# Patient Record
Sex: FEMALE | Race: WHITE | NOT HISPANIC OR LATINO | Employment: FULL TIME | URBAN - METROPOLITAN AREA
[De-identification: names, ages, dates, MRNs, and addresses within clinical notes are randomized per-mention and may not be internally consistent; named-entity substitution may affect disease eponyms.]

---

## 2017-01-27 RX ORDER — ACYCLOVIR 400 MG/1
TABLET ORAL
Qty: 30 TABLET | Refills: 6 | Status: SHIPPED | OUTPATIENT
Start: 2017-01-27 | End: 2017-06-28 | Stop reason: SDUPTHER

## 2017-04-18 RX ORDER — DESOGESTREL AND ETHINYL ESTRADIOL 0.15-0.03
KIT ORAL
Qty: 28 TABLET | Refills: 0 | Status: SHIPPED | OUTPATIENT
Start: 2017-04-18 | End: 2017-06-14 | Stop reason: SDUPTHER

## 2017-05-23 RX ORDER — LEVOTHYROXINE SODIUM 25 UG/1
25 TABLET ORAL DAILY
Qty: 30 TABLET | Refills: 6 | Status: SHIPPED | OUTPATIENT
Start: 2017-05-23 | End: 2017-12-04 | Stop reason: SDUPTHER

## 2017-06-14 RX ORDER — DESOGESTREL AND ETHINYL ESTRADIOL 0.15-0.03
KIT ORAL
Qty: 28 TABLET | Refills: 0 | Status: SHIPPED | OUTPATIENT
Start: 2017-06-14 | End: 2017-06-28 | Stop reason: SDUPTHER

## 2017-06-18 RX ORDER — TRIAMCINOLONE ACETONIDE 1 MG/G
PASTE DENTAL
Qty: 5 G | Refills: 0 | Status: SHIPPED | OUTPATIENT
Start: 2017-06-18 | End: 2022-01-20 | Stop reason: SDUPTHER

## 2017-06-28 ENCOUNTER — OFFICE VISIT (OUTPATIENT)
Dept: OBSTETRICS AND GYNECOLOGY | Facility: CLINIC | Age: 47
End: 2017-06-28
Payer: COMMERCIAL

## 2017-06-28 VITALS
WEIGHT: 134.5 LBS | BODY MASS INDEX: 22.96 KG/M2 | SYSTOLIC BLOOD PRESSURE: 108 MMHG | HEIGHT: 64 IN | DIASTOLIC BLOOD PRESSURE: 60 MMHG

## 2017-06-28 DIAGNOSIS — Z01.419 ENCOUNTER FOR GYNECOLOGICAL EXAMINATION WITHOUT ABNORMAL FINDING: Primary | ICD-10-CM

## 2017-06-28 DIAGNOSIS — Z30.011 ENCOUNTER FOR INITIAL PRESCRIPTION OF CONTRACEPTIVE PILLS: ICD-10-CM

## 2017-06-28 PROCEDURE — 99396 PREV VISIT EST AGE 40-64: CPT | Mod: S$GLB,,, | Performed by: OBSTETRICS & GYNECOLOGY

## 2017-06-28 PROCEDURE — 99999 PR PBB SHADOW E&M-EST. PATIENT-LVL II: CPT | Mod: PBBFAC,,, | Performed by: OBSTETRICS & GYNECOLOGY

## 2017-06-28 RX ORDER — ACYCLOVIR 400 MG/1
400 TABLET ORAL 2 TIMES DAILY
Qty: 30 TABLET | Refills: 12 | Status: SHIPPED | OUTPATIENT
Start: 2017-06-28 | End: 2018-07-11 | Stop reason: SDUPTHER

## 2017-06-28 RX ORDER — DESOGESTREL AND ETHINYL ESTRADIOL 0.15-0.03
1 KIT ORAL DAILY
Qty: 28 TABLET | Refills: 12 | Status: SHIPPED | OUTPATIENT
Start: 2017-06-28 | End: 2017-06-29 | Stop reason: SDUPTHER

## 2017-06-28 NOTE — PROGRESS NOTES
PT HERE FOR ANNUAL.  WANTS OCP REFILL AND ACYCLOVIR.    ROS:  GENERAL: No fever, chills, fatigability or weight loss.  VULVAR: No pain, no lesions and no itching.  VAGINAL: No relaxation, no itching, no discharge, no abnormal bleeding and no lesions.  ABDOMEN: No abdominal pain. Denies nausea. Denies vomiting. No diarrhea. No constipation  BREAST: Denies pain. No lumps. No discharge.  URINARY: No incontinence, no nocturia, no frequency and no dysuria.  CARDIOVASCULAR: No chest pain. No shortness of breath. No leg cramps.  NEUROLOGICAL: No headaches. No vision changes.  The remainder of the review of systems was negative.    PE:  General Appearance: normal weight And Well developed. Well nourished. In no acute distress.  Vulva: Lesions: No.  Urethral Meatus: Normal size. Normal location. No lesions. No prolapse.  Urethra: No masses. No tenderness. No prolapse. No scarring.  Bladder: No masses. No tenderness.  Vagina: Mucosa NI:yes discharge no, atrophy no, cystocele no or rectocele no.  Cervix: Lesion: no  Stenotic: no Cervical motion tenderness: no  Uterus: Uterus size: 5 weeks. Support good. Uterus size: Normal  Adnexa: Masses: No Tenderness: No CDS Nodularity: No  Abdomen: normal weight No masses. No tenderness.  Breasts: No bilateral masses. No bilateral discharge. No bilateral tenderness. No bilateral fibrocystic changes.  Neck: No thyroid enlargement. No thyroid masses.  Skin: Rashes: No      PROCEDURES:    PLAN:     DIAGNOSIS:  1. Encounter for gynecological examination without abnormal finding    2. Encounter for initial prescription of contraceptive pills        MEDICATIONS & ORDERS:  Orders Placed This Encounter    desogestrel-ethinyl estradiol (APRI) 0.15-0.03 mg per tablet    acyclovir (ZOVIRAX) 400 MG tablet       Patient was counseled today on the new ACS guidelines for cervical cytology screening as well as the current recommendations for breast cancer screening. She was counseled to follow up with  her PCP for other routine health maintenance. Counseling session lasted approximately 10 minutes, and all her questions were answered.         FOLLOW-UP: With me in 12 month

## 2017-06-29 ENCOUNTER — TELEPHONE (OUTPATIENT)
Dept: OBSTETRICS AND GYNECOLOGY | Facility: CLINIC | Age: 47
End: 2017-06-29

## 2017-06-29 RX ORDER — DESOGESTREL AND ETHINYL ESTRADIOL 0.15-0.03
1 KIT ORAL DAILY
Qty: 28 TABLET | Refills: 12 | Status: SHIPPED | OUTPATIENT
Start: 2017-06-29 | End: 2018-06-13 | Stop reason: SDUPTHER

## 2017-06-29 NOTE — TELEPHONE ENCOUNTER
----- Message from Catalina Stovall LPN sent at 6/29/2017 10:57 AM CDT -----  Contact: Self  Can you resend her Rx, the pharm is on file.  Thanks       ----- Message -----  From: Tiana Huizar  Sent: 6/29/2017   9:18 AM  To: Shen WILLIS Jr Staff    Pt is calling in regards of getting her BC (APRI) sent to a different pharmacy if possible. The pt states that she would like it sent to [facility] on [address], [address]. The pt can be reached at [phone]. Thanks KG

## 2017-10-17 RX ORDER — PAROXETINE 10 MG/1
10 TABLET, FILM COATED ORAL NIGHTLY
Qty: 30 TABLET | Refills: 11 | Status: SHIPPED | OUTPATIENT
Start: 2017-10-17 | End: 2018-08-13 | Stop reason: SDUPTHER

## 2017-10-20 ENCOUNTER — OFFICE VISIT (OUTPATIENT)
Dept: URGENT CARE | Facility: CLINIC | Age: 47
End: 2017-10-20
Payer: COMMERCIAL

## 2017-10-20 VITALS
HEART RATE: 80 BPM | SYSTOLIC BLOOD PRESSURE: 113 MMHG | OXYGEN SATURATION: 98 % | BODY MASS INDEX: 22.2 KG/M2 | HEIGHT: 64 IN | RESPIRATION RATE: 16 BRPM | TEMPERATURE: 98 F | DIASTOLIC BLOOD PRESSURE: 77 MMHG | WEIGHT: 130 LBS

## 2017-10-20 DIAGNOSIS — H92.01 EAR PAIN, RIGHT: Primary | ICD-10-CM

## 2017-10-20 PROCEDURE — 99213 OFFICE O/P EST LOW 20 MIN: CPT | Mod: S$GLB,,, | Performed by: FAMILY MEDICINE

## 2017-10-20 NOTE — PATIENT INSTRUCTIONS

## 2017-10-20 NOTE — PROGRESS NOTES
"Subjective:       Patient ID: Becky Ospina is a 47 y.o. female.    Vitals:  height is 5' 4" (1.626 m) and weight is 59 kg (130 lb). Her oral temperature is 98.3 °F (36.8 °C). Her blood pressure is 113/77 and her pulse is 80. Her respiration is 16 and oxygen saturation is 98%.     Chief Complaint: Otalgia    Pt is present today with ear pain, sore throat and congestion that started about 2 weeks ago.       URI    This is a new problem. The current episode started 1 to 4 weeks ago. The problem has been unchanged. Associated symptoms include coughing, ear pain and a sore throat. Pertinent negatives include no abdominal pain, chest pain, congestion, headaches, nausea or wheezing. Treatments tried: Mucinex and Zyrtec  The treatment provided no relief.     Review of Systems   Constitution: Negative for chills, fever and malaise/fatigue.   HENT: Positive for ear pain and sore throat. Negative for congestion and hoarse voice.    Eyes: Positive for discharge and redness.   Cardiovascular: Negative for chest pain, dyspnea on exertion and leg swelling.   Respiratory: Positive for cough and sputum production. Negative for shortness of breath and wheezing.    Musculoskeletal: Negative for myalgias.   Gastrointestinal: Negative for abdominal pain and nausea.   Neurological: Negative for headaches.       Objective:      Physical Exam   Constitutional: She appears well-developed and well-nourished.   HENT:   Head: Normocephalic and atraumatic.   Right Ear: Hearing, tympanic membrane, external ear and ear canal normal.   Left Ear: Hearing, tympanic membrane, external ear and ear canal normal.   Cardiovascular: Normal rate, regular rhythm and normal heart sounds.    Pulmonary/Chest: Effort normal and breath sounds normal.   Nursing note and vitals reviewed.      Assessment:       1. Ear pain, right        Plan:         Ear pain, right    OTC analgesia, RTC prn worsening symptoms    "

## 2017-11-08 ENCOUNTER — OFFICE VISIT (OUTPATIENT)
Dept: INTERNAL MEDICINE | Facility: CLINIC | Age: 47
End: 2017-11-08
Payer: COMMERCIAL

## 2017-11-08 VITALS
TEMPERATURE: 98 F | BODY MASS INDEX: 23.64 KG/M2 | DIASTOLIC BLOOD PRESSURE: 70 MMHG | HEIGHT: 64 IN | HEART RATE: 68 BPM | SYSTOLIC BLOOD PRESSURE: 102 MMHG | WEIGHT: 138.44 LBS

## 2017-11-08 DIAGNOSIS — J01.40 ACUTE NON-RECURRENT PANSINUSITIS: Primary | ICD-10-CM

## 2017-11-08 PROCEDURE — 99214 OFFICE O/P EST MOD 30 MIN: CPT | Mod: S$GLB,,, | Performed by: INTERNAL MEDICINE

## 2017-11-08 PROCEDURE — 99999 PR PBB SHADOW E&M-EST. PATIENT-LVL III: CPT | Mod: PBBFAC,,, | Performed by: INTERNAL MEDICINE

## 2017-11-08 RX ORDER — TRIAMCINOLONE ACETONIDE 55 UG/1
1 SPRAY, METERED NASAL DAILY
Refills: 0 | COMMUNITY
Start: 2017-11-08 | End: 2017-11-18

## 2017-11-08 RX ORDER — METHYLPREDNISOLONE 4 MG/1
TABLET ORAL
Qty: 1 PACKAGE | Refills: 0 | Status: SHIPPED | OUTPATIENT
Start: 2017-11-08 | End: 2017-11-14

## 2017-11-08 NOTE — PATIENT INSTRUCTIONS

## 2017-11-08 NOTE — PROGRESS NOTES
Becky Ospina presents today to urgent care for:  URI      UREVA    This is a new problem. The current episode started 1 to 4 weeks ago. The problem has been waxing and waning. There has been no fever. Associated symptoms include congestion, coughing, ear pain (several weeks ago but this has since resolved), sinus pain and a sore throat. Pertinent negatives include no abdominal pain, chest pain, diarrhea, dysuria, headaches, nausea, neck pain, plugged ear sensation, rhinorrhea, sneezing or wheezing. She has tried antihistamine for the symptoms. The treatment provided mild relief.       Past medical, social, family and surgical history was reviewed and updated today as needed. See encounter for details.     Review of Systems   Constitutional: Negative for chills and fever.   HENT: Positive for congestion, ear pain (several weeks ago but this has since resolved), sinus pain and sore throat. Negative for hearing loss, nosebleeds, rhinorrhea and sneezing.    Eyes: Negative for discharge.   Respiratory: Positive for cough. Negative for wheezing.    Cardiovascular: Negative for chest pain.   Gastrointestinal: Negative for abdominal pain, diarrhea and nausea.   Genitourinary: Negative for dysuria.   Musculoskeletal: Negative for neck pain.   Neurological: Negative for headaches.       Vitals:    11/08/17 0857   BP: 102/70   Pulse: 68   Temp: 98.3 °F (36.8 °C)   Body mass index is 23.76 kg/m².   Physical Exam   Constitutional: She appears well-developed and well-nourished. No distress.   HENT:   Head: Normocephalic and atraumatic.   Right Ear: External ear normal.   Left Ear: External ear normal.   Nose: No mucosal edema or rhinorrhea. Right sinus exhibits no maxillary sinus tenderness and no frontal sinus tenderness. Left sinus exhibits no maxillary sinus tenderness and no frontal sinus tenderness.   Mouth/Throat: Uvula is midline and mucous membranes are normal. Posterior oropharyngeal erythema present. No  posterior oropharyngeal edema. No tonsillar exudate.   Dry nasal membranes     Eyes: Conjunctivae are normal.   Neck: No thyromegaly present.   Cardiovascular: Normal rate, regular rhythm and intact distal pulses.    Pulmonary/Chest: Effort normal and breath sounds normal. She has no wheezes.   Lymphadenopathy:     She has no cervical adenopathy.        Assessment/plan:   1. Acute non-recurrent pansinusitis  Likely either allergic or viral.    Discussed current recommendations for the treatment of sinusitis.   Orders:  - methylPREDNISolone (MEDROL DOSEPACK) 4 mg tablet; use as directed  Dispense: 1 Package; Refill: 0  - triamcinolone (NASACORT) 55 mcg nasal inhaler; 1 spray by Nasal route once daily.; Refill: 0      Return if symptoms worsen or fail to improve. Pt. Will notify me if no improvement after steroid trial and then will proceed with ABX treatment if needed.

## 2017-12-04 RX ORDER — LEVOTHYROXINE SODIUM 25 UG/1
25 TABLET ORAL DAILY
Qty: 30 TABLET | Refills: 1 | Status: SHIPPED | OUTPATIENT
Start: 2017-12-04 | End: 2017-12-05 | Stop reason: SDUPTHER

## 2017-12-05 RX ORDER — LEVOTHYROXINE SODIUM 25 UG/1
25 TABLET ORAL DAILY
Qty: 90 TABLET | Refills: 4 | Status: SHIPPED | OUTPATIENT
Start: 2017-12-05 | End: 2018-05-15 | Stop reason: SDUPTHER

## 2018-01-04 ENCOUNTER — LAB VISIT (OUTPATIENT)
Dept: LAB | Facility: HOSPITAL | Age: 48
End: 2018-01-04
Payer: COMMERCIAL

## 2018-01-04 DIAGNOSIS — Z13.9 ENCOUNTER FOR SCREENING, UNSPECIFIED: ICD-10-CM

## 2018-01-04 DIAGNOSIS — E03.9 ACQUIRED HYPOTHYROIDISM: ICD-10-CM

## 2018-01-04 LAB
ALBUMIN SERPL BCP-MCNC: 3.5 G/DL
ALP SERPL-CCNC: 50 U/L
ALT SERPL W/O P-5'-P-CCNC: 13 U/L
ANION GAP SERPL CALC-SCNC: 7 MMOL/L
AST SERPL-CCNC: 17 U/L
BASOPHILS # BLD AUTO: 0.04 K/UL
BASOPHILS NFR BLD: 0.6 %
BILIRUB SERPL-MCNC: 0.6 MG/DL
BUN SERPL-MCNC: 18 MG/DL
CALCIUM SERPL-MCNC: 8.9 MG/DL
CHLORIDE SERPL-SCNC: 105 MMOL/L
CHOLEST SERPL-MCNC: 190 MG/DL
CHOLEST/HDLC SERPL: 2.4 {RATIO}
CO2 SERPL-SCNC: 25 MMOL/L
CREAT SERPL-MCNC: 0.7 MG/DL
DIFFERENTIAL METHOD: ABNORMAL
EOSINOPHIL # BLD AUTO: 0.2 K/UL
EOSINOPHIL NFR BLD: 2.8 %
ERYTHROCYTE [DISTWIDTH] IN BLOOD BY AUTOMATED COUNT: 11.7 %
EST. GFR  (AFRICAN AMERICAN): >60 ML/MIN/1.73 M^2
EST. GFR  (NON AFRICAN AMERICAN): >60 ML/MIN/1.73 M^2
GLUCOSE SERPL-MCNC: 98 MG/DL
HCT VFR BLD AUTO: 39.3 %
HDLC SERPL-MCNC: 78 MG/DL
HDLC SERPL: 41.1 %
HGB BLD-MCNC: 13 G/DL
LDLC SERPL CALC-MCNC: 90.8 MG/DL
LYMPHOCYTES # BLD AUTO: 1.6 K/UL
LYMPHOCYTES NFR BLD: 23.4 %
MCH RBC QN AUTO: 31.7 PG
MCHC RBC AUTO-ENTMCNC: 33.1 G/DL
MCV RBC AUTO: 96 FL
MONOCYTES # BLD AUTO: 0.7 K/UL
MONOCYTES NFR BLD: 10.2 %
NEUTROPHILS # BLD AUTO: 4.3 K/UL
NEUTROPHILS NFR BLD: 62.9 %
NONHDLC SERPL-MCNC: 112 MG/DL
PLATELET # BLD AUTO: 290 K/UL
PMV BLD AUTO: 9.5 FL
POTASSIUM SERPL-SCNC: 4.7 MMOL/L
PROT SERPL-MCNC: 7 G/DL
RBC # BLD AUTO: 4.1 M/UL
SODIUM SERPL-SCNC: 137 MMOL/L
T4 FREE SERPL-MCNC: 0.88 NG/DL
TRIGL SERPL-MCNC: 106 MG/DL
TSH SERPL DL<=0.005 MIU/L-ACNC: 7.44 UIU/ML
WBC # BLD AUTO: 6.85 K/UL

## 2018-01-04 PROCEDURE — 80061 LIPID PANEL: CPT

## 2018-01-04 PROCEDURE — 84443 ASSAY THYROID STIM HORMONE: CPT

## 2018-01-04 PROCEDURE — 80053 COMPREHEN METABOLIC PANEL: CPT

## 2018-01-04 PROCEDURE — 84439 ASSAY OF FREE THYROXINE: CPT

## 2018-01-04 PROCEDURE — 85025 COMPLETE CBC W/AUTO DIFF WBC: CPT

## 2018-01-04 PROCEDURE — 36415 COLL VENOUS BLD VENIPUNCTURE: CPT

## 2018-01-11 ENCOUNTER — OFFICE VISIT (OUTPATIENT)
Dept: INTERNAL MEDICINE | Facility: CLINIC | Age: 48
End: 2018-01-11
Payer: COMMERCIAL

## 2018-01-11 VITALS
SYSTOLIC BLOOD PRESSURE: 120 MMHG | WEIGHT: 139.75 LBS | BODY MASS INDEX: 23.86 KG/M2 | HEIGHT: 64 IN | DIASTOLIC BLOOD PRESSURE: 64 MMHG | HEART RATE: 76 BPM | OXYGEN SATURATION: 95 %

## 2018-01-11 DIAGNOSIS — R76.8 POSITIVE ANA (ANTINUCLEAR ANTIBODY): ICD-10-CM

## 2018-01-11 DIAGNOSIS — Z12.31 ENCOUNTER FOR SCREENING MAMMOGRAM FOR BREAST CANCER: ICD-10-CM

## 2018-01-11 DIAGNOSIS — Z00.00 PHYSICAL EXAM: Primary | ICD-10-CM

## 2018-01-11 DIAGNOSIS — E03.8 OTHER SPECIFIED HYPOTHYROIDISM: ICD-10-CM

## 2018-01-11 PROCEDURE — 99999 PR PBB SHADOW E&M-EST. PATIENT-LVL IV: CPT | Mod: PBBFAC,,, | Performed by: INTERNAL MEDICINE

## 2018-01-11 PROCEDURE — 99396 PREV VISIT EST AGE 40-64: CPT | Mod: S$GLB,,, | Performed by: INTERNAL MEDICINE

## 2018-01-11 NOTE — PROGRESS NOTES
Subjective:      Patient ID: Becky Ospina is a 47 y.o. female.    Chief Complaint: Annual Exam    HPI:  HPI   Patient is here for her annual exam: Patient feels as if she has had a cold for multiple.    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   HENT: Negative for ear pain, nosebleeds, sore throat, trouble swallowing and voice change.    Eyes: Negative for photophobia and visual disturbance.   Respiratory: Negative for cough, shortness of breath and wheezing.    Cardiovascular: Negative for chest pain, palpitations and leg swelling.   Gastrointestinal: Negative for abdominal distention, abdominal pain and blood in stool.   Genitourinary: Negative for difficulty urinating, dysuria, flank pain and hematuria.   Musculoskeletal: Negative for back pain, gait problem and joint swelling.   Skin: Negative for color change and rash.   Neurological: Negative for seizures and headaches.   Hematological:  "Negative for adenopathy. Does not bruise/bleed easily.   Psychiatric/Behavioral: Negative for dysphoric mood and suicidal ideas.     Objective:     Vitals:    01/11/18 1527   BP: 120/64   Pulse: 76   SpO2: 95%   Weight: 63.4 kg (139 lb 12.4 oz)   Height: 5' 4" (1.626 m)   PainSc: 0-No pain     Body mass index is 23.99 kg/m².  Physical Exam   Constitutional: She is oriented to person, place, and time. She appears well-developed and well-nourished. No distress.   HENT:   Right Ear: Tympanic membrane and ear canal normal.   Left Ear: Tympanic membrane and ear canal normal.   Nose: No sinus tenderness or nasal deformity.   Mouth/Throat: No oropharyngeal exudate or posterior oropharyngeal erythema.   Eyes: Conjunctivae, EOM and lids are normal. Pupils are equal, round, and reactive to light.   Neck: Carotid bruit is not present. No thyromegaly present.   Cardiovascular: Normal rate, regular rhythm and intact distal pulses.    Pulmonary/Chest: Effort normal and breath sounds normal. No respiratory distress.   Abdominal: Soft. Bowel sounds are normal. There is no tenderness.   Musculoskeletal: She exhibits no edema or tenderness.   Lymphadenopathy:        Head (right side): No submandibular adenopathy present.        Head (left side): No submandibular adenopathy present.     She has no cervical adenopathy.     She has no axillary adenopathy.        Right: No inguinal adenopathy present.        Left: No inguinal adenopathy present.   Neurological: She is alert and oriented to person, place, and time. She has normal strength.   Skin: Skin is intact. No lesion noted.   Psychiatric: She has a normal mood and affect. Her speech is normal and behavior is normal.     Assessment:     1. Physical exam    2. Encounter for screening mammogram for breast cancer    3. Other specified hypothyroidism    4. Positive LIBRA (antinuclear antibody)      Plan:   Becky was seen today for annual exam.    Diagnoses and all orders for this " visit:    Physical exam: updated and reviewed    Encounter for screening mammogram for breast cancer  -     Mammo Digital Screening Bilat with Tomosynthesis CAD; Future    Other specified hypothyroidism: patient has positive thyroid antibodies and her tsh has increased although she is assymptomatic.    Positive LIBRA (antinuclear antibody): : in the past no complaints of new symptoms      Return in about 1 year (around 1/11/2019) for Annual exam.     Medication List          Accurate as of 1/11/18  4:26 PM. If you have any questions, ask your nurse or doctor.               CONTINUE taking these medications    acyclovir 400 MG tablet  Commonly known as:  ZOVIRAX  Take 1 tablet (400 mg total) by mouth 2 (two) times daily.     desogestrel-ethinyl estradiol 0.15-0.03 mg per tablet  Commonly known as:  APRI  Take 1 tablet by mouth once daily.     levothyroxine 25 MCG tablet  Commonly known as:  SYNTHROID  Take 1 tablet (25 mcg total) by mouth once daily.     naproxen sodium 550 MG tablet  Commonly known as:  ANAPROX  Take 1 tablet (550 mg total) by mouth 2 (two) times daily with meals.     paroxetine 10 MG tablet  Commonly known as:  PAXIL  TAKE 1 TABLET (10 MG TOTAL) BY MOUTH EVERY EVENING.     triamcinolone acetonide 0.1% 0.1 % paste  Commonly known as:  KENALOG  APPLY TO MOUTH SORES 2 TIMES A DAY

## 2018-01-11 NOTE — PATIENT INSTRUCTIONS
Due: Tdap: tetanus/diptheria/pertussis  Pharmacy  3D Mammogram  Optometry: [facility]

## 2018-01-24 ENCOUNTER — HOSPITAL ENCOUNTER (OUTPATIENT)
Dept: RADIOLOGY | Facility: HOSPITAL | Age: 48
Discharge: HOME | End: 2018-01-24
Attending: INTERNAL MEDICINE
Payer: COMMERCIAL

## 2018-01-24 VITALS — HEIGHT: 64 IN | WEIGHT: 139 LBS | BODY MASS INDEX: 23.73 KG/M2

## 2018-01-24 DIAGNOSIS — Z12.31 ENCOUNTER FOR SCREENING MAMMOGRAM FOR BREAST CANCER: ICD-10-CM

## 2018-01-24 PROCEDURE — 77067 SCR MAMMO BI INCL CAD: CPT | Mod: TC

## 2018-01-24 PROCEDURE — 77067 SCR MAMMO BI INCL CAD: CPT | Mod: 26,,, | Performed by: INTERNAL MEDICINE

## 2018-01-24 PROCEDURE — 77063 BREAST TOMOSYNTHESIS BI: CPT | Mod: 26,,, | Performed by: INTERNAL MEDICINE

## 2018-05-15 RX ORDER — LEVOTHYROXINE SODIUM 25 UG/1
TABLET ORAL
Qty: 135 TABLET | Refills: 4 | Status: SHIPPED | OUTPATIENT
Start: 2018-05-15 | End: 2018-07-11 | Stop reason: SDUPTHER

## 2018-05-15 NOTE — TELEPHONE ENCOUNTER
"----- Message from Jill Kaminski sent at 5/15/2018 10:00 AM CDT -----  Contact: Self Call  [phone]     Is this a refill or new RX:  New     RX name and strength:  Levothyroxine (SYNTHROID) 25 MCG tablet    Directions: Taking 1 1/2    Is this a 30 day or 90 day RX:  90    Pharmacy name and phone # (DON'T enter "on file" or "in chart"):   [facility]   [phone]    Comments:  Please send a new Rx with new directions of taking 1 1/2 pills a day.      "

## 2018-06-13 RX ORDER — DESOGESTREL AND ETHINYL ESTRADIOL 0.15-0.03
1 KIT ORAL DAILY
Qty: 28 TABLET | Refills: 1 | Status: SHIPPED | OUTPATIENT
Start: 2018-06-13 | End: 2018-07-17 | Stop reason: SDUPTHER

## 2018-07-10 ENCOUNTER — NURSE TRIAGE (OUTPATIENT)
Dept: ADMINISTRATIVE | Facility: CLINIC | Age: 48
End: 2018-07-10

## 2018-07-11 ENCOUNTER — OFFICE VISIT (OUTPATIENT)
Dept: INTERNAL MEDICINE | Facility: CLINIC | Age: 48
End: 2018-07-11
Payer: COMMERCIAL

## 2018-07-11 VITALS
OXYGEN SATURATION: 99 % | BODY MASS INDEX: 24.77 KG/M2 | TEMPERATURE: 99 F | DIASTOLIC BLOOD PRESSURE: 60 MMHG | WEIGHT: 145.06 LBS | HEART RATE: 81 BPM | HEIGHT: 64 IN | SYSTOLIC BLOOD PRESSURE: 120 MMHG

## 2018-07-11 DIAGNOSIS — M54.2 NECK PAIN: ICD-10-CM

## 2018-07-11 DIAGNOSIS — G44.85 PRIMARY STABBING HEADACHE: Primary | ICD-10-CM

## 2018-07-11 DIAGNOSIS — E03.8 OTHER SPECIFIED HYPOTHYROIDISM: ICD-10-CM

## 2018-07-11 PROCEDURE — 99213 OFFICE O/P EST LOW 20 MIN: CPT | Mod: S$GLB,,, | Performed by: NURSE PRACTITIONER

## 2018-07-11 PROCEDURE — 3008F BODY MASS INDEX DOCD: CPT | Mod: CPTII,S$GLB,, | Performed by: NURSE PRACTITIONER

## 2018-07-11 PROCEDURE — 99999 PR PBB SHADOW E&M-EST. PATIENT-LVL IV: CPT | Mod: PBBFAC,,, | Performed by: NURSE PRACTITIONER

## 2018-07-11 RX ORDER — LEVOTHYROXINE SODIUM 25 UG/1
TABLET ORAL
Qty: 135 TABLET | Refills: 4 | Status: SHIPPED | OUTPATIENT
Start: 2018-07-11 | End: 2019-07-15 | Stop reason: SDUPTHER

## 2018-07-11 RX ORDER — ACYCLOVIR 400 MG/1
400 TABLET ORAL 2 TIMES DAILY
Qty: 30 TABLET | Refills: 12 | Status: SHIPPED | OUTPATIENT
Start: 2018-07-11 | End: 2018-07-17 | Stop reason: SDUPTHER

## 2018-07-11 RX ORDER — NAPROXEN SODIUM 550 MG/1
550 TABLET ORAL 2 TIMES DAILY WITH MEALS
Qty: 60 TABLET | Refills: 1 | Status: SHIPPED | OUTPATIENT
Start: 2018-07-11 | End: 2022-04-19

## 2018-07-11 NOTE — PROGRESS NOTES
INTERNAL MEDICINE URGENT CARE NOTE    CHIEF COMPLAINT     Chief Complaint   Patient presents with    Headache     only on left side       HPI     Becky Ospina is a 47 y.o. female with hypothyroidism who presents for an urgent visit today.    Here with c/o left sided headaches to the left occipital region started approx one year ago but now occurring daily x1 week. Described as burning sensation to the occipital region moving up to parietal region    Aggravated by hanging head down (ie- over the bath tub) and occasionally with valsalva and turning head to the left.   Relived with moving head to the right or opposite direction or shaking head   No associated s/s- no visual auras, no dizziness, no nausea.   Feels them coming on.     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 Authorizing Provider   acyclovir (ZOVIRAX) 400 MG tablet Take 1 tablet (400 mg total) by mouth 2 (two) times daily. 6/28/17  Yes Diaz Gotti Jr., MD   APRI 0.15-0.03 mg per tablet Take 1 tablet by mouth once daily. 6/13/18  Yes Diaz Gotti Jr., MD   levothyroxine (SYNTHROID) 25 MCG tablet Take 1 1/2 tablets per day 5/15/18  Yes Kacie Silva MD   naproxen sodium (ANAPROX) 550 MG tablet Take 1 tablet (550 mg total) by mouth 2 (two) times daily with meals. 11/22/14  Yes Toan Huddleston MD   paroxetine (PAXIL) 10 MG tablet TAKE 1 TABLET (10 MG TOTAL) BY MOUTH EVERY EVENING. 10/17/17  Yes Kacie Silva MD   triamcinolone acetonide 0.1% (KENALOG) 0.1 % paste APPLY TO MOUTH SORES 2 TIMES A DAY 6/18/17  Yes Kacie Silva MD       Review of Systems:  Review of Systems  "  Constitutional: Negative for chills, fatigue and fever.   Respiratory: Negative for cough, shortness of breath and wheezing.    Cardiovascular: Negative for chest pain and palpitations.   Gastrointestinal: Negative for abdominal pain, blood in stool, constipation, diarrhea and nausea.   Musculoskeletal: Negative for arthralgias, neck pain and neck stiffness.   Skin: Negative for rash.   Neurological: Positive for headaches. Negative for dizziness, seizures, weakness and light-headedness.       Health Maintainence:   Immunizations:  Health Maintenance       Date Due Completion Date    TETANUS VACCINE 07/13/1988 ---    Influenza Vaccine 08/01/2018 10/2/2017    Mammogram 01/24/2019 1/24/2018    Pap Smear with HPV Cotest 04/22/2019 4/22/2016    Lipid Panel 01/04/2023 1/4/2018           PHYSICAL EXAM     /60 (BP Location: Left arm, Patient Position: Sitting, BP Method: Large (Manual))   Pulse 81   Temp 98.6 °F (37 °C)   Ht 5' 4" (1.626 m)   Wt 65.8 kg (145 lb 1 oz)   LMP 07/11/2018 (Exact Date)   SpO2 99%   BMI 24.90 kg/m²     Physical Exam   Constitutional: She is oriented to person, place, and time. She appears well-developed and well-nourished.   HENT:   Head: Normocephalic.   Right Ear: External ear normal.   Left Ear: External ear normal.   Nose: Nose normal.   Mouth/Throat: Oropharynx is clear and moist. No oropharyngeal exudate.   Eyes: Pupils are equal, round, and reactive to light.   Neck: Neck supple. No JVD present. No tracheal deviation present. No thyromegaly present.   Cardiovascular: Normal rate, regular rhythm, normal heart sounds and intact distal pulses.  Exam reveals no gallop and no friction rub.    No murmur heard.  Pulmonary/Chest: Effort normal and breath sounds normal. No respiratory distress. She has no wheezes. She has no rales.   Abdominal: Soft. Bowel sounds are normal. She exhibits no distension. There is no tenderness.   Musculoskeletal: Normal range of motion. She exhibits no " edema or tenderness.   Lymphadenopathy:        Head (right side): No submental, no submandibular, no tonsillar, no preauricular, no posterior auricular and no occipital adenopathy present.        Head (left side): No submental, no submandibular, no tonsillar, no preauricular, no posterior auricular and no occipital adenopathy present.     She has no cervical adenopathy.        Right cervical: No superficial cervical adenopathy present.       Left cervical: No superficial cervical adenopathy present.        Right: No supraclavicular adenopathy present.        Left: No supraclavicular adenopathy present.   Neurological: She is alert and oriented to person, place, and time. She displays normal reflexes. No cranial nerve deficit. Coordination normal.   Skin: Skin is warm and dry. Capillary refill takes less than 2 seconds. No rash noted.   Psychiatric: She has a normal mood and affect. Her behavior is normal.   Vitals reviewed.      LABS     No results found for: LABA1C, HGBA1C  CMP  Sodium   Date Value Ref Range Status   01/04/2018 137 136 - 145 mmol/L Final     Potassium   Date Value Ref Range Status   01/04/2018 4.7 3.5 - 5.1 mmol/L Final     Chloride   Date Value Ref Range Status   01/04/2018 105 95 - 110 mmol/L Final     CO2   Date Value Ref Range Status   01/04/2018 25 23 - 29 mmol/L Final     Glucose   Date Value Ref Range Status   01/04/2018 98 70 - 110 mg/dL Final     BUN, Bld   Date Value Ref Range Status   01/04/2018 18 6 - 20 mg/dL Final     Creatinine   Date Value Ref Range Status   01/04/2018 0.7 0.5 - 1.4 mg/dL Final     Calcium   Date Value Ref Range Status   01/04/2018 8.9 8.7 - 10.5 mg/dL Final     Total Protein   Date Value Ref Range Status   01/04/2018 7.0 6.0 - 8.4 g/dL Final     Albumin   Date Value Ref Range Status   01/04/2018 3.5 3.5 - 5.2 g/dL Final     Total Bilirubin   Date Value Ref Range Status   01/04/2018 0.6 0.1 - 1.0 mg/dL Final     Comment:     For infants and newborns, interpretation  of results should be based  on gestational age, weight and in agreement with clinical  observations.  Premature Infant recommended reference ranges:  Up to 24 hours.............<8.0 mg/dL  Up to 48 hours............<12.0 mg/dL  3-5 days..................<15.0 mg/dL  6-29 days.................<15.0 mg/dL       Alkaline Phosphatase   Date Value Ref Range Status   01/04/2018 50 (L) 55 - 135 U/L Final     AST   Date Value Ref Range Status   01/04/2018 17 10 - 40 U/L Final     ALT   Date Value Ref Range Status   01/04/2018 13 10 - 44 U/L Final     Anion Gap   Date Value Ref Range Status   01/04/2018 7 (L) 8 - 16 mmol/L Final     eGFR if    Date Value Ref Range Status   01/04/2018 >60 >60 mL/min/1.73 m^2 Final     eGFR if non    Date Value Ref Range Status   01/04/2018 >60 >60 mL/min/1.73 m^2 Final     Comment:     Calculation used to obtain the estimated glomerular filtration  rate (eGFR) is the CKD-EPI equation.        Lab Results   Component Value Date    WBC 6.85 01/04/2018    HGB 13.0 01/04/2018    HCT 39.3 01/04/2018    MCV 96 01/04/2018     01/04/2018     Lab Results   Component Value Date    CHOL 190 01/04/2018    CHOL 215 (H) 01/05/2016    CHOL 182 09/23/2014     Lab Results   Component Value Date    HDL 78 (H) 01/04/2018    HDL 79 (H) 01/05/2016    HDL 72 09/23/2014     Lab Results   Component Value Date    LDLCALC 90.8 01/04/2018    LDLCALC 121.4 01/05/2016    LDLCALC 83.6 09/23/2014     Lab Results   Component Value Date    TRIG 106 01/04/2018    TRIG 73 01/05/2016    TRIG 132 09/23/2014     Lab Results   Component Value Date    CHOLHDL 41.1 01/04/2018    CHOLHDL 36.7 01/05/2016    CHOLHDL 39.6 09/23/2014     Lab Results   Component Value Date    TSH 7.441 (H) 01/04/2018       ASSESSMENT/PLAN     Becky Ospina is a 47 y.o. 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 ?occipital neuralgia vs cervical radiculopathy.   -     CT Head Without Contrast; Future; Expected date: 07/11/2018    Neck pain- ?cervical radiculopathy vs occipital neuralgia. Will send for imaging. May need neuro referral if imaging is negative.   -     CT Head Without Contrast; Future; Expected date: 07/11/2018  -     CT Cervical Spine Without Contrast; Future; Expected date: 07/11/2018    Other specified hypothyroidism- stable. Cont current meds. Will refill as pharmacy did not fill appropriately when Dr. Silva sent in rx. May need PA?   -     levothyroxine (SYNTHROID) 25 MCG tablet; Take 1 1/2 tablets per day  Dispense: 135 tablet; Refill: 4    Other orders  -     naproxen sodium (ANAPROX) 550 MG tablet; Take 1 tablet (550 mg total) by mouth 2 (two) times daily with meals.  Dispense: 60 tablet; Refill: 1  -     acyclovir (ZOVIRAX) 400 MG tablet; Take 1 tablet (400 mg total) by mouth 2 (two) times daily.  Dispense: 30 tablet; Refill: 12      Follow up as needed and with PCP     Patient education provided from [facility]. Patient was counseled on when and how to seek emergent care.       Ailyn COHEN, APRN, FNP-c   Department of Internal Medicine - [facility]  4:28 PM

## 2018-07-13 ENCOUNTER — TELEPHONE (OUTPATIENT)
Dept: INTERNAL MEDICINE | Facility: CLINIC | Age: 48
End: 2018-07-13

## 2018-07-13 ENCOUNTER — PATIENT MESSAGE (OUTPATIENT)
Dept: INTERNAL MEDICINE | Facility: CLINIC | Age: 48
End: 2018-07-13

## 2018-07-13 ENCOUNTER — HOSPITAL ENCOUNTER (OUTPATIENT)
Dept: RADIOLOGY | Facility: HOSPITAL | Age: 48
Discharge: HOME | End: 2018-07-13
Attending: NURSE PRACTITIONER
Payer: COMMERCIAL

## 2018-07-13 DIAGNOSIS — G44.85 PRIMARY STABBING HEADACHE: ICD-10-CM

## 2018-07-13 DIAGNOSIS — M54.2 NECK PAIN: ICD-10-CM

## 2018-07-13 DIAGNOSIS — G44.85 PRIMARY STABBING HEADACHE: Primary | ICD-10-CM

## 2018-07-13 PROCEDURE — 70450 CT HEAD/BRAIN W/O DYE: CPT | Mod: 26,,, | Performed by: RADIOLOGY

## 2018-07-13 PROCEDURE — 72125 CT NECK SPINE W/O DYE: CPT | Mod: TC

## 2018-07-13 PROCEDURE — 70450 CT HEAD/BRAIN W/O DYE: CPT | Mod: TC

## 2018-07-13 PROCEDURE — 72125 CT NECK SPINE W/O DYE: CPT | Mod: 26,,, | Performed by: RADIOLOGY

## 2018-07-13 NOTE — TELEPHONE ENCOUNTER
Spoke pt. CT of head - normal. CT of the c-spine with mild foraminal narrowing could explain radicular pain. Discussed treatment options, will refer to PT. Pt is agreeable and verbalizes understanding

## 2018-07-17 ENCOUNTER — OFFICE VISIT (OUTPATIENT)
Dept: OBSTETRICS AND GYNECOLOGY | Facility: CLINIC | Age: 48
End: 2018-07-17
Payer: COMMERCIAL

## 2018-07-17 VITALS
WEIGHT: 143.31 LBS | HEIGHT: 64 IN | SYSTOLIC BLOOD PRESSURE: 110 MMHG | BODY MASS INDEX: 24.46 KG/M2 | DIASTOLIC BLOOD PRESSURE: 70 MMHG

## 2018-07-17 DIAGNOSIS — Z30.011 ENCOUNTER FOR INITIAL PRESCRIPTION OF CONTRACEPTIVE PILLS: ICD-10-CM

## 2018-07-17 DIAGNOSIS — Z01.419 ENCOUNTER FOR GYNECOLOGICAL EXAMINATION WITHOUT ABNORMAL FINDING: Primary | ICD-10-CM

## 2018-07-17 PROCEDURE — 99396 PREV VISIT EST AGE 40-64: CPT | Mod: S$GLB,,, | Performed by: OBSTETRICS & GYNECOLOGY

## 2018-07-17 PROCEDURE — 99999 PR PBB SHADOW E&M-EST. PATIENT-LVL II: CPT | Mod: PBBFAC,,, | Performed by: OBSTETRICS & GYNECOLOGY

## 2018-07-17 RX ORDER — ACYCLOVIR 400 MG/1
400 TABLET ORAL 2 TIMES DAILY
Qty: 60 TABLET | Refills: 12 | Status: SHIPPED | OUTPATIENT
Start: 2018-07-17 | End: 2021-12-29 | Stop reason: SDUPTHER

## 2018-07-17 RX ORDER — DESOGESTREL AND ETHINYL ESTRADIOL 0.15-0.03
1 KIT ORAL DAILY
Qty: 28 TABLET | Refills: 12 | Status: SHIPPED | OUTPATIENT
Start: 2018-07-17 | End: 2019-08-07 | Stop reason: SDUPTHER

## 2018-07-17 NOTE — PROGRESS NOTES
PT HERE FOR ANNUAL.  WANTS REFILL OCP.    ROS:  GENERAL: No fever, chills, fatigability or weight loss.  VULVAR: No pain, no lesions and no itching.  VAGINAL: No relaxation, no itching, no discharge, no abnormal bleeding and no lesions.  ABDOMEN: No abdominal pain. Denies nausea. Denies vomiting. No diarrhea. No constipation  BREAST: Denies pain. No lumps. No discharge.  URINARY: No incontinence, no nocturia, no frequency and no dysuria.  CARDIOVASCULAR: No chest pain. No shortness of breath. No leg cramps.  NEUROLOGICAL: No headaches. No vision changes.  The remainder of the review of systems was negative.    PE:  General Appearance: normal weight And Well developed. Well nourished. In no acute distress.  Vulva: Lesions: No.  Urethral Meatus: Normal size. Normal location. No lesions. No prolapse.  Urethra: No masses. No tenderness. No prolapse. No scarring.  Bladder: No masses. No tenderness.  Vagina: Mucosa NI:yes discharge no, atrophy no, cystocele no or rectocele no.  Cervix: Lesion: no  Stenotic: no Cervical motion tenderness: no  Uterus: Uterus size: 6 weeks. Support good. Uterus size: Normal  Adnexa: Masses: No Tenderness: No CDS Nodularity: No  Abdomen: normal weight No masses. No tenderness.  Breasts: No bilateral masses. No bilateral discharge. No bilateral tenderness. No bilateral fibrocystic changes.  Neck: No thyroid enlargement. No thyroid masses.  Skin: Rashes: No      PROCEDURES:    PLAN:     DIAGNOSIS:  1. Encounter for gynecological examination without abnormal finding    2. Encounter for initial prescription of contraceptive pills        MEDICATIONS & ORDERS:  Orders Placed This Encounter    desogestrel-ethinyl estradiol (APRI) 0.15-0.03 mg per tablet    acyclovir (ZOVIRAX) 400 MG tablet       Patient was counseled today on the new ACS guidelines for cervical cytology screening as well as the current recommendations for breast cancer screening. She was counseled to follow up with her PCP for  other routine health maintenance. Counseling session lasted approximately 10 minutes, and all her questions were answered.         FOLLOW-UP: With me in 12 month

## 2018-07-24 ENCOUNTER — CLINICAL SUPPORT (OUTPATIENT)
Dept: REHABILITATION | Facility: OTHER | Age: 48
End: 2018-07-24
Attending: NURSE PRACTITIONER
Payer: COMMERCIAL

## 2018-07-24 DIAGNOSIS — R29.3 POSTURE IMBALANCE: ICD-10-CM

## 2018-07-24 DIAGNOSIS — M54.2 NECK PAIN: ICD-10-CM

## 2018-07-24 PROCEDURE — 97140 MANUAL THERAPY 1/> REGIONS: CPT | Mod: PN | Performed by: PHYSICAL THERAPIST

## 2018-07-24 PROCEDURE — 97161 PT EVAL LOW COMPLEX 20 MIN: CPT | Mod: PN | Performed by: PHYSICAL THERAPIST

## 2018-07-24 PROCEDURE — 97110 THERAPEUTIC EXERCISES: CPT | Mod: PN | Performed by: PHYSICAL THERAPIST

## 2018-07-24 NOTE — PLAN OF CARE
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  Nutrition:  Normal  History of Present Illness: Cervical spondylosis at C6-C7 resulting in mild bilateral neuroforaminal narrowing without significant spinal canal stenosis at this or any other cervical level level.  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 She will get a stabbing/ burning headaches on left side from base of skull to top of head. Pain will be severe for a few minutes and resolve completely with stretching it different directions. No ear ringing, temporal or orbital pain. History of neck tightness and more so on R side of neck today. No pain into UE's, numbness, or tingling. Parvo virus 5 y.o. And notes widespread  "joint pain since then. She will noticed increase in joint pain when eating inflammatory foods and carbs. Hx of TMD and wear a mouth guard at night.     Pain:  Location: head and neck   Description: hot  Activities Which Increase Pain: extension, watching TV, inflammatory foods/ carbs  Activities Which Decrease Pain: changing positions  Pain Scale: 0/10 at best 3/10 now  8/10 at worst    Objective     Posture: forward head, anterior tilt of scapulae,   Palpation: TTP OC superior, RCP, Upper trapezius, L masseter   Sensation: BUE light touch sensation grossly intact  DTRs: NT  Cervical Spine Active ROM, measured in degrees with inclinometer, * Indicates pain with movement    Flexion: 40  Extension: 80*  Left Side Bend: 20  Right Side Bend: 20  Left Rotation: 42  Right Rotation: 35    PROM C-spine: decreased flexion, B side bending, R rotation  Joint mobility: Hypermobility 2/6 central PA's C6-T2, Downglides R mid cervical    MMT:    Left  Right    Shoulder:       Flexion:    5/5   5/5    Abduction:    5/5   5/5  External Rotation:   5/5   5/5    Internal Rotation:   5/5   5/5    Elbow:  Flexion:    5/5   5/5     Extension:   5/5   5/5    Wrist:  Extension:   5/5   5/5  Flexion    5/5  5/5    Finger adduction  5/5   5/5  Opposition   4/5  5/5  Thumb extension  4+/5  5/5    Flexibility: pecs, cervical paraspinals, UT  Gait: Without AD  Analysis: Assistance independent  Bed Mobility:Independent  Transfers: Independent  Special Tests:   Spurling's:negative  Sharp Ami's: negative  Cervical distraction: positive for relief  ULTT: negative    Treatment: PT Evaluation Completed? Yes  Discussed Plan of Care with patient: Yes    Patient received 15 minutes of therapeutic exercise & instruction including:  Supine chin tucks/ DNF x 10  Upper cervical nods seated x 10  Corner stretch 5 x 10"  Scapula retractions x 20  Self mob with towel R rotation x 10    Patient received 10 minutes of manual therapy including:  suboccipital " release  Manual cervical distraction  downglides R mid cervical, central PA's lower cervical and upper thoracic  MET R rotation and flexion    Written Home Exercises Provided: issued HEP with above exercises  Patient demo good understanding of the education provided. Patient demo good return demo of skill of exercises.      Assessment       Initial Assessment: Becky is a 48 year old female referred to physical therapy for diagnosis of headaches and possible occipital neuralgia. Pt with intermittent chronic neck and TMJ pain. Patient has the following impairments upon initial evaluation: decreased cervical spine AROM, poor postural awareness, L C5-6 myotome weakness, decreased soft tissue mobility, and joint stiffness. Patient to benefit from skilled physical therapy to address above deficits and maximize functional independence. Patient appears motivated to improve condition and is a good candidate for physical therapy. Patient has verbalized understanding of plan of care and set goals. Patient has no identified cultural, spiritual, or educational needs that would impair learning.     History  Co-morbidities and personal factors that may impact the plan of care Examination  Body Structures and Functions, activity limitations and participation restrictions that may impact the plan of care Clinical Presentation   Decision Making/ Complexity Score   Co-morbidities:       none.            Personal Factors:    Body Regions:neck    Body Systems:     Musculoskeletal:  weakness, impaired functional mobility, pain, decreased ROM, impaired joint extensibility and impaired muscle length    Neuromuscular:    Activity limitations: looking up, turning head R      Participation Restrictions: TV watching         stable and uncomplicated.     Low     CMS Impairment/Limitation/Restriction for FOTO Neck Survey  Status Limitation G-Code CMS Severity Modifier  Intake 70% 30% Current Status CJ - At least 20 percent but less than 40  percent  Predicted 74% 26% Goal Status+ CJ - At least 20 percent but less than 40 percent    Rehab Potiential: good    Short Term GOALS: 4 weeks  1. Patient to be independent with home exercise program for improved self management of condition  2. Patient demonstrates independence with Postural Awareness  3. Patient to report decreased frequency L sided headaches by 30% or greater  4. Patient demonstrates independence with body mechanics    Long Term GOALS: 8 weeks  1. Patient demonstrates increased cervical spine AROM to WNL to improve tolerance to functional activities pain free.   3. Patient to have decreased subjective report of disability as noted by a score of 26% or less on the FOTO questionnaire       Plan     Certification Period: 7/24/2018 to 9/24/2018  Recommended Treatment Plan: 1 times per week for 8 weeks: Cervical/Lumbar Traction, Manual Therapy, Moist Heat/ Ice, Neuromuscular Re-ed, Patient Education, Therapeutic Activites, Therapeutic Exercise and modalities and functional dry needling PRN  Other Recommendations: Patient may be seen by PTA as part of treatment team        Therapist: Phoebe Ashley PT

## 2018-08-10 ENCOUNTER — CLINICAL SUPPORT (OUTPATIENT)
Dept: REHABILITATION | Facility: OTHER | Age: 48
End: 2018-08-10
Payer: COMMERCIAL

## 2018-08-10 DIAGNOSIS — M54.2 NECK PAIN: ICD-10-CM

## 2018-08-10 DIAGNOSIS — R29.3 POSTURE IMBALANCE: ICD-10-CM

## 2018-08-10 PROCEDURE — 97110 THERAPEUTIC EXERCISES: CPT | Mod: PN | Performed by: PHYSICAL THERAPIST

## 2018-08-10 PROCEDURE — 97140 MANUAL THERAPY 1/> REGIONS: CPT | Mod: PN | Performed by: PHYSICAL THERAPIST

## 2018-08-10 NOTE — PROGRESS NOTES
"                                                    Physical Therapy Daily Note     Name: Becky Cade [facility]  Clinic Number: [id number]  Diagnosis:   Encounter Diagnoses   Name Primary?    Neck pain     Posture imbalance      Physician: Ailyn Rivero,*  Precautions: standard  Visit #: 2 of 20  PTA Visit #: 0  Time In: 0900  Time Out: 0935    Subjective     Pt reports: Feeling very tense after the evaluation. She bought a massager and has been doing the exercises with some relief. She was called into work today and requests shortened treatment session  Pain Scale: Becky rates pain on a scale of 0-10 to be 3 currently.    Objective     Becky received individual therapeutic exercises to develop strength, endurance, ROM, flexibility, posture and core stabilization for 25 minutes including:    Supine chin tucks/ DNF x 10  Upper cervical nods seated x 10  Corner stretch 5 x 10"  Scapula retractions x 20  Self mob with towel R rotation x 10    Becky received the following manual therapy techniques: Manual traction, Myofacial release and Soft tissue Mobilization were applied to the: neck for 10 minutes including:  suboccipital release  Manual cervical distraction  downglides R mid cervical, central PA's lower cervical and upper thoracic  MET R rotation and flexion     The patient received the following supervised modalities after being cleared for contradictions: none    Written Home Exercises Provided: reviewed   Pt demo good understanding of the education provided. Becky demonstrated good return demonstration of activities.     Education provided re:  Becky verbalized good understanding of education provided.   No spiritual or educational barriers to learning provided    Assessment     Patient tolerated treatment session well. Increased TTP R suboccipital region with reported referral of headache.   This is a 48 y.o. female referred to outpatient physical therapy and presents with a medical " diagnosis of neck pain and headaches and demonstrates limitations as described in the problem list. Pt prognosis is Good. Pt will continue to benefit from skilled outpatient physical therapy to address the deficits listed in the problem list, provide pt/family education and to maximize pt's level of independence in the home and community environment.     Goals as follows:  Short Term GOALS: 4 weeks  1. Patient to be independent with home exercise program for improved self management of condition  2. Patient demonstrates independence with Postural Awareness  3. Patient to report decreased frequency L sided headaches by 30% or greater  4. Patient demonstrates independence with body mechanics     Long Term GOALS: 8 weeks  1. Patient demonstrates increased cervical spine AROM to WNL to improve tolerance to functional activities pain free.   3. Patient to have decreased subjective report of disability as noted by a score of 26% or less on the FOTO questionnaire      Plan     Continue with established Plan of Care towards PT goals.    Therapist: Phoebe Ashley, PT  8/10/2018

## 2018-08-13 RX ORDER — PAROXETINE 10 MG/1
10 TABLET, FILM COATED ORAL NIGHTLY
Qty: 30 TABLET | Refills: 3 | Status: SHIPPED | OUTPATIENT
Start: 2018-08-13 | End: 2018-11-08 | Stop reason: SDUPTHER

## 2018-08-17 ENCOUNTER — CLINICAL SUPPORT (OUTPATIENT)
Dept: REHABILITATION | Facility: OTHER | Age: 48
End: 2018-08-17
Payer: COMMERCIAL

## 2018-08-17 DIAGNOSIS — M54.2 NECK PAIN: ICD-10-CM

## 2018-08-17 DIAGNOSIS — R29.3 POSTURE IMBALANCE: ICD-10-CM

## 2018-08-17 PROCEDURE — 97140 MANUAL THERAPY 1/> REGIONS: CPT | Mod: PN | Performed by: PHYSICAL THERAPIST

## 2018-08-17 PROCEDURE — 97110 THERAPEUTIC EXERCISES: CPT | Mod: PN | Performed by: PHYSICAL THERAPIST

## 2018-08-17 NOTE — PROGRESS NOTES
"                                                    Physical Therapy Daily Note     Name: Becky Cade [facility] Number: [id number]  Diagnosis:   Encounter Diagnoses   Name Primary?    Neck pain     Posture imbalance      Physician: Ailyn Rivero,*  Precautions: standard  Visit #: 2 of 20  PTA Visit #: 0  Time In: 0900  Time Out: 0945    Subjective     Pt reports: Pt reports using a massager at home with good relief. No headaches currently, and pain on R side of neck  Pain Scale: Becky rates pain on a scale of 0-10 to be 3 currently.    Objective     Becky received individual therapeutic exercises to develop strength, endurance, ROM, flexibility, posture and core stabilization for 35 minutes including:    UBE 4 min    Supine chin tucks/ DNF x 10  Upper cervical nods seated x 10  Corner stretch 5 x 10"  Rows ytb 2 x 15  Scapula retractions x 20  Self mob with towel R rotation x 10    Supine on 1/2 roll:  -shoulder flexion x 10  -horiz abd 10" x 10  -protraction 2 x 10    Becky received the following manual therapy techniques: Manual traction, Myofacial release and Soft tissue Mobilization were applied to the: neck for 10 minutes including:  suboccipital release  Manual cervical distraction  downglides R mid cervical, central PA's lower cervical and upper thoracic  MET R rotation and flexion     The patient received the following supervised modalities after being cleared for contradictions: none    Written Home Exercises Provided: reviewed   Pt demo good understanding of the education provided. Becky demonstrated good return demonstration of activities.     Education provided re:  Becky verbalized good understanding of education provided.   No spiritual or educational barriers to learning provided    Assessment     Patient tolerated treatment session well. Good progression of periscapular of periscapular strengthening without exacerbation of pain or headaches.   This is a 48 y.o. " female referred to outpatient physical therapy and presents with a medical diagnosis of neck pain and headaches and demonstrates limitations as described in the problem list. Pt prognosis is Good. Pt will continue to benefit from skilled outpatient physical therapy to address the deficits listed in the problem list, provide pt/family education and to maximize pt's level of independence in the home and community environment.     Goals as follows:  Short Term GOALS: 4 weeks  1. Patient to be independent with home exercise program for improved self management of condition  2. Patient demonstrates independence with Postural Awareness  3. Patient to report decreased frequency L sided headaches by 30% or greater  4. Patient demonstrates independence with body mechanics     Long Term GOALS: 8 weeks  1. Patient demonstrates increased cervical spine AROM to WNL to improve tolerance to functional activities pain free.   3. Patient to have decreased subjective report of disability as noted by a score of 26% or less on the FOTO questionnaire      Plan     Continue with established Plan of Care towards PT goals.    Therapist: Phoebe Ashley, PT  8/17/2018

## 2018-08-22 ENCOUNTER — CLINICAL SUPPORT (OUTPATIENT)
Dept: REHABILITATION | Facility: OTHER | Age: 48
End: 2018-08-22
Payer: COMMERCIAL

## 2018-08-22 DIAGNOSIS — M54.2 NECK PAIN: ICD-10-CM

## 2018-08-22 DIAGNOSIS — R29.3 POSTURE IMBALANCE: ICD-10-CM

## 2018-08-22 PROCEDURE — 97110 THERAPEUTIC EXERCISES: CPT | Mod: PN | Performed by: PHYSICAL THERAPIST

## 2018-08-22 NOTE — PROGRESS NOTES
"                                                    Physical Therapy Daily Note     Name: Becky Cade [facility] Number: [id number]  Diagnosis:   Encounter Diagnoses   Name Primary?    Neck pain     Posture imbalance      Physician: Ailyn Rivero,*  Precautions: standard  Visit #: 2 of 20  PTA Visit #: 0  Time In: 0855  Time Out: 0935    Subjective     Pt reports: Pt reports no episodes of left sided headaches since last visit, only a little soreness. She reports intermittent R sided neck pain and tightness.   Pain Scale: Becky rates pain on a scale of 0-10 to be 0 currently.    Objective     Cervical Spine Active ROM, measured in degrees with inclinometer, * Indicates pain with movement     Flexion: 60  Extension: 80  Left Side Bend: 30  Right Side Bend: 25  Left Rotation: 55  Right Rotation: 45    BUE's grossly 5/5 with exception of middle rhomboids R: 4/5, L: 4+/5, Lower traps: R: 3+/5, L: 4/5    Becky received individual therapeutic exercises to develop strength, endurance, ROM, flexibility, posture and core stabilization for 35 minutes including:    UBE 4 min    Supine chin tucks/ DNF x 10  Upper cervical nods seated x 10  Corner stretch 5 x 10"  Rows ytb 2 x 15- np  Scapula retractions x 20  Self mob with towel R rotation x 10- np    Supine on 1/2 roll:  -shoulder flexion x 10  -horiz abd 10" x 10  -protraction 2 x 10    Becky received the following manual therapy techniques: Manual traction, Myofacial release and Soft tissue Mobilization were applied to the: neck for 5 minutes including:  suboccipital release  Manual cervical distraction  downglides R mid cervical, central PA's lower cervical and upper thoracic- np  MET R rotation and flexion - np    The patient received the following supervised modalities after being cleared for contradictions: none    Written Home Exercises Provided: reviewed   Pt demo good understanding of the education provided. Becky demonstrated good " return demonstration of activities.     Education provided re:  Becky verbalized good understanding of education provided.   No spiritual or educational barriers to learning provided    Assessment     Patient tolerated treatment session well with one month reassessment performed today. Patient demonstrating improved cervical spine AROM as noted by recent objective measures. Patient has met 4/4 short term goals.Patient to benefit from continued skilled physical therapy for normalized cervical spine ROM, improved periscapular strength, and pain.     This is a 48 y.o. female referred to outpatient physical therapy and presents with a medical diagnosis of neck pain and headaches and demonstrates limitations as described in the problem list. Pt prognosis is Good. Pt will continue to benefit from skilled outpatient physical therapy to address the deficits listed in the problem list, provide pt/family education and to maximize pt's level of independence in the home and community environment.     Goals as follows:  Short Term GOALS: 4 weeks  1. Patient to be independent with home exercise program for improved self management of condition- met  2. Patient demonstrates independence with Postural Awareness- met  3. Patient to report decreased frequency L sided headaches by 30% or greater- met  4. Patient demonstrates independence with body mechanics- met     Long Term GOALS: 8 weeks  1. Patient demonstrates increased cervical spine AROM to WNL to improve tolerance to functional activities pain free. - in progress  3. Patient to have decreased subjective report of disability as noted by a score of 26% or less on the FOTO questionnaire      Plan     Continue with established Plan of Care towards PT goals.    Therapist: Phoebe Ashley, PT  8/22/2018

## 2018-08-31 ENCOUNTER — CLINICAL SUPPORT (OUTPATIENT)
Dept: REHABILITATION | Facility: OTHER | Age: 48
End: 2018-08-31
Payer: COMMERCIAL

## 2018-08-31 DIAGNOSIS — R29.3 POSTURE IMBALANCE: ICD-10-CM

## 2018-08-31 DIAGNOSIS — M54.2 NECK PAIN: ICD-10-CM

## 2018-08-31 PROCEDURE — 97140 MANUAL THERAPY 1/> REGIONS: CPT | Mod: PN | Performed by: PHYSICAL THERAPIST

## 2018-08-31 PROCEDURE — 97110 THERAPEUTIC EXERCISES: CPT | Mod: PN | Performed by: PHYSICAL THERAPIST

## 2018-08-31 NOTE — PROGRESS NOTES
"Name: Becky Cade [facility] Number: [id number]   Age: 48 y.o.   Diagnosis:   Encounter Diagnoses   Name Primary?    Neck pain     Posture imbalance       Physician: Ailyn Rivero,*   Original Orders : PT eval and treat  Initial visit: 7/24/2018  Date of Last visit: 8/31/2018  Date of Discharge Note:  8/31/2018  Total Visits Received: 4  Missed Visits: 0    Subjective: feeling a lot looser and able to move her neck more. She continues to have intermittent headaches on left side, worse when washing her hair. Headache stops when she stops the activity. Her headaches and pain are manageable with the exercises. She also has a massager that she uses. Pt requests discharge from therapy and will come back if she has any problems.     Objective:  Treatment :    Included:Therapeutic exercise, Joint mobilizations, Soft tissue mobilizations, Moist heat and educated in HEP       8/22/2018   Cervical Spine Active ROM, measured in degrees with inclinometer, * Indicates pain with movement     Flexion: 60  Extension: 80  Left Side Bend: 30  Right Side Bend: 25  Left Rotation: 55  Right Rotation: 45    BUE's grossly 5/5 with exception of middle rhomboids R: 4/5, L: 4+/5, Lower traps: R: 3+/5, L: 4/5    Treatment today:    Time In: 0905  Time Out: 0945     UBE 4 min    Supine chin tucks/ DNF x 10  Upper cervical nods seated x 10  Corner stretch 5 x 10"  Rows ytb 2 x 15- np  Scapula retractions x 20  Self mob with towel R rotation x 10- np    Supine on 1/2 roll:  -shoulder flexion x 10  -horiz abd 10" x 10  -protraction 2 x 10    Becky received the following manual therapy techniques: Manual traction, Myofacial release and Soft tissue Mobilization were applied to the: neck for 5 minutes including:  suboccipital release  Manual cervical distraction  downglides R mid cervical, central PA's lower cervical and upper thoracic- np  MET R rotation and flexion - np      Assessment:  Pt achieved personal goals and " appropriate to discharge to HEP. Pt to contact PCP if any problems arise for referral for continued therapy or to neurology for headaches.     Goals as follows:  Short Term GOALS: 4 weeks  1. Patient to be independent with home exercise program for improved self management of condition- met  2. Patient demonstrates independence with Postural Awareness- met  3. Patient to report decreased frequency L sided headaches by 30% or greater- met  4. Patient demonstrates independence with body mechanics- met     Long Term GOALS: 8 weeks  1. Patient demonstrates increased cervical spine AROM to WNL to improve tolerance to functional activities pain free. -partially met  3. Patient to have decreased subjective report of disability as noted by a score of 26% or less on the FOTO questionnaire - met       CMS Impairment/Limitation/Restriction for FOTO Neck Survey  Status Limitation G-Code CMS Severity Modifier  Intake 70% 30%  Predicted 74% 26% Goal Status+ CJ - At least 20 percent but less than 40 percent  8/31/2018 78% 22% Current Status CJ - At least 20 percent but less than 40 percent    Discharge reason : Physician discontinued services    Discharge plan :Continue HEP and Pt to follow-up with MD as planned    Plan:  This patient is discharged from Physical Therapy Services.

## 2018-10-02 ENCOUNTER — TELEPHONE (OUTPATIENT)
Dept: INTERNAL MEDICINE | Facility: CLINIC | Age: 48
End: 2018-10-02

## 2018-10-02 DIAGNOSIS — Z12.11 COLON CANCER SCREENING: Primary | ICD-10-CM

## 2018-11-08 RX ORDER — PAROXETINE 10 MG/1
10 TABLET, FILM COATED ORAL NIGHTLY
Qty: 30 TABLET | Refills: 6 | Status: SHIPPED | OUTPATIENT
Start: 2018-11-08 | End: 2019-04-01 | Stop reason: SDUPTHER

## 2019-02-04 ENCOUNTER — TELEPHONE (OUTPATIENT)
Dept: INTERNAL MEDICINE | Facility: CLINIC | Age: 49
End: 2019-02-04

## 2019-02-04 DIAGNOSIS — Z12.31 ENCOUNTER FOR SCREENING MAMMOGRAM FOR BREAST CANCER: Primary | ICD-10-CM

## 2019-02-04 NOTE — TELEPHONE ENCOUNTER
----- Message from Danyel Jones sent at 2/4/2019  9:20 AM CST -----  Contact: self/[phone]  Type: Orders Request    What orders/ testing are being requested? Mammogram     Is there a future appointment scheduled for the patient with PCP? NO    When?NA    Would you prefer a response via [employer]? NO    Comments: Please advise.        Thank You

## 2019-02-08 ENCOUNTER — HOSPITAL ENCOUNTER (OUTPATIENT)
Dept: RADIOLOGY | Facility: HOSPITAL | Age: 49
Discharge: HOME | End: 2019-02-08
Attending: INTERNAL MEDICINE
Payer: COMMERCIAL

## 2019-02-08 VITALS — BODY MASS INDEX: 27.66 KG/M2 | WEIGHT: 162 LBS | HEIGHT: 64 IN

## 2019-02-08 DIAGNOSIS — Z12.31 ENCOUNTER FOR SCREENING MAMMOGRAM FOR BREAST CANCER: ICD-10-CM

## 2019-02-08 PROCEDURE — 77067 SCR MAMMO BI INCL CAD: CPT | Mod: TC

## 2019-02-08 PROCEDURE — 77063 BREAST TOMOSYNTHESIS BI: CPT | Mod: 26,,, | Performed by: RADIOLOGY

## 2019-02-08 PROCEDURE — 77067 MAMMO DIGITAL SCREENING BILAT WITH TOMOSYNTHESIS_CAD: ICD-10-PCS | Mod: 26,,, | Performed by: RADIOLOGY

## 2019-02-08 PROCEDURE — 77067 SCR MAMMO BI INCL CAD: CPT | Mod: 26,,, | Performed by: RADIOLOGY

## 2019-02-08 PROCEDURE — 77063 MAMMO DIGITAL SCREENING BILAT WITH TOMOSYNTHESIS_CAD: ICD-10-PCS | Mod: 26,,, | Performed by: RADIOLOGY

## 2019-03-14 ENCOUNTER — TELEPHONE (OUTPATIENT)
Dept: DERMATOLOGY | Facility: CLINIC | Age: 49
End: 2019-03-14

## 2019-03-14 NOTE — TELEPHONE ENCOUNTER
----- Message from Beth Heller sent at 3/14/2019  9:09 AM CDT -----  Contact: PT  Needs Advice    Reason for call: Patient is requesting an appointment I cannot schedule do not see any available appointments        Communication Preference: [phone]    Additional Information:

## 2019-04-01 RX ORDER — PAROXETINE 10 MG/1
10 TABLET, FILM COATED ORAL NIGHTLY
Qty: 30 TABLET | Refills: 12 | Status: SHIPPED | OUTPATIENT
Start: 2019-04-01 | End: 2020-05-12 | Stop reason: SDUPTHER

## 2019-04-09 ENCOUNTER — OFFICE VISIT (OUTPATIENT)
Dept: DERMATOLOGY | Facility: CLINIC | Age: 49
End: 2019-04-09
Payer: COMMERCIAL

## 2019-04-09 DIAGNOSIS — D22.9 MULTIPLE BENIGN NEVI: ICD-10-CM

## 2019-04-09 DIAGNOSIS — L30.9 ECZEMA, UNSPECIFIED TYPE: Primary | ICD-10-CM

## 2019-04-09 DIAGNOSIS — D18.01 ANGIOMA OF SKIN: ICD-10-CM

## 2019-04-09 DIAGNOSIS — L81.4 SOLAR LENTIGO: ICD-10-CM

## 2019-04-09 DIAGNOSIS — Z12.83 SCREENING EXAM FOR SKIN CANCER: ICD-10-CM

## 2019-04-09 PROCEDURE — 99214 OFFICE O/P EST MOD 30 MIN: CPT | Mod: S$GLB,,, | Performed by: PATHOLOGY

## 2019-04-09 PROCEDURE — 99999 PR PBB SHADOW E&M-EST. PATIENT-LVL II: CPT | Mod: PBBFAC,,, | Performed by: PATHOLOGY

## 2019-04-09 PROCEDURE — 99214 PR OFFICE/OUTPT VISIT, EST, LEVL IV, 30-39 MIN: ICD-10-PCS | Mod: S$GLB,,, | Performed by: PATHOLOGY

## 2019-04-09 PROCEDURE — 99999 PR PBB SHADOW E&M-EST. PATIENT-LVL II: ICD-10-PCS | Mod: PBBFAC,,, | Performed by: PATHOLOGY

## 2019-04-09 RX ORDER — TRIAMCINOLONE ACETONIDE 1 MG/G
CREAM TOPICAL
Qty: 454 G | Refills: 3 | Status: SHIPPED | OUTPATIENT
Start: 2019-04-09

## 2019-04-09 NOTE — PROGRESS NOTES
Subjective:       Patient ID:  Becky Ospina is a 48 y.o. female who presents for   Chief Complaint   Patient presents with    Spot     Pt here today for spot on left forearm, itching, 1+ year, Tx. none Pt request TBSE     Spot  - Initial  Affected locations: left arm  Duration: 1 year  Signs / symptoms: itching  Severity: mild to moderate  Timing: recurrent  Aggravated by: nothing  Relieving factors/Treatments tried: nothing  Improvement on treatment: no relief      Pt with h/o prednisone responsive hypersensitivity dermatitis in 2016.  Has continued mild itching to neck, arms, legs, back.  Uses Curel or CeraVe creams.  Uses a backscratcher.  No personal h/o MM or NMSC.  Here for skin cancer screening today.    Review of Systems   Constitutional: Negative for fever, chills, weight loss, weight gain, fatigue, night sweats and malaise.   Skin: Positive for itching, rash, dry skin and activity-related sunscreen use. Negative for daily sunscreen use.        Objective:    Physical Exam   Constitutional: She appears well-developed and well-nourished. No distress.   Neurological: She is alert and oriented to person, place, and time. She is not disoriented.   Psychiatric: She has a normal mood and affect.   Skin:   Areas Examined (abnormalities noted in diagram):   Scalp / Hair Palpated and Inspected  Head / Face Inspection Performed  Neck Inspection Performed  Chest / Axilla Inspection Performed  Abdomen Inspection Performed  Genitals / Buttocks / Groin Inspection Performed  Back Inspection Performed  RUE Inspected  LUE Inspection Performed  RLE Inspected  LLE Inspection Performed  Nails and Digits Inspection Performed                   Diagram Legend     Erythematous scaling macule/papule c/w actinic keratosis       Vascular papule c/w angioma      Pigmented verrucoid papule/plaque c/w seborrheic keratosis      Yellow umbilicated papule c/w sebaceous hyperplasia      Irregularly shaped tan macule c/w  lentigo     1-2 mm smooth white papules consistent with Milia      Movable subcutaneous cyst with punctum c/w epidermal inclusion cyst      Subcutaneous movable cyst c/w pilar cyst      Firm pink to brown papule c/w dermatofibroma      Pedunculated fleshy papule(s) c/w skin tag(s)      Evenly pigmented macule c/w junctional nevus     Mildly variegated pigmented, slightly irregular-bordered macule c/w mildly atypical nevus      Flesh colored to evenly pigmented papule c/w intradermal nevus       Pink pearly papule/plaque c/w basal cell carcinoma      Erythematous hyperkeratotic cursted plaque c/w SCC      Surgical scar with no sign of skin cancer recurrence      Open and closed comedones      Inflammatory papules and pustules      Verrucoid papule consistent consistent with wart     Erythematous eczematous patches and plaques     Dystrophic onycholytic nail with subungual debris c/w onychomycosis     Umbilicated papule    Erythematous-base heme-crusted tan verrucoid plaque consistent with inflamed seborrheic keratosis     Erythematous Silvery Scaling Plaque c/w Psoriasis     See annotation      Assessment / Plan:        Eczema, unspecified type - mild  -     triamcinolone acetonide 0.1% (KENALOG) 0.1 % cream; AAA bid  Dispense: 454 g; Refill: 3    Good skin care regimen discussed including limiting to one bath or shower/day, using lukewarm water with mild soap and moisturizing cream to skin 1 - 2x/day. Brochure was provided and reviewed with patient.    Suggest CeraVe anti-itch cream with pramoxine.    Solar lentigo - This is a benign hyperpigmented sun induced lesion. Daily sun protection will reduce the number of new lesions. Treatment of these benign lesions are considered cosmetic.      Angioma of skin - This is a benign vascular lesion. Reassurance given. No treatment required.       Multiple benign nevi - Patient with several benign appearing nevi. Instructed patient to observe lesion(s) for changes and follow  up in clinic if changes are noted.     Screening exam from skin cancer - no lesions concerning for malignancy           Follow up in about 1 year (around 4/9/2020), or if symptoms worsen or fail to improve.

## 2019-06-25 ENCOUNTER — TELEPHONE (OUTPATIENT)
Dept: INTERNAL MEDICINE | Facility: CLINIC | Age: 49
End: 2019-06-25

## 2019-06-25 RX ORDER — OFLOXACIN 3 MG/ML
SOLUTION/ DROPS OPHTHALMIC
Qty: 1 BOTTLE | Refills: 0 | Status: SHIPPED | OUTPATIENT
Start: 2019-06-25 | End: 2020-01-06

## 2019-06-25 NOTE — TELEPHONE ENCOUNTER
----- Message from Teto Osuna sent at 6/25/2019  8:45 AM CDT -----  Contact: Patient [phone]  Patient would like to get medical advice.  Symptoms (please be specific): Right eye Itchy and  puffy   How long has patient had these symptoms:  1 day  Pharmacy name and phone # [id number] - [place], [place] - [address]  [phone] (Phone) [phone] (Fax)    Comments:  Her was diagnosed with pink eye and now she thinks she's got it

## 2019-07-15 ENCOUNTER — TELEPHONE (OUTPATIENT)
Dept: INTERNAL MEDICINE | Facility: CLINIC | Age: 49
End: 2019-07-15

## 2019-07-15 DIAGNOSIS — E03.8 OTHER SPECIFIED HYPOTHYROIDISM: ICD-10-CM

## 2019-07-16 RX ORDER — LEVOTHYROXINE SODIUM 25 UG/1
TABLET ORAL
Qty: 135 TABLET | Refills: 0 | Status: SHIPPED | OUTPATIENT
Start: 2019-07-16 | End: 2020-01-06 | Stop reason: DRUGHIGH

## 2019-07-17 ENCOUNTER — PATIENT OUTREACH (OUTPATIENT)
Dept: ADMINISTRATIVE | Facility: OTHER | Age: 49
End: 2019-07-17

## 2019-07-17 DIAGNOSIS — E03.8 OTHER SPECIFIED HYPOTHYROIDISM: ICD-10-CM

## 2019-07-17 RX ORDER — LEVOTHYROXINE SODIUM 25 UG/1
TABLET ORAL
Qty: 135 TABLET | Refills: 4 | Status: SHIPPED | OUTPATIENT
Start: 2019-07-17 | End: 2019-07-19 | Stop reason: SDUPTHER

## 2019-07-19 ENCOUNTER — OFFICE VISIT (OUTPATIENT)
Dept: OBSTETRICS AND GYNECOLOGY | Facility: CLINIC | Age: 49
End: 2019-07-19
Payer: COMMERCIAL

## 2019-07-19 VITALS
WEIGHT: 142 LBS | SYSTOLIC BLOOD PRESSURE: 108 MMHG | BODY MASS INDEX: 24.24 KG/M2 | DIASTOLIC BLOOD PRESSURE: 62 MMHG | HEIGHT: 64 IN

## 2019-07-19 DIAGNOSIS — N39.46 MIXED STRESS AND URGE URINARY INCONTINENCE: ICD-10-CM

## 2019-07-19 DIAGNOSIS — Z01.419 ENCOUNTER FOR GYNECOLOGICAL EXAMINATION WITHOUT ABNORMAL FINDING: Primary | ICD-10-CM

## 2019-07-19 LAB
BILIRUB UR QL STRIP: NEGATIVE
CLARITY UR REFRACT.AUTO: ABNORMAL
COLOR UR AUTO: YELLOW
GLUCOSE UR QL STRIP: NEGATIVE
HGB UR QL STRIP: ABNORMAL
KETONES UR QL STRIP: NEGATIVE
LEUKOCYTE ESTERASE UR QL STRIP: NEGATIVE
MICROSCOPIC COMMENT: NORMAL
NITRITE UR QL STRIP: NEGATIVE
PH UR STRIP: 6 [PH] (ref 5–8)
PROT UR QL STRIP: NEGATIVE
RBC #/AREA URNS AUTO: 0 /HPF (ref 0–4)
SP GR UR STRIP: 1.02 (ref 1–1.03)
SQUAMOUS #/AREA URNS AUTO: 3 /HPF
URN SPEC COLLECT METH UR: ABNORMAL
WBC #/AREA URNS AUTO: 0 /HPF (ref 0–5)

## 2019-07-19 PROCEDURE — 87086 URINE CULTURE/COLONY COUNT: CPT

## 2019-07-19 PROCEDURE — 99396 PR PREVENTIVE VISIT,EST,40-64: ICD-10-PCS | Mod: S$GLB,,, | Performed by: OBSTETRICS & GYNECOLOGY

## 2019-07-19 PROCEDURE — 87624 HPV HI-RISK TYP POOLED RSLT: CPT

## 2019-07-19 PROCEDURE — 81001 URINALYSIS AUTO W/SCOPE: CPT

## 2019-07-19 PROCEDURE — 88141 LIQUID-BASED PAP SMEAR, SCREENING: ICD-10-PCS | Mod: ,,, | Performed by: PATHOLOGY

## 2019-07-19 PROCEDURE — 99396 PREV VISIT EST AGE 40-64: CPT | Mod: S$GLB,,, | Performed by: OBSTETRICS & GYNECOLOGY

## 2019-07-19 PROCEDURE — 99999 PR PBB SHADOW E&M-EST. PATIENT-LVL II: CPT | Mod: PBBFAC,,, | Performed by: OBSTETRICS & GYNECOLOGY

## 2019-07-19 PROCEDURE — 99999 PR PBB SHADOW E&M-EST. PATIENT-LVL II: ICD-10-PCS | Mod: PBBFAC,,, | Performed by: OBSTETRICS & GYNECOLOGY

## 2019-07-19 PROCEDURE — 88175 CYTOPATH C/V AUTO FLUID REDO: CPT | Performed by: PATHOLOGY

## 2019-07-19 PROCEDURE — 88141 CYTOPATH C/V INTERPRET: CPT | Mod: ,,, | Performed by: PATHOLOGY

## 2019-07-19 NOTE — PROGRESS NOTES
PAP = ASCUS  HPV High Risk type 16, PCR Negative Negative    HPV High Risk type 18, PCR Negative Negative    HPV other High Risk types, PCR Negative Negative      PAP / HPV NEXT YEAR.      PT HERE FOR ANNUAL.  MORE JUMANA AND URGENCY.  DOES NOT WANT TO SEE URO/GYN NOW.    ROS:  GENERAL: No fever, chills, fatigability or weight loss.  VULVAR: No pain, no lesions and no itching.  VAGINAL: No relaxation, no itching, no discharge, no abnormal bleeding and no lesions.  ABDOMEN: No abdominal pain. Denies nausea. Denies vomiting. No diarrhea. No constipation  BREAST: Denies pain. No lumps. No discharge.  URINARY: No incontinence, no nocturia, no frequency and no dysuria.  CARDIOVASCULAR: No chest pain. No shortness of breath. No leg cramps.  NEUROLOGICAL: No headaches. No vision changes.  The remainder of the review of systems was negative.    PE:  General Appearance: normal weight And Well developed. Well nourished. In no acute distress.  Vulva: Lesions: No.  Urethral Meatus: Normal size. Normal location. No lesions. No prolapse.  Urethra: No masses. No tenderness. No prolapse. No scarring.  Bladder: No masses. No tenderness.  Vagina: Mucosa NI:yes discharge no, atrophy no, cystocele no or rectocele no.  Cervix: Lesion: no  Stenotic: no Cervical motion tenderness: no  Uterus: Uterus size: 5 weeks. Support good. Uterus size: Normal  Adnexa: Masses: No Tenderness: No CDS Nodularity: No  Abdomen: normal weight No masses. No tenderness.  Breasts: No bilateral masses. No bilateral discharge. No bilateral tenderness. No bilateral fibrocystic changes.  Neck: No thyroid enlargement. No thyroid masses.  Skin: Rashes: No      PROCEDURES:    PLAN:     DIAGNOSIS:  1. Encounter for gynecological examination without abnormal finding    2. Mixed stress and urge urinary incontinence        MEDICATIONS & ORDERS:  Orders Placed This Encounter    Urine culture    Urinalysis    Liquid-based pap smear, screening       Patient was counseled  today on the new ACS guidelines for cervical cytology screening as well as the current recommendations for breast cancer screening. She was counseled to follow up with her PCP for other routine health maintenance. Counseling session lasted approximately 10 minutes, and all her questions were answered.         FOLLOW-UP: With me in 12 month

## 2019-07-20 ENCOUNTER — PATIENT MESSAGE (OUTPATIENT)
Dept: OBSTETRICS AND GYNECOLOGY | Facility: CLINIC | Age: 49
End: 2019-07-20

## 2019-07-21 LAB
BACTERIA UR CULT: NORMAL
BACTERIA UR CULT: NORMAL

## 2019-08-06 LAB
HPV HR 12 DNA CVX QL NAA+PROBE: NEGATIVE
HPV16 AG SPEC QL: NEGATIVE
HPV18 DNA SPEC QL NAA+PROBE: NEGATIVE

## 2019-08-07 RX ORDER — DESOGESTREL AND ETHINYL ESTRADIOL 0.15-0.03
KIT ORAL
Qty: 28 TABLET | Refills: 12 | Status: SHIPPED | OUTPATIENT
Start: 2019-08-07 | End: 2020-07-07

## 2019-12-12 ENCOUNTER — PATIENT MESSAGE (OUTPATIENT)
Dept: INTERNAL MEDICINE | Facility: CLINIC | Age: 49
End: 2019-12-12

## 2019-12-12 ENCOUNTER — TELEPHONE (OUTPATIENT)
Dept: INTERNAL MEDICINE | Facility: CLINIC | Age: 49
End: 2019-12-12

## 2019-12-12 DIAGNOSIS — Z00.00 WELLNESS EXAMINATION: Primary | ICD-10-CM

## 2019-12-12 NOTE — TELEPHONE ENCOUNTER
----- Message from Sima Gaston sent at 12/12/2019  9:08 AM CST -----  Doctor appointment and lab have been scheduled.  Please link lab orders to the lab appointment.  Date of doctor appointment:  1/6  Date of lab appointment:  1/2  Physical or EP: Phyiscal  Comments:

## 2019-12-28 ENCOUNTER — TELEPHONE (OUTPATIENT)
Dept: NEPHROLOGY | Facility: CLINIC | Age: 49
End: 2019-12-28

## 2019-12-28 ENCOUNTER — OFFICE VISIT (OUTPATIENT)
Dept: INTERNAL MEDICINE | Facility: CLINIC | Age: 49
End: 2019-12-28
Payer: COMMERCIAL

## 2019-12-28 VITALS
HEIGHT: 64 IN | WEIGHT: 147.06 LBS | HEART RATE: 87 BPM | BODY MASS INDEX: 25.11 KG/M2 | SYSTOLIC BLOOD PRESSURE: 110 MMHG | DIASTOLIC BLOOD PRESSURE: 64 MMHG | OXYGEN SATURATION: 96 %

## 2019-12-28 DIAGNOSIS — E03.9 ACQUIRED HYPOTHYROIDISM: ICD-10-CM

## 2019-12-28 DIAGNOSIS — B96.89 ACUTE BACTERIAL BRONCHITIS: Primary | ICD-10-CM

## 2019-12-28 DIAGNOSIS — J20.8 ACUTE BACTERIAL BRONCHITIS: Primary | ICD-10-CM

## 2019-12-28 PROCEDURE — 96372 THER/PROPH/DIAG INJ SC/IM: CPT | Mod: S$GLB,,, | Performed by: FAMILY MEDICINE

## 2019-12-28 PROCEDURE — 3008F PR BODY MASS INDEX (BMI) DOCUMENTED: ICD-10-PCS | Mod: CPTII,S$GLB,, | Performed by: FAMILY MEDICINE

## 2019-12-28 PROCEDURE — 3008F BODY MASS INDEX DOCD: CPT | Mod: CPTII,S$GLB,, | Performed by: FAMILY MEDICINE

## 2019-12-28 PROCEDURE — 99213 OFFICE O/P EST LOW 20 MIN: CPT | Mod: 25,S$GLB,, | Performed by: FAMILY MEDICINE

## 2019-12-28 PROCEDURE — 99213 PR OFFICE/OUTPT VISIT, EST, LEVL III, 20-29 MIN: ICD-10-PCS | Mod: 25,S$GLB,, | Performed by: FAMILY MEDICINE

## 2019-12-28 PROCEDURE — 96372 PR INJECTION,THERAP/PROPH/DIAG2ST, IM OR SUBCUT: ICD-10-PCS | Mod: S$GLB,,, | Performed by: FAMILY MEDICINE

## 2019-12-28 PROCEDURE — 99999 PR PBB SHADOW E&M-EST. PATIENT-LVL III: ICD-10-PCS | Mod: PBBFAC,,, | Performed by: FAMILY MEDICINE

## 2019-12-28 PROCEDURE — 99999 PR PBB SHADOW E&M-EST. PATIENT-LVL III: CPT | Mod: PBBFAC,,, | Performed by: FAMILY MEDICINE

## 2019-12-28 RX ORDER — FLUCONAZOLE 150 MG/1
150 TABLET ORAL DAILY
Qty: 1 TABLET | Refills: 0 | Status: SHIPPED | OUTPATIENT
Start: 2019-12-28 | End: 2019-12-29

## 2019-12-28 RX ORDER — TRIAMCINOLONE ACETONIDE 40 MG/ML
40 INJECTION, SUSPENSION INTRA-ARTICULAR; INTRAMUSCULAR
Status: COMPLETED | OUTPATIENT
Start: 2019-12-28 | End: 2019-12-28

## 2019-12-28 RX ORDER — PROMETHAZINE HYDROCHLORIDE AND CODEINE PHOSPHATE 6.25; 1 MG/5ML; MG/5ML
SOLUTION ORAL
Qty: 118 ML | Refills: 0 | Status: SHIPPED | OUTPATIENT
Start: 2019-12-28 | End: 2020-01-06

## 2019-12-28 RX ORDER — AZITHROMYCIN 250 MG/1
TABLET, FILM COATED ORAL
Qty: 6 TABLET | Refills: 0 | Status: SHIPPED | OUTPATIENT
Start: 2019-12-28 | End: 2020-01-06

## 2019-12-28 RX ORDER — METHYLPREDNISOLONE 4 MG/1
TABLET ORAL
Qty: 1 PACKAGE | Refills: 0 | Status: SHIPPED | OUTPATIENT
Start: 2019-12-28 | End: 2020-01-06

## 2019-12-28 RX ADMIN — TRIAMCINOLONE ACETONIDE 40 MG: 40 INJECTION, SUSPENSION INTRA-ARTICULAR; INTRAMUSCULAR at 02:12

## 2019-12-28 NOTE — PROGRESS NOTES
Subjective:   Patient ID: Becky Ospina is a 49 y.o. female.    Chief Complaint: URI      Cough   This is a new problem. The current episode started 1 to 4 weeks ago. The problem has been gradually worsening. The cough is productive of sputum, productive of purulent sputum and productive of brown sputum. Associated symptoms include chest pain, chills, ear congestion, ear pain, a fever, headaches, nasal congestion, postnasal drip, shortness of breath and wheezing. Pertinent negatives include no heartburn, hemoptysis, myalgias, rhinorrhea or sore throat. The symptoms are aggravated by lying down, dust and exercise.       Patient queried and denies any further complaints.    LOCATION  DURATION  SEVERITY  QUALITY  TIMING  CAUSE  ASSOCIATED SYMPTOMS  MODIFIERS.    ALLERGIES AND MEDICATIONS: updated and reviewed.  Review of patient's allergies indicates:  No Known Allergies    Current Outpatient Medications:     acyclovir (ZOVIRAX) 400 MG tablet, Take 1 tablet (400 mg total) by mouth 2 (two) times daily., Disp: 60 tablet, Rfl: 12    APRI 0.15-0.03 mg per tablet, Take 1 tablet by mouth once daily., Disp: 28 tablet, Rfl: 12    levothyroxine (SYNTHROID) 25 MCG tablet, TAKE 1 1/2 TABLETS PER DAY, Disp: 135 tablet, Rfl: 0    naproxen sodium (ANAPROX) 550 MG tablet, Take 1 tablet (550 mg total) by mouth 2 (two) times daily with meals., Disp: 60 tablet, Rfl: 1    ofloxacin (OCUFLOX) 0.3 % ophthalmic solution, Place 1 drop to the eye four times a day for 5 days, Disp: 1 Bottle, Rfl: 0    paroxetine (PAXIL) 10 MG tablet, TAKE 1 TABLET (10 MG TOTAL) BY MOUTH EVERY EVENING., Disp: 30 tablet, Rfl: 12    triamcinolone acetonide 0.1% (KENALOG) 0.1 % cream, AAA bid, Disp: 454 g, Rfl: 3    triamcinolone acetonide 0.1% (KENALOG) 0.1 % paste, APPLY TO MOUTH SORES 2 TIMES A DAY, Disp: 5 g, Rfl: 0    azithromycin (Z-MIRANDA) 250 MG tablet, Take 2 tablets by mouth on day 1; Take 1 tablet by mouth on days 2-5, Disp: 6 tablet,  "Rfl: 0    fluconazole (DIFLUCAN) 150 MG Tab, Take 1 tablet (150 mg total) by mouth once daily. Prn yeast vaginitis for 1 day, Disp: 1 tablet, Rfl: 0    methylPREDNISolone (MEDROL DOSEPACK) 4 mg tablet, use as directed; start 12/29/2019, Disp: 1 Package, Rfl: 0    promethazine-codeine 6.25-10 mg/5 ml (PHENERGAN WITH CODEINE) 6.25-10 mg/5 mL syrup, 5 ml po qhs prn severe cough, Disp: 118 mL, Rfl: 0  No current facility-administered medications for this visit.     Review of Systems   Constitutional: Positive for chills and fever. Negative for fatigue.   HENT: Positive for ear pain and postnasal drip. Negative for congestion, rhinorrhea, sinus pressure and sore throat.    Respiratory: Positive for cough, shortness of breath and wheezing. Negative for hemoptysis.    Cardiovascular: Positive for chest pain. Negative for palpitations and leg swelling.   Gastrointestinal: Negative for abdominal pain, diarrhea, heartburn, nausea and vomiting.   Genitourinary: Negative for dysuria.   Musculoskeletal: Negative for myalgias.   Neurological: Positive for headaches. Negative for dizziness and light-headedness.       Objective:     Vitals:    12/28/19 1342   BP: 110/64   Pulse: 87   SpO2: 96%   Weight: 66.7 kg (147 lb 0.8 oz)   Height: 5' 4" (1.626 m)   PainSc: 0-No pain     Body mass index is 25.24 kg/m².    Physical Exam   Constitutional: She is oriented to person, place, and time. She appears well-developed and well-nourished. She is cooperative. She does not have a sickly appearance. No distress.   HENT:   Head: Normocephalic and atraumatic.   Right Ear: Hearing, tympanic membrane, external ear and ear canal normal. No tenderness.   Left Ear: Hearing, tympanic membrane, external ear and ear canal normal. No tenderness.   Nose: Nose normal.   Mouth/Throat: Oropharynx is clear and moist. Normal dentition. No oropharyngeal exudate, posterior oropharyngeal edema or posterior oropharyngeal erythema.   Eyes: Conjunctivae and lids " are normal. Right eye exhibits no discharge. Left eye exhibits no discharge. Right conjunctiva is not injected. Left conjunctiva is not injected. No scleral icterus. Right eye exhibits normal extraocular motion. Left eye exhibits normal extraocular motion.   Neck: Normal range of motion. Neck supple. No JVD present. Carotid bruit is not present. No tracheal deviation and no edema present. No thyromegaly present.   Cardiovascular: Normal rate, regular rhythm, normal heart sounds and normal pulses. Exam reveals no friction rub.   No murmur heard.  Pulmonary/Chest: Effort normal. No accessory muscle usage. No respiratory distress. She has decreased breath sounds. She has no wheezes. She has no rhonchi. She has no rales.   Musculoskeletal: She exhibits no edema.   Lymphadenopathy:        Head (right side): No submandibular adenopathy present.        Head (left side): No submandibular adenopathy present.     She has no cervical adenopathy.   Neurological: She is alert and oriented to person, place, and time.   Skin: Skin is warm and dry. She is not diaphoretic.   Psychiatric: Her speech is normal and behavior is normal. Thought content normal. Her mood appears not anxious. Her affect is not angry, not labile and not inappropriate. She does not exhibit a depressed mood.   Nursing note and vitals reviewed.      Assessment and Plan:   Becky was seen today for uri.    Diagnoses and all orders for this visit:    Acute bacterial bronchitis    Acquired hypothyroidism    Other orders  -     triamcinolone acetonide injection 40 mg  -     azithromycin (Z-MIRANDA) 250 MG tablet; Take 2 tablets by mouth on day 1; Take 1 tablet by mouth on days 2-5  -     methylPREDNISolone (MEDROL DOSEPACK) 4 mg tablet; use as directed; start 12/29/2019  -     promethazine-codeine 6.25-10 mg/5 ml (PHENERGAN WITH CODEINE) 6.25-10 mg/5 mL syrup; 5 ml po qhs prn severe cough  -     fluconazole (DIFLUCAN) 150 MG Tab; Take 1 tablet (150 mg total) by  mouth once daily. Prn yeast vaginitis for 1 day    Hydrate, rest, OTC Mucinex Expectorant as directed, Nasal saline as needed.  OTC Zyrtec as directed.      Follow up in about 2 weeks (around 1/11/2020).    THIS NOTE WILL BE SHARED WITH THE PATIENT.

## 2020-01-02 ENCOUNTER — LAB VISIT (OUTPATIENT)
Dept: LAB | Facility: HOSPITAL | Age: 50
End: 2020-01-02
Payer: COMMERCIAL

## 2020-01-02 DIAGNOSIS — Z00.00 WELLNESS EXAMINATION: ICD-10-CM

## 2020-01-02 LAB
25(OH)D3+25(OH)D2 SERPL-MCNC: 15 NG/ML (ref 30–96)
ALBUMIN SERPL BCP-MCNC: 3.2 G/DL (ref 3.5–5.2)
ALP SERPL-CCNC: 54 U/L (ref 55–135)
ALT SERPL W/O P-5'-P-CCNC: 10 U/L (ref 10–44)
ANION GAP SERPL CALC-SCNC: 7 MMOL/L (ref 8–16)
AST SERPL-CCNC: 12 U/L (ref 10–40)
BASOPHILS # BLD AUTO: 0.08 K/UL (ref 0–0.2)
BASOPHILS NFR BLD: 0.8 % (ref 0–1.9)
BILIRUB SERPL-MCNC: 0.3 MG/DL (ref 0.1–1)
BUN SERPL-MCNC: 14 MG/DL (ref 6–20)
CALCIUM SERPL-MCNC: 9.1 MG/DL (ref 8.7–10.5)
CHLORIDE SERPL-SCNC: 105 MMOL/L (ref 95–110)
CHOLEST SERPL-MCNC: 190 MG/DL (ref 120–199)
CHOLEST/HDLC SERPL: 2.3 {RATIO} (ref 2–5)
CO2 SERPL-SCNC: 28 MMOL/L (ref 23–29)
CREAT SERPL-MCNC: 0.7 MG/DL (ref 0.5–1.4)
DIFFERENTIAL METHOD: ABNORMAL
EOSINOPHIL # BLD AUTO: 0.2 K/UL (ref 0–0.5)
EOSINOPHIL NFR BLD: 1.4 % (ref 0–8)
ERYTHROCYTE [DISTWIDTH] IN BLOOD BY AUTOMATED COUNT: 11.4 % (ref 11.5–14.5)
EST. GFR  (AFRICAN AMERICAN): >60 ML/MIN/1.73 M^2
EST. GFR  (NON AFRICAN AMERICAN): >60 ML/MIN/1.73 M^2
GLUCOSE SERPL-MCNC: 90 MG/DL (ref 70–110)
HCT VFR BLD AUTO: 39.9 % (ref 37–48.5)
HDLC SERPL-MCNC: 81 MG/DL (ref 40–75)
HDLC SERPL: 42.6 % (ref 20–50)
HGB BLD-MCNC: 12.5 G/DL (ref 12–16)
LDLC SERPL CALC-MCNC: 82.8 MG/DL (ref 63–159)
LYMPHOCYTES # BLD AUTO: 3 K/UL (ref 1–4.8)
LYMPHOCYTES NFR BLD: 29.2 % (ref 18–48)
MCH RBC QN AUTO: 31.6 PG (ref 27–31)
MCHC RBC AUTO-ENTMCNC: 31.3 G/DL (ref 32–36)
MCV RBC AUTO: 101 FL (ref 82–98)
MONOCYTES # BLD AUTO: 0.9 K/UL (ref 0.3–1)
MONOCYTES NFR BLD: 8.3 % (ref 4–15)
NEUTROPHILS # BLD AUTO: 6.2 K/UL (ref 1.8–7.7)
NEUTROPHILS NFR BLD: 59.8 % (ref 38–73)
NONHDLC SERPL-MCNC: 109 MG/DL
NRBC BLD-RTO: 0 /100 WBC
PLATELET # BLD AUTO: 367 K/UL (ref 150–350)
PMV BLD AUTO: 9.8 FL (ref 9.2–12.9)
POTASSIUM SERPL-SCNC: 3.7 MMOL/L (ref 3.5–5.1)
PROT SERPL-MCNC: 7 G/DL (ref 6–8.4)
RBC # BLD AUTO: 3.96 M/UL (ref 4–5.4)
SODIUM SERPL-SCNC: 140 MMOL/L (ref 136–145)
T4 FREE SERPL-MCNC: 0.8 NG/DL (ref 0.71–1.51)
TRIGL SERPL-MCNC: 131 MG/DL (ref 30–150)
TSH SERPL DL<=0.005 MIU/L-ACNC: 7.9 UIU/ML (ref 0.4–4)
WBC # BLD AUTO: 10.35 K/UL (ref 3.9–12.7)

## 2020-01-02 PROCEDURE — 36415 COLL VENOUS BLD VENIPUNCTURE: CPT

## 2020-01-02 PROCEDURE — 84443 ASSAY THYROID STIM HORMONE: CPT

## 2020-01-02 PROCEDURE — 80061 LIPID PANEL: CPT

## 2020-01-02 PROCEDURE — 85025 COMPLETE CBC W/AUTO DIFF WBC: CPT

## 2020-01-02 PROCEDURE — 80053 COMPREHEN METABOLIC PANEL: CPT

## 2020-01-02 PROCEDURE — 82306 VITAMIN D 25 HYDROXY: CPT

## 2020-01-02 PROCEDURE — 84439 ASSAY OF FREE THYROXINE: CPT

## 2020-01-06 ENCOUNTER — IMMUNIZATION (OUTPATIENT)
Dept: PHARMACY | Facility: CLINIC | Age: 50
End: 2020-01-06
Payer: COMMERCIAL

## 2020-01-06 ENCOUNTER — OFFICE VISIT (OUTPATIENT)
Dept: INTERNAL MEDICINE | Facility: CLINIC | Age: 50
End: 2020-01-06
Payer: COMMERCIAL

## 2020-01-06 VITALS
OXYGEN SATURATION: 99 % | DIASTOLIC BLOOD PRESSURE: 70 MMHG | HEIGHT: 64 IN | WEIGHT: 145.5 LBS | SYSTOLIC BLOOD PRESSURE: 100 MMHG | BODY MASS INDEX: 24.84 KG/M2 | HEART RATE: 77 BPM

## 2020-01-06 DIAGNOSIS — Z12.11 COLON CANCER SCREENING: ICD-10-CM

## 2020-01-06 DIAGNOSIS — R71.8 ELEVATED MCV: ICD-10-CM

## 2020-01-06 DIAGNOSIS — Z12.31 ENCOUNTER FOR SCREENING MAMMOGRAM FOR BREAST CANCER: ICD-10-CM

## 2020-01-06 DIAGNOSIS — Z00.00 WELLNESS EXAMINATION: ICD-10-CM

## 2020-01-06 DIAGNOSIS — E03.9 HYPOTHYROIDISM, UNSPECIFIED TYPE: ICD-10-CM

## 2020-01-06 DIAGNOSIS — E55.9 MILD VITAMIN D DEFICIENCY: ICD-10-CM

## 2020-01-06 DIAGNOSIS — Z86.19 HISTORY OF PARVOVIRUS B19 INFECTION: ICD-10-CM

## 2020-01-06 DIAGNOSIS — Z00.00 PHYSICAL EXAM: Primary | ICD-10-CM

## 2020-01-06 PROCEDURE — 99999 PR PBB SHADOW E&M-EST. PATIENT-LVL III: ICD-10-PCS | Mod: PBBFAC,,, | Performed by: INTERNAL MEDICINE

## 2020-01-06 PROCEDURE — 99396 PREV VISIT EST AGE 40-64: CPT | Mod: S$GLB,,, | Performed by: INTERNAL MEDICINE

## 2020-01-06 PROCEDURE — 99396 PR PREVENTIVE VISIT,EST,40-64: ICD-10-PCS | Mod: S$GLB,,, | Performed by: INTERNAL MEDICINE

## 2020-01-06 PROCEDURE — 99999 PR PBB SHADOW E&M-EST. PATIENT-LVL III: CPT | Mod: PBBFAC,,, | Performed by: INTERNAL MEDICINE

## 2020-01-06 RX ORDER — LEVOTHYROXINE SODIUM 50 UG/1
50 TABLET ORAL DAILY
Qty: 30 TABLET | Refills: 3 | Status: SHIPPED | OUTPATIENT
Start: 2020-01-06 | End: 2020-02-19 | Stop reason: DRUGHIGH

## 2020-01-06 NOTE — PROGRESS NOTES
"Subjective:      Patient ID: Becky Ospina is a 49 y.o. female.    Chief Complaint: Annual Exam    HPI:  HPI   Patient is here for her annual exam:  Last week patient had bronchitis last week that resolved.   Labs done prior to the appt: thyroid remains elevated.    Annual exam: 1/6/2020  Colonoscopy: Patient will do colonoscopy as screening  Mammogram:ordered  Gyn : Dr. 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   HENT: Negative for trouble swallowing.    Respiratory: Negative for cough, shortness of breath and wheezing.    Cardiovascular: Negative for chest pain and palpitations.   Gastrointestinal: Negative for abdominal distention, abdominal pain, blood in stool and vomiting.   Musculoskeletal: Negative for back pain.     Objective:     Vitals:    01/06/20 0808   BP: 100/70   Pulse: 77   SpO2: 99%   Weight: 66 kg (145 lb 8.1 oz)   Height: 5' 4" (1.626 m)   PainSc: 0-No pain     Body mass index is 24.98 " kg/m².  Physical Exam   Constitutional: She is oriented to person, place, and time. She appears well-developed and well-nourished. No distress.   Neck: Carotid bruit is not present. No thyromegaly present.   Cardiovascular: Normal rate, regular rhythm and normal heart sounds. PMI is not displaced.   Pulmonary/Chest: Effort normal and breath sounds normal. No respiratory distress.   Abdominal: Soft. Bowel sounds are normal. She exhibits no distension. There is no tenderness.   Musculoskeletal: She exhibits no edema.   Neurological: She is alert and oriented to person, place, and time.     Assessment:     1. Physical exam    2. Elevated MCV    3. Hypothyroidism, unspecified type    4. Wellness examination    5. History of parvovirus B19 infection    6. Colon cancer screening    7. Encounter for screening mammogram for breast cancer    8. Mild vitamin D deficiency      Plan:   Becky was seen today for annual exam.    Diagnoses and all orders for this visit:    Physical exam  Comments:  No new problems    Elevated MCV  Comments:  B12 will be checked with repeat tsh  Orders:  -     Vitamin B12; Future    Hypothyroidism, unspecified type  Comments:  Increase thyroid to 50 mcg recheck in 6 weeks  Orders:  -     TSH; Future    Wellness examination  Comments:  Discussed labs  Orders:  -     CBC auto differential; Future  -     Comprehensive metabolic panel; Future  -     Lipid panel; Future  -     Hemoglobin A1c; Future  -     TSH; Future  -     Vitamin D; Future    History of parvovirus B19 infection    Colon cancer screening  -     Case request GI: COLONOSCOPY    Encounter for screening mammogram for breast cancer  -     Mammo Digital Screening Bilat w/ Darrick; Future    Mild vitamin D deficiency  Comments:  Begin 2000 units a day    Other orders  -     levothyroxine (SYNTHROID) 50 MCG tablet; Take 1 tablet (50 mcg total) by mouth once daily.        Problem List Items Addressed This Visit     History of parvovirus B19  infection      Other Visit Diagnoses     Physical exam    -  Primary    No new problems    Elevated MCV        B12 will be checked with repeat tsh    Relevant Orders    Vitamin B12    Hypothyroidism, unspecified type        Increase thyroid to 50 mcg recheck in 6 weeks    Relevant Orders    TSH    Wellness examination        Discussed labs    Relevant Orders    CBC auto differential    Comprehensive metabolic panel    Lipid panel    Hemoglobin A1c    TSH    Vitamin D    Colon cancer screening        Relevant Orders    Case request GI: COLONOSCOPY (Completed)    Encounter for screening mammogram for breast cancer        Relevant Orders    Mammo Digital Screening Bilat w/ Darrick    Mild vitamin D deficiency        Begin 2000 units a day        Orders Placed This Encounter   Procedures    Mammo Digital Screening Bilat w/ Darrick     Standing Status:   Future     Standing Expiration Date:   3/7/2021     Order Specific Question:   May the Radiologist modify the order per protocol to meet the clinical needs of the patient?     Answer:   Yes    TSH     Standing Status:   Future     Standing Expiration Date:   3/6/2020    Vitamin B12     Standing Status:   Future     Standing Expiration Date:   3/6/2021    CBC auto differential     Standing Status:   Future     Standing Expiration Date:   3/6/2021    Comprehensive metabolic panel     Standing Status:   Future     Standing Expiration Date:   3/6/2021    Lipid panel     Standing Status:   Future     Standing Expiration Date:   3/6/2021    Hemoglobin A1c     Standing Status:   Future     Standing Expiration Date:   3/6/2021    TSH     Standing Status:   Future     Standing Expiration Date:   3/6/2021    Vitamin D     Standing Status:   Future     Standing Expiration Date:   3/6/2021    Case request GI: COLONOSCOPY     Order Specific Question:   Pre-op Diagnosis     Answer:   Screening [324352]     Order Specific Question:   CPT Code:     Answer:   PA COLORECTAL CANCER  SCREEN RESULTS DOCUMENT/REVIEW [3017F]     Order Specific Question:   Case Referring Provider     Answer:   FE SILVA [[id number]]     Order Specific Question:   CPT Code:     Answer:   IN COLON CA SCRN NOT HI RSK IND []     Order Specific Question:   Medical Necessity:     Answer:   Medically Non-Urgent [100]     Follow up in about 1 year (around 1/6/2021) for Annual exam: cbc, cmp, lipid, tsh vit D.     Medication List           Accurate as of January 6, 2020  8:41 AM. If you have any questions, ask your nurse or doctor.               CHANGE how you take these medications    acyclovir 400 MG tablet  Commonly known as:  ZOVIRAX  Take 1 tablet (400 mg total) by mouth 2 (two) times daily.  What changed:  additional instructions     levothyroxine 50 MCG tablet  Commonly known as:  SYNTHROID  Take 1 tablet (50 mcg total) by mouth once daily.  What changed:    · medication strength  · See the new instructions.  Changed by:  Fe Silva MD     naproxen sodium 550 MG tablet  Commonly known as:  ANAPROX  Take 1 tablet (550 mg total) by mouth 2 (two) times daily with meals.  What changed:  additional instructions        CONTINUE taking these medications    Apri 0.15-0.03 mg per tablet  Generic drug:  desogestrel-ethinyl estradiol  Take 1 tablet by mouth once daily.     paroxetine 10 MG tablet  Commonly known as:  PAXIL  TAKE 1 TABLET (10 MG TOTAL) BY MOUTH EVERY EVENING.     * triamcinolone acetonide 0.1% 0.1 % paste  Commonly known as:  KENALOG  APPLY TO MOUTH SORES 2 TIMES A DAY     * triamcinolone acetonide 0.1% 0.1 % cream  Commonly known as:  KENALOG  AAA bid         * This list has 2 medication(s) that are the same as other medications prescribed for you. Read the directions carefully, and ask your doctor or other care provider to review them with you.               Where to Get Your Medications      These medications were sent to [facility]/pharmacy #[id number] - [place], [place] - [address]  [address]  [place] [address]    Hours:  24-hours Phone:  [phone]   · levothyroxine 50 MCG tablet

## 2020-01-12 ENCOUNTER — PATIENT MESSAGE (OUTPATIENT)
Dept: INTERNAL MEDICINE | Facility: CLINIC | Age: 50
End: 2020-01-12

## 2020-01-26 ENCOUNTER — PATIENT MESSAGE (OUTPATIENT)
Dept: INTERNAL MEDICINE | Facility: CLINIC | Age: 50
End: 2020-01-26

## 2020-02-09 ENCOUNTER — HOSPITAL ENCOUNTER (OUTPATIENT)
Dept: RADIOLOGY | Facility: HOSPITAL | Age: 50
Discharge: HOME | End: 2020-02-09
Attending: INTERNAL MEDICINE
Payer: COMMERCIAL

## 2020-02-09 DIAGNOSIS — Z12.31 ENCOUNTER FOR SCREENING MAMMOGRAM FOR BREAST CANCER: ICD-10-CM

## 2020-02-09 PROCEDURE — 77067 SCR MAMMO BI INCL CAD: CPT | Mod: TC

## 2020-02-09 PROCEDURE — 77067 MAMMO DIGITAL SCREENING BILAT WITH TOMOSYNTHESIS_CAD: ICD-10-PCS | Mod: 26,,, | Performed by: RADIOLOGY

## 2020-02-09 PROCEDURE — 77063 BREAST TOMOSYNTHESIS BI: CPT | Mod: 26,,, | Performed by: RADIOLOGY

## 2020-02-09 PROCEDURE — 77063 MAMMO DIGITAL SCREENING BILAT WITH TOMOSYNTHESIS_CAD: ICD-10-PCS | Mod: 26,,, | Performed by: RADIOLOGY

## 2020-02-09 PROCEDURE — 77067 SCR MAMMO BI INCL CAD: CPT | Mod: 26,,, | Performed by: RADIOLOGY

## 2020-02-17 ENCOUNTER — PATIENT MESSAGE (OUTPATIENT)
Dept: INTERNAL MEDICINE | Facility: CLINIC | Age: 50
End: 2020-02-17

## 2020-02-17 ENCOUNTER — LAB VISIT (OUTPATIENT)
Dept: LAB | Facility: HOSPITAL | Age: 50
End: 2020-02-17
Attending: INTERNAL MEDICINE
Payer: COMMERCIAL

## 2020-02-17 ENCOUNTER — TELEPHONE (OUTPATIENT)
Dept: INTERNAL MEDICINE | Facility: CLINIC | Age: 50
End: 2020-02-17

## 2020-02-17 DIAGNOSIS — E03.9 HYPOTHYROIDISM, UNSPECIFIED TYPE: Primary | ICD-10-CM

## 2020-02-17 DIAGNOSIS — E03.9 HYPOTHYROIDISM, UNSPECIFIED TYPE: ICD-10-CM

## 2020-02-17 DIAGNOSIS — R71.8 ELEVATED MCV: ICD-10-CM

## 2020-02-17 LAB
T4 FREE SERPL-MCNC: 0.9 NG/DL (ref 0.71–1.51)
TSH SERPL DL<=0.005 MIU/L-ACNC: 8.88 UIU/ML (ref 0.4–4)
VIT B12 SERPL-MCNC: 282 PG/ML (ref 210–950)

## 2020-02-17 PROCEDURE — 36415 COLL VENOUS BLD VENIPUNCTURE: CPT

## 2020-02-17 PROCEDURE — 84443 ASSAY THYROID STIM HORMONE: CPT

## 2020-02-17 PROCEDURE — 82607 VITAMIN B-12: CPT

## 2020-02-17 PROCEDURE — 84439 ASSAY OF FREE THYROXINE: CPT

## 2020-02-17 NOTE — TELEPHONE ENCOUNTER
Please ask her if I may increase the levothyroxine to 75 mcg    Please also tell her that her B12 is low and ask how much she is taking.

## 2020-02-19 ENCOUNTER — TELEPHONE (OUTPATIENT)
Dept: INTERNAL MEDICINE | Facility: CLINIC | Age: 50
End: 2020-02-19

## 2020-02-19 RX ORDER — LEVOTHYROXINE SODIUM 75 UG/1
75 TABLET ORAL
Qty: 30 TABLET | Refills: 2 | Status: SHIPPED | OUTPATIENT
Start: 2020-02-19 | End: 2020-05-14

## 2020-02-19 NOTE — TELEPHONE ENCOUNTER
Please tell her that I have increased the levothyroxine to 75 mcg and will recheck a tsh in 8 weeks. I am raising her dose conservatively but please note the free T4 went up from 0.8 to 0.9 which is what the body sees. GML  TSH in 8 weeks         Documentation

## 2020-02-19 NOTE — TELEPHONE ENCOUNTER
Please tell her that I have increased the levothyroxine to 75 mcg and will recheck a tsh in 8 weeks. I am raising her dose conservatively but please note the free T4 went up from 0.8 to 0.9 which is what the body sees. GML  TSH in 8 weeks

## 2020-02-19 NOTE — TELEPHONE ENCOUNTER
----- Message from Sima Gaston sent at 2/19/2020 12:46 PM CST -----  Contact: Self [phone]  Patient is returning a phone call.  Who left a message for the patient: ALEXEI GRIJALVA  Does patient know what this is regarding:  Unknown  Comments:

## 2020-02-19 NOTE — TELEPHONE ENCOUNTER
Pt stated [facility] may [facility] [address]. Pt stated she isn't taking any B12 only vitamin D.    Pt she sent a portal message regarding why her TSH level has increased in 6 weeks. Pt stated you can respond back to her through the portal.

## 2020-03-25 ENCOUNTER — PATIENT MESSAGE (OUTPATIENT)
Dept: INTERNAL MEDICINE | Facility: CLINIC | Age: 50
End: 2020-03-25

## 2020-03-29 RX ORDER — LEVOTHYROXINE SODIUM 50 UG/1
TABLET ORAL
Qty: 90 TABLET | Refills: 1 | OUTPATIENT
Start: 2020-03-29

## 2020-05-12 ENCOUNTER — PATIENT MESSAGE (OUTPATIENT)
Dept: INTERNAL MEDICINE | Facility: CLINIC | Age: 50
End: 2020-05-12

## 2020-05-12 RX ORDER — PAROXETINE 10 MG/1
10 TABLET, FILM COATED ORAL NIGHTLY
Qty: 30 TABLET | Refills: 12 | Status: CANCELLED | OUTPATIENT
Start: 2020-05-12

## 2020-05-12 RX ORDER — PAROXETINE 10 MG/1
10 TABLET, FILM COATED ORAL NIGHTLY
Qty: 30 TABLET | Refills: 12 | Status: SHIPPED | OUTPATIENT
Start: 2020-05-12 | End: 2021-05-02

## 2020-05-12 NOTE — TELEPHONE ENCOUNTER
----- Message from Gladis Goode sent at 5/12/2020  8:50 AM CDT -----  Contact: [phone]  Type: Rx    Name of medication(s): paroxetine (PAXIL) 10 MG tablet    Is this a refill? New rx? refill    Who prescribed medication?  Dr. JEANINE Silva    Pharmacy Name, Phone, & Location:  [facility]/pharmacy #[id number] - [facility] [address]    Comments:  Please advise, thank you

## 2020-05-14 DIAGNOSIS — E03.9 HYPOTHYROIDISM, UNSPECIFIED TYPE: ICD-10-CM

## 2020-05-14 RX ORDER — LEVOTHYROXINE SODIUM 75 UG/1
TABLET ORAL
Qty: 90 TABLET | Refills: 3 | Status: SHIPPED | OUTPATIENT
Start: 2020-05-14 | End: 2021-07-01

## 2020-07-14 ENCOUNTER — PATIENT OUTREACH (OUTPATIENT)
Dept: ADMINISTRATIVE | Facility: OTHER | Age: 50
End: 2020-07-14

## 2020-07-16 ENCOUNTER — OFFICE VISIT (OUTPATIENT)
Dept: DERMATOLOGY | Facility: CLINIC | Age: 50
End: 2020-07-16
Payer: COMMERCIAL

## 2020-07-16 DIAGNOSIS — L82.1 SK (SEBORRHEIC KERATOSIS): ICD-10-CM

## 2020-07-16 DIAGNOSIS — Z12.83 SCREENING EXAM FOR SKIN CANCER: ICD-10-CM

## 2020-07-16 DIAGNOSIS — D48.5 NEOPLASM OF UNCERTAIN BEHAVIOR OF SKIN: Primary | ICD-10-CM

## 2020-07-16 DIAGNOSIS — D22.9 MULTIPLE BENIGN NEVI: ICD-10-CM

## 2020-07-16 DIAGNOSIS — L81.4 SOLAR LENTIGO: ICD-10-CM

## 2020-07-16 PROCEDURE — 99999 PR PBB SHADOW E&M-EST. PATIENT-LVL III: ICD-10-PCS | Mod: PBBFAC,,, | Performed by: DERMATOLOGY

## 2020-07-16 PROCEDURE — 99213 OFFICE O/P EST LOW 20 MIN: CPT | Mod: 25,S$GLB,, | Performed by: DERMATOLOGY

## 2020-07-16 PROCEDURE — 11102 PR TANGENTIAL BIOPSY, SKIN, SINGLE LESION: ICD-10-PCS | Mod: S$GLB,,, | Performed by: DERMATOLOGY

## 2020-07-16 PROCEDURE — 11102 TANGNTL BX SKIN SINGLE LES: CPT | Mod: S$GLB,,, | Performed by: DERMATOLOGY

## 2020-07-16 PROCEDURE — 88305 TISSUE EXAM BY PATHOLOGIST: CPT | Performed by: PATHOLOGY

## 2020-07-16 PROCEDURE — 99999 PR PBB SHADOW E&M-EST. PATIENT-LVL III: CPT | Mod: PBBFAC,,, | Performed by: DERMATOLOGY

## 2020-07-16 PROCEDURE — 88305 TISSUE EXAM BY PATHOLOGIST: CPT | Mod: 26,,, | Performed by: PATHOLOGY

## 2020-07-16 PROCEDURE — 99213 PR OFFICE/OUTPT VISIT, EST, LEVL III, 20-29 MIN: ICD-10-PCS | Mod: 25,S$GLB,, | Performed by: DERMATOLOGY

## 2020-07-16 PROCEDURE — 88305 TISSUE EXAM BY PATHOLOGIST: ICD-10-PCS | Mod: 26,,, | Performed by: PATHOLOGY

## 2020-07-16 NOTE — PATIENT INSTRUCTIONS

## 2020-07-16 NOTE — PROGRESS NOTES
Subjective:       Patient ID:  Becky Ospina is a 50 y.o. female who presents for   Chief Complaint   Patient presents with    Skin Check     ubse      Rash - Initial  Affected locations: right hand  Duration: 4 months (has since resolved)  Signs / symptoms: pain and itching  Relieving factors/Treatments tried: nothing (resolved spontaneously)        Review of Systems   Skin: Positive for dry skin and activity-related sunscreen use. Negative for itching, rash and daily sunscreen use.   Hematologic/Lymphatic: Does not bruise/bleed easily.        Objective:    Physical Exam   Constitutional: She appears well-developed and well-nourished. No distress.   Neurological: She is alert and oriented to person, place, and time. She is not disoriented.   Psychiatric: She has a normal mood and affect.   Skin:   Areas Examined (abnormalities noted in diagram):   Head / Face Inspection Performed  Neck Inspection Performed  Chest / Axilla Inspection Performed  Back Inspection Performed  RUE Inspected  LUE Inspection Performed                   Diagram Legend     Erythematous scaling macule/papule c/w actinic keratosis       Vascular papule c/w angioma      Pigmented verrucoid papule/plaque c/w seborrheic keratosis      Yellow umbilicated papule c/w sebaceous hyperplasia      Irregularly shaped tan macule c/w lentigo     1-2 mm smooth white papules consistent with Milia      Movable subcutaneous cyst with punctum c/w epidermal inclusion cyst      Subcutaneous movable cyst c/w pilar cyst      Firm pink to brown papule c/w dermatofibroma      Pedunculated fleshy papule(s) c/w skin tag(s)      Evenly pigmented macule c/w junctional nevus     Mildly variegated pigmented, slightly irregular-bordered macule c/w mildly atypical nevus      Flesh colored to evenly pigmented papule c/w intradermal nevus       Pink pearly papule/plaque c/w basal cell carcinoma      Erythematous hyperkeratotic cursted plaque c/w SCC      Surgical  scar with no sign of skin cancer recurrence      Open and closed comedones      Inflammatory papules and pustules      Verrucoid papule consistent consistent with wart     Erythematous eczematous patches and plaques     Dystrophic onycholytic nail with subungual debris c/w onychomycosis     Umbilicated papule    Erythematous-base heme-crusted tan verrucoid plaque consistent with inflamed seborrheic keratosis     Erythematous Silvery Scaling Plaque c/w Psoriasis     See annotation              Assessment / Plan:      Pathology Orders:     Normal Orders This Visit    Specimen to Pathology, Dermatology     Questions:    Procedure Type: Dermatology and skin neoplasms    Number of Specimens: 1    ------------------------: -------------------------    Spec 1 Procedure: Biopsy    Spec 1 Clinical Impression: r/o molluscum vs irritated nevus vs other    Spec 1 Source: right post neck        Neoplasm of uncertain behavior of skin  -     Specimen to Pathology, Dermatology  Shave biopsy procedure note:    Shave biopsy performed after verbal consent including risk of infection, scar, recurrence, need for additional treatment of site. Area prepped with alcohol, anesthetized with approximately 1.0cc of 1% lidocaine with epinephrine. Lesional tissue shaved with razor blade. Hemostasis achieved with application of aluminum chloride followed by hyfrecation. No complications. Dressing applied. Wound care explained.        SK (seborrheic keratosis)  These are benign inherited growths without a malignant potential. Reassurance given to patient. No treatment is necessary.       Solar lentigo  This is a benign hyperpigmented sun induced lesion. Daily sun protection will reduce the number of new lesions. Treatment of these benign lesions are considered cosmetic.    Multiple benign nevi   - stable and chronic      Screening exam for skin cancer    Upper body skin examination performed today including at least 6 points as noted in physical  examination. Suspicious lesions noted.               Follow up if symptoms worsen or fail to improve.

## 2020-07-17 DIAGNOSIS — Z12.11 SPECIAL SCREENING FOR MALIGNANT NEOPLASMS, COLON: Primary | ICD-10-CM

## 2020-07-17 DIAGNOSIS — Z01.818 PRE-OP TESTING: ICD-10-CM

## 2020-07-17 RX ORDER — POLYETHYLENE GLYCOL 3350, SODIUM SULFATE ANHYDROUS, SODIUM BICARBONATE, SODIUM CHLORIDE, POTASSIUM CHLORIDE 236; 22.74; 6.74; 5.86; 2.97 G/4L; G/4L; G/4L; G/4L; G/4L
4 POWDER, FOR SOLUTION ORAL ONCE
Qty: 4000 ML | Refills: 0 | Status: SHIPPED | OUTPATIENT
Start: 2020-07-17 | End: 2020-07-17

## 2020-07-21 LAB
FINAL PATHOLOGIC DIAGNOSIS: NORMAL
GROSS: NORMAL
MICROSCOPIC EXAM: NORMAL

## 2020-07-22 ENCOUNTER — OFFICE VISIT (OUTPATIENT)
Dept: OPTOMETRY | Facility: CLINIC | Age: 50
End: 2020-07-22

## 2020-07-22 ENCOUNTER — OFFICE VISIT (OUTPATIENT)
Dept: OPTOMETRY | Facility: CLINIC | Age: 50
End: 2020-07-22
Payer: COMMERCIAL

## 2020-07-22 DIAGNOSIS — H04.123 DRY EYE SYNDROME, BILATERAL: Primary | ICD-10-CM

## 2020-07-22 DIAGNOSIS — H52.03 HYPEROPIA, BILATERAL: ICD-10-CM

## 2020-07-22 DIAGNOSIS — Z46.0 FITTING AND ADJUSTMENT OF SPECTACLES AND CONTACT LENSES: Primary | ICD-10-CM

## 2020-07-22 DIAGNOSIS — H52.4 PRESBYOPIA: ICD-10-CM

## 2020-07-22 PROCEDURE — 92310 PR CONTACT LENS FITTING (NO CHANGE): ICD-10-PCS | Mod: CSM,,, | Performed by: OPTOMETRIST

## 2020-07-22 PROCEDURE — 92310 CONTACT LENS FITTING OU: CPT | Mod: CSM,,, | Performed by: OPTOMETRIST

## 2020-07-22 PROCEDURE — 99999 PR PBB SHADOW E&M-EST. PATIENT-LVL II: ICD-10-PCS | Mod: PBBFAC,,, | Performed by: OPTOMETRIST

## 2020-07-22 PROCEDURE — 92015 PR REFRACTION: ICD-10-PCS | Mod: S$GLB,,, | Performed by: OPTOMETRIST

## 2020-07-22 PROCEDURE — 92004 COMPRE OPH EXAM NEW PT 1/>: CPT | Mod: S$GLB,,, | Performed by: OPTOMETRIST

## 2020-07-22 PROCEDURE — 99999 PR PBB SHADOW E&M-EST. PATIENT-LVL II: CPT | Mod: PBBFAC,,, | Performed by: OPTOMETRIST

## 2020-07-22 PROCEDURE — 92015 DETERMINE REFRACTIVE STATE: CPT | Mod: S$GLB,,, | Performed by: OPTOMETRIST

## 2020-07-22 PROCEDURE — 92004 PR EYE EXAM, NEW PATIENT,COMPREHESV: ICD-10-PCS | Mod: S$GLB,,, | Performed by: OPTOMETRIST

## 2020-07-22 NOTE — PROGRESS NOTES
HPI     Pt here for annual and cl fit  KIMBERLY was 2 yrs ago   Wears PAL and MF daily cls  Feels like there is a decrease in near and far va   Pt feels like OD fbs often   Pt does not think she sees well out of her cls at all      Last edited by Niya Marin on 7/22/2020  9:37 AM. (History)            Assessment /Plan     For exam results, see Encounter Report.    Dry eye syndrome, bilateral   Use systane or refresh QID x 1-2 weeks then BID/PRN    Presbyopia  Hyperopia, bilateral   Rx specs   Cl fit today: dispensed trials of dailies total one MF +2.25 MED OU   Call/ return if problems with vision       Good internal ocular health, monitor yearly    RTC 1 year

## 2020-07-24 ENCOUNTER — OFFICE VISIT (OUTPATIENT)
Dept: OBSTETRICS AND GYNECOLOGY | Facility: CLINIC | Age: 50
End: 2020-07-24
Payer: COMMERCIAL

## 2020-07-24 VITALS
WEIGHT: 146.38 LBS | BODY MASS INDEX: 24.99 KG/M2 | HEIGHT: 64 IN | DIASTOLIC BLOOD PRESSURE: 76 MMHG | SYSTOLIC BLOOD PRESSURE: 112 MMHG

## 2020-07-24 DIAGNOSIS — Z01.419 ENCOUNTER FOR GYNECOLOGICAL EXAMINATION WITHOUT ABNORMAL FINDING: Primary | ICD-10-CM

## 2020-07-24 PROCEDURE — 3008F PR BODY MASS INDEX (BMI) DOCUMENTED: ICD-10-PCS | Mod: CPTII,S$GLB,, | Performed by: OBSTETRICS & GYNECOLOGY

## 2020-07-24 PROCEDURE — 99396 PREV VISIT EST AGE 40-64: CPT | Mod: S$GLB,,, | Performed by: OBSTETRICS & GYNECOLOGY

## 2020-07-24 PROCEDURE — 99999 PR PBB SHADOW E&M-EST. PATIENT-LVL III: ICD-10-PCS | Mod: PBBFAC,,, | Performed by: OBSTETRICS & GYNECOLOGY

## 2020-07-24 PROCEDURE — 99396 PR PREVENTIVE VISIT,EST,40-64: ICD-10-PCS | Mod: S$GLB,,, | Performed by: OBSTETRICS & GYNECOLOGY

## 2020-07-24 PROCEDURE — 3008F BODY MASS INDEX DOCD: CPT | Mod: CPTII,S$GLB,, | Performed by: OBSTETRICS & GYNECOLOGY

## 2020-07-24 PROCEDURE — 99999 PR PBB SHADOW E&M-EST. PATIENT-LVL III: CPT | Mod: PBBFAC,,, | Performed by: OBSTETRICS & GYNECOLOGY

## 2020-07-24 PROCEDURE — 88141 PR  CYTOPATH CERV/VAG INTERPRET: ICD-10-PCS | Mod: ,,, | Performed by: PATHOLOGY

## 2020-07-24 PROCEDURE — 88141 CYTOPATH C/V INTERPRET: CPT | Mod: ,,, | Performed by: PATHOLOGY

## 2020-07-24 PROCEDURE — 88175 CYTOPATH C/V AUTO FLUID REDO: CPT | Performed by: PATHOLOGY

## 2020-08-10 LAB
FINAL PATHOLOGIC DIAGNOSIS: ABNORMAL
Lab: ABNORMAL

## 2020-08-11 PROBLEM — R87.610 ASCUS OF CERVIX WITH NEGATIVE HIGH RISK HPV: Status: ACTIVE | Noted: 2020-01-01

## 2020-09-15 ENCOUNTER — LAB VISIT (OUTPATIENT)
Dept: URGENT CARE | Facility: CLINIC | Age: 50
End: 2020-09-15
Payer: COMMERCIAL

## 2020-09-15 DIAGNOSIS — Z01.818 PRE-OP TESTING: ICD-10-CM

## 2020-09-15 PROCEDURE — U0003 INFECTIOUS AGENT DETECTION BY NUCLEIC ACID (DNA OR RNA); SEVERE ACUTE RESPIRATORY SYNDROME CORONAVIRUS 2 (SARS-COV-2) (CORONAVIRUS DISEASE [COVID-19]), AMPLIFIED PROBE TECHNIQUE, MAKING USE OF HIGH THROUGHPUT TECHNOLOGIES AS DESCRIBED BY CMS-2020-01-R: HCPCS

## 2020-09-16 LAB — SARS-COV-2 RNA RESP QL NAA+PROBE: NOT DETECTED

## 2020-09-17 ENCOUNTER — NURSE TRIAGE (OUTPATIENT)
Dept: ADMINISTRATIVE | Facility: CLINIC | Age: 50
End: 2020-09-17

## 2020-09-17 ENCOUNTER — ANESTHESIA EVENT (OUTPATIENT)
Dept: ENDOSCOPY | Facility: HOSPITAL | Age: 50
End: 2020-09-17
Payer: COMMERCIAL

## 2020-09-17 NOTE — TELEPHONE ENCOUNTER
Pt doing colonoscpy prep, pt took laxatives at noon, pt already liquid diarrhea coming out. Pt started to drink prep, vomited once, able to tolerate 1 toi gras cup but worried she wont be able to tolerate drinking all the prep. Reviewed instructions with pt (see below). Recommended crystal light (without red or purple dye). VU. Will call back with further questions.     It is not uncommon to experience some abdominal cramping, nausea and/or vomiting when taking the prep. If you have nausea and/or vomiting while taking the prep, stop drinking for 20 to 30 minutes then resume.     Reason for Disposition   Question about upcoming scheduled test, no triage required and triager able to answer question    Protocols used: INFORMATION ONLY CALL - NO TRIAGE-A-

## 2020-09-18 ENCOUNTER — HOSPITAL ENCOUNTER (OUTPATIENT)
Facility: HOSPITAL | Age: 50
Discharge: HOME | End: 2020-09-18
Attending: INTERNAL MEDICINE | Admitting: INTERNAL MEDICINE
Payer: COMMERCIAL

## 2020-09-18 ENCOUNTER — NURSE TRIAGE (OUTPATIENT)
Dept: ADMINISTRATIVE | Facility: CLINIC | Age: 50
End: 2020-09-18

## 2020-09-18 ENCOUNTER — ANESTHESIA (OUTPATIENT)
Dept: ENDOSCOPY | Facility: HOSPITAL | Age: 50
End: 2020-09-18
Payer: COMMERCIAL

## 2020-09-18 VITALS
HEIGHT: 64 IN | TEMPERATURE: 98 F | SYSTOLIC BLOOD PRESSURE: 126 MMHG | WEIGHT: 140 LBS | BODY MASS INDEX: 23.9 KG/M2 | RESPIRATION RATE: 16 BRPM | OXYGEN SATURATION: 100 % | DIASTOLIC BLOOD PRESSURE: 87 MMHG | HEART RATE: 70 BPM

## 2020-09-18 DIAGNOSIS — Z12.11 ENCOUNTER FOR SCREENING COLONOSCOPY: Primary | ICD-10-CM

## 2020-09-18 LAB
HPV HR 12 DNA SPEC QL NAA+PROBE: NEGATIVE
HPV16 AG SPEC QL: NEGATIVE
HPV18 DNA SPEC QL NAA+PROBE: NEGATIVE

## 2020-09-18 PROCEDURE — G0121 COLON CA SCRN NOT HI RSK IND: ICD-10-PCS | Mod: ,,, | Performed by: INTERNAL MEDICINE

## 2020-09-18 PROCEDURE — D9220A PRA ANESTHESIA: Mod: CRNA,,, | Performed by: REGISTERED NURSE

## 2020-09-18 PROCEDURE — D9220A PRA ANESTHESIA: Mod: ANES,,, | Performed by: ANESTHESIOLOGY

## 2020-09-18 PROCEDURE — 63600175 PHARM REV CODE 636 W HCPCS: Performed by: REGISTERED NURSE

## 2020-09-18 PROCEDURE — 25000003 PHARM REV CODE 250: Performed by: REGISTERED NURSE

## 2020-09-18 PROCEDURE — D9220A PRA ANESTHESIA: ICD-10-PCS | Mod: CRNA,,, | Performed by: REGISTERED NURSE

## 2020-09-18 PROCEDURE — 87624 HPV HI-RISK TYP POOLED RSLT: CPT

## 2020-09-18 PROCEDURE — G0121 COLON CA SCRN NOT HI RSK IND: HCPCS | Performed by: INTERNAL MEDICINE

## 2020-09-18 PROCEDURE — 37000009 HC ANESTHESIA EA ADD 15 MINS: Performed by: INTERNAL MEDICINE

## 2020-09-18 PROCEDURE — 37000008 HC ANESTHESIA 1ST 15 MINUTES: Performed by: INTERNAL MEDICINE

## 2020-09-18 PROCEDURE — D9220A PRA ANESTHESIA: ICD-10-PCS | Mod: ANES,,, | Performed by: ANESTHESIOLOGY

## 2020-09-18 PROCEDURE — G0121 COLON CA SCRN NOT HI RSK IND: HCPCS | Mod: ,,, | Performed by: INTERNAL MEDICINE

## 2020-09-18 RX ORDER — SODIUM CHLORIDE 0.9 % (FLUSH) 0.9 %
10 SYRINGE (ML) INJECTION
Status: DISCONTINUED | OUTPATIENT
Start: 2020-09-18 | End: 2020-09-18 | Stop reason: HOSPADM

## 2020-09-18 RX ORDER — PROPOFOL 10 MG/ML
INJECTION, EMULSION INTRAVENOUS
Status: DISCONTINUED | OUTPATIENT
Start: 2020-09-18 | End: 2020-09-18

## 2020-09-18 RX ORDER — SODIUM CHLORIDE 9 MG/ML
INJECTION, SOLUTION INTRAVENOUS CONTINUOUS PRN
Status: DISCONTINUED | OUTPATIENT
Start: 2020-09-18 | End: 2020-09-18

## 2020-09-18 RX ORDER — LIDOCAINE HCL/PF 100 MG/5ML
SYRINGE (ML) INTRAVENOUS
Status: DISCONTINUED | OUTPATIENT
Start: 2020-09-18 | End: 2020-09-18

## 2020-09-18 RX ORDER — PROPOFOL 10 MG/ML
INJECTION, EMULSION INTRAVENOUS CONTINUOUS PRN
Status: DISCONTINUED | OUTPATIENT
Start: 2020-09-18 | End: 2020-09-18

## 2020-09-18 RX ORDER — SODIUM CHLORIDE 9 MG/ML
INJECTION, SOLUTION INTRAVENOUS CONTINUOUS
Status: DISCONTINUED | OUTPATIENT
Start: 2020-09-18 | End: 2020-09-18 | Stop reason: HOSPADM

## 2020-09-18 RX ADMIN — PROPOFOL 150 MCG/KG/MIN: 10 INJECTION, EMULSION INTRAVENOUS at 08:09

## 2020-09-18 RX ADMIN — SODIUM CHLORIDE: 9 INJECTION, SOLUTION INTRAVENOUS at 08:09

## 2020-09-18 RX ADMIN — Medication 60 MG: at 08:09

## 2020-09-18 RX ADMIN — PROPOFOL 100 MG: 10 INJECTION, EMULSION INTRAVENOUS at 08:09

## 2020-09-18 NOTE — H&P
Short Stay Endoscopy History and Physical    PCP - Kacie Silva MD    Procedure - Colonoscopy  ASA - per anesthesia  Mallampati - per anesthesia  History of Anesthesia problems - no  Family history Anesthesia problems - no   Plan of anesthesia - General    HPI:  This is a 50 y.o. female here for evaluation of : asymptomatic screening exam  First colonoscopy. No fam hx of CRC. No blood thinners.  On 2nd floor for fam hx of malignant hyperthermia.      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    Surgical History:  has a past surgical history that includes Tonsillectomy (1993) and Cold knife conization of cervix (2009).    Family History: family history includes Arthritis in her mother; Malignant hyperthermia in her brother; Parkinsonism in her father; Rheum arthritis in her mother.. Otherwise no colon cancer, inflammatory bowel disease, or GI malignancies.    Social History:  reports that she has never smoked. She has never used smokeless tobacco. She reports current alcohol use of about 2.0 standard drinks of alcohol per week. She reports that she does not use drugs.    Review of patient's allergies indicates:  No Known Allergies    Medications:   Medications Prior to Admission   Medication Sig Dispense Refill Last Dose    APRI 0.15-0.03 mg per tablet Take 1 tablet by mouth once daily. 28 tablet 12 9/17/2020 at Unknown time    levothyroxine (SYNTHROID) 75 MCG tablet TAKE 1 TABLET BY MOUTH BEFORE BREAKFAST. 90 tablet 3 9/17/2020 at Unknown time    naproxen sodium (ANAPROX) 550 MG tablet Take 1 tablet (550 mg  total) by mouth 2 (two) times daily with meals. (Patient taking differently: Take 550 mg by mouth 2 (two) times daily with meals. Jaw pain) 60 tablet 1 Past Week at Unknown time    paroxetine (PAXIL) 10 MG tablet Take 1 tablet (10 mg total) by mouth every evening. 30 tablet 12 9/17/2020 at Unknown time    triamcinolone acetonide 0.1% (KENALOG) 0.1 % cream AAA bid 454 g 3 Past Month at Unknown time    triamcinolone acetonide 0.1% (KENALOG) 0.1 % paste APPLY TO MOUTH SORES 2 TIMES A DAY 5 g 0 Past Month at Unknown time    acyclovir (ZOVIRAX) 400 MG tablet Take 1 tablet (400 mg total) by mouth 2 (two) times daily. (Patient taking differently: Take 400 mg by mouth 2 (two) times daily. Fever blisters) 60 tablet 12 More than a month at Unknown time    levothyroxine (SYNTHROID) 50 MCG tablet TAKE 1 TABLET BY MOUTH EVERY DAY 90 tablet 1 Unknown at Unknown time         Physical Exam:    Vital Signs: There were no vitals filed for this visit.    General Appearance: Well appearing in no acute distress  Eyes:    No scleral icterus  ENT: Neck supple, Lips, mucosa, and tongue normal; teeth and gums normal  Lungs: CTA bilaterally  Heart:  S1, S2 normal, no murmurs heard  Abdomen: Soft, non tender, non distended with positive bowel sounds. No hepatosplenomegaly, ascites, or mass.  Extremities: 2+ pulses, no clubbing, cyanosis or edema  Skin: No rash      Labs:  Lab Results   Component Value Date    WBC 10.35 01/02/2020    HGB 12.5 01/02/2020    HCT 39.9 01/02/2020     (H) 01/02/2020    CHOL 190 01/02/2020    TRIG 131 01/02/2020    HDL 81 (H) 01/02/2020    ALT 10 01/02/2020    AST 12 01/02/2020     01/02/2020    K 3.7 01/02/2020     01/02/2020    CREATININE 0.7 01/02/2020    BUN 14 01/02/2020    CO2 28 01/02/2020    TSH 8.883 (H) 02/17/2020       Plan:  Colonoscopy for average risk screening    I have explained the risks and benefits of endoscopy procedures to the patient including but not limited to  bleeding, perforation, infection, and death.  The patient was asked if they understand and allowed to ask any further questions to their satisfaction.    Anuel Velásquez MD

## 2020-09-18 NOTE — [employer] PATIENT INSTRUCTIONS
Discharge Summary/Instructions after an Endoscopic Procedure  Patient Name: Becky Ospina  Patient MRN: [id number]  Patient YOB: 1970 Friday, September 18, 2020  Thaddeus Blackburn MD  RESTRICTIONS:  During your procedure today, you received medications for sedation.  These   medications may affect your judgment, balance and coordination.  Therefore,   for 24 hours, you have the following restrictions:   - DO NOT drive a car, operate machinery, make legal/financial decisions,   sign important papers or drink alcohol.    ACTIVITY:  Today: no heavy lifting, straining or running due to procedural   sedation/anesthesia.  The following day: return to full activity including work.  DIET:  Eat and drink normally unless instructed otherwise.     TREATMENT FOR COMMON SIDE EFFECTS:  - Mild abdominal pain, nausea, belching, bloating or excessive gas:  rest,   eat lightly and use a heating pad.  - Sore Throat: treat with throat lozenges and/or gargle with warm salt   water.  - Because air was used during the procedure, expelling large amounts of air   from your rectum or belching is normal.  - If a bowel prep was taken, you may not have a bowel movement for 1-3 days.    This is normal.  SYMPTOMS TO WATCH FOR AND REPORT TO YOUR PHYSICIAN:  1. Abdominal pain or bloating, other than gas cramps.  2. Chest pain.  3. Back pain.  4. Signs of infection such as: chills or fever occurring within 24 hours   after the procedure.  5. Rectal bleeding, which would show as bright red, maroon, or black stools.   (A tablespoon of blood from the rectum is not serious, especially if   hemorrhoids are present.)  6. Vomiting.  7. Weakness or dizziness.  GO DIRECTLY TO THE NEAREST EMERGENCY ROOM IF YOU HAVE ANY OF THE FOLLOWING:      Difficulty breathing              Chills and/or fever over 101 F   Persistent vomiting and/or vomiting blood   Severe abdominal pain   Severe chest pain   Black, tarry stools   Bleeding- more than one  tablespoon   Any other symptom or condition that you feel may need urgent attention  Your doctor recommends these additional instructions:  If any biopsies were taken, your doctors clinic will contact you in 1 to 2   weeks with any results.  - Discharge patient to home.   - Resume previous diet.   - Continue present medications.   - Repeat colonoscopy in 10 years for screening purposes.   - The findings and recommendations were discussed with the patient and   patient's family.   For questions, problems or results please call your physician - Thaddeus Blackburn MD at Work:  [phone].  [facility], EMERGENCY ROOM PHONE NUMBER: [phone]  IF A COMPLICATION OR EMERGENCY SITUATION ARISES AND YOU ARE UNABLE TO REACH   YOUR PHYSICIAN - GO DIRECTLY TO THE EMERGENCY ROOM.  Thaddeus Blackburn MD  9/18/2020 8:32:52 AM  This report has been verified and signed electronically.  [employer]

## 2020-09-18 NOTE — TELEPHONE ENCOUNTER
Reason for Disposition   Question about upcoming scheduled test, no triage required and triager able to answer question    Protocols used: INFORMATION ONLY CALL - NO TRIAGE-A-AH    Patient has colonoscopy and has to report for procedure at 7am this morning. On glass 2 of 2nd half of the prep she cannot stop vomiting. She is running clear yellow water when she has to evacuate. Per her instructions for procedure, it says she can have clear liquids up to 4am the morning of colonoscopy. Patient wants to drink 7 up to quell nausea and she was advised to do so as instructions indicate. Advised patient to stop the prep and let them know before procedure she could not get down part 2 o f the prep. She verbalized understanding of care advice.

## 2020-09-18 NOTE — ANESTHESIA PREPROCEDURE EVALUATION
09/18/2020  Becky Ospina is a 50 y.o., female.  Patient reports brother with a confirmed Malignant Hyperthermia event as a child.  She reports having a tonisllectomy without issue when she was a child (unsure if they did a tiva for her at that time).  Discussed the risks and steps taken to prepare the room for her anesthetic she voiced understanding.        Patient Active Problem List   Diagnosis    Positive LIBRA (antinuclear antibody)    Acquired hypothyroidism    History of parvovirus B19 infection    ASCUS of cervix     Past Surgical History:   Procedure Laterality Date    COLD KNIFE CONIZATION OF CERVIX  2009    [facility]    TONSILLECTOMY  1993       Anesthesia Evaluation    I have reviewed the Patient Summary Reports.    I have reviewed the Nursing Notes. I have reviewed the NPO Status.      Review of Systems      Physical Exam  General:  Well nourished    Airway/Jaw/Neck:  Airway Findings: Mouth Opening: Normal General Airway Assessment: Adult  Mallampati: II  Improves to II with phonation.  Jaw/Neck Findings:  Limited Ability to Prognath  Neck ROM: Normal ROM     Eyes/Ears/Nose:  Eyes/Ears/Nose Findings:    Dental:  Dental Findings: In tact   Chest/Lungs:  Chest/Lungs Findings: Clear to auscultation, Normal Respiratory Rate     Heart/Vascular:  Heart Findings: Rate: Normal  Rhythm: Regular Rhythm  Sounds: Normal     Abdomen:  Abdomen Findings:  Normal     Musculoskeletal:  Musculoskeletal Findings:    Skin:  Skin Findings:     Mental Status:  Mental Status Findings:  Cooperative, Alert and Oriented         Anesthesia Plan  Type of Anesthesia, risks & benefits discussed:  Anesthesia Type:  general, MAC  Patient's Preference:   Intra-op Monitoring Plan:   Intra-op Monitoring Plan Comments:   Post Op Pain Control Plan:   Post Op Pain Control Plan Comments:   Induction:   IV  Beta Blocker:   Patient is not currently on a Beta-Blocker (No further documentation required).       Informed Consent: Patient understands risks and agrees with Anesthesia plan.  Questions answered. Anesthesia consent signed with patient.  ASA Score: 2     Day of Surgery Review of History & Physical:    H&P update referred to the surgeon.         Ready For Surgery From Anesthesia Perspective.

## 2020-09-18 NOTE — TRANSFER OF CARE
"Anesthesia Transfer of Care Note    Patient: Becky Ospina    Procedure(s) Performed: Procedure(s) (LRB):  COLONOSCOPY (N/A)    Patient location: PACU    Anesthesia Type: general    Transport from OR: Transported from OR on room air with adequate spontaneous ventilation    Post pain: adequate analgesia    Post assessment: no apparent anesthetic complications and tolerated procedure well    Post vital signs: stable    Level of consciousness: awake, alert and oriented    Nausea/Vomiting: no nausea/vomiting    Complications: none    Transfer of care protocol was followed      Last vitals:   Visit Vitals  BP (!) 125/91 (BP Location: Left arm, Patient Position: Lying)   Pulse 89   Temp 36.6 °C (97.9 °F) (Temporal)   Resp 17   Ht 5' 4" (1.626 m)   Wt 63.5 kg (140 lb)   SpO2 99%   Breastfeeding No   BMI 24.03 kg/m²     "

## 2020-09-18 NOTE — ANESTHESIA POSTPROCEDURE EVALUATION
Anesthesia Post Evaluation    Patient: Becky Ospina    Procedure(s) Performed: Procedure(s) (LRB):  COLONOSCOPY (N/A)    Final Anesthesia Type: general    Patient location during evaluation: PACU  Patient participation: Yes- Able to Participate  Level of consciousness: awake and alert and oriented  Pain management: adequate  Airway patency: patent    PONV status at discharge: No PONV  Anesthetic complications: no      Cardiovascular status: blood pressure returned to baseline and hemodynamically stable  Respiratory status: unassisted  Hydration status: euvolemic  Follow-up not needed.          Vitals Value Taken Time   /87 09/18/20 0916   Temp 36.6 °C (97.9 °F) 09/18/20 0841   Pulse 71 09/18/20 0925   Resp 24 09/18/20 0925   SpO2 99 % 09/18/20 0925   Vitals shown include unvalidated device data.      No case tracking events are documented in the log.      Pain/Lorenza Score: Lorenza Score: 10 (9/18/2020  9:00 AM)

## 2020-09-18 NOTE — DISCHARGE INSTRUCTIONS
Colonoscopy     A camera attached to a flexible tube with a viewing lens is used to take video pictures.     Colonoscopy is a test to view the inside of your lower digestive tract (colon and rectum). Sometimes it can show the last part of the small intestine (ileum). During the test, small pieces of tissue may be removed for testing. This is called a biopsy. Small growths, such as polyps, may also be removed.   Why is colonoscopy done?  The test is done to help look for colon cancer. And it can help find the source of abdominal pain, bleeding, and changes in bowel habits. It may be needed once a year, depending on factors such as your:  · Age  · Health history  · Family health history  · Symptoms  · Results from any prior colonoscopy  Risks and possible complications  These include:  · Bleeding               · A puncture or tear in the colon   · Risks of anesthesia  · A cancer lesion not being seen  Getting ready   To prepare for the test:  · Talk with your healthcare provider about the risks of the test (see below). Also ask your healthcare provider about alternatives to the test.  · Tell your healthcare provider about any medicines you take. Also tell him or her about any health conditions you may have.  · Make sure your rectum and colon are empty for the test. Follow the diet and bowel prep instructions exactly. If you dont, the test may need to be rescheduled.  · Plan for a friend or family member to drive you home after the test.     Colonoscopy provides an inside view of the entire colon.     You may discuss the results with your doctor right away or at a future visit.  During the test   The test is usually done in the hospital on an outpatient basis. This means you go home the same day. The procedure takes about 30 minutes. During that time:  · You are given relaxing (sedating) medicine through an IV line. You may be drowsy, or fully asleep.  · The healthcare provider will first give you a physical exam to  check for anal and rectal problems.  · Then the anus is lubricated and the scope inserted.  · If you are awake, you may have a feeling similar to needing to have a bowel movement. You may also feel pressure as air is pumped into the colon. Its OK to pass gas during the procedure.  · Biopsy, polyp removal, or other treatments may be done during the test.  After the test   You may have gas right after the test. It can help to try to pass it to help prevent later bloating. Your healthcare provider may discuss the results with you right away. Or you may need to schedule a follow-up visit to talk about the results. After the test, you can go back to your normal eating and other activities. You may be tired from the sedation and need to rest for a few hours.  Date Last Reviewed: 11/1/2016 © 2000-2017 [employer] [address] [place] [address], [place] [address]. All rights reserved. This information is not intended as a substitute for professional medical care. Always follow your healthcare professional's instructions.

## 2020-10-12 NOTE — PROGRESS NOTES
Requested updates within Care Everywhere.  Patient's chart was reviewed for overdue AGAPITO topics.  Open case request for Colonoscopy  Immunizations reconciled.    Orders placed:n/a  Tasked appts:n/a  Labs Linked:n/a     Ivermectin Counseling:  Patient instructed to take medication on an empty stomach with a full glass of water.  Patient informed of potential adverse effects including but not limited to nausea, diarrhea, dizziness, itching, and swelling of the extremities or lymph nodes.  The patient verbalized understanding of the proper use and possible adverse effects of ivermectin.  All of the patient's questions and concerns were addressed.

## 2020-11-18 ENCOUNTER — PATIENT MESSAGE (OUTPATIENT)
Dept: INTERNAL MEDICINE | Facility: CLINIC | Age: 50
End: 2020-11-18

## 2020-11-18 DIAGNOSIS — Z20.822 EXPOSURE TO COVID-19 VIRUS: Primary | ICD-10-CM

## 2020-11-18 NOTE — TELEPHONE ENCOUNTER
Order in for scheuduling as requested not sure if you can scheudle at the location she requests   Or if she would just go to urgent care and they would do but order in

## 2020-11-26 ENCOUNTER — PATIENT MESSAGE (OUTPATIENT)
Dept: DERMATOLOGY | Facility: CLINIC | Age: 50
End: 2020-11-26

## 2021-01-08 ENCOUNTER — TELEPHONE (OUTPATIENT)
Dept: OBSTETRICS AND GYNECOLOGY | Facility: CLINIC | Age: 51
End: 2021-01-08

## 2021-01-13 ENCOUNTER — PATIENT MESSAGE (OUTPATIENT)
Dept: INTERNAL MEDICINE | Facility: CLINIC | Age: 51
End: 2021-01-13

## 2021-01-13 DIAGNOSIS — Z00.00 WELLNESS EXAMINATION: Primary | ICD-10-CM

## 2021-01-26 ENCOUNTER — LAB VISIT (OUTPATIENT)
Dept: LAB | Facility: HOSPITAL | Age: 51
End: 2021-01-26
Attending: INTERNAL MEDICINE
Payer: COMMERCIAL

## 2021-01-26 DIAGNOSIS — Z00.00 WELLNESS EXAMINATION: ICD-10-CM

## 2021-01-26 LAB
ALBUMIN SERPL BCP-MCNC: 3.4 G/DL (ref 3.5–5.2)
ALP SERPL-CCNC: 57 U/L (ref 55–135)
ALT SERPL W/O P-5'-P-CCNC: 11 U/L (ref 10–44)
ANION GAP SERPL CALC-SCNC: 10 MMOL/L (ref 8–16)
AST SERPL-CCNC: 15 U/L (ref 10–40)
BASOPHILS # BLD AUTO: 0.06 K/UL (ref 0–0.2)
BASOPHILS NFR BLD: 0.9 % (ref 0–1.9)
BILIRUB SERPL-MCNC: 0.5 MG/DL (ref 0.1–1)
BUN SERPL-MCNC: 16 MG/DL (ref 6–20)
CALCIUM SERPL-MCNC: 8.8 MG/DL (ref 8.7–10.5)
CHLORIDE SERPL-SCNC: 102 MMOL/L (ref 95–110)
CHOLEST SERPL-MCNC: 208 MG/DL (ref 120–199)
CHOLEST/HDLC SERPL: 2.6 {RATIO} (ref 2–5)
CO2 SERPL-SCNC: 25 MMOL/L (ref 23–29)
CREAT SERPL-MCNC: 0.8 MG/DL (ref 0.5–1.4)
DIFFERENTIAL METHOD: ABNORMAL
EOSINOPHIL # BLD AUTO: 0.3 K/UL (ref 0–0.5)
EOSINOPHIL NFR BLD: 3.9 % (ref 0–8)
ERYTHROCYTE [DISTWIDTH] IN BLOOD BY AUTOMATED COUNT: 11.6 % (ref 11.5–14.5)
EST. GFR  (AFRICAN AMERICAN): >60 ML/MIN/1.73 M^2
EST. GFR  (NON AFRICAN AMERICAN): >60 ML/MIN/1.73 M^2
GLUCOSE SERPL-MCNC: 92 MG/DL (ref 70–110)
HCT VFR BLD AUTO: 40.4 % (ref 37–48.5)
HDLC SERPL-MCNC: 81 MG/DL (ref 40–75)
HDLC SERPL: 38.9 % (ref 20–50)
HGB BLD-MCNC: 13 G/DL (ref 12–16)
IMM GRANULOCYTES # BLD AUTO: 0.02 K/UL (ref 0–0.04)
IMM GRANULOCYTES NFR BLD AUTO: 0.3 % (ref 0–0.5)
LDLC SERPL CALC-MCNC: 104.2 MG/DL (ref 63–159)
LYMPHOCYTES # BLD AUTO: 1.5 K/UL (ref 1–4.8)
LYMPHOCYTES NFR BLD: 21.3 % (ref 18–48)
MCH RBC QN AUTO: 31.9 PG (ref 27–31)
MCHC RBC AUTO-ENTMCNC: 32.2 G/DL (ref 32–36)
MCV RBC AUTO: 99 FL (ref 82–98)
MONOCYTES # BLD AUTO: 0.6 K/UL (ref 0.3–1)
MONOCYTES NFR BLD: 8.9 % (ref 4–15)
NEUTROPHILS # BLD AUTO: 4.4 K/UL (ref 1.8–7.7)
NEUTROPHILS NFR BLD: 64.7 % (ref 38–73)
NONHDLC SERPL-MCNC: 127 MG/DL
NRBC BLD-RTO: 0 /100 WBC
PLATELET # BLD AUTO: 329 K/UL (ref 150–350)
PMV BLD AUTO: 9.9 FL (ref 9.2–12.9)
POTASSIUM SERPL-SCNC: 4.3 MMOL/L (ref 3.5–5.1)
PROT SERPL-MCNC: 7.2 G/DL (ref 6–8.4)
RBC # BLD AUTO: 4.08 M/UL (ref 4–5.4)
SODIUM SERPL-SCNC: 137 MMOL/L (ref 136–145)
TRIGL SERPL-MCNC: 114 MG/DL (ref 30–150)
TSH SERPL DL<=0.005 MIU/L-ACNC: 2.06 UIU/ML (ref 0.4–4)
WBC # BLD AUTO: 6.85 K/UL (ref 3.9–12.7)

## 2021-01-26 PROCEDURE — 36415 COLL VENOUS BLD VENIPUNCTURE: CPT

## 2021-01-26 PROCEDURE — 82306 VITAMIN D 25 HYDROXY: CPT

## 2021-01-26 PROCEDURE — 84443 ASSAY THYROID STIM HORMONE: CPT

## 2021-01-26 PROCEDURE — 80053 COMPREHEN METABOLIC PANEL: CPT

## 2021-01-26 PROCEDURE — 85025 COMPLETE CBC W/AUTO DIFF WBC: CPT

## 2021-01-26 PROCEDURE — 80061 LIPID PANEL: CPT

## 2021-01-27 LAB — 25(OH)D3+25(OH)D2 SERPL-MCNC: 41 NG/ML (ref 30–96)

## 2021-01-28 ENCOUNTER — PATIENT MESSAGE (OUTPATIENT)
Dept: PHARMACY | Facility: CLINIC | Age: 51
End: 2021-01-28

## 2021-01-28 ENCOUNTER — IMMUNIZATION (OUTPATIENT)
Dept: PHARMACY | Facility: CLINIC | Age: 51
End: 2021-01-28
Payer: COMMERCIAL

## 2021-01-28 ENCOUNTER — OFFICE VISIT (OUTPATIENT)
Dept: INTERNAL MEDICINE | Facility: CLINIC | Age: 51
End: 2021-01-28
Payer: COMMERCIAL

## 2021-01-28 VITALS
SYSTOLIC BLOOD PRESSURE: 114 MMHG | DIASTOLIC BLOOD PRESSURE: 80 MMHG | BODY MASS INDEX: 24.88 KG/M2 | OXYGEN SATURATION: 98 % | HEART RATE: 80 BPM | HEIGHT: 64 IN | WEIGHT: 145.75 LBS

## 2021-01-28 DIAGNOSIS — E55.9 MILD VITAMIN D DEFICIENCY: ICD-10-CM

## 2021-01-28 DIAGNOSIS — E53.8 LOW SERUM VITAMIN B12: ICD-10-CM

## 2021-01-28 DIAGNOSIS — Z00.00 WELLNESS EXAMINATION: Primary | ICD-10-CM

## 2021-01-28 DIAGNOSIS — F41.9 ANXIETY: ICD-10-CM

## 2021-01-28 DIAGNOSIS — E03.9 ACQUIRED HYPOTHYROIDISM: ICD-10-CM

## 2021-01-28 PROCEDURE — 3008F PR BODY MASS INDEX (BMI) DOCUMENTED: ICD-10-PCS | Mod: CPTII,S$GLB,, | Performed by: INTERNAL MEDICINE

## 2021-01-28 PROCEDURE — 99396 PR PREVENTIVE VISIT,EST,40-64: ICD-10-PCS | Mod: S$GLB,,, | Performed by: INTERNAL MEDICINE

## 2021-01-28 PROCEDURE — 3008F BODY MASS INDEX DOCD: CPT | Mod: CPTII,S$GLB,, | Performed by: INTERNAL MEDICINE

## 2021-01-28 PROCEDURE — 1126F PR PAIN SEVERITY QUANTIFIED, NO PAIN PRESENT: ICD-10-PCS | Mod: S$GLB,,, | Performed by: INTERNAL MEDICINE

## 2021-01-28 PROCEDURE — 99999 PR PBB SHADOW E&M-EST. PATIENT-LVL IV: CPT | Mod: PBBFAC,,, | Performed by: INTERNAL MEDICINE

## 2021-01-28 PROCEDURE — 99396 PREV VISIT EST AGE 40-64: CPT | Mod: S$GLB,,, | Performed by: INTERNAL MEDICINE

## 2021-01-28 PROCEDURE — 99999 PR PBB SHADOW E&M-EST. PATIENT-LVL IV: ICD-10-PCS | Mod: PBBFAC,,, | Performed by: INTERNAL MEDICINE

## 2021-01-28 PROCEDURE — 1126F AMNT PAIN NOTED NONE PRSNT: CPT | Mod: S$GLB,,, | Performed by: INTERNAL MEDICINE

## 2021-02-11 ENCOUNTER — HOSPITAL ENCOUNTER (OUTPATIENT)
Dept: RADIOLOGY | Facility: HOSPITAL | Age: 51
Discharge: HOME | End: 2021-02-11
Attending: INTERNAL MEDICINE
Payer: COMMERCIAL

## 2021-02-11 VITALS — HEIGHT: 64 IN | BODY MASS INDEX: 24.75 KG/M2 | WEIGHT: 145 LBS

## 2021-02-11 DIAGNOSIS — Z00.00 WELLNESS EXAMINATION: ICD-10-CM

## 2021-02-11 PROCEDURE — 77067 MAMMO DIGITAL SCREENING BILAT WITH TOMO: ICD-10-PCS | Mod: 26,,, | Performed by: RADIOLOGY

## 2021-02-11 PROCEDURE — 77067 SCR MAMMO BI INCL CAD: CPT | Mod: TC

## 2021-02-11 PROCEDURE — 77067 SCR MAMMO BI INCL CAD: CPT | Mod: 26,,, | Performed by: RADIOLOGY

## 2021-02-11 PROCEDURE — 77063 MAMMO DIGITAL SCREENING BILAT WITH TOMO: ICD-10-PCS | Mod: 26,,, | Performed by: RADIOLOGY

## 2021-02-11 PROCEDURE — 77063 BREAST TOMOSYNTHESIS BI: CPT | Mod: 26,,, | Performed by: RADIOLOGY

## 2021-03-04 ENCOUNTER — TELEPHONE (OUTPATIENT)
Dept: INTERNAL MEDICINE | Facility: CLINIC | Age: 51
End: 2021-03-04

## 2021-03-05 ENCOUNTER — PATIENT MESSAGE (OUTPATIENT)
Dept: INTERNAL MEDICINE | Facility: CLINIC | Age: 51
End: 2021-03-05

## 2021-03-29 ENCOUNTER — PATIENT MESSAGE (OUTPATIENT)
Dept: INTERNAL MEDICINE | Facility: CLINIC | Age: 51
End: 2021-03-29

## 2021-04-12 ENCOUNTER — PATIENT MESSAGE (OUTPATIENT)
Dept: INTERNAL MEDICINE | Facility: CLINIC | Age: 51
End: 2021-04-12

## 2021-04-15 ENCOUNTER — TELEPHONE (OUTPATIENT)
Dept: INTERNAL MEDICINE | Facility: CLINIC | Age: 51
End: 2021-04-15

## 2021-04-22 ENCOUNTER — PATIENT MESSAGE (OUTPATIENT)
Dept: UROLOGY | Facility: CLINIC | Age: 51
End: 2021-04-22

## 2021-04-26 ENCOUNTER — OFFICE VISIT (OUTPATIENT)
Dept: PSYCHIATRY | Facility: CLINIC | Age: 51
End: 2021-04-26
Payer: COMMERCIAL

## 2021-04-26 DIAGNOSIS — F43.22 ADJUSTMENT DISORDER WITH ANXIETY: Primary | ICD-10-CM

## 2021-04-26 PROCEDURE — 90791 PSYCH DIAGNOSTIC EVALUATION: CPT | Mod: S$GLB,,, | Performed by: SOCIAL WORKER

## 2021-04-26 PROCEDURE — 90791 PR PSYCHIATRIC DIAGNOSTIC EVALUATION: ICD-10-PCS | Mod: S$GLB,,, | Performed by: SOCIAL WORKER

## 2021-05-09 ENCOUNTER — PATIENT MESSAGE (OUTPATIENT)
Dept: PSYCHIATRY | Facility: CLINIC | Age: 51
End: 2021-05-09

## 2021-05-10 ENCOUNTER — PATIENT MESSAGE (OUTPATIENT)
Dept: INTERNAL MEDICINE | Facility: CLINIC | Age: 51
End: 2021-05-10

## 2021-05-10 RX ORDER — LORAZEPAM 0.5 MG/1
0.5 TABLET ORAL NIGHTLY PRN
Qty: 30 TABLET | Refills: 0 | Status: SHIPPED | OUTPATIENT
Start: 2021-05-10 | End: 2021-06-08 | Stop reason: SDUPTHER

## 2021-06-01 ENCOUNTER — PATIENT MESSAGE (OUTPATIENT)
Dept: INTERNAL MEDICINE | Facility: CLINIC | Age: 51
End: 2021-06-01

## 2021-06-08 ENCOUNTER — PATIENT MESSAGE (OUTPATIENT)
Dept: INTERNAL MEDICINE | Facility: CLINIC | Age: 51
End: 2021-06-08

## 2021-06-08 RX ORDER — LORAZEPAM 0.5 MG/1
0.5 TABLET ORAL NIGHTLY PRN
Qty: 30 TABLET | Refills: 0 | Status: SHIPPED | OUTPATIENT
Start: 2021-06-08 | End: 2021-07-06 | Stop reason: SDUPTHER

## 2021-06-09 ENCOUNTER — PATIENT MESSAGE (OUTPATIENT)
Dept: PSYCHIATRY | Facility: CLINIC | Age: 51
End: 2021-06-09

## 2021-06-28 ENCOUNTER — PATIENT MESSAGE (OUTPATIENT)
Dept: PSYCHIATRY | Facility: CLINIC | Age: 51
End: 2021-06-28

## 2021-06-30 ENCOUNTER — OFFICE VISIT (OUTPATIENT)
Dept: PSYCHIATRY | Facility: CLINIC | Age: 51
End: 2021-06-30
Payer: COMMERCIAL

## 2021-06-30 ENCOUNTER — TELEPHONE (OUTPATIENT)
Dept: ADMINISTRATIVE | Facility: OTHER | Age: 51
End: 2021-06-30

## 2021-06-30 DIAGNOSIS — F43.22 ADJUSTMENT DISORDER WITH ANXIETY: Primary | ICD-10-CM

## 2021-06-30 PROCEDURE — 90834 PR PSYCHOTHERAPY W/PATIENT, 45 MIN: ICD-10-PCS | Mod: S$GLB,,, | Performed by: SOCIAL WORKER

## 2021-06-30 PROCEDURE — 90834 PSYTX W PT 45 MINUTES: CPT | Mod: S$GLB,,, | Performed by: SOCIAL WORKER

## 2021-07-05 ENCOUNTER — PATIENT MESSAGE (OUTPATIENT)
Dept: INTERNAL MEDICINE | Facility: CLINIC | Age: 51
End: 2021-07-05

## 2021-07-06 RX ORDER — LORAZEPAM 0.5 MG/1
0.5 TABLET ORAL NIGHTLY PRN
Qty: 30 TABLET | Refills: 1 | Status: SHIPPED | OUTPATIENT
Start: 2021-07-06 | End: 2021-08-30 | Stop reason: SDUPTHER

## 2021-07-21 ENCOUNTER — OFFICE VISIT (OUTPATIENT)
Dept: PSYCHIATRY | Facility: CLINIC | Age: 51
End: 2021-07-21
Payer: COMMERCIAL

## 2021-07-21 DIAGNOSIS — F43.22 ADJUSTMENT DISORDER WITH ANXIETY: Primary | ICD-10-CM

## 2021-07-21 PROCEDURE — 90834 PSYTX W PT 45 MINUTES: CPT | Mod: S$GLB,,, | Performed by: SOCIAL WORKER

## 2021-07-21 PROCEDURE — 90834 PR PSYCHOTHERAPY W/PATIENT, 45 MIN: ICD-10-PCS | Mod: S$GLB,,, | Performed by: SOCIAL WORKER

## 2021-07-27 ENCOUNTER — PATIENT OUTREACH (OUTPATIENT)
Dept: ADMINISTRATIVE | Facility: OTHER | Age: 51
End: 2021-07-27

## 2021-07-28 ENCOUNTER — OFFICE VISIT (OUTPATIENT)
Dept: PSYCHIATRY | Facility: CLINIC | Age: 51
End: 2021-07-28
Payer: COMMERCIAL

## 2021-07-28 DIAGNOSIS — F43.22 ADJUSTMENT DISORDER WITH ANXIETY: Primary | ICD-10-CM

## 2021-07-28 PROCEDURE — 90834 PSYTX W PT 45 MINUTES: CPT | Mod: S$GLB,,, | Performed by: SOCIAL WORKER

## 2021-07-28 PROCEDURE — 90834 PR PSYCHOTHERAPY W/PATIENT, 45 MIN: ICD-10-PCS | Mod: S$GLB,,, | Performed by: SOCIAL WORKER

## 2021-07-29 ENCOUNTER — OFFICE VISIT (OUTPATIENT)
Dept: OBSTETRICS AND GYNECOLOGY | Facility: CLINIC | Age: 51
End: 2021-07-29
Payer: COMMERCIAL

## 2021-07-29 ENCOUNTER — PATIENT MESSAGE (OUTPATIENT)
Dept: OBSTETRICS AND GYNECOLOGY | Facility: CLINIC | Age: 51
End: 2021-07-29

## 2021-07-29 ENCOUNTER — LAB VISIT (OUTPATIENT)
Dept: LAB | Facility: OTHER | Age: 51
End: 2021-07-29
Attending: OBSTETRICS & GYNECOLOGY
Payer: COMMERCIAL

## 2021-07-29 VITALS
SYSTOLIC BLOOD PRESSURE: 118 MMHG | WEIGHT: 134.5 LBS | DIASTOLIC BLOOD PRESSURE: 68 MMHG | BODY MASS INDEX: 22.96 KG/M2 | HEIGHT: 64 IN

## 2021-07-29 DIAGNOSIS — Z30.40 ENCOUNTER FOR SURVEILLANCE OF CONTRACEPTIVES, UNSPECIFIED CONTRACEPTIVE: ICD-10-CM

## 2021-07-29 DIAGNOSIS — Z01.419 ENCOUNTER FOR GYNECOLOGICAL EXAMINATION WITHOUT ABNORMAL FINDING: Primary | ICD-10-CM

## 2021-07-29 LAB — FSH SERPL-ACNC: 2.9 MIU/ML

## 2021-07-29 PROCEDURE — 83001 ASSAY OF GONADOTROPIN (FSH): CPT | Performed by: OBSTETRICS & GYNECOLOGY

## 2021-07-29 PROCEDURE — 1126F PR PAIN SEVERITY QUANTIFIED, NO PAIN PRESENT: ICD-10-PCS | Mod: CPTII,S$GLB,, | Performed by: OBSTETRICS & GYNECOLOGY

## 2021-07-29 PROCEDURE — 3008F PR BODY MASS INDEX (BMI) DOCUMENTED: ICD-10-PCS | Mod: CPTII,S$GLB,, | Performed by: OBSTETRICS & GYNECOLOGY

## 2021-07-29 PROCEDURE — 3074F PR MOST RECENT SYSTOLIC BLOOD PRESSURE < 130 MM HG: ICD-10-PCS | Mod: CPTII,S$GLB,, | Performed by: OBSTETRICS & GYNECOLOGY

## 2021-07-29 PROCEDURE — 36415 COLL VENOUS BLD VENIPUNCTURE: CPT | Performed by: OBSTETRICS & GYNECOLOGY

## 2021-07-29 PROCEDURE — 87624 HPV HI-RISK TYP POOLED RSLT: CPT | Performed by: OBSTETRICS & GYNECOLOGY

## 2021-07-29 PROCEDURE — 3074F SYST BP LT 130 MM HG: CPT | Mod: CPTII,S$GLB,, | Performed by: OBSTETRICS & GYNECOLOGY

## 2021-07-29 PROCEDURE — 1160F PR REVIEW ALL MEDS BY PRESCRIBER/CLIN PHARMACIST DOCUMENTED: ICD-10-PCS | Mod: CPTII,S$GLB,, | Performed by: OBSTETRICS & GYNECOLOGY

## 2021-07-29 PROCEDURE — 1160F RVW MEDS BY RX/DR IN RCRD: CPT | Mod: CPTII,S$GLB,, | Performed by: OBSTETRICS & GYNECOLOGY

## 2021-07-29 PROCEDURE — 99999 PR PBB SHADOW E&M-EST. PATIENT-LVL III: ICD-10-PCS | Mod: PBBFAC,,, | Performed by: OBSTETRICS & GYNECOLOGY

## 2021-07-29 PROCEDURE — 1126F AMNT PAIN NOTED NONE PRSNT: CPT | Mod: CPTII,S$GLB,, | Performed by: OBSTETRICS & GYNECOLOGY

## 2021-07-29 PROCEDURE — 1159F MED LIST DOCD IN RCRD: CPT | Mod: CPTII,S$GLB,, | Performed by: OBSTETRICS & GYNECOLOGY

## 2021-07-29 PROCEDURE — 99396 PREV VISIT EST AGE 40-64: CPT | Mod: S$GLB,,, | Performed by: OBSTETRICS & GYNECOLOGY

## 2021-07-29 PROCEDURE — 3008F BODY MASS INDEX DOCD: CPT | Mod: CPTII,S$GLB,, | Performed by: OBSTETRICS & GYNECOLOGY

## 2021-07-29 PROCEDURE — 3078F DIAST BP <80 MM HG: CPT | Mod: CPTII,S$GLB,, | Performed by: OBSTETRICS & GYNECOLOGY

## 2021-07-29 PROCEDURE — 1159F PR MEDICATION LIST DOCUMENTED IN MEDICAL RECORD: ICD-10-PCS | Mod: CPTII,S$GLB,, | Performed by: OBSTETRICS & GYNECOLOGY

## 2021-07-29 PROCEDURE — 3078F PR MOST RECENT DIASTOLIC BLOOD PRESSURE < 80 MM HG: ICD-10-PCS | Mod: CPTII,S$GLB,, | Performed by: OBSTETRICS & GYNECOLOGY

## 2021-07-29 PROCEDURE — 88175 CYTOPATH C/V AUTO FLUID REDO: CPT | Performed by: OBSTETRICS & GYNECOLOGY

## 2021-07-29 PROCEDURE — 99999 PR PBB SHADOW E&M-EST. PATIENT-LVL III: CPT | Mod: PBBFAC,,, | Performed by: OBSTETRICS & GYNECOLOGY

## 2021-07-29 PROCEDURE — 99396 PR PREVENTIVE VISIT,EST,40-64: ICD-10-PCS | Mod: S$GLB,,, | Performed by: OBSTETRICS & GYNECOLOGY

## 2021-07-29 RX ORDER — DESOGESTREL AND ETHINYL ESTRADIOL 0.15-0.03
1 KIT ORAL DAILY
Qty: 28 TABLET | Refills: 12 | Status: SHIPPED | OUTPATIENT
Start: 2021-07-29 | End: 2021-07-29 | Stop reason: SDUPTHER

## 2021-07-29 RX ORDER — DESOGESTREL AND ETHINYL ESTRADIOL 0.15-0.03
1 KIT ORAL DAILY
Qty: 28 TABLET | Refills: 12 | Status: SHIPPED | OUTPATIENT
Start: 2021-07-29 | End: 2021-09-02 | Stop reason: SDUPTHER

## 2021-08-03 LAB
CLINICAL INFO: NORMAL
CYTO CVX: NORMAL
CYTOLOGIST CVX/VAG CYTO: NORMAL
CYTOLOGY CMNT CVX/VAG CYTO-IMP: NORMAL
CYTOLOGY PAP THIN PREP EXPLANATION: NORMAL
DATE OF PREVIOUS PAP: NO
DATE PREVIOUS BX: NO
HPV I/H RISK 4 DNA CVX QL NAA+PROBE: NOT DETECTED
LMP START DATE: NORMAL
SPECIMEN SOURCE CVX/VAG CYTO: NORMAL
STAT OF ADQ CVX/VAG CYTO-IMP: NORMAL

## 2021-08-11 ENCOUNTER — OFFICE VISIT (OUTPATIENT)
Dept: PSYCHIATRY | Facility: CLINIC | Age: 51
End: 2021-08-11
Payer: COMMERCIAL

## 2021-08-11 DIAGNOSIS — F43.22 ADJUSTMENT DISORDER WITH ANXIETY: Primary | ICD-10-CM

## 2021-08-11 PROCEDURE — 90834 PSYTX W PT 45 MINUTES: CPT | Mod: 95,,, | Performed by: SOCIAL WORKER

## 2021-08-11 PROCEDURE — 90834 PR PSYCHOTHERAPY W/PATIENT, 45 MIN: ICD-10-PCS | Mod: 95,,, | Performed by: SOCIAL WORKER

## 2021-08-18 ENCOUNTER — OFFICE VISIT (OUTPATIENT)
Dept: PSYCHIATRY | Facility: CLINIC | Age: 51
End: 2021-08-18
Payer: COMMERCIAL

## 2021-08-18 DIAGNOSIS — F43.22 ADJUSTMENT DISORDER WITH ANXIETY: Primary | ICD-10-CM

## 2021-08-18 PROCEDURE — 90834 PSYTX W PT 45 MINUTES: CPT | Mod: 95,,, | Performed by: SOCIAL WORKER

## 2021-08-18 PROCEDURE — 90834 PR PSYCHOTHERAPY W/PATIENT, 45 MIN: ICD-10-PCS | Mod: 95,,, | Performed by: SOCIAL WORKER

## 2021-08-23 ENCOUNTER — OFFICE VISIT (OUTPATIENT)
Dept: OPTOMETRY | Facility: CLINIC | Age: 51
End: 2021-08-23
Payer: COMMERCIAL

## 2021-08-23 ENCOUNTER — OFFICE VISIT (OUTPATIENT)
Dept: OPTOMETRY | Facility: CLINIC | Age: 51
End: 2021-08-23

## 2021-08-23 DIAGNOSIS — H04.123 DRY EYE SYNDROME, BILATERAL: Primary | ICD-10-CM

## 2021-08-23 DIAGNOSIS — H52.03 HYPEROPIA, BILATERAL: ICD-10-CM

## 2021-08-23 DIAGNOSIS — H52.4 PRESBYOPIA: ICD-10-CM

## 2021-08-23 DIAGNOSIS — Z46.0 FITTING AND ADJUSTMENT OF SPECTACLES AND CONTACT LENSES: ICD-10-CM

## 2021-08-23 DIAGNOSIS — R76.8 POSITIVE ANA (ANTINUCLEAR ANTIBODY): ICD-10-CM

## 2021-08-23 DIAGNOSIS — H52.03 HYPEROPIA, BILATERAL: Primary | ICD-10-CM

## 2021-08-23 DIAGNOSIS — H04.123 DRY EYE SYNDROME, BILATERAL: ICD-10-CM

## 2021-08-23 PROCEDURE — 92310 CONTACT LENS FITTING OU: CPT | Mod: CSM,S$GLB,, | Performed by: OPTOMETRIST

## 2021-08-23 PROCEDURE — 92014 COMPRE OPH EXAM EST PT 1/>: CPT | Mod: S$GLB,,, | Performed by: OPTOMETRIST

## 2021-08-23 PROCEDURE — 92310 PR CONTACT LENS FITTING (NO CHANGE): ICD-10-PCS | Mod: CSM,S$GLB,, | Performed by: OPTOMETRIST

## 2021-08-23 PROCEDURE — 92015 DETERMINE REFRACTIVE STATE: CPT | Mod: S$GLB,,, | Performed by: OPTOMETRIST

## 2021-08-23 PROCEDURE — 92015 PR REFRACTION: ICD-10-PCS | Mod: S$GLB,,, | Performed by: OPTOMETRIST

## 2021-08-23 PROCEDURE — 99999 PR PBB SHADOW E&M-EST. PATIENT-LVL I: CPT | Mod: PBBFAC,,, | Performed by: OPTOMETRIST

## 2021-08-23 PROCEDURE — 92014 PR EYE EXAM, EST PATIENT,COMPREHESV: ICD-10-PCS | Mod: S$GLB,,, | Performed by: OPTOMETRIST

## 2021-08-23 PROCEDURE — 99999 PR PBB SHADOW E&M-EST. PATIENT-LVL I: ICD-10-PCS | Mod: PBBFAC,,, | Performed by: OPTOMETRIST

## 2021-08-25 ENCOUNTER — OFFICE VISIT (OUTPATIENT)
Dept: PSYCHIATRY | Facility: CLINIC | Age: 51
End: 2021-08-25
Payer: COMMERCIAL

## 2021-08-25 DIAGNOSIS — F43.22 ADJUSTMENT DISORDER WITH ANXIETY: Primary | ICD-10-CM

## 2021-08-25 PROCEDURE — 90834 PSYTX W PT 45 MINUTES: CPT | Mod: 95,,, | Performed by: SOCIAL WORKER

## 2021-08-25 PROCEDURE — 90834 PR PSYCHOTHERAPY W/PATIENT, 45 MIN: ICD-10-PCS | Mod: 95,,, | Performed by: SOCIAL WORKER

## 2021-08-30 ENCOUNTER — PATIENT MESSAGE (OUTPATIENT)
Dept: INTERNAL MEDICINE | Facility: CLINIC | Age: 51
End: 2021-08-30

## 2021-08-30 ENCOUNTER — PATIENT MESSAGE (OUTPATIENT)
Dept: OBSTETRICS AND GYNECOLOGY | Facility: CLINIC | Age: 51
End: 2021-08-30

## 2021-08-30 DIAGNOSIS — F41.9 ANXIETY: Primary | ICD-10-CM

## 2021-08-30 RX ORDER — LORAZEPAM 0.5 MG/1
0.5 TABLET ORAL NIGHTLY PRN
Qty: 30 TABLET | Refills: 0 | Status: SHIPPED | OUTPATIENT
Start: 2021-08-30 | End: 2021-12-15 | Stop reason: SDUPTHER

## 2021-09-02 ENCOUNTER — PATIENT MESSAGE (OUTPATIENT)
Dept: OBSTETRICS AND GYNECOLOGY | Facility: CLINIC | Age: 51
End: 2021-09-02

## 2021-09-02 DIAGNOSIS — Z30.40 ENCOUNTER FOR REFILL OF PRESCRIPTION FOR CONTRACEPTION: Primary | ICD-10-CM

## 2021-09-03 RX ORDER — DESOGESTREL AND ETHINYL ESTRADIOL 0.15-0.03
1 KIT ORAL DAILY
Qty: 28 TABLET | Refills: 12 | Status: SHIPPED | OUTPATIENT
Start: 2021-09-03 | End: 2022-02-08 | Stop reason: SDUPTHER

## 2021-09-05 ENCOUNTER — PATIENT MESSAGE (OUTPATIENT)
Dept: PSYCHIATRY | Facility: CLINIC | Age: 51
End: 2021-09-05

## 2021-09-15 ENCOUNTER — OFFICE VISIT (OUTPATIENT)
Dept: PSYCHIATRY | Facility: CLINIC | Age: 51
End: 2021-09-15
Payer: COMMERCIAL

## 2021-09-15 DIAGNOSIS — F43.22 ADJUSTMENT DISORDER WITH ANXIETY: Primary | ICD-10-CM

## 2021-09-15 PROCEDURE — 90834 PSYTX W PT 45 MINUTES: CPT | Mod: 95,,, | Performed by: SOCIAL WORKER

## 2021-09-15 PROCEDURE — 90834 PR PSYCHOTHERAPY W/PATIENT, 45 MIN: ICD-10-PCS | Mod: 95,,, | Performed by: SOCIAL WORKER

## 2021-09-22 ENCOUNTER — OFFICE VISIT (OUTPATIENT)
Dept: PSYCHIATRY | Facility: CLINIC | Age: 51
End: 2021-09-22
Payer: COMMERCIAL

## 2021-09-22 DIAGNOSIS — F43.22 ADJUSTMENT DISORDER WITH ANXIETY: Primary | ICD-10-CM

## 2021-09-22 PROCEDURE — 90834 PSYTX W PT 45 MINUTES: CPT | Mod: 95,,, | Performed by: SOCIAL WORKER

## 2021-09-22 PROCEDURE — 90834 PR PSYCHOTHERAPY W/PATIENT, 45 MIN: ICD-10-PCS | Mod: 95,,, | Performed by: SOCIAL WORKER

## 2021-09-23 ENCOUNTER — OFFICE VISIT (OUTPATIENT)
Dept: SPINE | Facility: CLINIC | Age: 51
End: 2021-09-23
Attending: PHYSICAL MEDICINE & REHABILITATION
Payer: COMMERCIAL

## 2021-09-23 VITALS
WEIGHT: 134.5 LBS | BODY MASS INDEX: 22.96 KG/M2 | HEART RATE: 72 BPM | SYSTOLIC BLOOD PRESSURE: 110 MMHG | DIASTOLIC BLOOD PRESSURE: 73 MMHG | HEIGHT: 64 IN

## 2021-09-23 DIAGNOSIS — M25.521 RIGHT ELBOW PAIN: ICD-10-CM

## 2021-09-23 DIAGNOSIS — M77.11 LATERAL EPICONDYLITIS OF RIGHT ELBOW: Primary | ICD-10-CM

## 2021-09-23 PROCEDURE — 1160F RVW MEDS BY RX/DR IN RCRD: CPT | Mod: CPTII,S$GLB,, | Performed by: PHYSICAL MEDICINE & REHABILITATION

## 2021-09-23 PROCEDURE — 3078F PR MOST RECENT DIASTOLIC BLOOD PRESSURE < 80 MM HG: ICD-10-PCS | Mod: CPTII,S$GLB,, | Performed by: PHYSICAL MEDICINE & REHABILITATION

## 2021-09-23 PROCEDURE — 3074F SYST BP LT 130 MM HG: CPT | Mod: CPTII,S$GLB,, | Performed by: PHYSICAL MEDICINE & REHABILITATION

## 2021-09-23 PROCEDURE — 3074F PR MOST RECENT SYSTOLIC BLOOD PRESSURE < 130 MM HG: ICD-10-PCS | Mod: CPTII,S$GLB,, | Performed by: PHYSICAL MEDICINE & REHABILITATION

## 2021-09-23 PROCEDURE — 3078F DIAST BP <80 MM HG: CPT | Mod: CPTII,S$GLB,, | Performed by: PHYSICAL MEDICINE & REHABILITATION

## 2021-09-23 PROCEDURE — 99999 PR PBB SHADOW E&M-EST. PATIENT-LVL III: ICD-10-PCS | Mod: PBBFAC,,, | Performed by: PHYSICAL MEDICINE & REHABILITATION

## 2021-09-23 PROCEDURE — 1159F PR MEDICATION LIST DOCUMENTED IN MEDICAL RECORD: ICD-10-PCS | Mod: CPTII,S$GLB,, | Performed by: PHYSICAL MEDICINE & REHABILITATION

## 2021-09-23 PROCEDURE — 99203 OFFICE O/P NEW LOW 30 MIN: CPT | Mod: S$GLB,,, | Performed by: PHYSICAL MEDICINE & REHABILITATION

## 2021-09-23 PROCEDURE — 99999 PR PBB SHADOW E&M-EST. PATIENT-LVL III: CPT | Mod: PBBFAC,,, | Performed by: PHYSICAL MEDICINE & REHABILITATION

## 2021-09-23 PROCEDURE — 3008F PR BODY MASS INDEX (BMI) DOCUMENTED: ICD-10-PCS | Mod: CPTII,S$GLB,, | Performed by: PHYSICAL MEDICINE & REHABILITATION

## 2021-09-23 PROCEDURE — 1160F PR REVIEW ALL MEDS BY PRESCRIBER/CLIN PHARMACIST DOCUMENTED: ICD-10-PCS | Mod: CPTII,S$GLB,, | Performed by: PHYSICAL MEDICINE & REHABILITATION

## 2021-09-23 PROCEDURE — 1159F MED LIST DOCD IN RCRD: CPT | Mod: CPTII,S$GLB,, | Performed by: PHYSICAL MEDICINE & REHABILITATION

## 2021-09-23 PROCEDURE — 3008F BODY MASS INDEX DOCD: CPT | Mod: CPTII,S$GLB,, | Performed by: PHYSICAL MEDICINE & REHABILITATION

## 2021-09-23 PROCEDURE — 99203 PR OFFICE/OUTPT VISIT, NEW, LEVL III, 30-44 MIN: ICD-10-PCS | Mod: S$GLB,,, | Performed by: PHYSICAL MEDICINE & REHABILITATION

## 2021-09-23 RX ORDER — METHYLPREDNISOLONE 4 MG/1
TABLET ORAL
Qty: 1 PACKAGE | Refills: 0 | Status: SHIPPED | OUTPATIENT
Start: 2021-09-23 | End: 2021-10-14

## 2021-09-29 ENCOUNTER — OFFICE VISIT (OUTPATIENT)
Dept: PSYCHIATRY | Facility: CLINIC | Age: 51
End: 2021-09-29
Payer: COMMERCIAL

## 2021-09-29 DIAGNOSIS — F43.22 ADJUSTMENT DISORDER WITH ANXIETY: Primary | ICD-10-CM

## 2021-09-29 PROCEDURE — 90834 PSYTX W PT 45 MINUTES: CPT | Mod: 95,,, | Performed by: SOCIAL WORKER

## 2021-09-29 PROCEDURE — 90834 PR PSYCHOTHERAPY W/PATIENT, 45 MIN: ICD-10-PCS | Mod: 95,,, | Performed by: SOCIAL WORKER

## 2021-10-06 ENCOUNTER — OFFICE VISIT (OUTPATIENT)
Dept: PSYCHIATRY | Facility: CLINIC | Age: 51
End: 2021-10-06
Payer: COMMERCIAL

## 2021-10-06 DIAGNOSIS — F43.22 ADJUSTMENT DISORDER WITH ANXIETY: Primary | ICD-10-CM

## 2021-10-06 PROCEDURE — 90834 PSYTX W PT 45 MINUTES: CPT | Mod: 95,,, | Performed by: SOCIAL WORKER

## 2021-10-06 PROCEDURE — 90834 PR PSYCHOTHERAPY W/PATIENT, 45 MIN: ICD-10-PCS | Mod: 95,,, | Performed by: SOCIAL WORKER

## 2021-10-13 ENCOUNTER — OFFICE VISIT (OUTPATIENT)
Dept: PSYCHIATRY | Facility: CLINIC | Age: 51
End: 2021-10-13
Payer: COMMERCIAL

## 2021-10-13 DIAGNOSIS — F43.22 ADJUSTMENT DISORDER WITH ANXIETY: Primary | ICD-10-CM

## 2021-10-13 PROCEDURE — 90834 PSYTX W PT 45 MINUTES: CPT | Mod: 95,,, | Performed by: SOCIAL WORKER

## 2021-10-13 PROCEDURE — 90834 PR PSYCHOTHERAPY W/PATIENT, 45 MIN: ICD-10-PCS | Mod: 95,,, | Performed by: SOCIAL WORKER

## 2021-10-18 ENCOUNTER — PATIENT MESSAGE (OUTPATIENT)
Dept: INTERNAL MEDICINE | Facility: CLINIC | Age: 51
End: 2021-10-18

## 2021-10-19 ENCOUNTER — PATIENT MESSAGE (OUTPATIENT)
Dept: OBSTETRICS AND GYNECOLOGY | Facility: CLINIC | Age: 51
End: 2021-10-19

## 2021-10-19 DIAGNOSIS — N39.3 SUI (STRESS URINARY INCONTINENCE, FEMALE): Primary | ICD-10-CM

## 2021-10-20 ENCOUNTER — OFFICE VISIT (OUTPATIENT)
Dept: PSYCHIATRY | Facility: CLINIC | Age: 51
End: 2021-10-20
Payer: COMMERCIAL

## 2021-10-20 ENCOUNTER — PATIENT MESSAGE (OUTPATIENT)
Dept: OBSTETRICS AND GYNECOLOGY | Facility: CLINIC | Age: 51
End: 2021-10-20
Payer: COMMERCIAL

## 2021-10-20 DIAGNOSIS — F43.22 ADJUSTMENT DISORDER WITH ANXIETY: Primary | ICD-10-CM

## 2021-10-20 PROCEDURE — 90834 PR PSYCHOTHERAPY W/PATIENT, 45 MIN: ICD-10-PCS | Mod: S$GLB,,, | Performed by: SOCIAL WORKER

## 2021-10-20 PROCEDURE — 90834 PSYTX W PT 45 MINUTES: CPT | Mod: S$GLB,,, | Performed by: SOCIAL WORKER

## 2021-10-25 ENCOUNTER — PATIENT MESSAGE (OUTPATIENT)
Dept: PSYCHIATRY | Facility: CLINIC | Age: 51
End: 2021-10-25
Payer: COMMERCIAL

## 2021-11-02 ENCOUNTER — OFFICE VISIT (OUTPATIENT)
Dept: INTERNAL MEDICINE | Facility: CLINIC | Age: 51
End: 2021-11-02
Payer: COMMERCIAL

## 2021-11-02 VITALS
SYSTOLIC BLOOD PRESSURE: 110 MMHG | WEIGHT: 132.69 LBS | BODY MASS INDEX: 22.65 KG/M2 | DIASTOLIC BLOOD PRESSURE: 70 MMHG | HEIGHT: 64 IN

## 2021-11-02 DIAGNOSIS — H69.91 EUSTACHIAN TUBE DYSFUNCTION, RIGHT: Primary | ICD-10-CM

## 2021-11-02 PROBLEM — Z12.11 ENCOUNTER FOR SCREENING COLONOSCOPY: Status: RESOLVED | Noted: 2020-09-18 | Resolved: 2021-11-02

## 2021-11-02 PROCEDURE — 99999 PR PBB SHADOW E&M-EST. PATIENT-LVL III: ICD-10-PCS | Mod: PBBFAC,,, | Performed by: STUDENT IN AN ORGANIZED HEALTH CARE EDUCATION/TRAINING PROGRAM

## 2021-11-02 PROCEDURE — 99213 PR OFFICE/OUTPT VISIT, EST, LEVL III, 20-29 MIN: ICD-10-PCS | Mod: S$GLB,,, | Performed by: STUDENT IN AN ORGANIZED HEALTH CARE EDUCATION/TRAINING PROGRAM

## 2021-11-02 PROCEDURE — 99999 PR PBB SHADOW E&M-EST. PATIENT-LVL III: CPT | Mod: PBBFAC,,, | Performed by: STUDENT IN AN ORGANIZED HEALTH CARE EDUCATION/TRAINING PROGRAM

## 2021-11-02 PROCEDURE — 99213 OFFICE O/P EST LOW 20 MIN: CPT | Mod: S$GLB,,, | Performed by: STUDENT IN AN ORGANIZED HEALTH CARE EDUCATION/TRAINING PROGRAM

## 2021-11-02 RX ORDER — ZOSTER VACCINE RECOMBINANT, ADJUVANTED 50 MCG/0.5
KIT INTRAMUSCULAR
COMMUNITY
Start: 2021-08-16 | End: 2021-11-09 | Stop reason: DRUGHIGH

## 2021-11-08 ENCOUNTER — TELEPHONE (OUTPATIENT)
Dept: INTERNAL MEDICINE | Facility: CLINIC | Age: 51
End: 2021-11-08
Payer: COMMERCIAL

## 2021-11-08 ENCOUNTER — PATIENT MESSAGE (OUTPATIENT)
Dept: INTERNAL MEDICINE | Facility: CLINIC | Age: 51
End: 2021-11-08
Payer: COMMERCIAL

## 2021-11-09 RX ORDER — PAROXETINE HYDROCHLORIDE 20 MG/1
20 TABLET, FILM COATED ORAL EVERY MORNING
Qty: 30 TABLET | Refills: 4 | Status: SHIPPED | OUTPATIENT
Start: 2021-11-09 | End: 2022-01-20 | Stop reason: SDUPTHER

## 2021-11-10 ENCOUNTER — OFFICE VISIT (OUTPATIENT)
Dept: PSYCHIATRY | Facility: CLINIC | Age: 51
End: 2021-11-10
Payer: COMMERCIAL

## 2021-11-10 DIAGNOSIS — F43.22 ADJUSTMENT DISORDER WITH ANXIETY: Primary | ICD-10-CM

## 2021-11-10 PROCEDURE — 90834 PR PSYCHOTHERAPY W/PATIENT, 45 MIN: ICD-10-PCS | Mod: S$GLB,,, | Performed by: SOCIAL WORKER

## 2021-11-10 PROCEDURE — 90834 PSYTX W PT 45 MINUTES: CPT | Mod: S$GLB,,, | Performed by: SOCIAL WORKER

## 2021-11-16 ENCOUNTER — OFFICE VISIT (OUTPATIENT)
Dept: PSYCHIATRY | Facility: CLINIC | Age: 51
End: 2021-11-16
Payer: COMMERCIAL

## 2021-11-16 DIAGNOSIS — F43.22 ADJUSTMENT DISORDER WITH ANXIETY: Primary | ICD-10-CM

## 2021-11-16 PROCEDURE — 90834 PR PSYCHOTHERAPY W/PATIENT, 45 MIN: ICD-10-PCS | Mod: 95,,, | Performed by: SOCIAL WORKER

## 2021-11-16 PROCEDURE — 90834 PSYTX W PT 45 MINUTES: CPT | Mod: 95,,, | Performed by: SOCIAL WORKER

## 2021-11-24 ENCOUNTER — OFFICE VISIT (OUTPATIENT)
Dept: PSYCHIATRY | Facility: CLINIC | Age: 51
End: 2021-11-24
Payer: COMMERCIAL

## 2021-11-24 DIAGNOSIS — F43.22 ADJUSTMENT DISORDER WITH ANXIETY: Primary | ICD-10-CM

## 2021-11-24 PROCEDURE — 90834 PR PSYCHOTHERAPY W/PATIENT, 45 MIN: ICD-10-PCS | Mod: S$GLB,,, | Performed by: SOCIAL WORKER

## 2021-11-24 PROCEDURE — 90834 PSYTX W PT 45 MINUTES: CPT | Mod: S$GLB,,, | Performed by: SOCIAL WORKER

## 2021-12-01 ENCOUNTER — OFFICE VISIT (OUTPATIENT)
Dept: PSYCHIATRY | Facility: CLINIC | Age: 51
End: 2021-12-01
Payer: COMMERCIAL

## 2021-12-01 DIAGNOSIS — F43.22 ADJUSTMENT DISORDER WITH ANXIETY: Primary | ICD-10-CM

## 2021-12-01 PROCEDURE — 90834 PSYTX W PT 45 MINUTES: CPT | Mod: S$GLB,,, | Performed by: SOCIAL WORKER

## 2021-12-01 PROCEDURE — 90834 PR PSYCHOTHERAPY W/PATIENT, 45 MIN: ICD-10-PCS | Mod: S$GLB,,, | Performed by: SOCIAL WORKER

## 2021-12-08 ENCOUNTER — OFFICE VISIT (OUTPATIENT)
Dept: PSYCHIATRY | Facility: CLINIC | Age: 51
End: 2021-12-08
Payer: COMMERCIAL

## 2021-12-08 DIAGNOSIS — F43.22 ADJUSTMENT DISORDER WITH ANXIETY: Primary | ICD-10-CM

## 2021-12-08 PROCEDURE — 90834 PSYTX W PT 45 MINUTES: CPT | Mod: S$GLB,,, | Performed by: SOCIAL WORKER

## 2021-12-08 PROCEDURE — 90834 PR PSYCHOTHERAPY W/PATIENT, 45 MIN: ICD-10-PCS | Mod: S$GLB,,, | Performed by: SOCIAL WORKER

## 2021-12-14 ENCOUNTER — PATIENT MESSAGE (OUTPATIENT)
Dept: PSYCHIATRY | Facility: CLINIC | Age: 51
End: 2021-12-14
Payer: COMMERCIAL

## 2021-12-15 ENCOUNTER — PATIENT MESSAGE (OUTPATIENT)
Dept: INTERNAL MEDICINE | Facility: CLINIC | Age: 51
End: 2021-12-15
Payer: COMMERCIAL

## 2021-12-15 ENCOUNTER — OFFICE VISIT (OUTPATIENT)
Dept: PSYCHIATRY | Facility: CLINIC | Age: 51
End: 2021-12-15
Payer: COMMERCIAL

## 2021-12-15 DIAGNOSIS — F43.22 ADJUSTMENT DISORDER WITH ANXIETY: Primary | ICD-10-CM

## 2021-12-15 DIAGNOSIS — F41.9 ANXIETY: ICD-10-CM

## 2021-12-15 PROCEDURE — 90834 PSYTX W PT 45 MINUTES: CPT | Mod: S$GLB,,, | Performed by: SOCIAL WORKER

## 2021-12-15 PROCEDURE — 90834 PR PSYCHOTHERAPY W/PATIENT, 45 MIN: ICD-10-PCS | Mod: S$GLB,,, | Performed by: SOCIAL WORKER

## 2021-12-15 RX ORDER — LORAZEPAM 0.5 MG/1
0.5 TABLET ORAL NIGHTLY PRN
Qty: 30 TABLET | Refills: 0 | Status: SHIPPED | OUTPATIENT
Start: 2021-12-15 | End: 2022-01-20 | Stop reason: SDUPTHER

## 2021-12-22 ENCOUNTER — OFFICE VISIT (OUTPATIENT)
Dept: PSYCHIATRY | Facility: CLINIC | Age: 51
End: 2021-12-22
Payer: COMMERCIAL

## 2021-12-22 ENCOUNTER — PATIENT MESSAGE (OUTPATIENT)
Dept: PSYCHIATRY | Facility: CLINIC | Age: 51
End: 2021-12-22
Payer: COMMERCIAL

## 2021-12-22 DIAGNOSIS — F43.22 ADJUSTMENT DISORDER WITH ANXIETY: Primary | ICD-10-CM

## 2021-12-22 PROCEDURE — 90834 PSYTX W PT 45 MINUTES: CPT | Mod: S$GLB,,, | Performed by: SOCIAL WORKER

## 2021-12-22 PROCEDURE — 90834 PR PSYCHOTHERAPY W/PATIENT, 45 MIN: ICD-10-PCS | Mod: S$GLB,,, | Performed by: SOCIAL WORKER

## 2021-12-26 ENCOUNTER — PATIENT MESSAGE (OUTPATIENT)
Dept: INTERNAL MEDICINE | Facility: CLINIC | Age: 51
End: 2021-12-26
Payer: COMMERCIAL

## 2021-12-26 ENCOUNTER — NURSE TRIAGE (OUTPATIENT)
Dept: ADMINISTRATIVE | Facility: CLINIC | Age: 51
End: 2021-12-26
Payer: COMMERCIAL

## 2021-12-26 ENCOUNTER — PATIENT MESSAGE (OUTPATIENT)
Dept: PSYCHIATRY | Facility: CLINIC | Age: 51
End: 2021-12-26
Payer: COMMERCIAL

## 2021-12-26 DIAGNOSIS — U07.1 COVID-19 VIRUS INFECTION: Primary | ICD-10-CM

## 2021-12-27 ENCOUNTER — LAB VISIT (OUTPATIENT)
Dept: PRIMARY CARE CLINIC | Facility: OTHER | Age: 51
End: 2021-12-27
Attending: INTERNAL MEDICINE
Payer: COMMERCIAL

## 2021-12-27 DIAGNOSIS — Z20.822 ENCOUNTER FOR LABORATORY TESTING FOR COVID-19 VIRUS: ICD-10-CM

## 2021-12-27 PROCEDURE — U0003 INFECTIOUS AGENT DETECTION BY NUCLEIC ACID (DNA OR RNA); SEVERE ACUTE RESPIRATORY SYNDROME CORONAVIRUS 2 (SARS-COV-2) (CORONAVIRUS DISEASE [COVID-19]), AMPLIFIED PROBE TECHNIQUE, MAKING USE OF HIGH THROUGHPUT TECHNOLOGIES AS DESCRIBED BY CMS-2020-01-R: HCPCS | Performed by: INTERNAL MEDICINE

## 2021-12-28 LAB
SARS-COV-2 RNA RESP QL NAA+PROBE: DETECTED
SARS-COV-2- CYCLE NUMBER: 20

## 2021-12-29 ENCOUNTER — PATIENT MESSAGE (OUTPATIENT)
Dept: INTERNAL MEDICINE | Facility: CLINIC | Age: 51
End: 2021-12-29
Payer: COMMERCIAL

## 2021-12-29 ENCOUNTER — OFFICE VISIT (OUTPATIENT)
Dept: PSYCHIATRY | Facility: CLINIC | Age: 51
End: 2021-12-29
Payer: COMMERCIAL

## 2021-12-29 DIAGNOSIS — F43.22 ADJUSTMENT DISORDER WITH ANXIETY: Primary | ICD-10-CM

## 2021-12-29 PROCEDURE — 90834 PSYTX W PT 45 MINUTES: CPT | Mod: 95,,, | Performed by: SOCIAL WORKER

## 2021-12-29 PROCEDURE — 90834 PR PSYCHOTHERAPY W/PATIENT, 45 MIN: ICD-10-PCS | Mod: 95,,, | Performed by: SOCIAL WORKER

## 2021-12-29 RX ORDER — ACYCLOVIR 400 MG/1
400 TABLET ORAL 2 TIMES DAILY
Qty: 14 TABLET | Refills: 0 | Status: SHIPPED | OUTPATIENT
Start: 2021-12-29 | End: 2022-01-20 | Stop reason: SDUPTHER

## 2022-01-05 ENCOUNTER — PATIENT MESSAGE (OUTPATIENT)
Dept: INTERNAL MEDICINE | Facility: CLINIC | Age: 52
End: 2022-01-05
Payer: COMMERCIAL

## 2022-01-05 ENCOUNTER — OFFICE VISIT (OUTPATIENT)
Dept: PSYCHIATRY | Facility: CLINIC | Age: 52
End: 2022-01-05
Payer: COMMERCIAL

## 2022-01-05 DIAGNOSIS — F43.22 ADJUSTMENT DISORDER WITH ANXIETY: Primary | ICD-10-CM

## 2022-01-05 PROCEDURE — 90834 PR PSYCHOTHERAPY W/PATIENT, 45 MIN: ICD-10-PCS | Mod: 95,,, | Performed by: SOCIAL WORKER

## 2022-01-05 PROCEDURE — 90834 PSYTX W PT 45 MINUTES: CPT | Mod: 95,,, | Performed by: SOCIAL WORKER
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
## 2022-01-09 ENCOUNTER — PATIENT MESSAGE (OUTPATIENT)
Dept: PRIMARY CARE CLINIC | Facility: CLINIC | Age: 52
End: 2022-01-09
Payer: COMMERCIAL

## 2022-01-09 ENCOUNTER — PATIENT MESSAGE (OUTPATIENT)
Dept: OPTOMETRY | Facility: CLINIC | Age: 52
End: 2022-01-09
Payer: COMMERCIAL

## 2022-01-09 ENCOUNTER — PATIENT MESSAGE (OUTPATIENT)
Dept: INTERNAL MEDICINE | Facility: CLINIC | Age: 52
End: 2022-01-09
Payer: COMMERCIAL

## 2022-01-10 ENCOUNTER — PATIENT MESSAGE (OUTPATIENT)
Dept: PRIMARY CARE CLINIC | Facility: CLINIC | Age: 52
End: 2022-01-10
Payer: COMMERCIAL

## 2022-01-11 ENCOUNTER — TELEPHONE (OUTPATIENT)
Dept: OBSTETRICS AND GYNECOLOGY | Facility: CLINIC | Age: 52
End: 2022-01-11
Payer: COMMERCIAL

## 2022-01-11 NOTE — TELEPHONE ENCOUNTER
Called and spoke with pt     Pt sched Hrt Consult 7-12-22    Will switch to swing for everything    Needs annual appt sched 8-1-22 or later w/ swing  Pt will call next month to sched annual

## 2022-01-11 NOTE — TELEPHONE ENCOUNTER
----- Message from Nadia Gramajo sent at 1/10/2022  4:54 PM CST -----    ----- Message -----  From: Phoebe Villarreal MD  Sent: 1/10/2022   3:20 PM CST  To: Nadia Gramajo      ----- Message -----  From: Gala Saleh  Sent: 1/10/2022  10:18 AM CST  To: Phil GRIJALVA Staff    Patient would like to spk with someone about and an appt.. I am unable to schld requested date for her. Patient can be reached at [phone]

## 2022-01-12 ENCOUNTER — OFFICE VISIT (OUTPATIENT)
Dept: PSYCHIATRY | Facility: CLINIC | Age: 52
End: 2022-01-12
Payer: COMMERCIAL

## 2022-01-12 DIAGNOSIS — F43.22 ADJUSTMENT DISORDER WITH ANXIETY: Primary | ICD-10-CM

## 2022-01-12 PROCEDURE — 90834 PSYTX W PT 45 MINUTES: CPT | Mod: 95,,, | Performed by: SOCIAL WORKER

## 2022-01-12 PROCEDURE — 90834 PR PSYCHOTHERAPY W/PATIENT, 45 MIN: ICD-10-PCS | Mod: 95,,, | Performed by: SOCIAL WORKER

## 2022-01-12 NOTE — PROGRESS NOTES
Individual Psychotherapy (PhD/LCSW)    1/12/2022    Site:  [facility]    Therapeutic Intervention: Met with patient.  Outpatient - Insight oriented psychotherapy 45 min - CPT code 05873    Chief complaint/reason for encounter: anxiety     Interval history and content of current session: Pt doing well. Reached out to both daughters to re-open communication, received demands for apologies. Niya did send a photo and pt is hopeful of resuming some contact. Linda maintaining distance. Pt works to keep door open to both girls while holding her own boundaries of self-respect and fairness. Discuss upcoming court date.       Treatment plan:  · Target symptoms: anxiety   · Why chosen therapy is appropriate versus another modality: relevant to diagnosis  · Outcome monitoring methods: self-report  · Therapeutic intervention type: insight oriented psychotherapy    Risk parameters:  Patient reports no suicidal ideation  Patient reports no homicidal ideation  Patient reports no self-injurious behavior  Patient reports no violent behavior    Verbal deficits: None    Patient's response to intervention:  The patient's response to intervention is motivated.    Progress toward goals and other mental status changes:  The patient's progress toward goals is good    Diagnosis:   Adjustment disorder with anxiety    Plan:  individual psychotherapy    Return to clinic: pt will schedule further appointments    Length of Service (minutes): 45

## 2022-01-19 ENCOUNTER — OFFICE VISIT (OUTPATIENT)
Dept: PSYCHIATRY | Facility: CLINIC | Age: 52
End: 2022-01-19
Payer: COMMERCIAL

## 2022-01-19 DIAGNOSIS — F43.22 ADJUSTMENT DISORDER WITH ANXIETY: Primary | ICD-10-CM

## 2022-01-19 PROCEDURE — 90834 PSYTX W PT 45 MINUTES: CPT | Mod: 95,,, | Performed by: SOCIAL WORKER

## 2022-01-19 PROCEDURE — 90834 PR PSYCHOTHERAPY W/PATIENT, 45 MIN: ICD-10-PCS | Mod: 95,,, | Performed by: SOCIAL WORKER

## 2022-01-19 NOTE — PROGRESS NOTES
Individual Psychotherapy (PhD/LCSW)    1/19/2022    Site:  [facility]    Therapeutic Intervention: Met with patient.  Outpatient - Insight oriented psychotherapy 45 min - CPT code 29896    Chief complaint/reason for encounter: anxiety     Interval history and content of current session: Pt doing well. Court date went well, pt learning to ground herself when triggered, feeling increasingly confident. Discuss use of sensory input for grounding.  Pt had nice brief conversation with Niya and will keep reaching out to Linda periodically. Pt still needing meds to settle down to sleep but sleeping well after this.     Treatment plan:  · Target symptoms: anxiety   · Why chosen therapy is appropriate versus another modality: relevant to diagnosis  · Outcome monitoring methods: self-report  · Therapeutic intervention type: insight oriented psychotherapy    Risk parameters:  Patient reports no suicidal ideation  Patient reports no homicidal ideation  Patient reports no self-injurious behavior  Patient reports no violent behavior    Verbal deficits: None    Patient's response to intervention:  The patient's response to intervention is motivated.    Progress toward goals and other mental status changes:  The patient's progress toward goals is good    Diagnosis:   Adjustment disorder with anxiety    Plan:  individual psychotherapy    Return to clinic: pt will schedule further appointments    Length of Service (minutes): 45

## 2022-01-20 ENCOUNTER — TELEPHONE (OUTPATIENT)
Dept: OBSTETRICS AND GYNECOLOGY | Facility: CLINIC | Age: 52
End: 2022-01-20
Payer: COMMERCIAL

## 2022-01-20 ENCOUNTER — OFFICE VISIT (OUTPATIENT)
Dept: PRIMARY CARE CLINIC | Facility: CLINIC | Age: 52
End: 2022-01-20
Payer: COMMERCIAL

## 2022-01-20 ENCOUNTER — OFFICE VISIT (OUTPATIENT)
Dept: UROGYNECOLOGY | Facility: CLINIC | Age: 52
End: 2022-01-20
Payer: COMMERCIAL

## 2022-01-20 ENCOUNTER — TELEPHONE (OUTPATIENT)
Dept: PRIMARY CARE CLINIC | Facility: CLINIC | Age: 52
End: 2022-01-20

## 2022-01-20 VITALS
DIASTOLIC BLOOD PRESSURE: 80 MMHG | SYSTOLIC BLOOD PRESSURE: 141 MMHG | BODY MASS INDEX: 21.46 KG/M2 | HEIGHT: 64 IN | WEIGHT: 125.69 LBS | HEART RATE: 84 BPM

## 2022-01-20 VITALS — HEIGHT: 64 IN | BODY MASS INDEX: 22.78 KG/M2

## 2022-01-20 DIAGNOSIS — Z86.16 HISTORY OF COVID-19: ICD-10-CM

## 2022-01-20 DIAGNOSIS — K12.0 APHTHOUS ULCER: ICD-10-CM

## 2022-01-20 DIAGNOSIS — Z00.00 ANNUAL PHYSICAL EXAM: ICD-10-CM

## 2022-01-20 DIAGNOSIS — N81.11 CYSTOCELE, MIDLINE: ICD-10-CM

## 2022-01-20 DIAGNOSIS — Z12.31 SCREENING MAMMOGRAM FOR BREAST CANCER: ICD-10-CM

## 2022-01-20 DIAGNOSIS — N39.46 MIXED STRESS AND URGE URINARY INCONTINENCE: Primary | ICD-10-CM

## 2022-01-20 DIAGNOSIS — F41.1 GAD (GENERALIZED ANXIETY DISORDER): Primary | ICD-10-CM

## 2022-01-20 DIAGNOSIS — Z13.6 ENCOUNTER FOR LIPID SCREENING FOR CARDIOVASCULAR DISEASE: ICD-10-CM

## 2022-01-20 DIAGNOSIS — Z72.820 POOR SLEEP: ICD-10-CM

## 2022-01-20 DIAGNOSIS — B00.1 HERPES LABIALIS: ICD-10-CM

## 2022-01-20 DIAGNOSIS — Z13.220 ENCOUNTER FOR LIPID SCREENING FOR CARDIOVASCULAR DISEASE: ICD-10-CM

## 2022-01-20 DIAGNOSIS — N39.3 SUI (STRESS URINARY INCONTINENCE, FEMALE): ICD-10-CM

## 2022-01-20 PROCEDURE — 1160F PR REVIEW ALL MEDS BY PRESCRIBER/CLIN PHARMACIST DOCUMENTED: ICD-10-PCS | Mod: CPTII,S$GLB,, | Performed by: OBSTETRICS & GYNECOLOGY

## 2022-01-20 PROCEDURE — 51701 PR INSERTION OF NON-INDWELLING BLADDER CATHETERIZATION FOR RESIDUAL UR: ICD-10-PCS | Mod: S$GLB,,, | Performed by: OBSTETRICS & GYNECOLOGY

## 2022-01-20 PROCEDURE — 3008F PR BODY MASS INDEX (BMI) DOCUMENTED: ICD-10-PCS | Mod: CPTII,S$GLB,, | Performed by: OBSTETRICS & GYNECOLOGY

## 2022-01-20 PROCEDURE — 3008F BODY MASS INDEX DOCD: CPT | Mod: CPTII,S$GLB,, | Performed by: OBSTETRICS & GYNECOLOGY

## 2022-01-20 PROCEDURE — 99999 PR PBB SHADOW E&M-EST. PATIENT-LVL V: ICD-10-PCS | Mod: PBBFAC,,, | Performed by: OBSTETRICS & GYNECOLOGY

## 2022-01-20 PROCEDURE — 87086 URINE CULTURE/COLONY COUNT: CPT | Performed by: OBSTETRICS & GYNECOLOGY

## 2022-01-20 PROCEDURE — 3077F SYST BP >= 140 MM HG: CPT | Mod: CPTII,S$GLB,, | Performed by: OBSTETRICS & GYNECOLOGY

## 2022-01-20 PROCEDURE — 99999 PR PBB SHADOW E&M-EST. PATIENT-LVL V: CPT | Mod: PBBFAC,,, | Performed by: OBSTETRICS & GYNECOLOGY

## 2022-01-20 PROCEDURE — 1159F PR MEDICATION LIST DOCUMENTED IN MEDICAL RECORD: ICD-10-PCS | Mod: CPTII,S$GLB,, | Performed by: OBSTETRICS & GYNECOLOGY

## 2022-01-20 PROCEDURE — 3008F PR BODY MASS INDEX (BMI) DOCUMENTED: ICD-10-PCS | Mod: CPTII,95,, | Performed by: FAMILY MEDICINE

## 2022-01-20 PROCEDURE — 3079F PR MOST RECENT DIASTOLIC BLOOD PRESSURE 80-89 MM HG: ICD-10-PCS | Mod: CPTII,S$GLB,, | Performed by: OBSTETRICS & GYNECOLOGY

## 2022-01-20 PROCEDURE — 3008F BODY MASS INDEX DOCD: CPT | Mod: CPTII,95,, | Performed by: FAMILY MEDICINE

## 2022-01-20 PROCEDURE — 51701 INSERT BLADDER CATHETER: CPT | Mod: S$GLB,,, | Performed by: OBSTETRICS & GYNECOLOGY

## 2022-01-20 PROCEDURE — 99215 PR OFFICE/OUTPT VISIT, EST, LEVL V, 40-54 MIN: ICD-10-PCS | Mod: 25,S$GLB,, | Performed by: OBSTETRICS & GYNECOLOGY

## 2022-01-20 PROCEDURE — 99213 PR OFFICE/OUTPT VISIT, EST, LEVL III, 20-29 MIN: ICD-10-PCS | Mod: 95,,, | Performed by: FAMILY MEDICINE

## 2022-01-20 PROCEDURE — 3079F DIAST BP 80-89 MM HG: CPT | Mod: CPTII,S$GLB,, | Performed by: OBSTETRICS & GYNECOLOGY

## 2022-01-20 PROCEDURE — 99215 OFFICE O/P EST HI 40 MIN: CPT | Mod: 25,S$GLB,, | Performed by: OBSTETRICS & GYNECOLOGY

## 2022-01-20 PROCEDURE — 1159F MED LIST DOCD IN RCRD: CPT | Mod: CPTII,S$GLB,, | Performed by: OBSTETRICS & GYNECOLOGY

## 2022-01-20 PROCEDURE — 1160F RVW MEDS BY RX/DR IN RCRD: CPT | Mod: CPTII,S$GLB,, | Performed by: OBSTETRICS & GYNECOLOGY

## 2022-01-20 PROCEDURE — 3077F PR MOST RECENT SYSTOLIC BLOOD PRESSURE >= 140 MM HG: ICD-10-PCS | Mod: CPTII,S$GLB,, | Performed by: OBSTETRICS & GYNECOLOGY

## 2022-01-20 PROCEDURE — 99213 OFFICE O/P EST LOW 20 MIN: CPT | Mod: 95,,, | Performed by: FAMILY MEDICINE

## 2022-01-20 RX ORDER — ACYCLOVIR 400 MG/1
400 TABLET ORAL 2 TIMES DAILY
Qty: 14 TABLET | Refills: 0 | Status: SHIPPED | OUTPATIENT
Start: 2022-01-20 | End: 2022-04-19 | Stop reason: SDUPTHER

## 2022-01-20 RX ORDER — TRIAMCINOLONE ACETONIDE 1 MG/G
PASTE DENTAL
Qty: 5 G | Refills: 0 | Status: SHIPPED | OUTPATIENT
Start: 2022-01-20 | End: 2023-09-27 | Stop reason: SDUPTHER

## 2022-01-20 RX ORDER — HYDROXYZINE HYDROCHLORIDE 10 MG/1
10 TABLET, FILM COATED ORAL NIGHTLY PRN
Qty: 30 TABLET | Refills: 0 | Status: SHIPPED | OUTPATIENT
Start: 2022-01-20 | End: 2022-02-14

## 2022-01-20 RX ORDER — LORAZEPAM 0.5 MG/1
0.5 TABLET ORAL NIGHTLY PRN
Qty: 30 TABLET | Refills: 0 | Status: SHIPPED | OUTPATIENT
Start: 2022-01-20 | End: 2022-11-03 | Stop reason: SDUPTHER

## 2022-01-20 RX ORDER — PAROXETINE HYDROCHLORIDE 20 MG/1
20 TABLET, FILM COATED ORAL EVERY MORNING
Qty: 90 TABLET | Refills: 1 | Status: SHIPPED | OUTPATIENT
Start: 2022-01-20 | End: 2022-04-19 | Stop reason: SDUPTHER

## 2022-01-20 NOTE — PROGRESS NOTES
"Subjective:       Patient ID: Becky Ospina is a 51 y.o. female.    Chief Complaint: Medication Refill    HPI  50 y/o female here to establish care and for med refill.     She was a patient of Dr. Silva, she has an appointment with me set for April. She is under a lot of stress as she is going through a divorce, she has been on Paxil for over 10 years and about 2 months ago she increased to 20 mg and she feels her anxiety is better, she is also taking Ativan since July 2021, she takes it at night, she did not try any other meds for sleep.  She denies f/n/v/d/constipation/cp/sob, has appt set with Dr. Babcock with mixed incontinence. She is not doing any dedicated exercise.     Hx of covid 1/2022 mild outpatient symptoms  ANAM:  Counseling, paxil 20 mg daily, Ativan 0.5 mg nightly  Hypothyroidism:never had procedure or biopsy, levothyroxine 75mcg  GYN: following with Dr. Gotti, OCP  Colonoscopy 9/2020 repeat 10 years  OTC: b12, vit D    Review of Systems   Constitutional: Negative for activity change and unexpected weight change.   HENT: Negative for hearing loss, rhinorrhea and trouble swallowing.    Eyes: Negative for discharge and visual disturbance.   Respiratory: Negative for chest tightness and wheezing.    Cardiovascular: Negative for chest pain and palpitations.   Gastrointestinal: Negative for blood in stool, constipation, diarrhea and vomiting.   Endocrine: Negative for polydipsia and polyuria.   Genitourinary: Negative for difficulty urinating, dysuria, hematuria and menstrual problem.   Musculoskeletal: Negative for arthralgias, joint swelling and neck pain.   Neurological: Negative for weakness and headaches.   Psychiatric/Behavioral: Negative for confusion and dysphoric mood.       Objective:      Ht 5' 4" (1.626 m)   BMI 22.78 kg/m²   Physical Exam  Constitutional:       General: She is not in acute distress.     Appearance: She is well-developed and well-nourished. She is not diaphoretic. "   HENT:      Head: Normocephalic and atraumatic.   Pulmonary:      Effort: No respiratory distress.   Neurological:      Mental Status: She is alert.   Psychiatric:         Mood and Affect: Mood and affect normal.         Assessment:       1. RICKEY (generalized anxiety disorder)    2. Screening mammogram for breast cancer    3. Annual physical exam    4. Encounter for lipid screening for cardiovascular disease    5. Aphthous ulcer    6. Herpes labialis    7. Poor sleep    8. History of COVID-19        Plan:   Becky was seen today for medication refill.    Diagnoses and all orders for this visit:    RICKEY (generalized anxiety disorder)  -     LORazepam (ATIVAN) 0.5 MG tablet; Take 1 tablet (0.5 mg total) by mouth nightly as needed for Anxiety. Use only infrequently, can be addictive  -     paroxetine (PAXIL) 20 MG tablet; Take 1 tablet (20 mg total) by mouth every morning. Cancel the 10 mg  -     hydrOXYzine HCL (ATARAX) 10 MG Tab; Take 1 tablet (10 mg total) by mouth nightly as needed.    Screening mammogram for breast cancer  -     Mammo Digital Screening Bilat w/ [facility]; Future    Annual physical exam  -     CBC Auto Differential; Future  -     Comprehensive Metabolic Panel; Future  -     Hemoglobin A1C; Future  -     Lipid Panel; Future  -     TSH; Future  -     Urinalysis; Future    Encounter for lipid screening for cardiovascular disease  -     Lipid Panel; Future    Aphthous ulcer  -     triamcinolone acetonide 0.1% (KENALOG) 0.1 % paste; APPLY TO MOUTH SORES 2 TIMES A DAY    Herpes labialis  -     acyclovir (ZOVIRAX) 400 MG tablet; Take 1 tablet (400 mg total) by mouth 2 (two) times daily. Fever blisters    Poor sleep  -     hydrOXYzine HCL (ATARAX) 10 MG Tab; Take 1 tablet (10 mg total) by mouth nightly as needed.    History of COVID-19    The patient location is: [place]  The chief complaint leading to consultation is: rickey    Visit type: audiovisual    Face to Face time with patient: 20 minutes of total time spent  on the encounter, which includes face to face time and non-face to face time preparing to see the patient (eg, review of tests), Obtaining and/or reviewing separately obtained history, Documenting clinical information in the electronic or other health record, Independently interpreting results (not separately reported) and communicating results to the patient/family/caregiver, or Care coordination (not separately reported).         Each patient to whom he or she provides medical services by telemedicine is:  (1) informed of the relationship between the physician and patient and the respective role of any other health care provider with respect to management of the patient; and (2) notified that he or she may decline to receive medical services by telemedicine and may withdraw from such care at any time.    Notes:

## 2022-01-20 NOTE — TELEPHONE ENCOUNTER
[facility]  Made lab appt.  Mailed appt time letter out to pt

## 2022-01-20 NOTE — PROGRESS NOTES
[facility]  [address] [address]    Becky Ospina  [id number]  1970 January 20, 2022    Consulting Physician: Diaz Gotti Jr., MD   GYN: MD Shen  Primary M.D.: Desiree Stephens MD    Chief Complaint   Patient presents with    Consult    Urinary Incontinence     JUMANA       HPI:     1)  UI:  (+) JUMANA = (+) UUI, worse with stress and urge is tampered by preemptively going to the bathroom.  X 5years.  (+) pads:2/day, usually moderate to severe wetness wetness, worse after activity and walking and 1/night usually minimum wetness.  Daytime frequency: Q 2 hours.  Nocturia: Yes: 2/night.   (--) dysuria,  (--) hematuria,  (--) frequent UTIs.  (--) complete bladder emptying, frequently having double voids, leakage with standing after voiding, voiding increased with leaning forward on toilet.    2)  POP:  Absent.  (--) vaginal bleeding. (--) vaginal discharge. (--) sexually active.  (-) dyspareunia (--)  Vaginal dryness.  (--) vaginal estrogen use.     3)  BM:  (--) constipation/straining.  (--) chronic diarrhea. (+) h/o hematochezia, followed with normal colonoscopy in 2020. None currently.  (--) fecal incontinence.  (--) fecal smearing/urgency.  (+) complete evacuation.    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    Largest infant weight: 8#6  yes FAVD. yes episiotomy.      Gynecologic History  LMP: 2022, periods starting to get lighter, 3 days duration, currently on OCP.  Age of menarche: 12  Age of menopause: none  Menstrual history: moderate cramping and menorrhagia, relieved once on OCP.  Pap test: 2021, normal/HPV neg.  History of abnormal paps: Yes - s/o [facility] .  History of STIs:  No  Mammogram: Date of last: 2021.  Result: Normal  Colonoscopy: Date of last: .  Result:  normal.  Repeat due:  .    DEXA: none    Family History  Family History   Problem Relation Age of Onset    Rheum arthritis Mother     Arthritis Mother     Parkinsonism Father     Malignant hyperthermia Brother     Breast cancer Neg Hx     Colon cancer Neg Hx     Ovarian cancer Neg Hx     Melanoma Neg Hx     Cancer Neg Hx     Heart disease Neg Hx       Colon CA: No  Breast CA: No  GYN CA: No   CA: No    Social History  Social History     Tobacco Use   Smoking Status Never Smoker   Smokeless Tobacco Never Used   .    Social History     Substance and Sexual Activity   Alcohol Use Yes    Alcohol/week: 2.0 standard drinks    Types: 2 Glasses of wine per week    Comment: week   .    Social History     Substance and Sexual Activity   Drug Use No   .  The patient is .  Resides in [place] [address].  Employment status: currently employed, [employer].      Allergies  Review of patient's allergies indicates:  No Known Allergies    Medications  Current Outpatient Medications on File Prior to Visit   Medication Sig Dispense Refill    APRI 0.15-0.03 mg per tablet Take 1 tablet by mouth once daily. 28 tablet 12    levothyroxine (SYNTHROID) 75 MCG tablet TAKE 1 TABLET BY MOUTH EVERY DAY BEFORE BREAKFAST 90 tablet 3    naproxen sodium (ANAPROX) 550 MG tablet Take 1 tablet (550 mg total) by mouth 2 (two) times daily with meals. (Patient  "taking differently: Take 550 mg by mouth 2 (two) times daily with meals. Jaw pain) 60 tablet 1    triamcinolone acetonide 0.1% (KENALOG) 0.1 % cream AAA bid (Patient not taking: Reported on 1/20/2022) 454 g 3    [DISCONTINUED] acyclovir (ZOVIRAX) 400 MG tablet Take 1 tablet (400 mg total) by mouth 2 (two) times daily. Fever blisters 14 tablet 0    [DISCONTINUED] LORazepam (ATIVAN) 0.5 MG tablet Take 1 tablet (0.5 mg total) by mouth nightly as needed for Anxiety. Use only infrequently, can be addictive 30 tablet 0    [DISCONTINUED] paroxetine (PAXIL) 20 MG tablet Take 1 tablet (20 mg total) by mouth every morning. Cancel the 10 mg 30 tablet 4    [DISCONTINUED] triamcinolone acetonide 0.1% (KENALOG) 0.1 % paste APPLY TO MOUTH SORES 2 TIMES A DAY 5 g 0     No current facility-administered medications on file prior to visit.       Review of Systems A 14 point ROS was reviewed with pertinent positives as noted above in the history of present illness.      Constitutional: negative  Eyes: negative  Endocrine: negative  Gastrointestinal: negative  Cardiovascular: negative  Respiratory: negative  Allergic/Immunologic: negative  Integumentary: negative  Psychiatric: negative  Musculoskeletal: negative   Ear/Nose/Throat: negative  Neurologic: negative  Genitourinary: SEE HPI  Hematologic/Lymphatic: negative   Breast: negative    Urogynecologic Exam  BP (!) 141/80   Pulse 84   Ht 5' 4" (1.626 m)   Wt 57 kg (125 lb 11.2 oz)   LMP 01/12/2022 (Exact Date)   BMI 21.58 kg/m²     GENERAL APPEARANCE:  The patient is well-developed, well-nourished.  Neck:  Supple with no thyromegaly, no carotid bruits.  Heart:  Regular rate and rhythm, no murmurs, rubs or gallops.  Lungs:  Clear.  No CVA tenderness.  Abdomen:  Soft, nontender, nondistended, no hepatosplenomegaly.  Incisions:  none    PELVIC:    External genitalia:  Normal Bartholins, Skenes and labia bilaterally.    Urethra:  No caruncle, diverticulum or masses.  (+) " hypermobility.    Vagina:  Atrophy (--) , no bladder masses or tender, no discharge.    Cervix:  normal appearance  Uterus: normal size, contour, position, consistency, mobility, non-tender  Adnexa: Not palpable.    POP-Q:  Aa -1; Ba -1; C -6; Ap -2; Bp -2; D -8.  Genital hiatus 3, perineal body 2, total vaginal length 10-11.    NEUROLOGIC:  Cranial nerves 2 through 12 intact.  Strength 5/5.  DTRs 2+ lower extremities.  S2 through 4 normal.  Sacral reflexes intact.    EXT: MANDEL, 2+ pulses bilaterally, no C/C/E    COUGH STRESS TEST:  negative  KEGEL: 1 /5    RECTAL:    External:  Normal, (--) hemorrhoids, (--) dovetailing.   Internal:  deferred    PVR: 80 mL    Impression    1. JUMANA (stress urinary incontinence, female)        Initial Plan  The patient was counseled regarding these issues. The patient was given a summary sheet containing each of these issues with possible options for evaluation and management. When appropriate, we also reviewed computer-generated diagrams specific to their diagnoses..  All questions were addressed to the patient's satisfaction.    1)  Mixed urinary incontinence, urge = stress:    --urine C&S  --Empty bladder every 3 hours.  Empty well: wait a minute, lean forward on toilet.    --Avoid dietary irritants (see sheet).  Keep diary x 3-5 days to determine your irritants.  --KEGELS: do 10 in AM and 10 in PM, holding each x 10 seconds.  When you feel urge to go, STOP, KEGEL, and when urge has passed, then go to bathroom.  Consider PT in future.    --URGE: consider medication in future. Takes 2-4 weeks to see if will have effect.  For dry mouth: get sour, sugar free lozenge or gum.    --STRESS:  Pessary vs. Sling.    --needs UDS for Sling consideration   --make sure to check emptying    2)  Well-woman:  --please schedule mammogram for 2/11/2022 or after.     3)  Stage 1-2 cystocele:  --no bother  --uterus/posterior well-supported  --consider repair at time of any surgery: anterior    4) RTC  for UDS/cysto.     Approximately 45 min were spent in consult, 90 % in discussion.     Thank you for requesting consultation of your patient.  I look forward to participating in their care.    Brian Babcock  Female Pelvic Medicine and Reconstructive Surgery  [facility] [place], [place]

## 2022-01-20 NOTE — TELEPHONE ENCOUNTER
----- Message from Nadia Gramajo sent at 1/13/2022  4:22 PM CST -----    ----- Message -----  From: Phoebe Villarreal MD  Sent: 1/10/2022   3:20 PM CST  To: Nadia Gramajo      ----- Message -----  From: Gala Saleh  Sent: 1/10/2022  10:18 AM CST  To: Phil GRIJALVA Staff    Patient would like to spk with someone about and an appt.. I am unable to schld requested date for her. Patient can be reached at [phone]

## 2022-01-20 NOTE — PATIENT INSTRUCTIONS
Bladder Irritants  Certain foods and drinks have been associated with worsening symptoms of urinary frequency, urgency, urge incontinence, or bladder pain. If you suffer from any of these conditions, you may wish to try eliminating one or more of these foods from your diet and see if your symptoms improve. If bladder symptoms are related to dietary factors, strict adherence to a diet thateliminates the food should bring marked relief in 10 days. Once you are feeling better, you can begin to add foods back into your diet, one at a time. If symptoms return, you will be able to identify the irritant. As you add foods back to your diet it is very important that you drink significant amounts of water.    -----------------------------------------------------------------------------------------------  List of Common Bladder Irritants*  Alcoholic beverages  Apples and apple juice  Cantaloupe  Carbonated beverages  Chili and spicy foods  Chocolate  Citrus fruit  Coffee (including decaffeinated)  Cranberries and cranberry juice  Grapes  Guava  Milk Products: milk, cheese, cottage cheese, yogurt, ice cream  Peaches  Pineapple  Plums  Strawberries  Sugar especially artificial sweeteners, saccharin, aspartame, corn sweeteners, honey, fructose, sucrose, lactose  Tea  Tomatoes and tomato juice  Vitamin B complex  Vinegar  *Most people are not sensitive to ALL of these products; your goal is to find the foods that make YOUR symptoms worse.  ---------------------------------------------------------------------------------------------------    Low-acid fruit substitutions include apricots, papaya, pears and watermelon. Coffee drinkers can drink Kava or other lowacid instant drinks. Tea drinkers can substitute non-citrus herbal and sun brewed teas. Calcium carbonate co-buffered with calcium ascorbate can be substituted for Vitamin C. Prelief is a dietary supplement that works as an acid blocker for the bladder.    Where to get more  information:        Overcoming Bladder Disorders by Desiree Lux and Jose Sanchez, 1990        You Dont Have to Live with Cystitis! By Yvonne Sewell, 1988  · http://www.urologymanagement.org/oab    --------------------------------------------------------------------------------    1)  Mixed urinary incontinence, urge = stress:    --urine C&S  --Empty bladder every 3 hours.  Empty well: wait a minute, lean forward on toilet.    --Avoid dietary irritants (see sheet).  Keep diary x 3-5 days to determine your irritants.  --KEGELS: do 10 in AM and 10 in PM, holding each x 10 seconds.  When you feel urge to go, STOP, KEGEL, and when urge has passed, then go to bathroom.  Consider PT in future.    --URGE: consider medication in future.  Takes 2-4 weeks to see if will have effect.  For dry mouth: get sour, sugar free lozenge or gum.    --STRESS:  Pessary vs. Sling.    --needs UDS for Sling consideration    2)  Well-woman:  --please schedule mammogram for 2/11/2022 or after.     3)  Stage 1-2 cystocele:  --no bother  --uterus/posterior well-supported  --consider repair at time of any surgery: anterior    4) RTC for UDS/cysto.

## 2022-01-21 LAB — BACTERIA UR CULT: NO GROWTH

## 2022-01-23 PROBLEM — N81.11 CYSTOCELE, MIDLINE: Status: ACTIVE | Noted: 2022-01-23

## 2022-01-23 PROBLEM — N39.46 MIXED STRESS AND URGE URINARY INCONTINENCE: Status: ACTIVE | Noted: 2022-01-23

## 2022-01-26 ENCOUNTER — OFFICE VISIT (OUTPATIENT)
Dept: PSYCHIATRY | Facility: CLINIC | Age: 52
End: 2022-01-26
Payer: COMMERCIAL

## 2022-01-26 DIAGNOSIS — F43.22 ADJUSTMENT DISORDER WITH ANXIETY: Primary | ICD-10-CM

## 2022-01-26 PROCEDURE — 90834 PSYTX W PT 45 MINUTES: CPT | Mod: 95,,, | Performed by: SOCIAL WORKER

## 2022-01-26 PROCEDURE — 90834 PR PSYCHOTHERAPY W/PATIENT, 45 MIN: ICD-10-PCS | Mod: 95,,, | Performed by: SOCIAL WORKER

## 2022-01-26 NOTE — PROGRESS NOTES
"    Individual Psychotherapy (PhD/LCSW)    1/26/2022    Site:  [facility]    Therapeutic Intervention: Met with patient.  Outpatient - Insight oriented psychotherapy 45 min - CPT code 75803    Chief complaint/reason for encounter: anxiety     Interval history and content of current session: Pt doing well.She reached out to Linda by text to offer to talk with her and received back demands to let Linda take Yan for visits at her apartment. Pt is working on accepting that her daughters are strongly aligned with Elbert and have no interest in contact with her at this time, notes Linda texted that pt's home is a "trauma" for her. Pt feeling stronger and more confident in herself to move on with her life despite deep sadness at daughters' detachment and hostility toward her.     Treatment plan:  · Target symptoms: anxiety   · Why chosen therapy is appropriate versus another modality: relevant to diagnosis  · Outcome monitoring methods: self-report  · Therapeutic intervention type: insight oriented psychotherapy    Risk parameters:  Patient reports no suicidal ideation  Patient reports no homicidal ideation  Patient reports no self-injurious behavior  Patient reports no violent behavior    Verbal deficits: None    Patient's response to intervention:  The patient's response to intervention is motivated.    Progress toward goals and other mental status changes:  The patient's progress toward goals is good    Diagnosis:   Adjustment disorder with anxiety    Plan:  individual psychotherapy    Return to clinic: pt will schedule further appointments    Length of Service (minutes): 45        "

## 2022-01-29 ENCOUNTER — PATIENT MESSAGE (OUTPATIENT)
Dept: PRIMARY CARE CLINIC | Facility: CLINIC | Age: 52
End: 2022-01-29
Payer: COMMERCIAL

## 2022-02-02 ENCOUNTER — OFFICE VISIT (OUTPATIENT)
Dept: PSYCHIATRY | Facility: CLINIC | Age: 52
End: 2022-02-02
Payer: COMMERCIAL

## 2022-02-02 DIAGNOSIS — F43.22 ADJUSTMENT DISORDER WITH ANXIETY: Primary | ICD-10-CM

## 2022-02-02 PROCEDURE — 90834 PSYTX W PT 45 MINUTES: CPT | Mod: 95,,, | Performed by: SOCIAL WORKER

## 2022-02-02 PROCEDURE — 90834 PR PSYCHOTHERAPY W/PATIENT, 45 MIN: ICD-10-PCS | Mod: 95,,, | Performed by: SOCIAL WORKER

## 2022-02-03 ENCOUNTER — LAB VISIT (OUTPATIENT)
Dept: LAB | Facility: HOSPITAL | Age: 52
End: 2022-02-03
Attending: FAMILY MEDICINE
Payer: COMMERCIAL

## 2022-02-03 DIAGNOSIS — Z00.00 ANNUAL PHYSICAL EXAM: ICD-10-CM

## 2022-02-03 DIAGNOSIS — Z13.220 ENCOUNTER FOR LIPID SCREENING FOR CARDIOVASCULAR DISEASE: ICD-10-CM

## 2022-02-03 DIAGNOSIS — Z13.6 ENCOUNTER FOR LIPID SCREENING FOR CARDIOVASCULAR DISEASE: ICD-10-CM

## 2022-02-03 LAB
ALBUMIN SERPL BCP-MCNC: 3.2 G/DL (ref 3.5–5.2)
ALP SERPL-CCNC: 46 U/L (ref 55–135)
ALT SERPL W/O P-5'-P-CCNC: 9 U/L (ref 10–44)
ANION GAP SERPL CALC-SCNC: 8 MMOL/L (ref 8–16)
AST SERPL-CCNC: 14 U/L (ref 10–40)
BASOPHILS # BLD AUTO: 0.05 K/UL (ref 0–0.2)
BASOPHILS NFR BLD: 0.9 % (ref 0–1.9)
BILIRUB SERPL-MCNC: 0.3 MG/DL (ref 0.1–1)
BILIRUB UR QL STRIP: NEGATIVE
BUN SERPL-MCNC: 18 MG/DL (ref 6–20)
CALCIUM SERPL-MCNC: 9 MG/DL (ref 8.7–10.5)
CHLORIDE SERPL-SCNC: 105 MMOL/L (ref 95–110)
CHOLEST SERPL-MCNC: 189 MG/DL (ref 120–199)
CHOLEST/HDLC SERPL: 2.5 {RATIO} (ref 2–5)
CLARITY UR REFRACT.AUTO: ABNORMAL
CO2 SERPL-SCNC: 23 MMOL/L (ref 23–29)
COLOR UR AUTO: ABNORMAL
CREAT SERPL-MCNC: 0.7 MG/DL (ref 0.5–1.4)
DIFFERENTIAL METHOD: ABNORMAL
EOSINOPHIL # BLD AUTO: 0.2 K/UL (ref 0–0.5)
EOSINOPHIL NFR BLD: 3.1 % (ref 0–8)
ERYTHROCYTE [DISTWIDTH] IN BLOOD BY AUTOMATED COUNT: 11.9 % (ref 11.5–14.5)
EST. GFR  (AFRICAN AMERICAN): >60 ML/MIN/1.73 M^2
EST. GFR  (NON AFRICAN AMERICAN): >60 ML/MIN/1.73 M^2
ESTIMATED AVG GLUCOSE: 105 MG/DL (ref 68–131)
GLUCOSE SERPL-MCNC: 94 MG/DL (ref 70–110)
GLUCOSE UR QL STRIP: NEGATIVE
HBA1C MFR BLD: 5.3 % (ref 4–5.6)
HCT VFR BLD AUTO: 37.9 % (ref 37–48.5)
HDLC SERPL-MCNC: 76 MG/DL (ref 40–75)
HDLC SERPL: 40.2 % (ref 20–50)
HGB BLD-MCNC: 12.6 G/DL (ref 12–16)
HGB UR QL STRIP: ABNORMAL
IMM GRANULOCYTES # BLD AUTO: 0.02 K/UL (ref 0–0.04)
IMM GRANULOCYTES NFR BLD AUTO: 0.3 % (ref 0–0.5)
KETONES UR QL STRIP: NEGATIVE
LDLC SERPL CALC-MCNC: 91.2 MG/DL (ref 63–159)
LEUKOCYTE ESTERASE UR QL STRIP: NEGATIVE
LYMPHOCYTES # BLD AUTO: 1.6 K/UL (ref 1–4.8)
LYMPHOCYTES NFR BLD: 28 % (ref 18–48)
MCH RBC QN AUTO: 32.5 PG (ref 27–31)
MCHC RBC AUTO-ENTMCNC: 33.2 G/DL (ref 32–36)
MCV RBC AUTO: 98 FL (ref 82–98)
MICROSCOPIC COMMENT: NORMAL
MONOCYTES # BLD AUTO: 0.5 K/UL (ref 0.3–1)
MONOCYTES NFR BLD: 9 % (ref 4–15)
NEUTROPHILS # BLD AUTO: 3.4 K/UL (ref 1.8–7.7)
NEUTROPHILS NFR BLD: 58.7 % (ref 38–73)
NITRITE UR QL STRIP: NEGATIVE
NONHDLC SERPL-MCNC: 113 MG/DL
NRBC BLD-RTO: 0 /100 WBC
PH UR STRIP: 5 [PH] (ref 5–8)
PLATELET # BLD AUTO: 295 K/UL (ref 150–450)
PMV BLD AUTO: 10.1 FL (ref 9.2–12.9)
POTASSIUM SERPL-SCNC: 4.3 MMOL/L (ref 3.5–5.1)
PROT SERPL-MCNC: 6.9 G/DL (ref 6–8.4)
PROT UR QL STRIP: NEGATIVE
RBC # BLD AUTO: 3.88 M/UL (ref 4–5.4)
RBC #/AREA URNS AUTO: 0 /HPF (ref 0–4)
SODIUM SERPL-SCNC: 136 MMOL/L (ref 136–145)
SP GR UR STRIP: 1.02 (ref 1–1.03)
TRIGL SERPL-MCNC: 109 MG/DL (ref 30–150)
TSH SERPL DL<=0.005 MIU/L-ACNC: 2.74 UIU/ML (ref 0.4–4)
URN SPEC COLLECT METH UR: ABNORMAL
WBC # BLD AUTO: 5.75 K/UL (ref 3.9–12.7)

## 2022-02-03 PROCEDURE — 80053 COMPREHEN METABOLIC PANEL: CPT | Performed by: FAMILY MEDICINE

## 2022-02-03 PROCEDURE — 36415 COLL VENOUS BLD VENIPUNCTURE: CPT | Mod: PN | Performed by: FAMILY MEDICINE

## 2022-02-03 PROCEDURE — 83036 HEMOGLOBIN GLYCOSYLATED A1C: CPT | Performed by: FAMILY MEDICINE

## 2022-02-03 PROCEDURE — 80061 LIPID PANEL: CPT | Performed by: FAMILY MEDICINE

## 2022-02-03 PROCEDURE — 85025 COMPLETE CBC W/AUTO DIFF WBC: CPT | Performed by: FAMILY MEDICINE

## 2022-02-03 PROCEDURE — 81001 URINALYSIS AUTO W/SCOPE: CPT | Performed by: FAMILY MEDICINE

## 2022-02-03 PROCEDURE — 84443 ASSAY THYROID STIM HORMONE: CPT | Performed by: FAMILY MEDICINE

## 2022-02-04 ENCOUNTER — PATIENT MESSAGE (OUTPATIENT)
Dept: PRIMARY CARE CLINIC | Facility: CLINIC | Age: 52
End: 2022-02-04
Payer: COMMERCIAL

## 2022-02-07 ENCOUNTER — PATIENT MESSAGE (OUTPATIENT)
Dept: OBSTETRICS AND GYNECOLOGY | Facility: CLINIC | Age: 52
End: 2022-02-07
Payer: COMMERCIAL

## 2022-02-07 DIAGNOSIS — Z30.40 ENCOUNTER FOR REFILL OF PRESCRIPTION FOR CONTRACEPTION: ICD-10-CM

## 2022-02-09 ENCOUNTER — OFFICE VISIT (OUTPATIENT)
Dept: PSYCHIATRY | Facility: CLINIC | Age: 52
End: 2022-02-09
Payer: COMMERCIAL

## 2022-02-09 DIAGNOSIS — F43.22 ADJUSTMENT DISORDER WITH ANXIETY: Primary | ICD-10-CM

## 2022-02-09 PROCEDURE — 90834 PR PSYCHOTHERAPY W/PATIENT, 45 MIN: ICD-10-PCS | Mod: 95,,, | Performed by: SOCIAL WORKER

## 2022-02-09 PROCEDURE — 3044F PR MOST RECENT HEMOGLOBIN A1C LEVEL <7.0%: ICD-10-PCS | Mod: CPTII,95,, | Performed by: SOCIAL WORKER

## 2022-02-09 PROCEDURE — 90834 PSYTX W PT 45 MINUTES: CPT | Mod: 95,,, | Performed by: SOCIAL WORKER

## 2022-02-09 PROCEDURE — 3044F HG A1C LEVEL LT 7.0%: CPT | Mod: CPTII,95,, | Performed by: SOCIAL WORKER

## 2022-02-09 RX ORDER — DESOGESTREL AND ETHINYL ESTRADIOL 0.15-0.03
1 KIT ORAL DAILY
Qty: 28 TABLET | Refills: 12 | Status: SHIPPED | OUTPATIENT
Start: 2022-02-09 | End: 2022-04-19

## 2022-02-09 NOTE — PROGRESS NOTES
"    Individual Psychotherapy (PhD/LCSW)    2/9/2022    Site:  [facility]    Therapeutic Intervention: Met with patient.  Outpatient - Insight oriented psychotherapy 45 min - CPT code 93764    Chief complaint/reason for encounter: anxiety     Interval history and content of current session: Pt doing well. She called Niya and they had a nice conversation about E's classes. She asked to bring Linda a birthday gift and M agreed to meet her outside M's apartment, hugged pt warmly, was enthusiastic about the gift. M had requested that she bring the dog, and pt offered to do this after work, but M declined, saying she had plans.  Pt enjoyed girls' weekend with friends, but noted strong anxiety afterward fearing she had "lost control" at some point. Look at hx growing up with mom correcting pt's behavior and worrying about appearances, then hx with Elbert correcting and critique-ing her behavior, leaving her feeling incapable or inappropriate. Pt now receiving validation from friends when she relaxes and has fun, and pt is aware her anxiety is not reality-based. Suggest that as pt continues to relax with friends she is building confidence that she can be loved as her authentic self..     Treatment plan:  · Target symptoms: anxiety   · Why chosen therapy is appropriate versus another modality: relevant to diagnosis  · Outcome monitoring methods: self-report  · Therapeutic intervention type: insight oriented psychotherapy    Risk parameters:  Patient reports no suicidal ideation  Patient reports no homicidal ideation  Patient reports no self-injurious behavior  Patient reports no violent behavior    Verbal deficits: None    Patient's response to intervention:  The patient's response to intervention is motivated.    Progress toward goals and other mental status changes:  The patient's progress toward goals is good    Diagnosis:   Adjustment disorder with anxiety    Plan:  individual psychotherapy    Return to clinic: pt will " schedule further appointments    Length of Service (minutes): 45

## 2022-02-10 ENCOUNTER — PATIENT MESSAGE (OUTPATIENT)
Dept: OBSTETRICS AND GYNECOLOGY | Facility: CLINIC | Age: 52
End: 2022-02-10
Payer: COMMERCIAL

## 2022-02-12 DIAGNOSIS — F41.1 GAD (GENERALIZED ANXIETY DISORDER): ICD-10-CM

## 2022-02-12 DIAGNOSIS — Z72.820 POOR SLEEP: ICD-10-CM

## 2022-02-14 ENCOUNTER — HOSPITAL ENCOUNTER (OUTPATIENT)
Dept: RADIOLOGY | Facility: HOSPITAL | Age: 52
Discharge: HOME | End: 2022-02-14
Attending: FAMILY MEDICINE
Payer: COMMERCIAL

## 2022-02-14 ENCOUNTER — PATIENT MESSAGE (OUTPATIENT)
Dept: RESEARCH | Facility: HOSPITAL | Age: 52
End: 2022-02-14
Payer: COMMERCIAL

## 2022-02-14 VITALS — WEIGHT: 125 LBS | BODY MASS INDEX: 21.34 KG/M2 | HEIGHT: 64 IN

## 2022-02-14 DIAGNOSIS — Z12.31 SCREENING MAMMOGRAM FOR BREAST CANCER: ICD-10-CM

## 2022-02-14 PROCEDURE — 77067 SCR MAMMO BI INCL CAD: CPT | Mod: TC

## 2022-02-14 PROCEDURE — 77063 BREAST TOMOSYNTHESIS BI: CPT | Mod: TC

## 2022-02-14 PROCEDURE — 77063 BREAST TOMOSYNTHESIS BI: CPT | Mod: 26,,, | Performed by: RADIOLOGY

## 2022-02-14 PROCEDURE — 77067 MAMMO DIGITAL SCREENING BILAT WITH TOMO: ICD-10-PCS | Mod: 26,,, | Performed by: RADIOLOGY

## 2022-02-14 PROCEDURE — 77067 SCR MAMMO BI INCL CAD: CPT | Mod: 26,,, | Performed by: RADIOLOGY

## 2022-02-14 PROCEDURE — 77063 MAMMO DIGITAL SCREENING BILAT WITH TOMO: ICD-10-PCS | Mod: 26,,, | Performed by: RADIOLOGY

## 2022-02-14 RX ORDER — HYDROXYZINE HYDROCHLORIDE 10 MG/1
TABLET, FILM COATED ORAL
Qty: 90 TABLET | Refills: 0 | Status: SHIPPED | OUTPATIENT
Start: 2022-02-14 | End: 2022-04-30

## 2022-02-15 ENCOUNTER — PATIENT MESSAGE (OUTPATIENT)
Dept: PRIMARY CARE CLINIC | Facility: CLINIC | Age: 52
End: 2022-02-15
Payer: COMMERCIAL

## 2022-02-16 ENCOUNTER — OFFICE VISIT (OUTPATIENT)
Dept: PSYCHIATRY | Facility: CLINIC | Age: 52
End: 2022-02-16
Payer: COMMERCIAL

## 2022-02-16 ENCOUNTER — PATIENT MESSAGE (OUTPATIENT)
Dept: PSYCHIATRY | Facility: CLINIC | Age: 52
End: 2022-02-16

## 2022-02-16 DIAGNOSIS — F43.22 ADJUSTMENT DISORDER WITH ANXIETY: Primary | ICD-10-CM

## 2022-02-16 PROCEDURE — 90834 PSYTX W PT 45 MINUTES: CPT | Mod: ,,, | Performed by: SOCIAL WORKER

## 2022-02-16 PROCEDURE — 3044F HG A1C LEVEL LT 7.0%: CPT | Mod: CPTII,,, | Performed by: SOCIAL WORKER

## 2022-02-16 PROCEDURE — 3044F PR MOST RECENT HEMOGLOBIN A1C LEVEL <7.0%: ICD-10-PCS | Mod: CPTII,,, | Performed by: SOCIAL WORKER

## 2022-02-16 PROCEDURE — 90834 PR PSYCHOTHERAPY W/PATIENT, 45 MIN: ICD-10-PCS | Mod: ,,, | Performed by: SOCIAL WORKER

## 2022-02-16 NOTE — PROGRESS NOTES
Individual Psychotherapy (PhD/LCSW)    2/16/2022    Site:  [facility]    Therapeutic Intervention: Met with patient.  Outpatient - Insight oriented psychotherapy 45 min - CPT code 63625    Chief complaint/reason for encounter: anxiety     Interval history and content of current session: Pt doing well. Reached out to the girls on Aguirre's Day, got brief text back at end of day. Main issue is sadness that her daughters are shutting her out of their lives, focusing on missing their dog but refusing to see Yan at her home. Pt enjoying friends and family.     Treatment plan:  · Target symptoms: anxiety   · Why chosen therapy is appropriate versus another modality: relevant to diagnosis  · Outcome monitoring methods: self-report  · Therapeutic intervention type: insight oriented psychotherapy    Risk parameters:  Patient reports no suicidal ideation  Patient reports no homicidal ideation  Patient reports no self-injurious behavior  Patient reports no violent behavior    Verbal deficits: None    Patient's response to intervention:  The patient's response to intervention is motivated.    Progress toward goals and other mental status changes:  The patient's progress toward goals is good    Diagnosis:   Adjustment disorder with anxiety    Plan:  individual psychotherapy    Return to clinic: pt will schedule further appointments    Length of Service (minutes): 45

## 2022-02-22 ENCOUNTER — PATIENT MESSAGE (OUTPATIENT)
Dept: RESEARCH | Facility: HOSPITAL | Age: 52
End: 2022-02-22
Payer: COMMERCIAL

## 2022-02-22 NOTE — PROGRESS NOTES
TITLE OF OPERATION:  1. Complex cystometry.  2. Complex uroflowmetry.  3. Electromyography with surface electrodes.  4. Pressure voiding flow study.  5. Abdominal pressure measurement.  6. Leak point pressure measurement.    INDICATIONS:  1)  UI:  (+) JUMANA = (+) UUI, worse with stress and urge is tampered by preemptively going to the bathroom.  X 5years.  (+) pads:2/day, usually moderate to severe wetness wetness, worse after activity and walking and 1/night usually minimum wetness.  Daytime frequency: Q 2 hours.  Nocturia: Yes: 2/night.   (--) dysuria,  (--) hematuria,  (--) frequent UTIs.  (--) complete bladder emptying, frequently having double voids, leakage with standing after voiding, voiding increased with leaning forward on toilet.     2)  POP:  Absent.  (--) vaginal bleeding. (--) vaginal discharge. (--) sexually active.  (-) dyspareunia (--)  Vaginal dryness.  (--) vaginal estrogen use.   --POP-Q:  Aa -1; Ba -1; C -6; Ap -2; Bp -2; D -8.  Genital hiatus 3, perineal body 2, total vaginal length 10-11.    3)  BM:  (--) constipation/straining.  (--) chronic diarrhea. (+) h/o hematochezia, followed with normal colonoscopy in 2020. None currently.  (--) fecal incontinence.  (--) fecal smearing/urgency.  (+) complete evacuation.    PREOPERATIVE DIAGNOSIS:  1)  Mixed urinary incontinence  2)  Cystocele    POSTOPERATIVE DIAGNOSIS:  1)  Mixed urinary incontinence  2)  Cystocele  3)  Concern for voiding dysfunction (Valsalva assist)  4)  Detrusor instability    ANESTHESIA:  None.    SPECIMEN (BACTERIOLOGICAL, PATHOLOGICAL OR OTHER):  None.    PROSTHETIC DEVICE/IMPLANT:  None.    SURGEONS NARRATIVE:  A time out was performed in which the patient identity and procedure were confirmed.  Urodynamic evaluation was performed using a computerized system ([employer], [employer]  Uroflowmetry was performed on the patient in the sitting position without catheters in place.  Subsequent urodynamic testing was performed  with the patient in the lithotomy position at 45 degrees. Air charged catheters were used with sterile water as the infusion medium. Vesical and abdominal (rectal) pressures were measured, and detrusor pressure was calculated. EMG activity was recorded with surface electrodes. During filling, room temperature sterile water was infused at a rate of 30 cubic centimeters per minute. The patient was asked cough after instillation of each 100cc volume. Two Valsalva leak point pressures and two cough leak point pressures were performed with the catheters in place at 300 cubic centimeters and again at maximum capacity. Valsalva leak point pressure was defined as the difference between vesical pressure at which leakage was noted (visualized at the external urethral meatus) and the baseline vesical pressure. Following urodynamic testing, a pressure flow study was performed with the patient in the sitting position. Vesical and abdominal pressures were monitored and detrusor pressures were calculated. After the pressure flow study, the catheters were then removed. The patient tolerated the procedure well.     Urine dipstick: neg.    1.  VOIDING PHASE:      a.  Uroflowmetry:   Prolapse reduction: No   Voided volume:  Negative recording mL    Voiding time:   7 seconds   Max flow:  68 mL/s   Avg flow:   Negative recording mL/s    PVR:   10 mL    The overall configuration of this uroflow study was with insufficient volume for interpretation.      b.  Pressure flow:   Prolapse reduction: No   Voided volume:   369 mL   Voiding time:   42 seconds   Peak flow:  23 mL/s    Avg flow:  10 mL/s   Max det pressure:  23  cm H20   Det pressure at max flow: 12 cm H20   Void initiated by detrusor contraction, followed by valsalva.     Urethral relaxation (EMG):  absent.     PVR (calculated):  0 mL    The overall configuration of this pressure flow study was prolonged with concern for voiding dysfunction (Valsalva assist).       2.  FILLING PHASE:   1st desire: 48 mL   Normal desire:  166 mL   Strong desire:  253 mL   Urgency:  310 mL   Compliance (calculated)  approx 75 mL/cm H20   EMG activity during filling:  unchanged   Detrusor contractions observed: Yes, intermittently throughout fill. No leaks.     3.  URETHRAL FUNCTION/STORAGE PHASE:    a.  WITH prolapse reduction:   CLPP (150 mL): Positive  at  106 cm H20   VLPP (150 mL): Negative  at  77 cm H20    CLPP (300 mL): Positive  at  99 cm H20   VLPP (300 mL): Positive  at  78 cm H20    CLPP (MAX ):    Positive  at  107 cm H20   VLPP (MAX):     Positive  at  66 cm H20    These findings are consistent with Positive urodynamic stress incontinence.    Assessment:  UF with insufficient volume for interpretation.  PF with concern for voiding dysfunction (Valsalva assist).  Compliance normal.  Max capacity  369 mL.  DO (+).  JUMANA (+).      Plan:    1)  Mixed urinary incontinence, urge = stress:    --Empty bladder every 3 hours.  Empty well: wait a minute, lean forward on toilet.    --Avoid dietary irritants (see sheet).  Keep diary x 3-5 days to determine your irritants.  --KEGELS: do 10 in AM and 10 in PM, holding each x 10 seconds.  When you feel urge to go, STOP, KEGEL, and when urge has passed, then go to bathroom.  Consider PT in future.    --URGE: consider medication in future. Takes 2-4 weeks to see if will have effect.  For dry mouth: get sour, sugar free lozenge or gum.    --STRESS:  Pessary vs. Sling.               --UDS + JUMANA              --emptying ok     2)  Well-woman:  --please schedule mammogram for 2/11/2022 or after.      3)  Stage 1-2 cystocele:  --no bother  --uterus/posterior well-supported  --consider repair at time of any surgery: anterior     4) would like to proceed with surgery. Will call you to pick dates/set up OR.   -------------------------------------    Title of Operation:   Cystourethroscopy.     INDICATIONS:  As above    PREOPERATIVE  DIAGNOSIS  As above    POSTOPERATIVE DIAGNOSIS:   As above    Anesthesia:   2% Xylocaine gel.    Specimen (Bacteriological, Pathological or other):   None.     Prosthetic Device/Implant:   None.     Surgeons Narrative:     After informed consent was obtained, the patient was placed in the lithotomy position. The urethral meatus was prepped with Betadine and 10 cubic centimeters of 2% Xylocaine gel were introduced into the urethra. A flexible cystourethroscope was introduced into the bladder. The bladder was distended with approximately 300 cubic centimeters of sterile water. A systematic survey was performed in which the bladder was surveyed using multiple sequential passes in a clockwise fashion from the bladder dome to the bladder base to the urethrovesical junction. The trigone and ureteral orifices were observed. The scope was then flipped back on itself, and the urethrovesical junction was viewed. A vaginal examining finger was then placed with pressure suburethrally at the urethrovesical junction as the telescope was withdrawn in order to perform positive pressure urethroscopy.  Standard maneuvers of cough, squeeze and Valsalva were performed. The telescope was then completely withdrawn.     Findings: Urethroscopy:  Normal.  Cystoscopy:  Normal bladder mucosa, bilateral ureteral flow was noted.     Assessment: Essentially normal cystourethroscopy.     Plan: The patient will follow up with Dr. Babcock as scheduled.  See urodynamics note from 2/23/2022 for further plan details.

## 2022-02-22 NOTE — H&P (VIEW-ONLY)
TITLE OF OPERATION:  1. Complex cystometry.  2. Complex uroflowmetry.  3. Electromyography with surface electrodes.  4. Pressure voiding flow study.  5. Abdominal pressure measurement.  6. Leak point pressure measurement.    INDICATIONS:  1)  UI:  (+) JUMANA = (+) UUI, worse with stress and urge is tampered by preemptively going to the bathroom.  X 5years.  (+) pads:2/day, usually moderate to severe wetness wetness, worse after activity and walking and 1/night usually minimum wetness.  Daytime frequency: Q 2 hours.  Nocturia: Yes: 2/night.   (--) dysuria,  (--) hematuria,  (--) frequent UTIs.  (--) complete bladder emptying, frequently having double voids, leakage with standing after voiding, voiding increased with leaning forward on toilet.     2)  POP:  Absent.  (--) vaginal bleeding. (--) vaginal discharge. (--) sexually active.  (-) dyspareunia (--)  Vaginal dryness.  (--) vaginal estrogen use.   --POP-Q:  Aa -1; Ba -1; C -6; Ap -2; Bp -2; D -8.  Genital hiatus 3, perineal body 2, total vaginal length 10-11.    3)  BM:  (--) constipation/straining.  (--) chronic diarrhea. (+) h/o hematochezia, followed with normal colonoscopy in 2020. None currently.  (--) fecal incontinence.  (--) fecal smearing/urgency.  (+) complete evacuation.    PREOPERATIVE DIAGNOSIS:  1)  Mixed urinary incontinence  2)  Cystocele    POSTOPERATIVE DIAGNOSIS:  1)  Mixed urinary incontinence  2)  Cystocele  3)  Concern for voiding dysfunction (Valsalva assist)  4)  Detrusor instability    ANESTHESIA:  None.    SPECIMEN (BACTERIOLOGICAL, PATHOLOGICAL OR OTHER):  None.    PROSTHETIC DEVICE/IMPLANT:  None.    SURGEONS NARRATIVE:  A time out was performed in which the patient identity and procedure were confirmed.  Urodynamic evaluation was performed using a computerized system ([employer]  Uroflowmetry was performed on the patient in the sitting position without catheters in place.  Subsequent urodynamic testing was performed  with the patient in the lithotomy position at 45 degrees. Air charged catheters were used with sterile water as the infusion medium. Vesical and abdominal (rectal) pressures were measured, and detrusor pressure was calculated. EMG activity was recorded with surface electrodes. During filling, room temperature sterile water was infused at a rate of 30 cubic centimeters per minute. The patient was asked cough after instillation of each 100cc volume. Two Valsalva leak point pressures and two cough leak point pressures were performed with the catheters in place at 300 cubic centimeters and again at maximum capacity. Valsalva leak point pressure was defined as the difference between vesical pressure at which leakage was noted (visualized at the external urethral meatus) and the baseline vesical pressure. Following urodynamic testing, a pressure flow study was performed with the patient in the sitting position. Vesical and abdominal pressures were monitored and detrusor pressures were calculated. After the pressure flow study, the catheters were then removed. The patient tolerated the procedure well.     Urine dipstick: neg.    1.  VOIDING PHASE:      a.  Uroflowmetry:   Prolapse reduction: No   Voided volume:  Negative recording mL    Voiding time:   7 seconds   Max flow:  68 mL/s   Avg flow:   Negative recording mL/s    PVR:   10 mL    The overall configuration of this uroflow study was with insufficient volume for interpretation.      b.  Pressure flow:   Prolapse reduction: No   Voided volume:   369 mL   Voiding time:   42 seconds   Peak flow:  23 mL/s    Avg flow:  10 mL/s   Max det pressure:  23  cm H20   Det pressure at max flow: 12 cm H20   Void initiated by detrusor contraction, followed by valsalva.     Urethral relaxation (EMG):  absent.     PVR (calculated):  0 mL    The overall configuration of this pressure flow study was prolonged with concern for voiding dysfunction (Valsalva assist).       2.  FILLING PHASE:   1st desire: 48 mL   Normal desire:  166 mL   Strong desire:  253 mL   Urgency:  310 mL   Compliance (calculated)  approx 75 mL/cm H20   EMG activity during filling:  unchanged   Detrusor contractions observed: Yes, intermittently throughout fill. No leaks.     3.  URETHRAL FUNCTION/STORAGE PHASE:    a.  WITH prolapse reduction:   CLPP (150 mL): Positive  at  106 cm H20   VLPP (150 mL): Negative  at  77 cm H20    CLPP (300 mL): Positive  at  99 cm H20   VLPP (300 mL): Positive  at  78 cm H20    CLPP (MAX ):    Positive  at  107 cm H20   VLPP (MAX):     Positive  at  66 cm H20    These findings are consistent with Positive urodynamic stress incontinence.    Assessment:  UF with insufficient volume for interpretation.  PF with concern for voiding dysfunction (Valsalva assist).  Compliance normal.  Max capacity  369 mL.  DO (+).  JUMANA (+).      Plan:    1)  Mixed urinary incontinence, urge = stress:    --Empty bladder every 3 hours.  Empty well: wait a minute, lean forward on toilet.    --Avoid dietary irritants (see sheet).  Keep diary x 3-5 days to determine your irritants.  --KEGELS: do 10 in AM and 10 in PM, holding each x 10 seconds.  When you feel urge to go, STOP, KEGEL, and when urge has passed, then go to bathroom.  Consider PT in future.    --URGE: consider medication in future. Takes 2-4 weeks to see if will have effect.  For dry mouth: get sour, sugar free lozenge or gum.    --STRESS:  Pessary vs. Sling.               --UDS + JUMANA              --emptying ok     2)  Well-woman:  --please schedule mammogram for 2/11/2022 or after.      3)  Stage 1-2 cystocele:  --no bother  --uterus/posterior well-supported  --consider repair at time of any surgery: anterior     4) would like to proceed with surgery. Will call you to pick dates/set up OR.   -------------------------------------    Title of Operation:   Cystourethroscopy.     INDICATIONS:  As above    PREOPERATIVE  DIAGNOSIS  As above    POSTOPERATIVE DIAGNOSIS:   As above    Anesthesia:   2% Xylocaine gel.    Specimen (Bacteriological, Pathological or other):   None.     Prosthetic Device/Implant:   None.     Surgeons Narrative:     After informed consent was obtained, the patient was placed in the lithotomy position. The urethral meatus was prepped with Betadine and 10 cubic centimeters of 2% Xylocaine gel were introduced into the urethra. A flexible cystourethroscope was introduced into the bladder. The bladder was distended with approximately 300 cubic centimeters of sterile water. A systematic survey was performed in which the bladder was surveyed using multiple sequential passes in a clockwise fashion from the bladder dome to the bladder base to the urethrovesical junction. The trigone and ureteral orifices were observed. The scope was then flipped back on itself, and the urethrovesical junction was viewed. A vaginal examining finger was then placed with pressure suburethrally at the urethrovesical junction as the telescope was withdrawn in order to perform positive pressure urethroscopy.  Standard maneuvers of cough, squeeze and Valsalva were performed. The telescope was then completely withdrawn.     Findings: Urethroscopy:  Normal.  Cystoscopy:  Normal bladder mucosa, bilateral ureteral flow was noted.     Assessment: Essentially normal cystourethroscopy.     Plan: The patient will follow up with Dr. Babcock as scheduled.  See urodynamics note from 2/23/2022 for further plan details.

## 2022-02-23 ENCOUNTER — PROCEDURE VISIT (OUTPATIENT)
Dept: UROGYNECOLOGY | Facility: CLINIC | Age: 52
End: 2022-02-23
Payer: COMMERCIAL

## 2022-02-23 VITALS
WEIGHT: 134.5 LBS | DIASTOLIC BLOOD PRESSURE: 64 MMHG | SYSTOLIC BLOOD PRESSURE: 108 MMHG | HEIGHT: 64 IN | BODY MASS INDEX: 22.96 KG/M2

## 2022-02-23 DIAGNOSIS — N39.3 SUI (STRESS URINARY INCONTINENCE, FEMALE): ICD-10-CM

## 2022-02-23 DIAGNOSIS — D84.9 IMMUNOSUPPRESSED STATUS: ICD-10-CM

## 2022-02-23 LAB
BILIRUB SERPL-MCNC: NORMAL MG/DL
BLOOD URINE, POC: NORMAL
CLARITY, POC UA: CLEAR
COLOR, POC UA: NORMAL
GLUCOSE UR QL STRIP: NORMAL
KETONES UR QL STRIP: NORMAL
LEUKOCYTE ESTERASE URINE, POC: NORMAL
NITRITE, POC UA: NORMAL
PH, POC UA: 5
PROTEIN, POC: NORMAL
SPECIFIC GRAVITY, POC UA: 1.01
UROBILINOGEN, POC UA: NORMAL

## 2022-02-23 PROCEDURE — 51741 ELECTRO-UROFLOWMETRY FIRST: CPT | Mod: 51,S$GLB,, | Performed by: OBSTETRICS & GYNECOLOGY

## 2022-02-23 PROCEDURE — 52000 CYSTOURETHROSCOPY: CPT | Mod: 59,S$GLB,, | Performed by: OBSTETRICS & GYNECOLOGY

## 2022-02-23 PROCEDURE — 51784 ANAL/URINARY MUSCLE STUDY: CPT | Mod: 51,S$GLB,, | Performed by: OBSTETRICS & GYNECOLOGY

## 2022-02-23 PROCEDURE — 51784 PR ANAL/URINARY MUSCLE STUDY: ICD-10-PCS | Mod: 51,S$GLB,, | Performed by: OBSTETRICS & GYNECOLOGY

## 2022-02-23 PROCEDURE — 81002 URINALYSIS NONAUTO W/O SCOPE: CPT | Mod: S$GLB,,, | Performed by: OBSTETRICS & GYNECOLOGY

## 2022-02-23 PROCEDURE — 51741 PR UROFLOWMETRY, COMPLEX: ICD-10-PCS | Mod: 51,S$GLB,, | Performed by: OBSTETRICS & GYNECOLOGY

## 2022-02-23 PROCEDURE — 51728 PR COMPLEX CYSTOMETROGRAM VOIDING PRESSURE STUDIES: ICD-10-PCS | Mod: S$GLB,,, | Performed by: OBSTETRICS & GYNECOLOGY

## 2022-02-23 PROCEDURE — 52000 PR CYSTOURETHROSCOPY: ICD-10-PCS | Mod: 59,S$GLB,, | Performed by: OBSTETRICS & GYNECOLOGY

## 2022-02-23 PROCEDURE — 51797 PR VOIDING PRESS STUDY INTRA-ABDOMINAL VOID: ICD-10-PCS | Mod: S$GLB,,, | Performed by: OBSTETRICS & GYNECOLOGY

## 2022-02-23 PROCEDURE — 51728 CYSTOMETROGRAM W/VP: CPT | Mod: S$GLB,,, | Performed by: OBSTETRICS & GYNECOLOGY

## 2022-02-23 PROCEDURE — 51797 INTRAABDOMINAL PRESSURE TEST: CPT | Mod: S$GLB,,, | Performed by: OBSTETRICS & GYNECOLOGY

## 2022-02-23 PROCEDURE — 81002 POCT URINE DIPSTICK WITHOUT MICROSCOPE: ICD-10-PCS | Mod: S$GLB,,, | Performed by: OBSTETRICS & GYNECOLOGY

## 2022-02-23 RX ORDER — LIDOCAINE HYDROCHLORIDE 20 MG/ML
JELLY TOPICAL ONCE
Status: COMPLETED | OUTPATIENT
Start: 2022-02-23 | End: 2022-02-23

## 2022-02-23 RX ORDER — SULFAMETHOXAZOLE AND TRIMETHOPRIM 800; 160 MG/1; MG/1
1 TABLET ORAL
Status: COMPLETED | OUTPATIENT
Start: 2022-02-23 | End: 2022-02-23

## 2022-02-23 RX ADMIN — SULFAMETHOXAZOLE AND TRIMETHOPRIM 1 TABLET: 800; 160 TABLET ORAL at 09:02

## 2022-02-23 RX ADMIN — LIDOCAINE HYDROCHLORIDE 5 ML: 20 JELLY TOPICAL at 09:02

## 2022-02-28 ENCOUNTER — TELEPHONE (OUTPATIENT)
Dept: UROGYNECOLOGY | Facility: CLINIC | Age: 52
End: 2022-02-28
Payer: COMMERCIAL

## 2022-02-28 DIAGNOSIS — Z01.818 PREOP TESTING: Primary | ICD-10-CM

## 2022-02-28 DIAGNOSIS — N81.11 CYSTOCELE, MIDLINE: ICD-10-CM

## 2022-02-28 DIAGNOSIS — N39.46 MIXED STRESS AND URGE URINARY INCONTINENCE: Primary | ICD-10-CM

## 2022-02-28 NOTE — TELEPHONE ENCOUNTER
----- Message from Mustapha Gomez sent at 2/28/2022  9:53 AM CST -----  Please call pt regarding her surgery [phone]

## 2022-03-02 ENCOUNTER — OFFICE VISIT (OUTPATIENT)
Dept: PSYCHIATRY | Facility: CLINIC | Age: 52
End: 2022-03-02
Payer: COMMERCIAL

## 2022-03-02 DIAGNOSIS — F43.22 ADJUSTMENT DISORDER WITH ANXIETY: Primary | ICD-10-CM

## 2022-03-02 PROCEDURE — 3044F PR MOST RECENT HEMOGLOBIN A1C LEVEL <7.0%: ICD-10-PCS | Mod: CPTII,,, | Performed by: SOCIAL WORKER

## 2022-03-02 PROCEDURE — 90834 PR PSYCHOTHERAPY W/PATIENT, 45 MIN: ICD-10-PCS | Mod: ,,, | Performed by: SOCIAL WORKER

## 2022-03-02 PROCEDURE — 3044F HG A1C LEVEL LT 7.0%: CPT | Mod: CPTII,,, | Performed by: SOCIAL WORKER

## 2022-03-02 PROCEDURE — 90834 PSYTX W PT 45 MINUTES: CPT | Mod: ,,, | Performed by: SOCIAL WORKER

## 2022-03-02 NOTE — PROGRESS NOTES
Individual Psychotherapy (PhD/LCSW)    3/2/2022    Site:  [facility]    Therapeutic Intervention: Met with patient.  Outpatient - Insight oriented psychotherapy 45 min - CPT code 19189    Chief complaint/reason for encounter: anxiety     Interval history and content of current session: Pt felt great support from friends through [facility]. However she is feeling deep pain at her daughters' distancing from her, Pt spoke with Linda and asked to discuss her grievances with pt and was met with demand for apology for not behaving as a mother should, based on not letting  admit the girls when they knew pt was not home.  Pt misses her daughters and feels stuck in finding a way to resume normal relationships with them. Discuss drafting a letter to the girls to reach out and to clarify misconceptions they may hold.     Treatment plan:  · Target symptoms: anxiety   · Why chosen therapy is appropriate versus another modality: relevant to diagnosis  · Outcome monitoring methods: self-report  · Therapeutic intervention type: insight oriented psychotherapy    Risk parameters:  Patient reports no suicidal ideation  Patient reports no homicidal ideation  Patient reports no self-injurious behavior  Patient reports no violent behavior    Verbal deficits: None    Patient's response to intervention:  The patient's response to intervention is motivated.    Progress toward goals and other mental status changes:  The patient's progress toward goals is mixed    Diagnosis:   Adjustment disorder with anxiety    Plan:  individual psychotherapy    Return to clinic: pt will schedule further appointments    Length of Service (minutes): 45

## 2022-03-09 ENCOUNTER — OFFICE VISIT (OUTPATIENT)
Dept: PSYCHIATRY | Facility: CLINIC | Age: 52
End: 2022-03-09
Payer: COMMERCIAL

## 2022-03-09 DIAGNOSIS — F43.22 ADJUSTMENT DISORDER WITH ANXIETY: Primary | ICD-10-CM

## 2022-03-09 PROCEDURE — 3044F PR MOST RECENT HEMOGLOBIN A1C LEVEL <7.0%: ICD-10-PCS | Mod: CPTII,S$GLB,, | Performed by: SOCIAL WORKER

## 2022-03-09 PROCEDURE — 99999 PR PBB SHADOW E&M-EST. PATIENT-LVL I: CPT | Mod: PBBFAC,,, | Performed by: SOCIAL WORKER

## 2022-03-09 PROCEDURE — 3044F HG A1C LEVEL LT 7.0%: CPT | Mod: CPTII,S$GLB,, | Performed by: SOCIAL WORKER

## 2022-03-09 PROCEDURE — 99999 PR PBB SHADOW E&M-EST. PATIENT-LVL I: ICD-10-PCS | Mod: PBBFAC,,, | Performed by: SOCIAL WORKER

## 2022-03-09 PROCEDURE — 90834 PR PSYCHOTHERAPY W/PATIENT, 45 MIN: ICD-10-PCS | Mod: S$GLB,,, | Performed by: SOCIAL WORKER

## 2022-03-09 PROCEDURE — 90834 PSYTX W PT 45 MINUTES: CPT | Mod: S$GLB,,, | Performed by: SOCIAL WORKER
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
## 2022-03-10 ENCOUNTER — TELEPHONE (OUTPATIENT)
Dept: UROGYNECOLOGY | Facility: CLINIC | Age: 52
End: 2022-03-10
Payer: COMMERCIAL

## 2022-03-10 NOTE — TELEPHONE ENCOUNTER
----- Message from Minerva Winter sent at 3/10/2022  2:39 PM CST -----  Patient returned Shaylee Cervantes's call.  She would like a call back at [phone]

## 2022-03-10 NOTE — TELEPHONE ENCOUNTER
Returned pt call no answer, Left voice message for pt to give the office a call back at [phone].

## 2022-03-10 NOTE — TELEPHONE ENCOUNTER
----- Message from Scout Monroy MA sent at 3/10/2022 11:03 AM CST -----  Regarding: Surgery Date    ----- Message -----  From: Radha Blanco  Sent: 3/10/2022  10:56 AM CST  To: [facility]    Name of Who is Calling: RAFA ALEXANDER          What is the request in detail: The patient is calling to speak to the nurse in regards to moving her surgery date up. Please advise          Can the clinic reply by [facility]: Yes         What [facility]: [phone]

## 2022-03-10 NOTE — TELEPHONE ENCOUNTER
----- Message from Radha Blanco sent at 3/10/2022  1:20 PM CST -----   Type:  Patient Returning Call    Who Called:RAFA ALEXANDER    Who Left Message for Patient:Shaylee Cervantes NP     Does the patient know what this is regarding?:    Best Call Back Number:[phone]    Additional Information:

## 2022-03-16 ENCOUNTER — OFFICE VISIT (OUTPATIENT)
Dept: PSYCHIATRY | Facility: CLINIC | Age: 52
End: 2022-03-16
Payer: COMMERCIAL

## 2022-03-16 ENCOUNTER — OFFICE VISIT (OUTPATIENT)
Dept: UROGYNECOLOGY | Facility: CLINIC | Age: 52
End: 2022-03-16
Payer: COMMERCIAL

## 2022-03-16 VITALS
BODY MASS INDEX: 23.34 KG/M2 | WEIGHT: 136.69 LBS | SYSTOLIC BLOOD PRESSURE: 104 MMHG | DIASTOLIC BLOOD PRESSURE: 70 MMHG | HEIGHT: 64 IN

## 2022-03-16 DIAGNOSIS — N39.46 MIXED STRESS AND URGE URINARY INCONTINENCE: ICD-10-CM

## 2022-03-16 DIAGNOSIS — N81.11 CYSTOCELE, MIDLINE: ICD-10-CM

## 2022-03-16 DIAGNOSIS — F43.22 ADJUSTMENT DISORDER WITH ANXIETY: Primary | ICD-10-CM

## 2022-03-16 DIAGNOSIS — Z01.818 PREOP TESTING: Primary | ICD-10-CM

## 2022-03-16 PROCEDURE — 90834 PSYTX W PT 45 MINUTES: CPT | Mod: S$GLB,,, | Performed by: SOCIAL WORKER

## 2022-03-16 PROCEDURE — 99499 UNLISTED E&M SERVICE: CPT | Mod: S$GLB,,, | Performed by: NURSE PRACTITIONER

## 2022-03-16 PROCEDURE — 3074F SYST BP LT 130 MM HG: CPT | Mod: CPTII,S$GLB,, | Performed by: NURSE PRACTITIONER

## 2022-03-16 PROCEDURE — 3044F PR MOST RECENT HEMOGLOBIN A1C LEVEL <7.0%: ICD-10-PCS | Mod: CPTII,S$GLB,, | Performed by: NURSE PRACTITIONER

## 2022-03-16 PROCEDURE — 3044F HG A1C LEVEL LT 7.0%: CPT | Mod: CPTII,S$GLB,, | Performed by: SOCIAL WORKER

## 2022-03-16 PROCEDURE — 3008F BODY MASS INDEX DOCD: CPT | Mod: CPTII,S$GLB,, | Performed by: NURSE PRACTITIONER

## 2022-03-16 PROCEDURE — 99499 NO LOS: ICD-10-PCS | Mod: S$GLB,,, | Performed by: NURSE PRACTITIONER

## 2022-03-16 PROCEDURE — 3078F DIAST BP <80 MM HG: CPT | Mod: CPTII,S$GLB,, | Performed by: NURSE PRACTITIONER

## 2022-03-16 PROCEDURE — 3074F PR MOST RECENT SYSTOLIC BLOOD PRESSURE < 130 MM HG: ICD-10-PCS | Mod: CPTII,S$GLB,, | Performed by: NURSE PRACTITIONER

## 2022-03-16 PROCEDURE — 3044F HG A1C LEVEL LT 7.0%: CPT | Mod: CPTII,S$GLB,, | Performed by: NURSE PRACTITIONER

## 2022-03-16 PROCEDURE — 99999 PR PBB SHADOW E&M-EST. PATIENT-LVL I: CPT | Mod: PBBFAC,,, | Performed by: SOCIAL WORKER

## 2022-03-16 PROCEDURE — 3078F PR MOST RECENT DIASTOLIC BLOOD PRESSURE < 80 MM HG: ICD-10-PCS | Mod: CPTII,S$GLB,, | Performed by: NURSE PRACTITIONER

## 2022-03-16 PROCEDURE — 1159F PR MEDICATION LIST DOCUMENTED IN MEDICAL RECORD: ICD-10-PCS | Mod: CPTII,S$GLB,, | Performed by: NURSE PRACTITIONER

## 2022-03-16 PROCEDURE — 99999 PR PBB SHADOW E&M-EST. PATIENT-LVL III: CPT | Mod: PBBFAC,,, | Performed by: NURSE PRACTITIONER

## 2022-03-16 PROCEDURE — 1160F PR REVIEW ALL MEDS BY PRESCRIBER/CLIN PHARMACIST DOCUMENTED: ICD-10-PCS | Mod: CPTII,S$GLB,, | Performed by: NURSE PRACTITIONER

## 2022-03-16 PROCEDURE — 99999 PR PBB SHADOW E&M-EST. PATIENT-LVL I: ICD-10-PCS | Mod: PBBFAC,,, | Performed by: SOCIAL WORKER

## 2022-03-16 PROCEDURE — 3044F PR MOST RECENT HEMOGLOBIN A1C LEVEL <7.0%: ICD-10-PCS | Mod: CPTII,S$GLB,, | Performed by: SOCIAL WORKER

## 2022-03-16 PROCEDURE — 90834 PR PSYCHOTHERAPY W/PATIENT, 45 MIN: ICD-10-PCS | Mod: S$GLB,,, | Performed by: SOCIAL WORKER

## 2022-03-16 PROCEDURE — 1160F RVW MEDS BY RX/DR IN RCRD: CPT | Mod: CPTII,S$GLB,, | Performed by: NURSE PRACTITIONER

## 2022-03-16 PROCEDURE — 99999 PR PBB SHADOW E&M-EST. PATIENT-LVL III: ICD-10-PCS | Mod: PBBFAC,,, | Performed by: NURSE PRACTITIONER

## 2022-03-16 PROCEDURE — 3008F PR BODY MASS INDEX (BMI) DOCUMENTED: ICD-10-PCS | Mod: CPTII,S$GLB,, | Performed by: NURSE PRACTITIONER

## 2022-03-16 PROCEDURE — 1159F MED LIST DOCD IN RCRD: CPT | Mod: CPTII,S$GLB,, | Performed by: NURSE PRACTITIONER

## 2022-03-16 NOTE — PROGRESS NOTES
Urogyn follow up  03/16/2022  .  [facility] - UROGYNECOLOGY  [address] [address]    Becky Ospina  [id number]  1970      Becky Ospina is a 51 y.o.  here for a urogyn preop visit.    Last HPI from 01/20/2022  1)  UI:  (+) JUMANA = (+) UUI, worse with stress and urge is tampered by preemptively going to the bathroom.  X 5years.  (+) pads:2/day, usually moderate to severe wetness wetness, worse after activity and walking and 1/night usually minimum wetness.  Daytime frequency: Q 2 hours.  Nocturia: Yes: 2/night.   (--) dysuria,  (--) hematuria,  (--) frequent UTIs.  (--) complete bladder emptying, frequently having double voids, leakage with standing after voiding, voiding increased with leaning forward on toilet.     2)  POP:  Absent.  (--) vaginal bleeding. (--) vaginal discharge. (--) sexually active.  (-) dyspareunia (--)  Vaginal dryness.  (--) vaginal estrogen use.      3)  BM:  (--) constipation/straining.  (--) chronic diarrhea. (+) h/o hematochezia, followed with normal colonoscopy in 2020. None currently.  (--) fecal incontinence.  (--) fecal smearing/urgency.  (+) complete evacuation.         02/23/2022  Suds  Urine dipstick: neg.     1.  VOIDING PHASE:       a.  Uroflowmetry:  · Prolapse reduction: No  · Voided volume:  Negative recording mL   · Voiding time:   7 seconds  · Max flow:  68 mL/s  · Avg flow:   Negative recording mL/s   · PVR:   10 mL     The overall configuration of this uroflow study was with insufficient volume for interpretation.       b.  Pressure flow:  · Prolapse reduction: No  · Voided volume:   369 mL  · Voiding time:   42 seconds  · Peak flow:  23 mL/s   · Avg flow:  10 mL/s  · Max det pressure:  23  cm H20  · Det pressure at max flow: 12 cm H20  · Void initiated by detrusor contraction, followed by valsalva.    · Urethral relaxation (EMG):  absent.    · PVR (calculated):  0 mL     The overall configuration of this pressure flow study  was prolonged with concern for voiding dysfunction (Valsalva assist).       2.  FILLING PHASE:  · 1st desire: 48 mL  · Normal desire:  166 mL  · Strong desire:  253 mL  · Urgency:  310 mL  · Compliance (calculated)  approx 75 mL/cm H20  · EMG activity during filling:  unchanged  · Detrusor contractions observed: Yes, intermittently throughout fill. No leaks.      3.  URETHRAL FUNCTION/STORAGE PHASE:     a.  WITH prolapse reduction:  · CLPP (150 mL): Positive  at  106 cm H20  · VLPP (150 mL): Negative  at  77 cm H20   · CLPP (300 mL): Positive  at  99 cm H20  · VLPP (300 mL): Positive  at  78 cm H20   · CLPP (MAX ):    Positive  at  107 cm H20  · VLPP (MAX):     Positive  at  66 cm H20     These findings are consistent with Positive urodynamic stress incontinence.     Assessment:  UF with insufficient volume for interpretation.  PF with concern for voiding dysfunction (Valsalva assist).  Compliance normal.  Max capacity  369 mL.  DO (+).  JUMANA (+).       Cysto    Findings: Urethroscopy:  Normal.  Cystoscopy:  Normal bladder mucosa, bilateral ureteral flow was noted.     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       Past Surgical History:   Procedure Laterality Date    COLD KNIFE CONIZATION OF CERVIX  2009    [facility]    COLONOSCOPY N/A 9/18/2020    Procedure: COLONOSCOPY;  Surgeon: Thaddeus Blackburn MD;  Location: [address];  Service: Endoscopy;  Laterality: N/A;  family history malignant hyperthermia  covid test [facility] urgent care 9/15    TONSILLECTOMY  1993       Family History   Problem Relation Age of Onset    Rheum arthritis Mother     Arthritis Mother     Parkinsonism Father     Malignant  hyperthermia Brother     Breast cancer Neg Hx     Colon cancer Neg Hx     Ovarian cancer Neg Hx     Melanoma Neg Hx     Cancer Neg Hx     Heart disease Neg Hx        Social History     Socioeconomic History    Marital status: Legally    Tobacco Use    Smoking status: Never Smoker    Smokeless tobacco: Never Used   Substance and Sexual Activity    Alcohol use: Yes     Alcohol/week: 2.0 standard drinks     Types: 2 Glasses of wine per week     Comment: week    Drug use: No    Sexual activity: Yes     Partners: Male     Birth control/protection: Other-see comments     Comment: Apri Birth Control Pills       Current Outpatient Medications   Medication Sig Dispense Refill    acyclovir (ZOVIRAX) 400 MG tablet Take 1 tablet (400 mg total) by mouth 2 (two) times daily. Fever blisters 14 tablet 0    hydrOXYzine HCL (ATARAX) 10 MG Tab TAKE 1 TABLET BY MOUTH EVERY DAY NIGHTLY AS NEEDED 90 tablet 0    paroxetine (PAXIL) 20 MG tablet Take 1 tablet (20 mg total) by mouth every morning. Cancel the 10 mg 90 tablet 1    APRI 0.15-0.03 mg per tablet Take 1 tablet by mouth once daily. 28 tablet 12    levothyroxine (SYNTHROID) 75 MCG tablet TAKE 1 TABLET BY MOUTH EVERY DAY BEFORE BREAKFAST (Patient not taking: No sig reported) 90 tablet 3    LORazepam (ATIVAN) 0.5 MG tablet Take 1 tablet (0.5 mg total) by mouth nightly as needed for Anxiety. Use only infrequently, can be addictive (Patient not taking: Reported on 2/23/2022) 30 tablet 0    naproxen sodium (ANAPROX) 550 MG tablet Take 1 tablet (550 mg total) by mouth 2 (two) times daily with meals. (Patient not taking: No sig reported) 60 tablet 1    triamcinolone acetonide 0.1% (KENALOG) 0.1 % cream AAA bid (Patient not taking: No sig reported) 454 g 3    triamcinolone acetonide 0.1% (KENALOG) 0.1 % paste APPLY TO MOUTH SORES 2 TIMES A DAY (Patient not taking: No sig reported) 5 g 0     No current facility-administered medications for this visit.  "      Review of patient's allergies indicates:  No Known Allergies    Well woman:  Pap test: 7/2021, normal/HPV neg.  History of abnormal paps: Yes - s/o [facility] 2009.  History of STIs:  No  Mammogram: Date of last: 2/11/2021.  Result: Normal  Colonoscopy: Date of last: 2020.  Result:  normal.  Repeat due:  2030.    DEXA: none:    ROS:  As per HPI.      Exam  /70 (BP Location: Right arm, Patient Position: Sitting, BP Method: Medium (Manual))   Ht 5' 4" (1.626 m)   Wt 62 kg (136 lb 11 oz)   BMI 23.46 kg/m²   General: alert and oriented, no acute distress  Respiratory: normal respiratory effort  Abd: soft, non-tender, non-distended    Pelvic--deferred    Impression  1. Preop testing  CBC Auto Differential    Basic Metabolic Panel    EKG 12-lead   2. Mixed stress and urge urinary incontinence     3. Cystocele, midline       We reviewed the above issues and discussed options for short-term versus long-term management of her problems.   Plan:   1. Patient consented with Dr. Babcock for possible anterior/posterior repair with perineorrhaphy, placement of synthetic midurethral sling, and cystourethroscopy.   R/B/A reviewed. Specific risks reviewed include:  infection, bleeding, need for blood transfusion, damage to surrounding structures, anesthesia risks, death, heart attack, stroke, mesh erosion/extrusion, pain, dyspareunia, urinary retention, voiding dysfunction, urinary incontinence, exacerbation of urinary urge incontinence, and need for further surgeries.  We reviewed potential for failure of POP defect repair and need for future surgery, with no way of predicting risk.  She understands success rate of midurethral sling for JUMANA was reviewed as 80-85%, and she understands that this will not necessarily impact other types of urinary incontinence.  Alternatives reviewed include: pessary/PT for POP and pessary/periurethral injections/PT/medication for JUMANA.    2. T&S, urine HCG on DOS  3. Preoperative appointment with " PCP or cardiology: No  4. VTE Prophylaxis:  heparin 5000 u SQ TID (1st dose 2hrs preop) + SCDs  5. Patient instructed on Magnesium citrate and chlorahexadine/dial soap prep to perform day before & AM of surgery.   6. Proceed to OR for above-mentioned procedure.  7. Brother has history of malignant hyperthermia-- will notify anesthesia      30 minutes were spent in face to face time with this patient  90 % of this time was spent in counseling and/or coordination of care    RIKKI Guillory-BC [facility]  Division of Female Pelvic Medicine and Reconstructive Surgery  Department of Obstetrics & Gynecology

## 2022-03-16 NOTE — PROGRESS NOTES
"    Individual Psychotherapy (PhD/LCSW)    3/16/2022    Site:  [facility]    Therapeutic Intervention: Met with patient.  Outpatient - Insight oriented psychotherapy 45 min - CPT code 29870    Chief complaint/reason for encounter: anxiety     Interval history and content of current session: Pt had a "good week", has almost finished resume and is making appointments to talk with friends about jobs. Discuss pt's lack of experience taking charge, ie of finances, travel, and importance of experiences like driving to [place] to build her confidence. Pt's role in [place] and in marriage was to be patronized and dismissed as childlike or incapable. Pt beginning to emerge from this and sees she is treated with respect by peers.  Pt had good chat with Niya and broached idea of a visit.     Treatment plan:  · Target symptoms: anxiety   · Why chosen therapy is appropriate versus another modality: relevant to diagnosis  · Outcome monitoring methods: self-report  · Therapeutic intervention type: insight oriented psychotherapy    Risk parameters:  Patient reports no suicidal ideation  Patient reports no homicidal ideation  Patient reports no self-injurious behavior  Patient reports no violent behavior    Verbal deficits: None    Patient's response to intervention:  The patient's response to intervention is motivated.    Progress toward goals and other mental status changes:  The patient's progress toward goals is mixed    Diagnosis:   Adjustment disorder with anxiety    Plan:  individual psychotherapy    Return to clinic: pt will schedule further appointments    Length of Service (minutes): 45        "

## 2022-03-16 NOTE — H&P (VIEW-ONLY)
Urogyn follow up  03/16/2022  .  [facility] - UROGYNECOLOGY  [address] [address]    Becky Ospina  [id number]  1970      Becky Ospina is a 51 y.o.  here for a urogyn preop visit.    Last HPI from 01/20/2022  1)  UI:  (+) JUMANA = (+) UUI, worse with stress and urge is tampered by preemptively going to the bathroom.  X 5years.  (+) pads:2/day, usually moderate to severe wetness wetness, worse after activity and walking and 1/night usually minimum wetness.  Daytime frequency: Q 2 hours.  Nocturia: Yes: 2/night.   (--) dysuria,  (--) hematuria,  (--) frequent UTIs.  (--) complete bladder emptying, frequently having double voids, leakage with standing after voiding, voiding increased with leaning forward on toilet.     2)  POP:  Absent.  (--) vaginal bleeding. (--) vaginal discharge. (--) sexually active.  (-) dyspareunia (--)  Vaginal dryness.  (--) vaginal estrogen use.      3)  BM:  (--) constipation/straining.  (--) chronic diarrhea. (+) h/o hematochezia, followed with normal colonoscopy in 2020. None currently.  (--) fecal incontinence.  (--) fecal smearing/urgency.  (+) complete evacuation.         02/23/2022  Suds  Urine dipstick: neg.     1.  VOIDING PHASE:       a.  Uroflowmetry:  · Prolapse reduction: No  · Voided volume:  Negative recording mL   · Voiding time:   7 seconds  · Max flow:  68 mL/s  · Avg flow:   Negative recording mL/s   · PVR:   10 mL     The overall configuration of this uroflow study was with insufficient volume for interpretation.       b.  Pressure flow:  · Prolapse reduction: No  · Voided volume:   369 mL  · Voiding time:   42 seconds  · Peak flow:  23 mL/s   · Avg flow:  10 mL/s  · Max det pressure:  23  cm H20  · Det pressure at max flow: 12 cm H20  · Void initiated by detrusor contraction, followed by valsalva.    · Urethral relaxation (EMG):  absent.    · PVR (calculated):  0 mL     The overall configuration of this pressure flow study  was prolonged with concern for voiding dysfunction (Valsalva assist).       2.  FILLING PHASE:  · 1st desire: 48 mL  · Normal desire:  166 mL  · Strong desire:  253 mL  · Urgency:  310 mL  · Compliance (calculated)  approx 75 mL/cm H20  · EMG activity during filling:  unchanged  · Detrusor contractions observed: Yes, intermittently throughout fill. No leaks.      3.  URETHRAL FUNCTION/STORAGE PHASE:     a.  WITH prolapse reduction:  · CLPP (150 mL): Positive  at  106 cm H20  · VLPP (150 mL): Negative  at  77 cm H20   · CLPP (300 mL): Positive  at  99 cm H20  · VLPP (300 mL): Positive  at  78 cm H20   · CLPP (MAX ):    Positive  at  107 cm H20  · VLPP (MAX):     Positive  at  66 cm H20     These findings are consistent with Positive urodynamic stress incontinence.     Assessment:  UF with insufficient volume for interpretation.  PF with concern for voiding dysfunction (Valsalva assist).  Compliance normal.  Max capacity  369 mL.  DO (+).  JUMANA (+).       Cysto    Findings: Urethroscopy:  Normal.  Cystoscopy:  Normal bladder mucosa, bilateral ureteral flow was noted.     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       Past Surgical History:   Procedure Laterality Date    COLD KNIFE CONIZATION OF CERVIX  2009    [facility]    COLONOSCOPY N/A 9/18/2020    Procedure: COLONOSCOPY;  Surgeon: Thaddeus Blackburn MD;  Location: [address];  Service: Endoscopy;  Laterality: N/A;  family history malignant hyperthermia  covid test [facility] urgent care 9/15    TONSILLECTOMY  1993       Family History   Problem Relation Age of Onset    Rheum arthritis Mother     Arthritis Mother     Parkinsonism Father     Malignant  hyperthermia Brother     Breast cancer Neg Hx     Colon cancer Neg Hx     Ovarian cancer Neg Hx     Melanoma Neg Hx     Cancer Neg Hx     Heart disease Neg Hx        Social History     Socioeconomic History    Marital status: Legally    Tobacco Use    Smoking status: Never Smoker    Smokeless tobacco: Never Used   Substance and Sexual Activity    Alcohol use: Yes     Alcohol/week: 2.0 standard drinks     Types: 2 Glasses of wine per week     Comment: week    Drug use: No    Sexual activity: Yes     Partners: Male     Birth control/protection: Other-see comments     Comment: Apri Birth Control Pills       Current Outpatient Medications   Medication Sig Dispense Refill    acyclovir (ZOVIRAX) 400 MG tablet Take 1 tablet (400 mg total) by mouth 2 (two) times daily. Fever blisters 14 tablet 0    hydrOXYzine HCL (ATARAX) 10 MG Tab TAKE 1 TABLET BY MOUTH EVERY DAY NIGHTLY AS NEEDED 90 tablet 0    paroxetine (PAXIL) 20 MG tablet Take 1 tablet (20 mg total) by mouth every morning. Cancel the 10 mg 90 tablet 1    APRI 0.15-0.03 mg per tablet Take 1 tablet by mouth once daily. 28 tablet 12    levothyroxine (SYNTHROID) 75 MCG tablet TAKE 1 TABLET BY MOUTH EVERY DAY BEFORE BREAKFAST (Patient not taking: No sig reported) 90 tablet 3    LORazepam (ATIVAN) 0.5 MG tablet Take 1 tablet (0.5 mg total) by mouth nightly as needed for Anxiety. Use only infrequently, can be addictive (Patient not taking: Reported on 2/23/2022) 30 tablet 0    naproxen sodium (ANAPROX) 550 MG tablet Take 1 tablet (550 mg total) by mouth 2 (two) times daily with meals. (Patient not taking: No sig reported) 60 tablet 1    triamcinolone acetonide 0.1% (KENALOG) 0.1 % cream AAA bid (Patient not taking: No sig reported) 454 g 3    triamcinolone acetonide 0.1% (KENALOG) 0.1 % paste APPLY TO MOUTH SORES 2 TIMES A DAY (Patient not taking: No sig reported) 5 g 0     No current facility-administered medications for this visit.  "      Review of patient's allergies indicates:  No Known Allergies    Well woman:  Pap test: 7/2021, normal/HPV neg.  History of abnormal paps: Yes - s/o [facility] 2009.  History of STIs:  No  Mammogram: Date of last: 2/11/2021.  Result: Normal  Colonoscopy: Date of last: 2020.  Result:  normal.  Repeat due:  2030.    DEXA: none:    ROS:  As per HPI.      Exam  /70 (BP Location: Right arm, Patient Position: Sitting, BP Method: Medium (Manual))   Ht 5' 4" (1.626 m)   Wt 62 kg (136 lb 11 oz)   BMI 23.46 kg/m²   General: alert and oriented, no acute distress  Respiratory: normal respiratory effort  Abd: soft, non-tender, non-distended    Pelvic--deferred    Impression  1. Preop testing  CBC Auto Differential    Basic Metabolic Panel    EKG 12-lead   2. Mixed stress and urge urinary incontinence     3. Cystocele, midline       We reviewed the above issues and discussed options for short-term versus long-term management of her problems.   Plan:   1. Patient consented with Dr. Babcock for possible anterior/posterior repair with perineorrhaphy, placement of synthetic midurethral sling, and cystourethroscopy.   R/B/A reviewed. Specific risks reviewed include:  infection, bleeding, need for blood transfusion, damage to surrounding structures, anesthesia risks, death, heart attack, stroke, mesh erosion/extrusion, pain, dyspareunia, urinary retention, voiding dysfunction, urinary incontinence, exacerbation of urinary urge incontinence, and need for further surgeries.  We reviewed potential for failure of POP defect repair and need for future surgery, with no way of predicting risk.  She understands success rate of midurethral sling for JUMANA was reviewed as 80-85%, and she understands that this will not necessarily impact other types of urinary incontinence.  Alternatives reviewed include: pessary/PT for POP and pessary/periurethral injections/PT/medication for JUMANA.    2. T&S, urine HCG on DOS  3. Preoperative appointment with " PCP or cardiology: No  4. VTE Prophylaxis:  heparin 5000 u SQ TID (1st dose 2hrs preop) + SCDs  5. Patient instructed on Magnesium citrate and chlorahexadine/dial soap prep to perform day before & AM of surgery.   6. Proceed to OR for above-mentioned procedure.  7. Brother has history of malignant hyperthermia-- will notify anesthesia      30 minutes were spent in face to face time with this patient  90 % of this time was spent in counseling and/or coordination of care    RIKKI Guillory-BC [facility]  Division of Female Pelvic Medicine and Reconstructive Surgery  Department of Obstetrics & Gynecology

## 2022-03-17 ENCOUNTER — PATIENT MESSAGE (OUTPATIENT)
Dept: SURGERY | Facility: HOSPITAL | Age: 52
End: 2022-03-17
Payer: COMMERCIAL

## 2022-03-17 NOTE — TELEPHONE ENCOUNTER
I spoke with patient to clarify message. Patient is asking for a letter stating name, date and where surgery will be. Also, the provider who is performing the surgery as well as the recommended recovery time.     Scout MARMOLEJO MA

## 2022-03-18 ENCOUNTER — HOSPITAL ENCOUNTER (OUTPATIENT)
Dept: CARDIOLOGY | Facility: CLINIC | Age: 52
Discharge: HOME | End: 2022-03-18
Payer: COMMERCIAL

## 2022-03-18 ENCOUNTER — LAB VISIT (OUTPATIENT)
Dept: LAB | Facility: HOSPITAL | Age: 52
End: 2022-03-18
Payer: COMMERCIAL

## 2022-03-18 DIAGNOSIS — Z01.818 PREOP TESTING: ICD-10-CM

## 2022-03-18 LAB
ANION GAP SERPL CALC-SCNC: 8 MMOL/L (ref 8–16)
BASOPHILS # BLD AUTO: 0.08 K/UL (ref 0–0.2)
BASOPHILS NFR BLD: 1.2 % (ref 0–1.9)
BUN SERPL-MCNC: 20 MG/DL (ref 6–20)
CALCIUM SERPL-MCNC: 9 MG/DL (ref 8.7–10.5)
CHLORIDE SERPL-SCNC: 104 MMOL/L (ref 95–110)
CO2 SERPL-SCNC: 26 MMOL/L (ref 23–29)
CREAT SERPL-MCNC: 0.7 MG/DL (ref 0.5–1.4)
DIFFERENTIAL METHOD: ABNORMAL
EOSINOPHIL # BLD AUTO: 0.2 K/UL (ref 0–0.5)
EOSINOPHIL NFR BLD: 3.2 % (ref 0–8)
ERYTHROCYTE [DISTWIDTH] IN BLOOD BY AUTOMATED COUNT: 12 % (ref 11.5–14.5)
EST. GFR  (AFRICAN AMERICAN): >60 ML/MIN/1.73 M^2
EST. GFR  (NON AFRICAN AMERICAN): >60 ML/MIN/1.73 M^2
GLUCOSE SERPL-MCNC: 92 MG/DL (ref 70–110)
HCT VFR BLD AUTO: 38.1 % (ref 37–48.5)
HGB BLD-MCNC: 12.4 G/DL (ref 12–16)
IMM GRANULOCYTES # BLD AUTO: 0.02 K/UL (ref 0–0.04)
IMM GRANULOCYTES NFR BLD AUTO: 0.3 % (ref 0–0.5)
LYMPHOCYTES # BLD AUTO: 1.9 K/UL (ref 1–4.8)
LYMPHOCYTES NFR BLD: 29.6 % (ref 18–48)
MCH RBC QN AUTO: 32.3 PG (ref 27–31)
MCHC RBC AUTO-ENTMCNC: 32.5 G/DL (ref 32–36)
MCV RBC AUTO: 99 FL (ref 82–98)
MONOCYTES # BLD AUTO: 0.7 K/UL (ref 0.3–1)
MONOCYTES NFR BLD: 10 % (ref 4–15)
NEUTROPHILS # BLD AUTO: 3.6 K/UL (ref 1.8–7.7)
NEUTROPHILS NFR BLD: 55.7 % (ref 38–73)
NRBC BLD-RTO: 0 /100 WBC
PLATELET # BLD AUTO: 297 K/UL (ref 150–450)
PMV BLD AUTO: 9.3 FL (ref 9.2–12.9)
POTASSIUM SERPL-SCNC: 4.5 MMOL/L (ref 3.5–5.1)
RBC # BLD AUTO: 3.84 M/UL (ref 4–5.4)
SODIUM SERPL-SCNC: 138 MMOL/L (ref 136–145)
WBC # BLD AUTO: 6.49 K/UL (ref 3.9–12.7)

## 2022-03-18 PROCEDURE — 80048 BASIC METABOLIC PNL TOTAL CA: CPT | Performed by: NURSE PRACTITIONER

## 2022-03-18 PROCEDURE — 93005 EKG 12-LEAD: ICD-10-PCS | Mod: S$GLB,,, | Performed by: NURSE PRACTITIONER

## 2022-03-18 PROCEDURE — 36415 COLL VENOUS BLD VENIPUNCTURE: CPT | Performed by: NURSE PRACTITIONER

## 2022-03-18 PROCEDURE — 93005 ELECTROCARDIOGRAM TRACING: CPT | Mod: S$GLB,,, | Performed by: NURSE PRACTITIONER

## 2022-03-18 PROCEDURE — 93010 EKG 12-LEAD: ICD-10-PCS | Mod: S$GLB,,, | Performed by: INTERNAL MEDICINE

## 2022-03-18 PROCEDURE — 93010 ELECTROCARDIOGRAM REPORT: CPT | Mod: S$GLB,,, | Performed by: INTERNAL MEDICINE

## 2022-03-18 PROCEDURE — 85025 COMPLETE CBC W/AUTO DIFF WBC: CPT | Performed by: NURSE PRACTITIONER

## 2022-03-20 RX ORDER — CEFAZOLIN SODIUM/WATER 2 G/20 ML
2 SYRINGE (ML) INTRAVENOUS
Status: CANCELLED | OUTPATIENT
Start: 2022-03-20

## 2022-03-20 RX ORDER — MUPIROCIN 20 MG/G
OINTMENT TOPICAL
Status: CANCELLED | OUTPATIENT
Start: 2022-03-20

## 2022-03-22 ENCOUNTER — LAB VISIT (OUTPATIENT)
Dept: PRIMARY CARE CLINIC | Facility: CLINIC | Age: 52
End: 2022-03-22
Payer: COMMERCIAL

## 2022-03-22 DIAGNOSIS — Z01.818 PREOP TESTING: ICD-10-CM

## 2022-03-22 PROCEDURE — U0005 INFEC AGEN DETEC AMPLI PROBE: HCPCS | Performed by: NURSE PRACTITIONER

## 2022-03-22 PROCEDURE — U0003 INFECTIOUS AGENT DETECTION BY NUCLEIC ACID (DNA OR RNA); SEVERE ACUTE RESPIRATORY SYNDROME CORONAVIRUS 2 (SARS-COV-2) (CORONAVIRUS DISEASE [COVID-19]), AMPLIFIED PROBE TECHNIQUE, MAKING USE OF HIGH THROUGHPUT TECHNOLOGIES AS DESCRIBED BY CMS-2020-01-R: HCPCS | Performed by: NURSE PRACTITIONER

## 2022-03-23 ENCOUNTER — OFFICE VISIT (OUTPATIENT)
Dept: PSYCHIATRY | Facility: CLINIC | Age: 52
End: 2022-03-23
Payer: COMMERCIAL

## 2022-03-23 DIAGNOSIS — F43.22 ADJUSTMENT DISORDER WITH ANXIETY: Primary | ICD-10-CM

## 2022-03-23 LAB
SARS-COV-2 RNA RESP QL NAA+PROBE: NOT DETECTED
SARS-COV-2- CYCLE NUMBER: NORMAL

## 2022-03-23 PROCEDURE — 99999 PR PBB SHADOW E&M-EST. PATIENT-LVL I: ICD-10-PCS | Mod: PBBFAC,,, | Performed by: SOCIAL WORKER

## 2022-03-23 PROCEDURE — 90834 PR PSYCHOTHERAPY W/PATIENT, 45 MIN: ICD-10-PCS | Mod: S$GLB,,, | Performed by: SOCIAL WORKER

## 2022-03-23 PROCEDURE — 99999 PR PBB SHADOW E&M-EST. PATIENT-LVL I: CPT | Mod: PBBFAC,,, | Performed by: SOCIAL WORKER

## 2022-03-23 PROCEDURE — 3044F PR MOST RECENT HEMOGLOBIN A1C LEVEL <7.0%: ICD-10-PCS | Mod: CPTII,S$GLB,, | Performed by: SOCIAL WORKER

## 2022-03-23 PROCEDURE — 90834 PSYTX W PT 45 MINUTES: CPT | Mod: S$GLB,,, | Performed by: SOCIAL WORKER

## 2022-03-23 PROCEDURE — 3044F HG A1C LEVEL LT 7.0%: CPT | Mod: CPTII,S$GLB,, | Performed by: SOCIAL WORKER

## 2022-03-23 NOTE — PROGRESS NOTES
Individual Psychotherapy (PhD/LCSW)    3/23/2022    Site:  [facility]    Therapeutic Intervention: Met with patient.  Outpatient - Insight oriented psychotherapy 45 min - CPT code 97109    Chief complaint/reason for encounter: anxiety     Interval history and content of current session: Pt has reached out to contacts to initiate job search, gaining confidence daily, taking in the love and support from her community. She did reach out to E to ask to visit this spring, but E suggested fall, and pt is reluctantly accepting that kids are blocking her access for now.  M did agree to meet her for a walk with Yan, and this went well, but pt noted M gave brief superficial responses to questions about her activities, work, etc. Discuss upcoming surgery, meet in 2 weeks.     Treatment plan:  · Target symptoms: anxiety   · Why chosen therapy is appropriate versus another modality: relevant to diagnosis  · Outcome monitoring methods: self-report  · Therapeutic intervention type: insight oriented psychotherapy    Risk parameters:  Patient reports no suicidal ideation  Patient reports no homicidal ideation  Patient reports no self-injurious behavior  Patient reports no violent behavior    Verbal deficits: None    Patient's response to intervention:  The patient's response to intervention is motivated.    Progress toward goals and other mental status changes:  The patient's progress toward goals is good    Diagnosis:   Adjustment disorder with anxiety    Plan:  individual psychotherapy    Return to clinic: pt will schedule further appointments    Length of Service (minutes): 45

## 2022-03-24 ENCOUNTER — TELEPHONE (OUTPATIENT)
Dept: UROGYNECOLOGY | Facility: CLINIC | Age: 52
End: 2022-03-24
Payer: COMMERCIAL

## 2022-03-24 ENCOUNTER — ANESTHESIA EVENT (OUTPATIENT)
Dept: SURGERY | Facility: HOSPITAL | Age: 52
End: 2022-03-24
Payer: COMMERCIAL

## 2022-03-24 NOTE — ANESTHESIA PREPROCEDURE EVALUATION
03/24/2022  Becky Ospina is a 51 y.o., female.  Pre-operative evaluation for Procedure(s) (LRB):  COLPORRHAPHY, COMBINED ANTEROPOSTERIOR (N/A)  SLING, MIDURETHRAL (N/A)  CYSTOSCOPY (N/A)    Becky Ospina is a 51 y.o. female     Patient Active Problem List   Diagnosis    Positive LIBRA (antinuclear antibody)    Acquired hypothyroidism    History of parvovirus B19 infection    ASCUS of cervix    Low serum vitamin B12    Mixed stress and urge urinary incontinence    Cystocele, midline       Review of patient's allergies indicates:  No Known Allergies    No current facility-administered medications on file prior to encounter.     Current Outpatient Medications on File Prior to Encounter   Medication Sig Dispense Refill    APRI 0.15-0.03 mg per tablet Take 1 tablet by mouth once daily. (Patient taking differently: Take 1 tablet by mouth every evening.) 28 tablet 12    levothyroxine (SYNTHROID) 75 MCG tablet TAKE 1 TABLET BY MOUTH EVERY DAY BEFORE BREAKFAST (Patient taking differently: Take by mouth every evening.) 90 tablet 3    paroxetine (PAXIL) 20 MG tablet Take 1 tablet (20 mg total) by mouth every morning. Cancel the 10 mg (Patient taking differently: Take 20 mg by mouth every evening. Cancel the 10 mg) 90 tablet 1    acyclovir (ZOVIRAX) 400 MG tablet Take 1 tablet (400 mg total) by mouth 2 (two) times daily. Fever blisters 14 tablet 0    hydrOXYzine HCL (ATARAX) 10 MG Tab TAKE 1 TABLET BY MOUTH EVERY DAY NIGHTLY AS NEEDED 90 tablet 0    LORazepam (ATIVAN) 0.5 MG tablet Take 1 tablet (0.5 mg total) by mouth nightly as needed for Anxiety. Use only infrequently, can be addictive (Patient not taking: Reported on 2/23/2022) 30 tablet 0    naproxen sodium (ANAPROX) 550 MG tablet Take 1 tablet (550 mg total) by mouth 2 (two) times daily with meals. (Patient not taking: No sig  reported) 60 tablet 1    triamcinolone acetonide 0.1% (KENALOG) 0.1 % cream AAA bid (Patient not taking: No sig reported) 454 g 3    triamcinolone acetonide 0.1% (KENALOG) 0.1 % paste APPLY TO MOUTH SORES 2 TIMES A DAY (Patient not taking: No sig reported) 5 g 0       Past Surgical History:   Procedure Laterality Date    COLD KNIFE CONIZATION OF CERVIX      KJB    COLONOSCOPY N/A 2020    Procedure: COLONOSCOPY;  Surgeon: Thaddeus Blackburn MD;  [facility]: [facility] ([address] [place] [address]);  Service: Endoscopy;  Laterality: N/A;  family history malignant hyperthermia  covid test [facility] urgent care 9/15    TONSILLECTOMY         Social History     Socioeconomic History    Marital status: Legally    Tobacco Use    Smoking status: Never Smoker    Smokeless tobacco: Never Used   Substance and Sexual Activity    Alcohol use: Yes     Alcohol/week: 2.0 standard drinks     Types: 2 Glasses of wine per week     Comment: week    Drug use: No    Sexual activity: Yes     Partners: Male     Birth control/protection: Other-see comments     Comment: Apri Birth Control Pills         CBC: No results for input(s): WBC, RBC, HGB, HCT, PLT, MCV, MCH, MCHC in the last 72 hours.    CMP: No results for input(s): NA, K, CL, CO2, BUN, CREATININE, GLU, MG, PHOS, CALCIUM, ALBUMIN, PROT, ALKPHOS, ALT, AST, BILITOT in the last 72 hours.    INR  No results for input(s): PT, INR, PROTIME, APTT in the last 72 hours.        Diagnostic Studies:      EKD Echo:  No results found for this or any previous visit.      Pre-op Assessment    I have reviewed the Patient Summary Reports.     I have reviewed the Nursing Notes. I have reviewed the NPO Status.   I have reviewed the Medications.     Review of Systems  Anesthesia Hx:  Hx of Anesthetic complications (fam Hx of Mh )  Family Hx of Anesthesia complications:  Malignant Hyperthermia, in a sibling        Physical Exam    Airway:  Mallampati: II   Mouth Opening: Normal  TM Distance:  Normal  Tongue: Normal  Neck ROM: Normal ROM    Patient's brother has had the muscle biopsy and been diagnosed with MH.Patient had tonsil surgery as a child with no issues but has not been tested for MH    Anesthesia Plan  Type of Anesthesia, risks & benefits discussed:    Anesthesia Type: Gen ETT  Intra-op Monitoring Plan: Standard ASA Monitors  Post Op Pain Control Plan: multimodal analgesia  Airway Plan: Direct, Post-Induction  Informed Consent: Informed consent signed with the Patient and all parties understand the risks and agree with anesthesia plan.  All questions answered. Patient consented to blood products? Yes  ASA Score: 2    Ready For Surgery From Anesthesia Perspective.     .

## 2022-03-24 NOTE — TELEPHONE ENCOUNTER
Reached out to patient to confirm surgery on 3/25/22, arrival time 10AM for 12PM surgery and location [facility]. Patient also stated that she did not drink the whole bottle of magnesium nitrate because it made he feel as if she needed to vomit. I assured patient that I will communicate with Dr. Babcock medical staff to advise her if she needs to drink all of it or not.   Patient verbalized understanding.     Scout MARMOLEJO MA

## 2022-03-25 ENCOUNTER — HOSPITAL ENCOUNTER (OUTPATIENT)
Facility: HOSPITAL | Age: 52
Discharge: HOME | End: 2022-03-25
Attending: OBSTETRICS & GYNECOLOGY | Admitting: OBSTETRICS & GYNECOLOGY
Payer: COMMERCIAL

## 2022-03-25 ENCOUNTER — ANESTHESIA (OUTPATIENT)
Dept: SURGERY | Facility: HOSPITAL | Age: 52
End: 2022-03-25
Payer: COMMERCIAL

## 2022-03-25 VITALS
TEMPERATURE: 97 F | SYSTOLIC BLOOD PRESSURE: 139 MMHG | RESPIRATION RATE: 32 BRPM | WEIGHT: 134.5 LBS | HEIGHT: 64 IN | DIASTOLIC BLOOD PRESSURE: 83 MMHG | OXYGEN SATURATION: 99 % | HEART RATE: 85 BPM | BODY MASS INDEX: 22.96 KG/M2

## 2022-03-25 DIAGNOSIS — N39.46 MIXED STRESS AND URGE URINARY INCONTINENCE: ICD-10-CM

## 2022-03-25 DIAGNOSIS — N39.46 MIXED STRESS AND URGE URINARY INCONTINENCE: Primary | ICD-10-CM

## 2022-03-25 DIAGNOSIS — N39.3 STRESS INCONTINENCE: ICD-10-CM

## 2022-03-25 LAB
ABO + RH BLD: NORMAL
B-HCG UR QL: NEGATIVE
BLD GP AB SCN CELLS X3 SERPL QL: NORMAL
CTP QC/QA: YES

## 2022-03-25 PROCEDURE — 25000003 PHARM REV CODE 250: Performed by: STUDENT IN AN ORGANIZED HEALTH CARE EDUCATION/TRAINING PROGRAM

## 2022-03-25 PROCEDURE — 25000003 PHARM REV CODE 250

## 2022-03-25 PROCEDURE — 71000044 HC DOSC ROUTINE RECOVERY FIRST HOUR: Performed by: OBSTETRICS & GYNECOLOGY

## 2022-03-25 PROCEDURE — 71000016 HC POSTOP RECOV ADDL HR: Performed by: OBSTETRICS & GYNECOLOGY

## 2022-03-25 PROCEDURE — 57288 REPAIR BLADDER DEFECT: CPT | Mod: ,,, | Performed by: OBSTETRICS & GYNECOLOGY

## 2022-03-25 PROCEDURE — 37000009 HC ANESTHESIA EA ADD 15 MINS: Performed by: OBSTETRICS & GYNECOLOGY

## 2022-03-25 PROCEDURE — 63600175 PHARM REV CODE 636 W HCPCS: Performed by: STUDENT IN AN ORGANIZED HEALTH CARE EDUCATION/TRAINING PROGRAM

## 2022-03-25 PROCEDURE — C1771 REP DEV, URINARY, W/SLING: HCPCS | Performed by: OBSTETRICS & GYNECOLOGY

## 2022-03-25 PROCEDURE — 37000008 HC ANESTHESIA 1ST 15 MINUTES: Performed by: OBSTETRICS & GYNECOLOGY

## 2022-03-25 PROCEDURE — D9220A PRA ANESTHESIA: Mod: ,,, | Performed by: STUDENT IN AN ORGANIZED HEALTH CARE EDUCATION/TRAINING PROGRAM

## 2022-03-25 PROCEDURE — 81025 URINE PREGNANCY TEST: CPT | Performed by: OBSTETRICS & GYNECOLOGY

## 2022-03-25 PROCEDURE — 36000707: Performed by: OBSTETRICS & GYNECOLOGY

## 2022-03-25 PROCEDURE — 71000015 HC POSTOP RECOV 1ST HR: Performed by: OBSTETRICS & GYNECOLOGY

## 2022-03-25 PROCEDURE — D9220A PRA ANESTHESIA: ICD-10-PCS | Mod: ,,, | Performed by: STUDENT IN AN ORGANIZED HEALTH CARE EDUCATION/TRAINING PROGRAM

## 2022-03-25 PROCEDURE — 57288 PR SLING OPER STRES INCONTINENCE: ICD-10-PCS | Mod: ,,, | Performed by: OBSTETRICS & GYNECOLOGY

## 2022-03-25 PROCEDURE — 25000003 PHARM REV CODE 250: Performed by: OBSTETRICS & GYNECOLOGY

## 2022-03-25 PROCEDURE — 57288 REPAIR BLADDER DEFECT: CPT | Mod: AS,,, | Performed by: PHYSICIAN ASSISTANT

## 2022-03-25 PROCEDURE — 36000706: Performed by: OBSTETRICS & GYNECOLOGY

## 2022-03-25 PROCEDURE — 57288 PR SLING OPER STRES INCONTINENCE: ICD-10-PCS | Mod: AS,,, | Performed by: PHYSICIAN ASSISTANT

## 2022-03-25 PROCEDURE — 86850 RBC ANTIBODY SCREEN: CPT | Performed by: OBSTETRICS & GYNECOLOGY

## 2022-03-25 PROCEDURE — 36415 COLL VENOUS BLD VENIPUNCTURE: CPT | Performed by: OBSTETRICS & GYNECOLOGY

## 2022-03-25 RX ORDER — KETAMINE HCL IN 0.9 % NACL 50 MG/5 ML
SYRINGE (ML) INTRAVENOUS
Status: DISCONTINUED | OUTPATIENT
Start: 2022-03-25 | End: 2022-03-25

## 2022-03-25 RX ORDER — DIPHENHYDRAMINE HYDROCHLORIDE 50 MG/ML
25 INJECTION INTRAMUSCULAR; INTRAVENOUS EVERY 4 HOURS PRN
Status: DISCONTINUED | OUTPATIENT
Start: 2022-03-25 | End: 2022-03-25 | Stop reason: HOSPADM

## 2022-03-25 RX ORDER — LIDOCAINE HYDROCHLORIDE AND EPINEPHRINE 10; 10 MG/ML; UG/ML
INJECTION, SOLUTION INFILTRATION; PERINEURAL
Status: DISCONTINUED | OUTPATIENT
Start: 2022-03-25 | End: 2022-03-25 | Stop reason: HOSPADM

## 2022-03-25 RX ORDER — ONDANSETRON 2 MG/ML
INJECTION INTRAMUSCULAR; INTRAVENOUS
Status: DISCONTINUED | OUTPATIENT
Start: 2022-03-25 | End: 2022-03-25

## 2022-03-25 RX ORDER — SODIUM CHLORIDE 0.9 % (FLUSH) 0.9 %
10 SYRINGE (ML) INJECTION
Status: DISCONTINUED | OUTPATIENT
Start: 2022-03-25 | End: 2022-03-25 | Stop reason: HOSPADM

## 2022-03-25 RX ORDER — PROPOFOL 10 MG/ML
VIAL (ML) INTRAVENOUS
Status: DISCONTINUED | OUTPATIENT
Start: 2022-03-25 | End: 2022-03-25

## 2022-03-25 RX ORDER — ONDANSETRON 8 MG/1
8 TABLET, ORALLY DISINTEGRATING ORAL EVERY 8 HOURS PRN
Status: DISCONTINUED | OUTPATIENT
Start: 2022-03-25 | End: 2022-03-25 | Stop reason: HOSPADM

## 2022-03-25 RX ORDER — DEXAMETHASONE SODIUM PHOSPHATE 4 MG/ML
INJECTION, SOLUTION INTRA-ARTICULAR; INTRALESIONAL; INTRAMUSCULAR; INTRAVENOUS; SOFT TISSUE
Status: DISCONTINUED | OUTPATIENT
Start: 2022-03-25 | End: 2022-03-25

## 2022-03-25 RX ORDER — FENTANYL CITRATE 50 UG/ML
INJECTION, SOLUTION INTRAMUSCULAR; INTRAVENOUS
Status: DISCONTINUED | OUTPATIENT
Start: 2022-03-25 | End: 2022-03-25

## 2022-03-25 RX ORDER — ROCURONIUM BROMIDE 10 MG/ML
INJECTION, SOLUTION INTRAVENOUS
Status: DISCONTINUED | OUTPATIENT
Start: 2022-03-25 | End: 2022-03-25

## 2022-03-25 RX ORDER — CEFAZOLIN SODIUM/WATER 2 G/20 ML
SYRINGE (ML) INTRAVENOUS
Status: DISCONTINUED | OUTPATIENT
Start: 2022-03-25 | End: 2022-03-25

## 2022-03-25 RX ORDER — HYDROCODONE BITARTRATE AND ACETAMINOPHEN 5; 325 MG/1; MG/1
1 TABLET ORAL EVERY 4 HOURS PRN
Status: DISCONTINUED | OUTPATIENT
Start: 2022-03-25 | End: 2022-03-25 | Stop reason: HOSPADM

## 2022-03-25 RX ORDER — PROPOFOL 10 MG/ML
VIAL (ML) INTRAVENOUS CONTINUOUS PRN
Status: DISCONTINUED | OUTPATIENT
Start: 2022-03-25 | End: 2022-03-25

## 2022-03-25 RX ORDER — HYDROCODONE BITARTRATE AND ACETAMINOPHEN 5; 325 MG/1; MG/1
1 TABLET ORAL EVERY 6 HOURS PRN
Qty: 30 TABLET | Refills: 0 | Status: SHIPPED | OUTPATIENT
Start: 2022-03-25 | End: 2022-03-31

## 2022-03-25 RX ORDER — METOCLOPRAMIDE HYDROCHLORIDE 5 MG/ML
5 INJECTION INTRAMUSCULAR; INTRAVENOUS EVERY 6 HOURS PRN
Status: DISCONTINUED | OUTPATIENT
Start: 2022-03-25 | End: 2022-03-25 | Stop reason: HOSPADM

## 2022-03-25 RX ORDER — IBUPROFEN 200 MG
600 TABLET ORAL EVERY 6 HOURS PRN
Status: DISCONTINUED | OUTPATIENT
Start: 2022-03-25 | End: 2022-03-25 | Stop reason: HOSPADM

## 2022-03-25 RX ORDER — LIDOCAINE HYDROCHLORIDE 20 MG/ML
INJECTION, SOLUTION EPIDURAL; INFILTRATION; INTRACAUDAL; PERINEURAL
Status: DISCONTINUED | OUTPATIENT
Start: 2022-03-25 | End: 2022-03-25

## 2022-03-25 RX ORDER — MUPIROCIN 20 MG/G
OINTMENT TOPICAL
Status: COMPLETED
Start: 2022-03-25 | End: 2022-03-25

## 2022-03-25 RX ORDER — PHENAZOPYRIDINE HYDROCHLORIDE 200 MG/1
200 TABLET, FILM COATED ORAL 3 TIMES DAILY PRN
Qty: 12 TABLET | Refills: 0 | Status: SHIPPED | OUTPATIENT
Start: 2022-03-25 | End: 2022-04-19

## 2022-03-25 RX ORDER — MIDAZOLAM HYDROCHLORIDE 1 MG/ML
INJECTION, SOLUTION INTRAMUSCULAR; INTRAVENOUS
Status: DISCONTINUED | OUTPATIENT
Start: 2022-03-25 | End: 2022-03-25

## 2022-03-25 RX ORDER — DIPHENHYDRAMINE HCL 25 MG
25 CAPSULE ORAL EVERY 4 HOURS PRN
Status: DISCONTINUED | OUTPATIENT
Start: 2022-03-25 | End: 2022-03-25 | Stop reason: HOSPADM

## 2022-03-25 RX ORDER — ONDANSETRON 2 MG/ML
4 INJECTION INTRAMUSCULAR; INTRAVENOUS DAILY PRN
Status: DISCONTINUED | OUTPATIENT
Start: 2022-03-25 | End: 2022-03-25 | Stop reason: HOSPADM

## 2022-03-25 RX ORDER — HYDROMORPHONE HYDROCHLORIDE 1 MG/ML
0.2 INJECTION, SOLUTION INTRAMUSCULAR; INTRAVENOUS; SUBCUTANEOUS EVERY 5 MIN PRN
Status: DISCONTINUED | OUTPATIENT
Start: 2022-03-25 | End: 2022-03-25 | Stop reason: HOSPADM

## 2022-03-25 RX ORDER — DOCUSATE SODIUM 100 MG/1
100 CAPSULE, LIQUID FILLED ORAL 2 TIMES DAILY
Qty: 60 CAPSULE | Refills: 1 | Status: SHIPPED | OUTPATIENT
Start: 2022-03-25 | End: 2022-03-31

## 2022-03-25 RX ORDER — HYDROMORPHONE HYDROCHLORIDE 1 MG/ML
1 INJECTION, SOLUTION INTRAMUSCULAR; INTRAVENOUS; SUBCUTANEOUS EVERY 4 HOURS PRN
Status: DISCONTINUED | OUTPATIENT
Start: 2022-03-25 | End: 2022-03-25 | Stop reason: HOSPADM

## 2022-03-25 RX ORDER — IBUPROFEN 600 MG/1
600 TABLET ORAL EVERY 6 HOURS PRN
Qty: 30 TABLET | Refills: 1 | Status: SHIPPED | OUTPATIENT
Start: 2022-03-25 | End: 2022-04-19

## 2022-03-25 RX ADMIN — Medication 2 G: at 12:03

## 2022-03-25 RX ADMIN — Medication 20 MG: at 12:03

## 2022-03-25 RX ADMIN — ONDANSETRON 4 MG: 2 INJECTION INTRAMUSCULAR; INTRAVENOUS at 12:03

## 2022-03-25 RX ADMIN — DEXAMETHASONE SODIUM PHOSPHATE 4 MG: 4 INJECTION INTRA-ARTICULAR; INTRALESIONAL; INTRAMUSCULAR; INTRAVENOUS; SOFT TISSUE at 12:03

## 2022-03-25 RX ADMIN — ROCURONIUM BROMIDE 50 MG: 10 INJECTION, SOLUTION INTRAVENOUS at 12:03

## 2022-03-25 RX ADMIN — LIDOCAINE HYDROCHLORIDE 100 MG: 20 INJECTION, SOLUTION EPIDURAL; INFILTRATION; INTRACAUDAL at 12:03

## 2022-03-25 RX ADMIN — FENTANYL CITRATE 25 MCG: 50 INJECTION, SOLUTION INTRAMUSCULAR; INTRAVENOUS at 01:03

## 2022-03-25 RX ADMIN — ROCURONIUM BROMIDE 10 MG: 10 INJECTION, SOLUTION INTRAVENOUS at 01:03

## 2022-03-25 RX ADMIN — SUGAMMADEX 200 MG: 100 INJECTION, SOLUTION INTRAVENOUS at 01:03

## 2022-03-25 RX ADMIN — MIDAZOLAM 2 MG: 1 INJECTION INTRAMUSCULAR; INTRAVENOUS at 12:03

## 2022-03-25 RX ADMIN — FENTANYL CITRATE 50 MCG: 50 INJECTION, SOLUTION INTRAMUSCULAR; INTRAVENOUS at 01:03

## 2022-03-25 RX ADMIN — MUPIROCIN: 20 OINTMENT TOPICAL at 10:03

## 2022-03-25 RX ADMIN — SODIUM CHLORIDE, SODIUM GLUCONATE, SODIUM ACETATE, POTASSIUM CHLORIDE, MAGNESIUM CHLORIDE, SODIUM PHOSPHATE, DIBASIC, AND POTASSIUM PHOSPHATE: .53; .5; .37; .037; .03; .012; .00082 INJECTION, SOLUTION INTRAVENOUS at 12:03

## 2022-03-25 RX ADMIN — Medication 30 MG: at 01:03

## 2022-03-25 RX ADMIN — FENTANYL CITRATE 75 MCG: 50 INJECTION, SOLUTION INTRAMUSCULAR; INTRAVENOUS at 12:03

## 2022-03-25 RX ADMIN — Medication 150 MCG/KG/MIN: at 12:03

## 2022-03-25 RX ADMIN — PROPOFOL 120 MG: 10 INJECTION, EMULSION INTRAVENOUS at 12:03

## 2022-03-25 NOTE — INTERVAL H&P NOTE
The patient has been examined and the H&P has been reviewed:    I concur with the findings and no changes have occurred since H&P was written.    Anesthesia/Surgery risks, benefits and alternative options discussed and understood by patient/family.    Triny Slater MD  OBGYN, PGY-4    Active Hospital Problems    Diagnosis  POA    *Mixed stress and urge urinary incontinence [N39.46]  Yes      Resolved Hospital Problems   No resolved problems to display.

## 2022-03-25 NOTE — PLAN OF CARE
Adult Patient Discharge. SOLO/PADSS Scoring met. PO Liquids tolerated prior to discharge. Education provided.

## 2022-03-25 NOTE — ANESTHESIA PROCEDURE NOTES
Intubation    Date/Time: 3/25/2022 12:49 PM  Performed by: Zac Mar MD  Authorized by: Zac Mar MD     Intubation:     Induction:  Intravenous    Intubated:  Postinduction    Mask Ventilation:  Easy mask    Attempts:  1    Attempted By:  Staff anesthesiologist    Method of Intubation:  Direct    Blade:  Hansel 3    Laryngeal View Grade: Grade I - full view of cords      Difficult Airway Encountered?: No      Complications:  None    Airway Device:  Oral endotracheal tube    Airway Device Size:  7.0    Style/Cuff Inflation:  Cuffed    Inflation Amount (mL):  5    Tube secured:  21    Secured at:  The teeth    Placement Verified By:  Capnometry    Complicating Factors:  None    Findings Post-Intubation:  BS equal bilateral

## 2022-03-25 NOTE — DISCHARGE SUMMARY
[facility] - [facility] (2nd Fl)  Discharge Note  Short Stay    Procedure(s) (LRB):  COLPORRHAPHY (N/A)  SLING, MIDURETHRAL (N/A)  CYSTOSCOPY (N/A)    OUTCOME: Patient tolerated treatment/procedure well without complication and is now ready for discharge.    DISPOSITION: Home or Self Care    FINAL DIAGNOSIS:  Mixed stress and urge urinary incontinence    FOLLOWUP: In clinic    DISCHARGE INSTRUCTIONS:    Discharge Procedure Orders   Diet general     Call MD for:  temperature >100.4     Call MD for:  persistent nausea and vomiting     Call MD for:  severe uncontrolled pain     Call MD for:  difficulty breathing, headache or visual disturbances     Call MD for:  redness, tenderness, or signs of infection (pain, swelling, redness, odor or green/yellow discharge around incision site)     Call MD for:  hives     Call MD for:  persistent dizziness or light-headedness     Call MD for:  extreme fatigue     Call MD for:   Order Comments: Please watch for the following symptoms after your surgery: Fever >101º F, painful urination, an increase in vaginal bleeding, persistent nausea/vomiting, worsening pain, or other concerns related to your surgery.     No dressing needed     Pelvic Rest     Other restrictions (specify):   Order Comments: 1)  No heavy lifting >10 # x 6 weeks.   2)  Nothing in vagina x 6 weeks.   3)  Control constipation/avoid straining with bowel movements.   4)  if home with bowden, catheter care as per nurse instruction.     Wound care routine (specify)   Order Comments: You have glue at pubic area incisions--this will go away over time. Gently clean. Do not scrub.        TIME SPENT ON DISCHARGE: 5 minutes

## 2022-03-25 NOTE — OP NOTE
.Date of Operation: 03/25/2022    Title of Operation:  1)  Placement of retropubic tension-free midurethral sling, Advantage Fit ([employer])  2)  Cystourethroscopy    Indications for Surgery:  Last HPI from 01/20/2022  1)  UI:  (+) JUMANA = (+) UUI, worse with stress and urge is tampered by preemptively going to the bathroom.  X 5years.  (+) pads:2/day, usually moderate to severe wetness wetness, worse after activity and walking and 1/night usually minimum wetness.  Daytime frequency: Q 2 hours.  Nocturia: Yes: 2/night.   (--) dysuria,  (--) hematuria,  (--) frequent UTIs.  (--) complete bladder emptying, frequently having double voids, leakage with standing after voiding, voiding increased with leaning forward on toilet.     2)  POP:  Absent.  (--) vaginal bleeding. (--) vaginal discharge. (--) sexually active.  (-) dyspareunia (--)  Vaginal dryness.  (--) vaginal estrogen use.      3)  BM:  (--) constipation/straining.  (--) chronic diarrhea. (+) h/o hematochezia, followed with normal colonoscopy in 2020. None currently.  (--) fecal incontinence.  (--) fecal smearing/urgency.  (+) complete evacuation.           02/23/2022  [place]  Urine dipstick: neg.     1.  VOIDING PHASE:       a.  Uroflowmetry:  · Prolapse reduction: No  · Voided volume:  Negative recording mL   · Voiding time:   7 seconds  · Max flow:  68 mL/s  · Avg flow:   Negative recording mL/s   · PVR:   10 mL     The overall configuration of this uroflow study was with insufficient volume for interpretation.       b.  Pressure flow:  · Prolapse reduction: No  · Voided volume:   369 mL  · Voiding time:   42 seconds  · Peak flow:  23 mL/s   · Avg flow:  10 mL/s  · Max det pressure:  23  cm H20  · Det pressure at max flow: 12 cm H20  · Void initiated by detrusor contraction, followed by valsalva.    · Urethral relaxation (EMG):  absent.    · PVR (calculated):  0 mL     The overall configuration of this pressure flow study was prolonged with concern for  voiding dysfunction (Valsalva assist).       2.  FILLING PHASE:  · 1st desire: 48 mL  · Normal desire:  166 mL  · Strong desire:  253 mL  · Urgency:  310 mL  · Compliance (calculated)  approx 75 mL/cm H20  · EMG activity during filling:  unchanged  · Detrusor contractions observed: Yes, intermittently throughout fill. No leaks.      3.  URETHRAL FUNCTION/STORAGE PHASE:     a.  WITH prolapse reduction:  · CLPP (150 mL): Positive  at  106 cm H20  · VLPP (150 mL): Negative  at  77 cm H20   · CLPP (300 mL): Positive  at  99 cm H20  · VLPP (300 mL): Positive  at  78 cm H20   · CLPP (MAX ):    Positive  at  107 cm H20  · VLPP (MAX):     Positive  at  66 cm H20     These findings are consistent with Positive urodynamic stress incontinence.     Assessment:  UF with insufficient volume for interpretation.  PF with concern for voiding dysfunction (Valsalva assist).  Compliance normal.  Max capacity  369 mL.  DO (+).  JUMANA (+).       Cysto    Findings: Urethroscopy:  Normal.  Cystoscopy:  Normal bladder mucosa, bilateral ureteral flow was noted.     Assessment: Essentially normal cystourethroscopy    Preoperative Diagnosis:  1)  Mixed urinary incontinence  2)  Cystocele  3)  Concern for voiding dysfunction (Valsalva assist)  4)  Detrusor instability  5)  Urodynamic stress incontinence    Postoperative Diagnosis:  1)  Mixed urinary incontinence  2)  Cystocele  3)  Concern for voiding dysfunction (Valsalva assist)  4)  Detrusor instability  5)  Urodynamic stress incontinence    Anesthesia:  General endotracheal anesthesia.  Additionally, a dilute solution of 1% lidocaine with epinephrine were injected vaginally for local anesthesia.    Specimen (Bacteriological, Pathological or other):  none    Prosthetic Device/Implant:  1)  Advantage Fit sling.  LOT# [id number].      Surgeons Narrative:    Surgeon: Brian Babcock MD    Assistants:  LELE Dupont (insufficient resident assistance)     Intravenous Fluids:  1000 mL     Estimated  Blood Loss:  50 mL     Urine Output:  not measured but several hundred mL in bag     Counts:  Sponge, lap, needle counts correct x 2.     Drains: Bowden catheter.     Disposition:  The patient was sent to the PACU in stable condition.     Findings:     1.  On exam under anesthesia, normal external female genitalia. There was not significant prolapse as previously noted in clinic.  Uterus and cervix: Normal size  Adnexa: non palpable     2.  On cystoscopy, the bladder mucosa was Normal.  After placement of sling, there was no suture or mesh within the bladder mucosa.  The ureteral orifices were visualized bilaterally with (+) noted good efflux x 2. On a systematic survey of the bladder dome to the base of the urethrovesical junction, there were not other abnormalities noted. The urethra was normal on retraction of the scope.     Description of procedure:    The patient was identified in the preoperative area where informed consent was confirmed, and she was taken to the operating room where an adequate level of general anesthesia was obtained.  The patient was positioned in lithotomy position with legs in Jacinto stirrups. Care was taken to avoid joint hyperflexion or hyperextension, and all extremity surfaces were carefully padded so as to minimize risk of neurologic injury. Intravenous antibiotics were administered preoperatively. Sequential compression devices were applied to the patient's lower extremities preoperatively and heparin was administered subcutaneously for VTE prophylaxis.  Surgical time-out was performed, where the patient was identified and procedures confirmed.  An examination under anesthesia was performed with findings described as above.  The patient's abdomen, perineum, and vagina were sterilely prepped and draped. A bowden catheter was placed in the bladder for drainage.      We then proceeded with placement of the Advantage Fit midurethral sling. The skin was marked just above the symphysis  pubis, 2 cm laterally on either side of the midline indicating the exit sites for the trocars.  The Odonnell catheter was palpated at the urethrovesical junction, and 2 Allis clamps were placed at the anterior vaginal wall along the midurethra. The Odonnell catheter was removed from the patient's bladder. The vaginal epithelium between the two Allis clamps was injected with the 1% lidocaine with epinephrine.  Metzenbaum scissors were used to dissect the vaginal epithelium off the underlying pubocervical muscular tissue in the direction of the angle formed between the ischiopubic ramus and the pubic bone bilaterally. The rigid catheter guide was used to deviate the bladder neck and urethra to the patient's left while the right trocar was advanced to the dissected tract in the patient's right side.  Once the urogenital membrane was perforated, the trocar and handle were reoriented in the sagittal plane and the tip of the trocar was advanced through the retropubic space in intimate proximity to the pubic bone.  The trocar was then advanced through the skin approximately 2 cm to the right of the midline just above the pubic symphysis. The passage of the Advantage Fit trocar was repeated on the patient's left hand side in a similar fashion, using the catheter guide to deviate the bladder neck to the right.  At this point, cystourethroscopy was performed. Cystoscopy was negative for injury after infusion of at least 300 mL in the bladder.  The ureteral orifices were noted to have good efflux bilaterally.  The bladder was then drained. With a #10 Hegar dilator placed between the sling mesh and the urethra, the arms of the sling were elevated above the pubic symphysis and the plastic sheaths were removed from the mesh arms.  Excess mesh was trimmed beneath the suprapubic skin.  The Hegar dilator ensured that the mesh sling was placed in a tension-free manner.  The vaginal mucosa overlying the sling mesh was closed with a several  mattress stitches of 2-0 Vicryl with excellent hemostasis noted.  The suprapubic incisions were closed with dermabond.    At the close of the case, all counts were correct x2.  The vagina was irrigated, and all irrigants were removed.  The vagina was packed with a role of Kerlix, coated with Premarin cream for assurance of immediate postop hemostasis.  The Odonnell was in place and draining well.  The patient was awakened from general endotracheal anesthesia and was taken to the Recovery Room in stable condition.  She tolerated the procedure well.    I was present and scrubbed for the entire procedure.

## 2022-03-25 NOTE — INTERVAL H&P NOTE
The patient has been examined and the H&P has been reviewed:    I concur with the findings and no changes have occurred since H&P was written.    Surgery risks, benefits and alternative options discussed and understood by patient/family.          Active Hospital Problems    Diagnosis  POA    *Mixed stress and urge urinary incontinence [N39.46]  Yes      Resolved Hospital Problems   No resolved problems to display.

## 2022-03-28 ENCOUNTER — PATIENT MESSAGE (OUTPATIENT)
Dept: UROGYNECOLOGY | Facility: CLINIC | Age: 52
End: 2022-03-28
Payer: COMMERCIAL

## 2022-03-28 NOTE — ANESTHESIA POSTPROCEDURE EVALUATION
Anesthesia Post Evaluation    Patient: Becky Ospina    Procedure(s) Performed: Procedure(s) (LRB):  COLPORRHAPHY (N/A)  SLING, MIDURETHRAL (N/A)  CYSTOSCOPY (N/A)    Final Anesthesia Type: general      Patient location during evaluation: PACU  Patient participation: Yes- Able to Participate  Level of consciousness: awake and alert, awake and oriented  Post-procedure vital signs: reviewed and stable  Pain management: adequate  Airway patency: patent    PONV status at discharge: No PONV  Anesthetic complications: no      Cardiovascular status: blood pressure returned to baseline, hemodynamically stable and stable  Respiratory status: unassisted, spontaneous ventilation and room air  Hydration status: euvolemic  Follow-up not needed.          Vitals Value Taken Time   /83 03/25/22 1530   Temp 36.1 °C (97 °F) 03/25/22 1400   Pulse 85 03/25/22 1530   Resp 35 03/25/22 1506   SpO2 99 % 03/25/22 1530   Vitals shown include unvalidated device data.      No case tracking events are documented in the log.      Pain/Lorenza Score: No data recorded

## 2022-03-29 ENCOUNTER — PATIENT MESSAGE (OUTPATIENT)
Dept: UROGYNECOLOGY | Facility: CLINIC | Age: 52
End: 2022-03-29
Payer: COMMERCIAL

## 2022-03-31 ENCOUNTER — TELEPHONE (OUTPATIENT)
Dept: INTERNAL MEDICINE | Facility: CLINIC | Age: 52
End: 2022-03-31

## 2022-03-31 ENCOUNTER — HOSPITAL ENCOUNTER (OUTPATIENT)
Dept: RADIOLOGY | Facility: HOSPITAL | Age: 52
Discharge: HOME | End: 2022-03-31
Attending: PHYSICIAN ASSISTANT
Payer: COMMERCIAL

## 2022-03-31 ENCOUNTER — OFFICE VISIT (OUTPATIENT)
Dept: INTERNAL MEDICINE | Facility: CLINIC | Age: 52
End: 2022-03-31
Payer: COMMERCIAL

## 2022-03-31 ENCOUNTER — PATIENT MESSAGE (OUTPATIENT)
Dept: PRIMARY CARE CLINIC | Facility: CLINIC | Age: 52
End: 2022-03-31
Payer: COMMERCIAL

## 2022-03-31 VITALS
HEIGHT: 64 IN | SYSTOLIC BLOOD PRESSURE: 128 MMHG | WEIGHT: 135.13 LBS | DIASTOLIC BLOOD PRESSURE: 82 MMHG | HEART RATE: 83 BPM | OXYGEN SATURATION: 97 % | BODY MASS INDEX: 23.07 KG/M2

## 2022-03-31 DIAGNOSIS — N39.46 MIXED STRESS AND URGE URINARY INCONTINENCE: ICD-10-CM

## 2022-03-31 DIAGNOSIS — E53.8 LOW SERUM VITAMIN B12: ICD-10-CM

## 2022-03-31 DIAGNOSIS — E03.9 ACQUIRED HYPOTHYROIDISM: ICD-10-CM

## 2022-03-31 DIAGNOSIS — M54.12 CERVICAL RADICULOPATHY: ICD-10-CM

## 2022-03-31 DIAGNOSIS — M54.12 CERVICAL RADICULOPATHY: Primary | ICD-10-CM

## 2022-03-31 PROCEDURE — 3079F PR MOST RECENT DIASTOLIC BLOOD PRESSURE 80-89 MM HG: ICD-10-PCS | Mod: CPTII,S$GLB,, | Performed by: PHYSICIAN ASSISTANT

## 2022-03-31 PROCEDURE — 3074F SYST BP LT 130 MM HG: CPT | Mod: CPTII,S$GLB,, | Performed by: PHYSICIAN ASSISTANT

## 2022-03-31 PROCEDURE — 3079F DIAST BP 80-89 MM HG: CPT | Mod: CPTII,S$GLB,, | Performed by: PHYSICIAN ASSISTANT

## 2022-03-31 PROCEDURE — 1159F MED LIST DOCD IN RCRD: CPT | Mod: CPTII,S$GLB,, | Performed by: PHYSICIAN ASSISTANT

## 2022-03-31 PROCEDURE — 72040 XR CERVICAL SPINE AP LATERAL: ICD-10-PCS | Mod: 26,,, | Performed by: RADIOLOGY

## 2022-03-31 PROCEDURE — 99999 PR PBB SHADOW E&M-EST. PATIENT-LVL V: CPT | Mod: PBBFAC,,, | Performed by: PHYSICIAN ASSISTANT

## 2022-03-31 PROCEDURE — 1160F RVW MEDS BY RX/DR IN RCRD: CPT | Mod: CPTII,S$GLB,, | Performed by: PHYSICIAN ASSISTANT

## 2022-03-31 PROCEDURE — 1160F PR REVIEW ALL MEDS BY PRESCRIBER/CLIN PHARMACIST DOCUMENTED: ICD-10-PCS | Mod: CPTII,S$GLB,, | Performed by: PHYSICIAN ASSISTANT

## 2022-03-31 PROCEDURE — 3074F PR MOST RECENT SYSTOLIC BLOOD PRESSURE < 130 MM HG: ICD-10-PCS | Mod: CPTII,S$GLB,, | Performed by: PHYSICIAN ASSISTANT

## 2022-03-31 PROCEDURE — 99999 PR PBB SHADOW E&M-EST. PATIENT-LVL V: ICD-10-PCS | Mod: PBBFAC,,, | Performed by: PHYSICIAN ASSISTANT

## 2022-03-31 PROCEDURE — 72040 X-RAY EXAM NECK SPINE 2-3 VW: CPT | Mod: 26,,, | Performed by: RADIOLOGY

## 2022-03-31 PROCEDURE — 99214 PR OFFICE/OUTPT VISIT, EST, LEVL IV, 30-39 MIN: ICD-10-PCS | Mod: S$GLB,,, | Performed by: PHYSICIAN ASSISTANT

## 2022-03-31 PROCEDURE — 1159F PR MEDICATION LIST DOCUMENTED IN MEDICAL RECORD: ICD-10-PCS | Mod: CPTII,S$GLB,, | Performed by: PHYSICIAN ASSISTANT

## 2022-03-31 PROCEDURE — 3008F BODY MASS INDEX DOCD: CPT | Mod: CPTII,S$GLB,, | Performed by: PHYSICIAN ASSISTANT

## 2022-03-31 PROCEDURE — 3008F PR BODY MASS INDEX (BMI) DOCUMENTED: ICD-10-PCS | Mod: CPTII,S$GLB,, | Performed by: PHYSICIAN ASSISTANT

## 2022-03-31 PROCEDURE — 3044F PR MOST RECENT HEMOGLOBIN A1C LEVEL <7.0%: ICD-10-PCS | Mod: CPTII,S$GLB,, | Performed by: PHYSICIAN ASSISTANT

## 2022-03-31 PROCEDURE — 99214 OFFICE O/P EST MOD 30 MIN: CPT | Mod: S$GLB,,, | Performed by: PHYSICIAN ASSISTANT

## 2022-03-31 PROCEDURE — 3044F HG A1C LEVEL LT 7.0%: CPT | Mod: CPTII,S$GLB,, | Performed by: PHYSICIAN ASSISTANT

## 2022-03-31 PROCEDURE — 72040 X-RAY EXAM NECK SPINE 2-3 VW: CPT | Mod: TC

## 2022-03-31 RX ORDER — METHOCARBAMOL 500 MG/1
500 TABLET, FILM COATED ORAL 3 TIMES DAILY
Qty: 30 TABLET | Refills: 0 | Status: SHIPPED | OUTPATIENT
Start: 2022-03-31 | End: 2022-04-10

## 2022-03-31 RX ORDER — GABAPENTIN 100 MG/1
CAPSULE ORAL
Qty: 90 CAPSULE | Refills: 1 | Status: SHIPPED | OUTPATIENT
Start: 2022-03-31 | End: 2022-04-19 | Stop reason: SDUPTHER

## 2022-03-31 NOTE — TELEPHONE ENCOUNTER
----- Message from Catherine Arteaga sent at 3/31/2022  3:51 PM CDT -----  Regarding: MRI  Will wait to schedule MRI

## 2022-03-31 NOTE — OPERATIVE NOTE ADDENDUM
Certification of Assistant at Surgery       Surgery Date: 3/25/2022     Participating Surgeons:  Surgeon(s) and Role:     * Brian Babcock MD - Primary     * Triny Slater MD - Resident - Assisting    Procedures:  Procedure(s) (LRB):  COLPORRHAPHY (N/A)  SLING, MIDURETHRAL (N/A)  CYSTOSCOPY (N/A)    Assistant Surgeon's Certification of Necessity:  I understand that section 1842 (b) (6) (d) of the Social Security Act generally prohibits Medicare Part B reasonable charge payment for the services of assistants at surgery in teaching hospitals when qualified residents are available to furnish such services. I certify that the services for which payment is claimed were medically necessary, and that no qualified resident was available to perform the services. I further understand that these services are subject to post-payment review by the Medicare carrier.      Urbano Power PA-C    03/31/2022  11:53 AM

## 2022-03-31 NOTE — PROGRESS NOTES
"Subjective:       Patient ID: Becky Ospina is a 51 y.o. female.        Chief Complaint: Arm Pain    Becky Ospina is an established patient of Desiree Stephens MD here today for urgent care visit.    S/p bladder sling.    Tightness and muscle pain/spasm right side of neck/shoulder/back.  She began taking ibuprofen 600 mg.  Pain radiates down into her shoulder and arm.  Burning and shooting pain.  Not numb or tingling.  No weakness.  Flexion and extension of her neck worsen the pain in neck and arm.  No injury.  She had CT of neck 2018.    "Cervical spondylosis at C6-C7 resulting in mild bilateral neuroforaminal narrowing without significant spinal canal stenosis at this or any other cervical level level."    Going through a divorce, lots of stress, two children 21 and 23.    Previous Dr. Silva patient.         Review of Systems   Constitutional: Negative for chills, diaphoresis, fatigue and fever.   HENT: Negative for congestion and sore throat.    Eyes: Negative for visual disturbance.   Respiratory: Negative for cough, chest tightness and shortness of breath.    Cardiovascular: Negative for chest pain, palpitations and leg swelling.   Gastrointestinal: Negative for abdominal pain, blood in stool, constipation, diarrhea, nausea and vomiting.   Genitourinary: Negative for dysuria, frequency, hematuria and urgency.   Musculoskeletal: Positive for arthralgias and neck pain. Negative for back pain.   Skin: Negative for rash.   Neurological: Negative for dizziness, syncope, weakness and headaches.   Psychiatric/Behavioral: Negative for dysphoric mood and sleep disturbance. The patient is not nervous/anxious.        Objective:      Physical Exam  Vitals and nursing note reviewed.   Constitutional:       Appearance: Normal appearance. She is well-developed.   HENT:      Head: Normocephalic.      Right Ear: External ear normal.      Left Ear: External ear normal.   Eyes:      Pupils: Pupils are equal, " "round, and reactive to light.   Cardiovascular:      Rate and Rhythm: Normal rate and regular rhythm.      Heart sounds: Normal heart sounds. No murmur heard.    No friction rub. No gallop.   Pulmonary:      Effort: Pulmonary effort is normal. No respiratory distress.      Breath sounds: Normal breath sounds.   Abdominal:      Palpations: Abdomen is soft.      Tenderness: There is no abdominal tenderness.   Musculoskeletal:      Cervical back: Spasms and tenderness present. Normal range of motion (pain with flexion and extension).   Skin:     General: Skin is warm and dry.   Neurological:      Mental Status: She is alert.      Sensory: Sensation is intact.      Motor: Motor function is intact.         Assessment:       1. Cervical radiculopathy    2. s/p retropubic MUS/cysto 3/25/2022    3. Acquired hypothyroidism    4. Low serum vitamin B12        Plan:       Becky was seen today for arm pain.    Diagnoses and all orders for this visit:    Cervical radiculopathy  -     X-Ray Cervical Spine AP And Lateral; Future  -     MRI Cervical Spine Without Contrast; Future  -     gabapentin (NEURONTIN) 100 MG capsule; 1-3 tablets at night as needed  -     methocarbamoL (ROBAXIN) 500 MG Tab; Take 1 tablet (500 mg total) by mouth 3 (three) times daily. for 10 days    s/p retropubic MUS/cysto 3/25/2022    Acquired hypothyroidism - taking synthroid    Low serum vitamin B12 - taking b12 vitamin    >30 minutes spent on patient encounter  Start robaxin and gabapentin  X-ray today  Schedule MRI  ED prompts reviewed    Pt has been given instructions populated from [employer] database and has verbalized understanding of the after visit summary and information contained wherein.    Follow up with a primary care provider. May go to ER for acute shortness of breath, lightheadedness, fever, or any other emergent complaints or changes in condition.    "This note will be shared with the patient"    Future Appointments   Date Time Provider " [facility]   4/6/2022  8:00 AM Caren Shnaider, LCSW [facility] [place]   4/13/2022  8:00 AM Caren Shnaider, LCSW [facility] [place]   4/19/2022  2:40 PM Desiree Stephens MD [facility] [place]   4/20/2022  8:00 AM Caren Shnaider, LCSW [facility] [place]   4/27/2022  8:00 AM Caren Shnaider, LCSW [facility] [place]   5/4/2022  8:00 AM Caren Shnaider, LCSW [facility] [place]   5/5/2022  9:00 AM Brian Babcock MD [facility]   5/11/2022  8:00 AM Caren Shnaider, LCSW [facility] [place]   5/18/2022  8:00 AM Caren Shnaider, LCSW [facility] [place]   5/25/2022  8:00 AM Caren Shnaider, LCSW [facility] [place]   6/1/2022  8:00 AM Caren Shnaider, LCSW [facility] [place]   6/8/2022  8:00 AM Caren Shnaider, LCSW [facility] [place]   6/15/2022  8:00 AM Caren Shnaider, LCSW [facility] [place]   6/22/2022  8:00 AM Caren Shnaider, LCSW [facility] [place]   6/29/2022  8:00 AM Caren Shnaider, LCSW [facility] [place]   7/6/2022  8:00 AM Caren Shnaider, LCSW [facility] [place]   7/12/2022  9:00 AM Phoebe Villarreal MD [facility]   7/13/2022  8:00 AM Caren Shnaider, LCSW [facility] [place]   7/20/2022  8:00 AM Caren Shnaider, LCSW [facility] [place]   7/27/2022  8:00 AM Caren Shnaider, LCSW [facility] [place]

## 2022-04-04 NOTE — TELEPHONE ENCOUNTER
"Pt states that she is now a "5" on a pain scale. She is having to take the muscle relaxant only at night due to  going back to work. She is continuing to use the Ibuprofen. She will wait to f/u with Dr Stephens.  "

## 2022-04-06 ENCOUNTER — OFFICE VISIT (OUTPATIENT)
Dept: PSYCHIATRY | Facility: CLINIC | Age: 52
End: 2022-04-06
Payer: COMMERCIAL

## 2022-04-06 DIAGNOSIS — F43.22 ADJUSTMENT DISORDER WITH ANXIETY: Primary | ICD-10-CM

## 2022-04-06 PROCEDURE — 99999 PR PBB SHADOW E&M-EST. PATIENT-LVL I: CPT | Mod: PBBFAC,,, | Performed by: SOCIAL WORKER

## 2022-04-06 PROCEDURE — 3044F HG A1C LEVEL LT 7.0%: CPT | Mod: CPTII,S$GLB,, | Performed by: SOCIAL WORKER

## 2022-04-06 PROCEDURE — 90834 PR PSYCHOTHERAPY W/PATIENT, 45 MIN: ICD-10-PCS | Mod: S$GLB,,, | Performed by: SOCIAL WORKER

## 2022-04-06 PROCEDURE — 99999 PR PBB SHADOW E&M-EST. PATIENT-LVL I: ICD-10-PCS | Mod: PBBFAC,,, | Performed by: SOCIAL WORKER

## 2022-04-06 PROCEDURE — 3044F PR MOST RECENT HEMOGLOBIN A1C LEVEL <7.0%: ICD-10-PCS | Mod: CPTII,S$GLB,, | Performed by: SOCIAL WORKER

## 2022-04-06 PROCEDURE — 90834 PSYTX W PT 45 MINUTES: CPT | Mod: S$GLB,,, | Performed by: SOCIAL WORKER

## 2022-04-06 NOTE — PROGRESS NOTES
Individual Psychotherapy (PhD/LCSW)    4/6/2022    Site:  [place]    Therapeutic Intervention: Met with patient.  Outpatient - Insight oriented psychotherapy 45 min - CPT code 06490    Chief complaint/reason for encounter: anxiety     Interval history and content of current session: Just before surgery pt learned that daughter Linda has been accepted to [employer], announced this on social media but did not tell pt. Daughter Niya has ignored pt's calls x 2 weeks.  Pt devastated that daughters continue to block her from their lives. Pt also had reaction to anesthesia where she became hyper-aroused, and has not slept well since. Make hypnosis recording for pt today to assist with sleep and she will see her PCP or contact surgeon to address this.     Treatment plan:  · Target symptoms: anxiety   · Why chosen therapy is appropriate versus another modality: relevant to diagnosis  · Outcome monitoring methods: self-report  · Therapeutic intervention type: insight oriented psychotherapy    Risk parameters:  Patient reports no suicidal ideation  Patient reports no homicidal ideation  Patient reports no self-injurious behavior  Patient reports no violent behavior    Verbal deficits: None    Patient's response to intervention:  The patient's response to intervention is motivated.    Progress toward goals and other mental status changes:  The patient's progress toward goals is fair    Diagnosis:   Adjustment disorder with anxiety    Plan:  individual psychotherapy    Return to clinic: pt will schedule further appointments    Length of Service (minutes): 45

## 2022-04-13 ENCOUNTER — OFFICE VISIT (OUTPATIENT)
Dept: PSYCHIATRY | Facility: CLINIC | Age: 52
End: 2022-04-13
Payer: COMMERCIAL

## 2022-04-13 DIAGNOSIS — F43.22 ADJUSTMENT DISORDER WITH ANXIETY: Primary | ICD-10-CM

## 2022-04-13 PROCEDURE — 90834 PR PSYCHOTHERAPY W/PATIENT, 45 MIN: ICD-10-PCS | Mod: S$GLB,,, | Performed by: SOCIAL WORKER

## 2022-04-13 PROCEDURE — 3044F PR MOST RECENT HEMOGLOBIN A1C LEVEL <7.0%: ICD-10-PCS | Mod: CPTII,S$GLB,, | Performed by: SOCIAL WORKER

## 2022-04-13 PROCEDURE — 90834 PSYTX W PT 45 MINUTES: CPT | Mod: S$GLB,,, | Performed by: SOCIAL WORKER

## 2022-04-13 PROCEDURE — 99999 PR PBB SHADOW E&M-EST. PATIENT-LVL I: ICD-10-PCS | Mod: PBBFAC,,, | Performed by: SOCIAL WORKER

## 2022-04-13 PROCEDURE — 99999 PR PBB SHADOW E&M-EST. PATIENT-LVL I: CPT | Mod: PBBFAC,,, | Performed by: SOCIAL WORKER

## 2022-04-13 PROCEDURE — 3044F HG A1C LEVEL LT 7.0%: CPT | Mod: CPTII,S$GLB,, | Performed by: SOCIAL WORKER

## 2022-04-13 NOTE — PROGRESS NOTES
"    Individual Psychotherapy (PhD/LCSW)    4/13/2022    Site:  [place]    Therapeutic Intervention: Met with patient.  Outpatient - Insight oriented psychotherapy 45 min - CPT code 97695    Chief complaint/reason for encounter: anxiety     Interval history and content of current session: Pt asked both daughters directly why they are pushing away and was told she is not "motherly" and has kept their dog from them. They also accuse her of not "letting us in your house" when they came while they knew she was at lunch on [address]. Pt has reached out repeatedly to ask to see both girls and to speak with them, ask about their lives. She let them know her door is open but she will "give them the space they asked for". Pt experiencing decrease in somatic tension, breathing better and starting to sleep better, still dealing with pinched nerve, using her hypnosis recording. She is sad but recognizes she has to wait until her daughters are open to rebuilding their relationships.     Treatment plan:  · Target symptoms: anxiety   · Why chosen therapy is appropriate versus another modality: relevant to diagnosis  · Outcome monitoring methods: self-report  · Therapeutic intervention type: insight oriented psychotherapy    Risk parameters:  Patient reports no suicidal ideation  Patient reports no homicidal ideation  Patient reports no self-injurious behavior  Patient reports no violent behavior    Verbal deficits: None    Patient's response to intervention:  The patient's response to intervention is motivated.    Progress toward goals and other mental status changes:  The patient's progress toward goals is good    Diagnosis:   Adjustment disorder with anxiety    Plan:  individual psychotherapy    Return to clinic: pt will schedule further appointments    Length of Service (minutes): 45        "

## 2022-04-19 ENCOUNTER — OFFICE VISIT (OUTPATIENT)
Dept: PRIMARY CARE CLINIC | Facility: CLINIC | Age: 52
End: 2022-04-19
Payer: COMMERCIAL

## 2022-04-19 ENCOUNTER — PATIENT MESSAGE (OUTPATIENT)
Dept: SPINE | Facility: CLINIC | Age: 52
End: 2022-04-19
Payer: COMMERCIAL

## 2022-04-19 VITALS
BODY MASS INDEX: 23.56 KG/M2 | SYSTOLIC BLOOD PRESSURE: 150 MMHG | DIASTOLIC BLOOD PRESSURE: 90 MMHG | HEART RATE: 83 BPM | OXYGEN SATURATION: 99 % | RESPIRATION RATE: 18 BRPM | HEIGHT: 64 IN | WEIGHT: 138 LBS | TEMPERATURE: 99 F

## 2022-04-19 DIAGNOSIS — Z00.00 ANNUAL PHYSICAL EXAM: Primary | ICD-10-CM

## 2022-04-19 DIAGNOSIS — F41.1 GAD (GENERALIZED ANXIETY DISORDER): ICD-10-CM

## 2022-04-19 DIAGNOSIS — Z12.83 SKIN CANCER SCREENING: ICD-10-CM

## 2022-04-19 DIAGNOSIS — M47.22 OSTEOARTHRITIS OF SPINE WITH RADICULOPATHY, CERVICAL REGION: ICD-10-CM

## 2022-04-19 DIAGNOSIS — E03.9 HYPOTHYROIDISM, UNSPECIFIED TYPE: ICD-10-CM

## 2022-04-19 DIAGNOSIS — B00.1 HERPES LABIALIS: ICD-10-CM

## 2022-04-19 DIAGNOSIS — M54.12 CERVICAL RADICULOPATHY: ICD-10-CM

## 2022-04-19 PROCEDURE — 99396 PREV VISIT EST AGE 40-64: CPT | Mod: 25,S$GLB,, | Performed by: FAMILY MEDICINE

## 2022-04-19 PROCEDURE — 3044F PR MOST RECENT HEMOGLOBIN A1C LEVEL <7.0%: ICD-10-PCS | Mod: CPTII,S$GLB,, | Performed by: FAMILY MEDICINE

## 2022-04-19 PROCEDURE — 1159F PR MEDICATION LIST DOCUMENTED IN MEDICAL RECORD: ICD-10-PCS | Mod: CPTII,S$GLB,, | Performed by: FAMILY MEDICINE

## 2022-04-19 PROCEDURE — 96372 PR INJECTION,THERAP/PROPH/DIAG2ST, IM OR SUBCUT: ICD-10-PCS | Mod: S$GLB,,, | Performed by: FAMILY MEDICINE

## 2022-04-19 PROCEDURE — 3008F PR BODY MASS INDEX (BMI) DOCUMENTED: ICD-10-PCS | Mod: CPTII,S$GLB,, | Performed by: FAMILY MEDICINE

## 2022-04-19 PROCEDURE — 3044F HG A1C LEVEL LT 7.0%: CPT | Mod: CPTII,S$GLB,, | Performed by: FAMILY MEDICINE

## 2022-04-19 PROCEDURE — 99999 PR PBB SHADOW E&M-EST. PATIENT-LVL V: CPT | Mod: PBBFAC,,, | Performed by: FAMILY MEDICINE

## 2022-04-19 PROCEDURE — 3080F DIAST BP >= 90 MM HG: CPT | Mod: CPTII,S$GLB,, | Performed by: FAMILY MEDICINE

## 2022-04-19 PROCEDURE — 1160F PR REVIEW ALL MEDS BY PRESCRIBER/CLIN PHARMACIST DOCUMENTED: ICD-10-PCS | Mod: CPTII,S$GLB,, | Performed by: FAMILY MEDICINE

## 2022-04-19 PROCEDURE — 3077F PR MOST RECENT SYSTOLIC BLOOD PRESSURE >= 140 MM HG: ICD-10-PCS | Mod: CPTII,S$GLB,, | Performed by: FAMILY MEDICINE

## 2022-04-19 PROCEDURE — 96372 THER/PROPH/DIAG INJ SC/IM: CPT | Mod: S$GLB,,, | Performed by: FAMILY MEDICINE

## 2022-04-19 PROCEDURE — 3008F BODY MASS INDEX DOCD: CPT | Mod: CPTII,S$GLB,, | Performed by: FAMILY MEDICINE

## 2022-04-19 PROCEDURE — 1159F MED LIST DOCD IN RCRD: CPT | Mod: CPTII,S$GLB,, | Performed by: FAMILY MEDICINE

## 2022-04-19 PROCEDURE — 3077F SYST BP >= 140 MM HG: CPT | Mod: CPTII,S$GLB,, | Performed by: FAMILY MEDICINE

## 2022-04-19 PROCEDURE — 99396 PR PREVENTIVE VISIT,EST,40-64: ICD-10-PCS | Mod: 25,S$GLB,, | Performed by: FAMILY MEDICINE

## 2022-04-19 PROCEDURE — 1160F RVW MEDS BY RX/DR IN RCRD: CPT | Mod: CPTII,S$GLB,, | Performed by: FAMILY MEDICINE

## 2022-04-19 PROCEDURE — 99999 PR PBB SHADOW E&M-EST. PATIENT-LVL V: ICD-10-PCS | Mod: PBBFAC,,, | Performed by: FAMILY MEDICINE

## 2022-04-19 PROCEDURE — 3080F PR MOST RECENT DIASTOLIC BLOOD PRESSURE >= 90 MM HG: ICD-10-PCS | Mod: CPTII,S$GLB,, | Performed by: FAMILY MEDICINE

## 2022-04-19 RX ORDER — KETOROLAC TROMETHAMINE 30 MG/ML
30 INJECTION, SOLUTION INTRAMUSCULAR; INTRAVENOUS
Status: COMPLETED | OUTPATIENT
Start: 2022-04-19 | End: 2022-04-19

## 2022-04-19 RX ORDER — ACYCLOVIR 400 MG/1
400 TABLET ORAL 2 TIMES DAILY
Qty: 14 TABLET | Refills: 0 | Status: SHIPPED | OUTPATIENT
Start: 2022-04-19 | End: 2023-05-18 | Stop reason: SDUPTHER

## 2022-04-19 RX ORDER — PAROXETINE 30 MG/1
30 TABLET, FILM COATED ORAL DAILY
Qty: 90 TABLET | Refills: 1 | Status: SHIPPED | OUTPATIENT
Start: 2022-04-19 | End: 2022-10-17 | Stop reason: SDUPTHER

## 2022-04-19 RX ORDER — LEVOTHYROXINE SODIUM 75 UG/1
75 TABLET ORAL
Qty: 90 TABLET | Refills: 3 | Status: SHIPPED | OUTPATIENT
Start: 2022-04-19 | End: 2023-05-18 | Stop reason: SDUPTHER

## 2022-04-19 RX ORDER — GABAPENTIN 300 MG/1
300 CAPSULE ORAL 3 TIMES DAILY
Qty: 90 CAPSULE | Refills: 1 | Status: SHIPPED | OUTPATIENT
Start: 2022-04-19 | End: 2022-07-12

## 2022-04-19 RX ORDER — MELOXICAM 7.5 MG/1
TABLET ORAL
Qty: 30 TABLET | Refills: 0 | Status: SHIPPED | OUTPATIENT
Start: 2022-04-19 | End: 2022-05-10 | Stop reason: SDUPTHER

## 2022-04-19 RX ADMIN — KETOROLAC TROMETHAMINE 30 MG: 30 INJECTION, SOLUTION INTRAMUSCULAR; INTRAVENOUS at 04:04

## 2022-04-19 NOTE — PROGRESS NOTES
"Subjective:       Patient ID: Becky Ospina is a 51 y.o. female.    Chief Complaint: Establish Care (Pt have been dealing with a pinch nerve)    HPI   52 y/o female here for annual exam.    R sided arm and neck pain since her bladder surgery in March, she is R handed, the pain is a throbbing "itching" type feeling, the pain is worse when she lifts anything, she has pain all the time, pain when looking down and to the right, she has weakness in her hand, she feels her thumb and index finger have a numb feeling on the tip, she has been dropping things. She is taking gabapentin 300 mg at night, she is taking Ibuprofen 600 mg bid, she tried Robaxin and it did not really help. No fall or injury. She is not doing any dedicated exercise. Not sleeping well secondary to pain.  She denies f/n/v/d/constipation/cp/sob.    She has had to strain to get her urine out since her surgery, has post op visit set for 5/5.    Hx of covid 1/2022 mild outpatient symptoms  ANAM:  Counseling, paxil 20 mg daily, Ativan 0.5 mg nightly  Hypothyroidism:never had procedure or biopsy, levothyroxine 75mcg  GYN: following with Dr. Gotti, pap 7/2021, OCP, mmg 2/2022  Colonoscopy 9/2020 repeat 10 years  Eye exam 8/2021  OTC: b12, vit D    Review of Systems  see HPI  Objective:      BP (!) 150/90 (BP Location: Left arm, Patient Position: Sitting, BP Method: Small (Manual))   Pulse 83   Temp 98.6 °F (37 °C) (Oral)   Resp 18   Ht 5' 4" (1.626 m)   Wt 62.6 kg (138 lb 0.1 oz)   LMP 04/12/2022 (Approximate)   SpO2 99%   BMI 23.69 kg/m²   Physical Exam    Assessment:       1. Annual physical exam    2. Cervical radiculopathy    3. Osteoarthritis of spine with radiculopathy, cervical region    4. Skin cancer screening    5. ANAM (generalized anxiety disorder)    6. Hypothyroidism, unspecified type    7. Herpes labialis        Plan:   Becky was seen today for [facility] care.    Diagnoses and all orders for this visit:    Annual physical " exam    Cervical radiculopathy  -     gabapentin (NEURONTIN) 300 MG capsule; Take 1 capsule (300 mg total) by mouth 3 (three) times daily. 1-3 tablets at night as needed  -     Ambulatory referral/consult to [facility]; Future  -     meloxicam (MOBIC) 7.5 MG tablet; Take 1-2 tablets daily as needed for pain  -     ketorolac injection 30 mg    Osteoarthritis of spine with radiculopathy, cervical region  -     gabapentin (NEURONTIN) 300 MG capsule; Take 1 capsule (300 mg total) by mouth 3 (three) times daily. 1-3 tablets at night as needed  -     Ambulatory referral/consult to [facility]; Future  -     meloxicam (MOBIC) 7.5 MG tablet; Take 1-2 tablets daily as needed for pain    Skin cancer screening  -     Ambulatory referral/consult to Dermatology; Future    ANAM (generalized anxiety disorder)  -     paroxetine (PAXIL) 30 MG tablet; Take 1 tablet (30 mg total) by mouth once daily. Cancel the 10 mg    Hypothyroidism, unspecified type  -     levothyroxine (SYNTHROID) 75 MCG tablet; Take 1 tablet (75 mcg total) by mouth before breakfast.    Herpes labialis  -     acyclovir (ZOVIRAX) 400 MG tablet; Take 1 tablet (400 mg total) by mouth 2 (two) times daily. Fever blisters    discussed doing a course of prednisone and she would rather not at this time, discussed PT she declined at this time

## 2022-04-19 NOTE — PATIENT INSTRUCTIONS
Trial of gabapentin 100 mg am and 100 mg at lunch and continue 300 mg at night after a week increase 200 mg am, 200 mg at lunch and can try to increase night dose to 400 mg and let me know

## 2022-04-20 ENCOUNTER — TELEPHONE (OUTPATIENT)
Dept: SPINE | Facility: CLINIC | Age: 52
End: 2022-04-20
Payer: COMMERCIAL

## 2022-04-20 ENCOUNTER — OFFICE VISIT (OUTPATIENT)
Dept: PSYCHIATRY | Facility: CLINIC | Age: 52
End: 2022-04-20
Payer: COMMERCIAL

## 2022-04-20 DIAGNOSIS — F43.22 ADJUSTMENT DISORDER WITH ANXIETY: Primary | ICD-10-CM

## 2022-04-20 PROCEDURE — 3044F HG A1C LEVEL LT 7.0%: CPT | Mod: CPTII,95,, | Performed by: SOCIAL WORKER

## 2022-04-20 PROCEDURE — 90834 PSYTX W PT 45 MINUTES: CPT | Mod: 95,,, | Performed by: SOCIAL WORKER

## 2022-04-20 PROCEDURE — 3044F PR MOST RECENT HEMOGLOBIN A1C LEVEL <7.0%: ICD-10-PCS | Mod: CPTII,95,, | Performed by: SOCIAL WORKER

## 2022-04-20 PROCEDURE — 90834 PR PSYCHOTHERAPY W/PATIENT, 45 MIN: ICD-10-PCS | Mod: 95,,, | Performed by: SOCIAL WORKER

## 2022-04-20 NOTE — PROGRESS NOTES
"The patient location is: [place], [place]  The chief complaint leading to consultation is: adjustment disorder    Visit type: audiovisual    Face to Face time with patient: 45 minutes  50 minutes of total time spent on the encounter, which includes face to face time and non-face to face time preparing to see the patient (eg, review of tests), Obtaining and/or reviewing separately obtained history, Documenting clinical information in the electronic or other health record, Independently interpreting results (not separately reported) and communicating results to the patient/family/caregiver, or Care coordination (not separately reported).         Each patient to whom he or she provides medical services by telemedicine is:  (1) informed of the relationship between the physician and patient and the respective role of any other health care provider with respect to management of the patient; and (2) notified that he or she may decline to receive medical services by telemedicine and may withdraw from such care at any time.    Notes:     Individual Psychotherapy (PhD/LCSW)    4/20/2022    Site:  [facility]    Therapeutic Intervention: Met with patient alone  Outpatient - Insight oriented psychotherapy 45 min - CPT code 29376    Chief complaint/reason for encounter: anxiety     Interval history and content of current session: Pt has job interview Friday, and MRI scheduled to check on her R sided nerve pain, had good meeting with Dr. Stephens. Pt got texts from both girls for [place], gratified by this. Discuss growing awareness of pattern of self-distraction to distance from her own pain both growing up and in her marriage, pt recalls immersing herself in music, mainly musicals and happy songs.  Look at how this decreased awareness of things that were hurtful, and how she is now feeling these more acutely as she becomes more "present".      Treatment plan:  · Target symptoms: anxiety   · Why chosen therapy is appropriate versus " another modality: relevant to diagnosis  · Outcome monitoring methods: self-report  · Therapeutic intervention type: insight oriented psychotherapy    Risk parameters:  Patient reports no suicidal ideation  Patient reports no homicidal ideation  Patient reports no self-injurious behavior  Patient reports no violent behavior    Verbal deficits: None    Patient's response to intervention:  The patient's response to intervention is motivated.    Progress toward goals and other mental status changes:  The patient's progress toward goals is good    Diagnosis:   Adjustment disorder with anxiety    Plan:  individual psychotherapy    Return to clinic: pt will schedule further appointments    Length of Service (minutes): 45

## 2022-04-23 ENCOUNTER — PATIENT MESSAGE (OUTPATIENT)
Dept: PRIMARY CARE CLINIC | Facility: CLINIC | Age: 52
End: 2022-04-23
Payer: COMMERCIAL

## 2022-04-27 ENCOUNTER — OFFICE VISIT (OUTPATIENT)
Dept: PSYCHIATRY | Facility: CLINIC | Age: 52
End: 2022-04-27
Payer: COMMERCIAL

## 2022-04-27 DIAGNOSIS — F43.22 ADJUSTMENT DISORDER WITH ANXIETY: Primary | ICD-10-CM

## 2022-04-27 PROCEDURE — 3044F PR MOST RECENT HEMOGLOBIN A1C LEVEL <7.0%: ICD-10-PCS | Mod: CPTII,S$GLB,, | Performed by: SOCIAL WORKER

## 2022-04-27 PROCEDURE — 90834 PR PSYCHOTHERAPY W/PATIENT, 45 MIN: ICD-10-PCS | Mod: S$GLB,,, | Performed by: SOCIAL WORKER

## 2022-04-27 PROCEDURE — 3044F HG A1C LEVEL LT 7.0%: CPT | Mod: CPTII,S$GLB,, | Performed by: SOCIAL WORKER

## 2022-04-27 PROCEDURE — 90834 PSYTX W PT 45 MINUTES: CPT | Mod: S$GLB,,, | Performed by: SOCIAL WORKER

## 2022-04-27 PROCEDURE — 99999 PR PBB SHADOW E&M-EST. PATIENT-LVL I: ICD-10-PCS | Mod: PBBFAC,,, | Performed by: SOCIAL WORKER

## 2022-04-27 PROCEDURE — 99999 PR PBB SHADOW E&M-EST. PATIENT-LVL I: CPT | Mod: PBBFAC,,, | Performed by: SOCIAL WORKER

## 2022-04-27 NOTE — PROGRESS NOTES
"The patient location is: [place], [place]  The chief complaint leading to consultation is: adjustment disorder    Visit type: audiovisual    Face to Face time with patient: 45 minutes  50 minutes of total time spent on the encounter, which includes face to face time and non-face to face time preparing to see the patient (eg, review of tests), Obtaining and/or reviewing separately obtained history, Documenting clinical information in the electronic or other health record, Independently interpreting results (not separately reported) and communicating results to the patient/family/caregiver, or Care coordination (not separately reported).         Each patient to whom he or she provides medical services by telemedicine is:  (1) informed of the relationship between the physician and patient and the respective role of any other health care provider with respect to management of the patient; and (2) notified that he or she may decline to receive medical services by telemedicine and may withdraw from such care at any time.    Notes:     Individual Psychotherapy (PhD/LCSW)    4/27/2022    Site:  [facility]    Therapeutic Intervention: Met with patient alone  Outpatient - Insight oriented psychotherapy 45 min - CPT code 67352    Chief complaint/reason for encounter: anxiety     Interval history and content of current session: Discuss pt's mixed reaction to not getting job she interviewed for. 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 Emotionally she is experiencing self-doubt, hearing mom shaming her for "saying too much."  Look at pt's difficulty with self-confidence due to judgments from mom and M, leaving her with sense of self split between believing in her abilities and fearing she is incompetent.      Treatment plan:  · Target symptoms: anxiety   · Why chosen therapy is appropriate versus another modality: relevant to diagnosis  · Outcome monitoring methods: self-report  · Therapeutic " intervention type: insight oriented psychotherapy    Risk parameters:  Patient reports no suicidal ideation  Patient reports no homicidal ideation  Patient reports no self-injurious behavior  Patient reports no violent behavior    Verbal deficits: None    Patient's response to intervention:  The patient's response to intervention is motivated.    Progress toward goals and other mental status changes:  The patient's progress toward goals is mixed    Diagnosis:   Adjustment disorder with anxiety    Plan:  individual psychotherapy    Return to clinic: pt will schedule further appointments    Length of Service (minutes): 45

## 2022-04-30 ENCOUNTER — PATIENT MESSAGE (OUTPATIENT)
Dept: PRIMARY CARE CLINIC | Facility: CLINIC | Age: 52
End: 2022-04-30
Payer: COMMERCIAL

## 2022-04-30 ENCOUNTER — OFFICE VISIT (OUTPATIENT)
Dept: URGENT CARE | Facility: CLINIC | Age: 52
End: 2022-04-30
Payer: COMMERCIAL

## 2022-04-30 VITALS
BODY MASS INDEX: 21.68 KG/M2 | DIASTOLIC BLOOD PRESSURE: 89 MMHG | HEIGHT: 64 IN | TEMPERATURE: 97 F | SYSTOLIC BLOOD PRESSURE: 147 MMHG | RESPIRATION RATE: 16 BRPM | WEIGHT: 127 LBS

## 2022-04-30 DIAGNOSIS — R21 RASH DUE TO ALLERGY: Primary | ICD-10-CM

## 2022-04-30 DIAGNOSIS — T78.40XA RASH DUE TO ALLERGY: Primary | ICD-10-CM

## 2022-04-30 PROCEDURE — 1159F PR MEDICATION LIST DOCUMENTED IN MEDICAL RECORD: ICD-10-PCS | Mod: CPTII,S$GLB,, | Performed by: FAMILY MEDICINE

## 2022-04-30 PROCEDURE — 1159F MED LIST DOCD IN RCRD: CPT | Mod: CPTII,S$GLB,, | Performed by: FAMILY MEDICINE

## 2022-04-30 PROCEDURE — 3044F PR MOST RECENT HEMOGLOBIN A1C LEVEL <7.0%: ICD-10-PCS | Mod: CPTII,S$GLB,, | Performed by: FAMILY MEDICINE

## 2022-04-30 PROCEDURE — 3008F PR BODY MASS INDEX (BMI) DOCUMENTED: ICD-10-PCS | Mod: CPTII,S$GLB,, | Performed by: FAMILY MEDICINE

## 2022-04-30 PROCEDURE — 3008F BODY MASS INDEX DOCD: CPT | Mod: CPTII,S$GLB,, | Performed by: FAMILY MEDICINE

## 2022-04-30 PROCEDURE — 3077F PR MOST RECENT SYSTOLIC BLOOD PRESSURE >= 140 MM HG: ICD-10-PCS | Mod: CPTII,S$GLB,, | Performed by: FAMILY MEDICINE

## 2022-04-30 PROCEDURE — 99213 OFFICE O/P EST LOW 20 MIN: CPT | Mod: S$GLB,,, | Performed by: FAMILY MEDICINE

## 2022-04-30 PROCEDURE — 1160F PR REVIEW ALL MEDS BY PRESCRIBER/CLIN PHARMACIST DOCUMENTED: ICD-10-PCS | Mod: CPTII,S$GLB,, | Performed by: FAMILY MEDICINE

## 2022-04-30 PROCEDURE — 3077F SYST BP >= 140 MM HG: CPT | Mod: CPTII,S$GLB,, | Performed by: FAMILY MEDICINE

## 2022-04-30 PROCEDURE — 99213 PR OFFICE/OUTPT VISIT, EST, LEVL III, 20-29 MIN: ICD-10-PCS | Mod: S$GLB,,, | Performed by: FAMILY MEDICINE

## 2022-04-30 PROCEDURE — 3079F DIAST BP 80-89 MM HG: CPT | Mod: CPTII,S$GLB,, | Performed by: FAMILY MEDICINE

## 2022-04-30 PROCEDURE — 3044F HG A1C LEVEL LT 7.0%: CPT | Mod: CPTII,S$GLB,, | Performed by: FAMILY MEDICINE

## 2022-04-30 PROCEDURE — 1160F RVW MEDS BY RX/DR IN RCRD: CPT | Mod: CPTII,S$GLB,, | Performed by: FAMILY MEDICINE

## 2022-04-30 PROCEDURE — 3079F PR MOST RECENT DIASTOLIC BLOOD PRESSURE 80-89 MM HG: ICD-10-PCS | Mod: CPTII,S$GLB,, | Performed by: FAMILY MEDICINE

## 2022-04-30 RX ORDER — METHYLPREDNISOLONE 4 MG/1
4 TABLET ORAL DAILY
Qty: 21 TABLET | Refills: 0 | Status: SHIPPED | OUTPATIENT
Start: 2022-04-30 | End: 2022-05-06

## 2022-04-30 RX ORDER — FAMOTIDINE 20 MG/1
20 TABLET, FILM COATED ORAL 2 TIMES DAILY
Qty: 14 TABLET | Refills: 0 | Status: SHIPPED | OUTPATIENT
Start: 2022-04-30 | End: 2022-07-12

## 2022-04-30 RX ORDER — HYDROXYZINE PAMOATE 25 MG/1
25 CAPSULE ORAL 2 TIMES DAILY
Qty: 14 CAPSULE | Refills: 0 | Status: SHIPPED | OUTPATIENT
Start: 2022-04-30 | End: 2022-05-07

## 2022-04-30 NOTE — PROGRESS NOTES
"Subjective:       Patient ID: Becky Ospina is a 51 y.o. female.    Vitals:  height is 5' 4" (1.626 m) and weight is 57.6 kg (127 lb). Her temporal temperature is 97.2 °F (36.2 °C). Her blood pressure is 147/89 (abnormal). Her respiration is 16.     Chief Complaint: Urticaria    Patient presents with a rash on her face, chest, both arms, back, and legs. Symptoms began 2 days ago. Patient is not aware of what she could be allergic to. Patient states that she has not changed anything in her routine. There is also a bug bite on the right side of her neck.  Pt denies throat or face swelling, chest pain, shortness of breath, headache, dizziness, nausea/vomiting, fever.     Rash  This is a new problem. The current episode started in the past 7 days. The problem has been gradually worsening since onset. The affected locations include the scalp, head, face, lips, neck, chest, torso, back, abdomen, left ear, left eye, left shoulder, left axilla, left arm, left elbow, left hand, left wrist, left fingers, left hip, left buttock, left upper leg, left lower leg, right wrist, right hand, right elbow, right arm, right axilla, right shoulder, right ear, right eye, right fingers, right hip, right buttock, right upper leg and right lower leg. The rash is characterized by burning, itchiness, pain and redness. She was exposed to nothing. Pertinent negatives include no cough, diarrhea, facial edema, fatigue, shortness of breath or vomiting. Past treatments include nothing.       Constitution: Negative for fatigue.   Neck: Negative for neck pain, neck stiffness and neck swelling.   Respiratory: Negative for cough and shortness of breath.    Gastrointestinal: Negative for nausea, vomiting and diarrhea.   Skin: Positive for color change, rash and hives. Negative for erythema.   Allergic/Immunologic: Positive for hives. Negative for itching.       Objective:      Physical Exam   Constitutional: She is oriented to person, place, " and time. She appears well-developed. She is cooperative.  Non-toxic appearance. She does not appear ill. No distress.   HENT:   Head: Normocephalic and atraumatic.   Ears:   Right Ear: Hearing, tympanic membrane, external ear and ear canal normal. impacted cerumen  Left Ear: Hearing, tympanic membrane, external ear and ear canal normal. impacted cerumen  Nose: Nose normal. No mucosal edema, rhinorrhea, nasal deformity or congestion. No epistaxis. Right sinus exhibits no maxillary sinus tenderness and no frontal sinus tenderness. Left sinus exhibits no maxillary sinus tenderness and no frontal sinus tenderness.   Mouth/Throat: Uvula is midline, oropharynx is clear and moist and mucous membranes are normal. No trismus in the jaw. Normal dentition. No uvula swelling. No oropharyngeal exudate or posterior oropharyngeal erythema.   Eyes: Conjunctivae and lids are normal. Right eye exhibits no discharge. Left eye exhibits no discharge. No scleral icterus.   Neck: Trachea normal and phonation normal. Neck supple.   Cardiovascular: Normal rate, regular rhythm, normal heart sounds and normal pulses.   No murmur heard.  Pulmonary/Chest: Effort normal and breath sounds normal. No stridor. No respiratory distress. She has no wheezes. She has no rhonchi. She has no rales.   Abdominal: Normal appearance and bowel sounds are normal. She exhibits no distension and no mass. Soft. There is no abdominal tenderness.   Musculoskeletal: Normal range of motion.         General: No deformity. Normal range of motion.      Cervical back: She exhibits no tenderness.   Lymphadenopathy:     She has no cervical adenopathy.   Neurological: She is alert and oriented to person, place, and time. She exhibits normal muscle tone. Coordination normal.   Skin: Skin is warm, dry, intact, not diaphoretic, not pale and rash. No erythema         Comments: +ve erythematous maculopapular rash over trunk,extremitis and face.  No face, tongue, throat swelling.     Psychiatric: Her speech is normal and behavior is normal. Judgment and thought content normal.   Nursing note and vitals reviewed.        Assessment:       1. Rash due to allergy          Plan:         Rash due to allergy    Other orders  -     methylPREDNISolone (MEDROL DOSEPACK) 4 mg tablet; Take 1 tablet (4 mg total) by mouth once daily. Take as directed for 6 days  Dispense: 21 tablet; Refill: 0  -     hydrOXYzine pamoate (VISTARIL) 25 MG Cap; Take 1 capsule (25 mg total) by mouth 2 (two) times a day. for 7 days  Dispense: 14 capsule; Refill: 0  -     famotidine (PEPCID) 20 MG tablet; Take 1 tablet (20 mg total) by mouth 2 (two) times daily. for 7 days  Dispense: 14 tablet; Refill: 0

## 2022-05-01 ENCOUNTER — PATIENT MESSAGE (OUTPATIENT)
Dept: DERMATOLOGY | Facility: CLINIC | Age: 52
End: 2022-05-01
Payer: COMMERCIAL

## 2022-05-03 ENCOUNTER — TELEPHONE (OUTPATIENT)
Dept: URGENT CARE | Facility: CLINIC | Age: 52
End: 2022-05-03
Payer: COMMERCIAL

## 2022-05-04 ENCOUNTER — OFFICE VISIT (OUTPATIENT)
Dept: PSYCHIATRY | Facility: CLINIC | Age: 52
End: 2022-05-04
Payer: COMMERCIAL

## 2022-05-04 DIAGNOSIS — F43.22 ADJUSTMENT DISORDER WITH ANXIETY: Primary | ICD-10-CM

## 2022-05-04 PROCEDURE — 90834 PSYTX W PT 45 MINUTES: CPT | Mod: 95,,, | Performed by: SOCIAL WORKER

## 2022-05-04 PROCEDURE — 99999 PR PBB SHADOW E&M-EST. PATIENT-LVL I: ICD-10-PCS | Mod: PBBFAC,,, | Performed by: SOCIAL WORKER

## 2022-05-04 PROCEDURE — 90834 PR PSYCHOTHERAPY W/PATIENT, 45 MIN: ICD-10-PCS | Mod: 95,,, | Performed by: SOCIAL WORKER

## 2022-05-04 PROCEDURE — 99999 PR PBB SHADOW E&M-EST. PATIENT-LVL I: CPT | Mod: PBBFAC,,, | Performed by: SOCIAL WORKER

## 2022-05-04 NOTE — PROGRESS NOTES
"The patient location is: [place], [place]  The chief complaint leading to consultation is: adjustment disorder    Visit type: audiovisual    Face to Face time with patient: 45 minutes  50 minutes of total time spent on the encounter, which includes face to face time and non-face to face time preparing to see the patient (eg, review of tests), Obtaining and/or reviewing separately obtained history, Documenting clinical information in the electronic or other health record, Independently interpreting results (not separately reported) and communicating results to the patient/family/caregiver, or Care coordination (not separately reported).         Each patient to whom he or she provides medical services by telemedicine is:  (1) informed of the relationship between the physician and patient and the respective role of any other health care provider with respect to management of the patient; and (2) notified that he or she may decline to receive medical services by telemedicine and may withdraw from such care at any time.    Notes:     Individual Psychotherapy (PhD/LCSW)    5/4/2022    Site:  telemed    Therapeutic Intervention: Met with patient alone  Outpatient - Insight oriented psychotherapy 45 min - CPT code 48039    Chief complaint/reason for encounter: anxiety     Interval history and content of current session: Pt reports difficult week, broke out in full-body rash, agrees this was part of emotional flooding after feeling rejection from not getting job she applied for. Look at hx of learning to minimize or rationalize things that were hurtful in childhood, then flood with self-doubt or shame.  Talk with pt about how to "talk back" to inner voice, get validation and grounding from friends who know her well. She reached out to both girls asking for their summer itineraries and to see them at some point when they are home, got reply from [place] but so far not from [place].  Pt struggles with pain of cut-off from her " latrice.     Treatment plan:  · Target symptoms: anxiety   · Why chosen therapy is appropriate versus another modality: relevant to diagnosis  · Outcome monitoring methods: self-report  · Therapeutic intervention type: insight oriented psychotherapy    Risk parameters:  Patient reports no suicidal ideation  Patient reports no homicidal ideation  Patient reports no self-injurious behavior  Patient reports no violent behavior    Verbal deficits: None    Patient's response to intervention:  The patient's response to intervention is motivated.    Progress toward goals and other mental status changes:  The patient's progress toward goals is mixed    Diagnosis:   Adjustment disorder with anxiety    Plan:  individual psychotherapy    Return to clinic: pt will schedule further appointments    Length of Service (minutes): 45

## 2022-05-05 ENCOUNTER — OFFICE VISIT (OUTPATIENT)
Dept: UROGYNECOLOGY | Facility: CLINIC | Age: 52
End: 2022-05-05
Payer: COMMERCIAL

## 2022-05-05 VITALS
WEIGHT: 136.25 LBS | BODY MASS INDEX: 23.26 KG/M2 | DIASTOLIC BLOOD PRESSURE: 88 MMHG | HEIGHT: 64 IN | SYSTOLIC BLOOD PRESSURE: 128 MMHG

## 2022-05-05 DIAGNOSIS — N39.46 MIXED STRESS AND URGE URINARY INCONTINENCE: ICD-10-CM

## 2022-05-05 DIAGNOSIS — N39.8 VOIDING DYSFUNCTION: Primary | ICD-10-CM

## 2022-05-05 PROCEDURE — 3044F PR MOST RECENT HEMOGLOBIN A1C LEVEL <7.0%: ICD-10-PCS | Mod: CPTII,S$GLB,, | Performed by: OBSTETRICS & GYNECOLOGY

## 2022-05-05 PROCEDURE — 3079F PR MOST RECENT DIASTOLIC BLOOD PRESSURE 80-89 MM HG: ICD-10-PCS | Mod: CPTII,S$GLB,, | Performed by: OBSTETRICS & GYNECOLOGY

## 2022-05-05 PROCEDURE — 99999 PR PBB SHADOW E&M-EST. PATIENT-LVL IV: CPT | Mod: PBBFAC,,, | Performed by: OBSTETRICS & GYNECOLOGY

## 2022-05-05 PROCEDURE — 51798 US URINE CAPACITY MEASURE: CPT | Mod: S$GLB,,, | Performed by: OBSTETRICS & GYNECOLOGY

## 2022-05-05 PROCEDURE — 3079F DIAST BP 80-89 MM HG: CPT | Mod: CPTII,S$GLB,, | Performed by: OBSTETRICS & GYNECOLOGY

## 2022-05-05 PROCEDURE — 51798 PR MEAS,POST-VOID RES,US,NON-IMAGING: ICD-10-PCS | Mod: S$GLB,,, | Performed by: OBSTETRICS & GYNECOLOGY

## 2022-05-05 PROCEDURE — 3074F SYST BP LT 130 MM HG: CPT | Mod: CPTII,S$GLB,, | Performed by: OBSTETRICS & GYNECOLOGY

## 2022-05-05 PROCEDURE — 99024 POSTOP FOLLOW-UP VISIT: CPT | Mod: S$GLB,,, | Performed by: OBSTETRICS & GYNECOLOGY

## 2022-05-05 PROCEDURE — 3008F PR BODY MASS INDEX (BMI) DOCUMENTED: ICD-10-PCS | Mod: CPTII,S$GLB,, | Performed by: OBSTETRICS & GYNECOLOGY

## 2022-05-05 PROCEDURE — 1160F PR REVIEW ALL MEDS BY PRESCRIBER/CLIN PHARMACIST DOCUMENTED: ICD-10-PCS | Mod: CPTII,S$GLB,, | Performed by: OBSTETRICS & GYNECOLOGY

## 2022-05-05 PROCEDURE — 99024 PR POST-OP FOLLOW-UP VISIT: ICD-10-PCS | Mod: S$GLB,,, | Performed by: OBSTETRICS & GYNECOLOGY

## 2022-05-05 PROCEDURE — 99999 PR PBB SHADOW E&M-EST. PATIENT-LVL IV: ICD-10-PCS | Mod: PBBFAC,,, | Performed by: OBSTETRICS & GYNECOLOGY

## 2022-05-05 PROCEDURE — 1159F MED LIST DOCD IN RCRD: CPT | Mod: CPTII,S$GLB,, | Performed by: OBSTETRICS & GYNECOLOGY

## 2022-05-05 PROCEDURE — 3074F PR MOST RECENT SYSTOLIC BLOOD PRESSURE < 130 MM HG: ICD-10-PCS | Mod: CPTII,S$GLB,, | Performed by: OBSTETRICS & GYNECOLOGY

## 2022-05-05 PROCEDURE — 1160F RVW MEDS BY RX/DR IN RCRD: CPT | Mod: CPTII,S$GLB,, | Performed by: OBSTETRICS & GYNECOLOGY

## 2022-05-05 PROCEDURE — 3008F BODY MASS INDEX DOCD: CPT | Mod: CPTII,S$GLB,, | Performed by: OBSTETRICS & GYNECOLOGY

## 2022-05-05 PROCEDURE — 3044F HG A1C LEVEL LT 7.0%: CPT | Mod: CPTII,S$GLB,, | Performed by: OBSTETRICS & GYNECOLOGY

## 2022-05-05 PROCEDURE — 1159F PR MEDICATION LIST DOCUMENTED IN MEDICAL RECORD: ICD-10-PCS | Mod: CPTII,S$GLB,, | Performed by: OBSTETRICS & GYNECOLOGY

## 2022-05-05 NOTE — PROGRESS NOTES
Urogyn follow up  05/05/2022    [facility] - UROGYNECOLOGY  [address] [address]    Becky Ospina  [id number]  1970      Becky Ospina is a 51 y.o. here for postop check.     .Date of Operation: 03/25/2022     Title of Operation:  1)  Placement of retropubic tension-free midurethral sling, [employer]vantage Fit ([employer])  2)  Cystourethroscopy     Indications for Surgery:  Last HPI from 01/20/2022  1)  UI:  (+) JUMANA = (+) UUI, worse with stress and urge is tampered by preemptively going to the bathroom.  X 5years.  (+) pads:2/day, usually moderate to severe wetness wetness, worse after activity and walking and 1/night usually minimum wetness.  Daytime frequency: Q 2 hours.  Nocturia: Yes: 2/night.   (--) dysuria,  (--) hematuria,  (--) frequent UTIs.  (--) complete bladder emptying, frequently having double voids, leakage with standing after voiding, voiding increased with leaning forward on toilet.     2)  POP:  Absent.  (--) vaginal bleeding. (--) vaginal discharge. (--) sexually active.  (-) dyspareunia (--)  Vaginal dryness.  (--) vaginal estrogen use.      3)  BM:  (--) constipation/straining.  (--) chronic diarrhea. (+) h/o hematochezia, followed with normal colonoscopy in 2020. None currently.  (--) fecal incontinence.  (--) fecal smearing/urgency.  (+) complete evacuation.     02/23/2022  [place]  Urine dipstick: neg.     1.  VOIDING PHASE:       a.  Uroflowmetry:  · Prolapse reduction: No  · Voided volume:  Negative recording mL   · Voiding time:   7 seconds  · Max flow:  68 mL/s  · Avg flow:   Negative recording mL/s   · PVR:   10 mL     The overall configuration of this uroflow study was with insufficient volume for interpretation.       b.  Pressure flow:  · Prolapse reduction: No  · Voided volume:   369 mL  · Voiding time:   42 seconds  · Peak flow:  23 mL/s   · Avg flow:  10 mL/s  · Max det pressure:  23  cm H20  · Det pressure at max flow: 12 cm  H20  · Void initiated by detrusor contraction, followed by valsalva.    · Urethral relaxation (EMG):  absent.    · PVR (calculated):  0 mL     The overall configuration of this pressure flow study was prolonged with concern for voiding dysfunction (Valsalva assist).       2.  FILLING PHASE:  · 1st desire: 48 mL  · Normal desire:  166 mL  · Strong desire:  253 mL  · Urgency:  310 mL  · Compliance (calculated)  approx 75 mL/cm H20  · EMG activity during filling:  unchanged  · Detrusor contractions observed: Yes, intermittently throughout fill. No leaks.      3.  URETHRAL FUNCTION/STORAGE PHASE:     a.  WITH prolapse reduction:  · CLPP (150 mL): Positive  at  106 cm H20  · VLPP (150 mL): Negative  at  77 cm H20   · CLPP (300 mL): Positive  at  99 cm H20  · VLPP (300 mL): Positive  at  78 cm H20   · CLPP (MAX ):    Positive  at  107 cm H20  · VLPP (MAX):     Positive  at  66 cm H20     These findings are consistent with Positive urodynamic stress incontinence.     Assessment:  UF with insufficient volume for interpretation.  PF with concern for voiding dysfunction (Valsalva assist).  Compliance normal.  Max capacity  369 mL.  DO (+).  JUMANA (+).       Cysto    Findings: Urethroscopy:  Normal.  Cystoscopy:  Normal bladder mucosa, bilateral ureteral flow was noted.     Assessment: Essentially normal cystourethroscopy     Preoperative Diagnosis:  1)  Mixed urinary incontinence  2)  Cystocele  3)  Concern for voiding dysfunction (Valsalva assist)  4)  Detrusor instability  5)  Urodynamic stress incontinence     Postoperative Diagnosis:  1)  Mixed urinary incontinence  2)  Cystocele  3)  Concern for voiding dysfunction (Valsalva assist)  4)  Detrusor instability  5)  Urodynamic stress incontinence  Issues include:  Patient Active Problem List   Diagnosis    Positive LIBRA (antinuclear antibody)    Acquired hypothyroidism    History of parvovirus B19 infection    ASCUS of cervix    Low serum vitamin B12    s/p retropubic  MUS/cysto 3/25/2022    Cystocele, midline       History since last visit: no pain, VB.    Bladder issues: Feels like she's having some trouble with urine splaying + PV dribbling. Has to leak L to empty. Is pushing to urinate.  Does have Valsalva voiding on UDS preop.  No JUMANA, UUI, U/F, nocturia, dysuria.   Bowel issues: none    Well-woman:  Pap test: 7/2021, normal/HPV neg.  History of abnormal paps: Yes - s/o CK 2009.  History of STIs:  No  Mammogram: Date of last: 2/11/2022.  Result: Normal  Colonoscopy: Date of last: 2020.  Result:  normal.  Repeat due:  2030.    DEXA: none    Medications:    Current Outpatient Medications:     acyclovir (ZOVIRAX) 400 MG tablet, Take 1 tablet (400 mg total) by mouth 2 (two) times daily. Fever blisters, Disp: 14 tablet, Rfl: 0    famotidine (PEPCID) 20 MG tablet, Take 1 tablet (20 mg total) by mouth 2 (two) times daily. for 7 days, Disp: 14 tablet, Rfl: 0    gabapentin (NEURONTIN) 300 MG capsule, Take 1 capsule (300 mg total) by mouth 3 (three) times daily. 1-3 tablets at night as needed, Disp: 90 capsule, Rfl: 1    hydrOXYzine pamoate (VISTARIL) 25 MG Cap, Take 1 capsule (25 mg total) by mouth 2 (two) times a day. for 7 days, Disp: 14 capsule, Rfl: 0    levothyroxine (SYNTHROID) 75 MCG tablet, Take 1 tablet (75 mcg total) by mouth before breakfast., Disp: 90 tablet, Rfl: 3    meloxicam (MOBIC) 7.5 MG tablet, Take 1-2 tablets daily as needed for pain, Disp: 30 tablet, Rfl: 0    methylPREDNISolone (MEDROL DOSEPACK) 4 mg tablet, Take 1 tablet (4 mg total) by mouth once daily. Take as directed for 6 days, Disp: 21 tablet, Rfl: 0    paroxetine (PAXIL) 30 MG tablet, Take 1 tablet (30 mg total) by mouth once daily. Cancel the 10 mg, Disp: 90 tablet, Rfl: 1    triamcinolone acetonide 0.1% (KENALOG) 0.1 % cream, AAA bid, Disp: 454 g, Rfl: 3    triamcinolone acetonide 0.1% (KENALOG) 0.1 % paste, APPLY TO MOUTH SORES 2 TIMES A DAY, Disp: 5 g, Rfl: 0    APRI 0.15-0.03 mg per  "tablet, Take 1 tablet by mouth once daily. (Patient taking differently: Take 1 tablet by mouth every evening.), Disp: 28 tablet, Rfl: 12    LORazepam (ATIVAN) 0.5 MG tablet, Take 1 tablet (0.5 mg total) by mouth nightly as needed for Anxiety. Use only infrequently, can be addictive (Patient not taking: Reported on 4/19/2022), Disp: 30 tablet, Rfl: 0    ROS:  As per HPI.      Exam  Ht 5' 4" (1.626 m)   LMP 04/12/2022 (Approximate)   BMI 21.80 kg/m²   General: alert and oriented, no acute distress  Respiratory: normal respiratory effort  Abd: soft, non-tender, non-distended    Pelvic  Ext. Genitalia: normal external genitalia. Normal bartholins and skenes glands  Vagina: + min atrophy. Normal vaginal mucosa without lesions. No discharge noted.   Non-tender bladder base without palpable mass. Sling path NT, no mesh visualized/palpated.   Cervix: not evaluated  Uterus:   not evaluated  Urethra: no masses or tenderness  Urethral meatus: no lesions, caruncle or prolapse.    POP-Q:  Deferred.  No POP with valsalva.     PVR 10-20 ML on US    Impression  No diagnosis found.  We reviewed the above issues and discussed options for short-term versus long-term management of her problems.   Plan:   1. Postop state: well-healed. Resume normal activity. Always get help lifting 50 lbs or more.   2. Voiding dysfunction:  a. Don't strain with urination--just relax.   b. Do a few PT sessions to reinforce this.  Will message PT to see if can coordinate with neck PT.   3. She will follow up with us Sept 2022 for 6 month check.   20 minutes were spent in face to face time with this patient  90 % of this time was spent in counseling and/or coordination of care     Brian Babcock MD  [facility]  Division of Female Pelvic Medicine and Reconstructive Surgery  Department of Obstetrics & Gynecology      "

## 2022-05-06 ENCOUNTER — HOSPITAL ENCOUNTER (OUTPATIENT)
Dept: RADIOLOGY | Facility: HOSPITAL | Age: 52
Discharge: HOME | End: 2022-05-06
Attending: PHYSICIAN ASSISTANT
Payer: COMMERCIAL

## 2022-05-06 DIAGNOSIS — M54.12 CERVICAL RADICULOPATHY: ICD-10-CM

## 2022-05-06 PROCEDURE — 72141 MRI CERVICAL SPINE WITHOUT CONTRAST: ICD-10-PCS | Mod: 26,,, | Performed by: RADIOLOGY

## 2022-05-06 PROCEDURE — 72141 MRI NECK SPINE W/O DYE: CPT | Mod: 26,,, | Performed by: RADIOLOGY

## 2022-05-06 PROCEDURE — 72141 MRI NECK SPINE W/O DYE: CPT | Mod: TC

## 2022-05-07 PROBLEM — N39.8 VOIDING DYSFUNCTION: Status: ACTIVE | Noted: 2022-05-07

## 2022-05-09 ENCOUNTER — PATIENT OUTREACH (OUTPATIENT)
Dept: ADMINISTRATIVE | Facility: OTHER | Age: 52
End: 2022-05-09
Payer: COMMERCIAL

## 2022-05-09 NOTE — PROGRESS NOTES
Subjective:      Patient ID: Becky Ospina is a 51 y.o. female.    Chief Complaint: Neck Pain    Ms Ospina is a 52 yo female here for evaluation of neck pain that started after pelvic floor surgery 3/25/2022.  She was last seen 9/23/2021 for right elbow pain for the past 3 months and she was given a steroid corinne and recommended a tennis elbow strap.  She feels like the pain started 2 week after surgery.  It got better with prednisone corinne last week.  She feels like the first and second finger tips were numb.  She feels like numbness is better.  She feels like right arm was weak, but it is getting better, but still trouble lifting.  It was hard to sleep.  The pain was worse looking to the right and looking down.  She is feeling better.  She did feel like mobic and gabapentin were helped.  She stopped it for the prednisone.  We discussed can restart.  Pain is 0/10    MRI cervical 5/6/2022  C1-C2: Dens is intact.  Pre dens space is maintained.     Alignment: Normal.     Vertebrae: Degenerative endplate changes in the lower cervical spine.  No fracture or marrow infiltrative process.     Discs: Moderate disc height loss at C5-C7.  No evidence for discitis.     Cord: Normal.     Skull base and craniocervical junction: Normal.     Degenerative findings:     C2-C3: No spinal canal stenosis or neural foraminal narrowing.     C3-C4: No spinal canal stenosis or neural foraminal narrowing.     C4-C5: No spinal canal stenosis or neural foraminal narrowing.     C5-C6: Right paracentral disc osteophyte complex results in moderate narrowing of the right lateral recess and moderate to severe narrowing of the right neural foramen.     C6-C7: Posterior disc osteophyte complex and uncovertebral spurring result in mild left neural foraminal narrowing.     C7-T1: Facet arthropathy results in mild bilateral neural foraminal narrowing.     Paraspinal muscles & soft tissues: Unremarkable.     Impression:     1. Degenerative  changes of the lower cervical spine detailed above.  Prominent right paracentral disc osteophyte complex at C5-C6 results in moderate narrowing of the right lateral recess and moderate to severe narrowing of the right neural foramen.    X-ray cervical 3/31/2022  Some reversal of the normal cervical lordosis, but no significant alignment abnormality.  No atlantoaxial subluxation.  Vertebral body heights are normally maintained, without compression deformity at any level.  There is severe disc narrowing at C6-7 and moderate disc narrowing at C5-6 observed, with some marginal vertebral endplate spurring seen at each of these levels.  Disc heights above C5-6 are normally maintained.  There is an opacity adjacent to the anterior margin of the C5 vertebral body currently seen on the lateral projection which was not observed on the 2018 CT study and could represent the development of some local calcification/ossification of the anterior longitudinal ligament.  Neural arches appear intact, and the vertebral endplates are well defined.  No osseous destructive process or prevertebral soft tissue swelling.     Impression:     Degenerative changes as discussed above, including disc narrowing and marginal vertebral endplate spurring at C5-6 and C6-7, more pronounced at the latter level.    CT cervical 7/2018  The cervical spine demonstrates proper alignment.  The vertebral body heights appear well-maintained.  There is no evidence of fracture or osseous lytic or blastic process.  There is intervertebral disc space height loss at C6-C7 with mild adjacent endplate change.  Focal degenerative changes are described below.     C2 -- C3: No significant disc abnormality, spinal canal stenosis, or neuroforaminal narrowing.     C3 -- C4: No significant disc abnormality, spinal canal stenosis, or neuroforaminal narrowing.     C4 -- C5: No significant disc abnormality, spinal canal stenosis, or neuroforaminal narrowing.     C5 -- C6: No  significant disc abnormality, spinal canal stenosis, or neuroforaminal narrowing.     C6 -- C7: Posterior disc osteophyte complex with mild uncovertebral spurring resulting in at most mild bilateral neuroforaminal narrowing.  No significant spinal canal stenosis.     C7 -- T1: No significant disc abnormality, spinal canal stenosis, or neuroforaminal narrowing.     The spinal canal otherwise appears unremarkable.  No intradural abnormalities are identified.  Evaluation of the surrounding soft tissues reveals significant heterogeneity to the thyroid gland.  Further evaluation with dedicated thyroid ultrasound is recommended.  Multiple bilateral unenlarged cervical lymph nodes are noted.     IMPRESSION:      Cervical spondylosis at C6-C7 resulting in mild bilateral neuroforaminal narrowing without significant spinal canal stenosis at this or any other cervical level level.     Significant thyroid heterogeneity.  Further evaluation dedicated thyroid ultrasound is recommended if not previously obtained.    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    Past Surgical History:  2009: COLD KNIFE CONIZATION OF CERVIX      Comment:  KJLUX  9/18/2020: COLONOSCOPY; N/A      Comment:  Procedure: COLONOSCOPY;  Surgeon: Thaddeus Blackburn MD;                 Location: [address]);  Service: Endoscopy;                 Laterality: N/A;  family history malignant                hyperthermiacovid test [facility] 9/15  1993: TONSILLECTOMY    Review of patient's family history indicates:  Problem: Rheum arthritis      Relation: Mother          Age of Onset: (Not Specified)  Problem: Arthritis      Relation: Mother          Age of Onset: (Not Specified)  Problem: Parkinsonism       Relation: Father          Age of Onset: (Not Specified)  Problem: Malignant hyperthermia      Relation: Brother          Age of Onset: (Not Specified)  Problem: Breast cancer      Relation: Neg Hx          Age of Onset: (Not Specified)  Problem: Colon cancer      Relation: Neg Hx          Age of Onset: (Not Specified)  Problem: Ovarian cancer      Relation: Neg Hx          Age of Onset: (Not Specified)  Problem: Melanoma      Relation: Neg Hx          Age of Onset: (Not Specified)      Social History    Socioeconomic History      Marital status: Legally       Spouse name: Not on file      Number of children: Not on file      Years of education: Not on file      Highest education level: Not on file    Occupational History      Not on file    Tobacco Use      Smoking status: Never Smoker      Smokeless tobacco: Never Used    Substance and Sexual Activity      Alcohol use: Yes        Alcohol/week: 2.0 standard drinks        Types: 2 Glasses of wine per week        Comment: week      Drug use: No      Sexual activity: Yes        Partners: Male        Birth control/protection: Other-see comments        Comment: Apri Birth Control Pills    Other Topics      Concerns:        Are you pregnant or think you may be?: Not Asked        Breast-feeding: Not Asked    Social History Narrative      Not on file    Social Determinants of Health  Financial Resource Strain:       Difficulty of Paying Living Expenses:   Food Insecurity:       Worried About Running Out of Food in the Last Year:       Ran Out of Food in the Last Year:   Transportation Needs:       Lack of Transportation (Medical):       Lack of Transportation (Non-Medical):   Physical Activity:       Days of Exercise per Week:       Minutes of Exercise per Session:   Stress:       Feeling of Stress :   Social Connections:       Frequency of Communication with Friends and Family:       Frequency of Social Gatherings with Friends and Family:       Attends Jewish  Services:       Active Member of Clubs or Organizations:       Attends Club or Organization Meetings:       Marital Status:     Current Outpatient Medications:  acyclovir (ZOVIRAX) 400 MG tablet, Take 1 tablet (400 mg total) by mouth 2 (two) times daily. (Patient taking differently: Take 400 mg by mouth 2 (two) times daily. Fever blisters), Disp: 60 tablet, Rfl: 12  APRI 0.15-0.03 mg per tablet, Take 1 tablet by mouth once daily., Disp: 28 tablet, Rfl: 12  levothyroxine (SYNTHROID) 75 MCG tablet, TAKE 1 TABLET BY MOUTH EVERY DAY BEFORE BREAKFAST, Disp: 90 tablet, Rfl: 3  LORazepam (ATIVAN) 0.5 MG tablet, Take 1 tablet (0.5 mg total) by mouth nightly as needed for Anxiety. Use only infrequently, can be addictive, Disp: 30 tablet, Rfl: 0  naproxen sodium (ANAPROX) 550 MG tablet, Take 1 tablet (550 mg total) by mouth 2 (two) times daily with meals. (Patient taking differently: Take 550 mg by mouth 2 (two) times daily with meals. Jaw pain), Disp: 60 tablet, Rfl: 1  paroxetine (PAXIL) 10 MG tablet, TAKE 1 TABLET BY MOUTH EVERY DAY IN THE EVENING, Disp: 90 tablet, Rfl: 4  triamcinolone acetonide 0.1% (KENALOG) 0.1 % cream, AAA bid, Disp: 454 g, Rfl: 3  triamcinolone acetonide 0.1% (KENALOG) 0.1 % paste, APPLY TO MOUTH SORES 2 TIMES A DAY, Disp: 5 g, Rfl: 0    No current facility-administered medications for this visit.      Review of patient's allergies indicates:  No Known Allergies        Review of Systems   Constitutional: Negative for weight gain and weight loss.   Cardiovascular: Negative for chest pain.   Respiratory: Negative for shortness of breath.    Musculoskeletal: Positive for neck pain (right neck and arm). Negative for back pain, joint pain and joint swelling.   Gastrointestinal: Negative for abdominal pain, bowel incontinence, nausea and vomiting.   Genitourinary: Negative for bladder incontinence.   Neurological: Negative for numbness and paresthesias.         Objective:        General: Becky anguiano  well-developed, well-nourished, appears stated age, in no acute distress, alert and oriented to time, place and person.     General    Vitals reviewed.  Constitutional: She is oriented to person, place, and time. She appears well-developed and well-nourished.   HENT:   Head: Normocephalic and atraumatic.   Pulmonary/Chest: Effort normal.   Neurological: She is alert and oriented to person, place, and time.   Psychiatric: She has a normal mood and affect. Her behavior is normal. Judgment and thought content normal.         Back (L-Spine & T-Spine) / Neck (C-Spine) Exam     Tenderness   The patient is tender to palpation of the right trapezial. Right paramedian tenderness of the Upper C-Spine and Lower C-Spine.     Neck (C-Spine) Range of Motion   Flexion:     > 40  Extension: > 40Right Lateral Bend: 20 Left Lateral Bend: 20 Right Rotation: 40 Left Rotation: 40     Spinal Sensation   Right Side Sensation  C-Spine Level: normal   L-Spine Level: normal  S-Spine Level: normal  Left Side Sensation  C-Spine Level: normal  L-Spine Level: normal  S-Spine Level: normal      Muscle Strength   Right Upper Extremity   Biceps: 5/5   Deltoid:  5/5  Triceps:  5/5  Wrist extension: 5/5   : 5/5   Finger Flexors:  5/5  Intrinsics: 5/5  Left Upper Extremity  Biceps: 5/5   Deltoid:  5/5  Triceps:  5/5  Wrist extension: 5/5   :  5/5   Finger Flexors:  5/5  Intrinsics: 5/5    Reflexes     Left Side  Biceps:  2+  Triceps:  2+  Brachioradialis:  2+  Left Broderick's Sign:  Absent    Right Side   Biceps:  2+  Triceps:  2+  Brachioradialis:  2+  Right Broderick's Sign:  absent    Vascular Exam     Right Pulses        Carotid:                  2+    Left Pulses        Carotid:                  2+              Assessment:       1. Osteoarthritis of spine with radiculopathy, cervical region    2. Cervical radiculopathy    3. Muscle spasm           Plan:       Orders Placed This Encounter    meloxicam (MOBIC) 7.5 MG tablet     1. We  discussed MRI cervical spine and we discussed the disc osteophyte at C5-6  With some right NF narrowing.  The pain is better now, but if right arm pain returns can try cervical CAROL  2. We discussed the posture sitting and getting head over spine.  We discussed mouse position  3. Medrol dose corinne helped, she is going to restart mobic 7.5 1-2 a day as needed  4. We discussed PT can be an option if bothers her more.  We also discussed treat your own neck book with bobbi Selby  5. She will stop gabapentin since feeling better, she can take as needed, if she needs it  6. RTC PRN           Follow-up: No follow-ups on file. If there are any questions prior to this, the patient was instructed to contact the office.

## 2022-05-10 ENCOUNTER — OFFICE VISIT (OUTPATIENT)
Dept: SPINE | Facility: CLINIC | Age: 52
End: 2022-05-10
Attending: PHYSICAL MEDICINE & REHABILITATION
Payer: COMMERCIAL

## 2022-05-10 VITALS
HEIGHT: 64 IN | HEART RATE: 76 BPM | SYSTOLIC BLOOD PRESSURE: 117 MMHG | BODY MASS INDEX: 23.26 KG/M2 | DIASTOLIC BLOOD PRESSURE: 75 MMHG | WEIGHT: 136.25 LBS

## 2022-05-10 DIAGNOSIS — M62.838 MUSCLE SPASM: ICD-10-CM

## 2022-05-10 DIAGNOSIS — M54.12 CERVICAL RADICULOPATHY: ICD-10-CM

## 2022-05-10 DIAGNOSIS — M47.22 OSTEOARTHRITIS OF SPINE WITH RADICULOPATHY, CERVICAL REGION: Primary | ICD-10-CM

## 2022-05-10 PROCEDURE — 3008F BODY MASS INDEX DOCD: CPT | Mod: CPTII,S$GLB,, | Performed by: PHYSICAL MEDICINE & REHABILITATION

## 2022-05-10 PROCEDURE — 99214 PR OFFICE/OUTPT VISIT, EST, LEVL IV, 30-39 MIN: ICD-10-PCS | Mod: S$GLB,,, | Performed by: PHYSICAL MEDICINE & REHABILITATION

## 2022-05-10 PROCEDURE — 3044F HG A1C LEVEL LT 7.0%: CPT | Mod: CPTII,S$GLB,, | Performed by: PHYSICAL MEDICINE & REHABILITATION

## 2022-05-10 PROCEDURE — 3074F SYST BP LT 130 MM HG: CPT | Mod: CPTII,S$GLB,, | Performed by: PHYSICAL MEDICINE & REHABILITATION

## 2022-05-10 PROCEDURE — 99999 PR PBB SHADOW E&M-EST. PATIENT-LVL IV: CPT | Mod: PBBFAC,,, | Performed by: PHYSICAL MEDICINE & REHABILITATION

## 2022-05-10 PROCEDURE — 1160F RVW MEDS BY RX/DR IN RCRD: CPT | Mod: CPTII,S$GLB,, | Performed by: PHYSICAL MEDICINE & REHABILITATION

## 2022-05-10 PROCEDURE — 1159F MED LIST DOCD IN RCRD: CPT | Mod: CPTII,S$GLB,, | Performed by: PHYSICAL MEDICINE & REHABILITATION

## 2022-05-10 PROCEDURE — 3078F PR MOST RECENT DIASTOLIC BLOOD PRESSURE < 80 MM HG: ICD-10-PCS | Mod: CPTII,S$GLB,, | Performed by: PHYSICAL MEDICINE & REHABILITATION

## 2022-05-10 PROCEDURE — 3078F DIAST BP <80 MM HG: CPT | Mod: CPTII,S$GLB,, | Performed by: PHYSICAL MEDICINE & REHABILITATION

## 2022-05-10 PROCEDURE — 1160F PR REVIEW ALL MEDS BY PRESCRIBER/CLIN PHARMACIST DOCUMENTED: ICD-10-PCS | Mod: CPTII,S$GLB,, | Performed by: PHYSICAL MEDICINE & REHABILITATION

## 2022-05-10 PROCEDURE — 99999 PR PBB SHADOW E&M-EST. PATIENT-LVL IV: ICD-10-PCS | Mod: PBBFAC,,, | Performed by: PHYSICAL MEDICINE & REHABILITATION

## 2022-05-10 PROCEDURE — 99214 OFFICE O/P EST MOD 30 MIN: CPT | Mod: S$GLB,,, | Performed by: PHYSICAL MEDICINE & REHABILITATION

## 2022-05-10 PROCEDURE — 1159F PR MEDICATION LIST DOCUMENTED IN MEDICAL RECORD: ICD-10-PCS | Mod: CPTII,S$GLB,, | Performed by: PHYSICAL MEDICINE & REHABILITATION

## 2022-05-10 PROCEDURE — 3008F PR BODY MASS INDEX (BMI) DOCUMENTED: ICD-10-PCS | Mod: CPTII,S$GLB,, | Performed by: PHYSICAL MEDICINE & REHABILITATION

## 2022-05-10 PROCEDURE — 3074F PR MOST RECENT SYSTOLIC BLOOD PRESSURE < 130 MM HG: ICD-10-PCS | Mod: CPTII,S$GLB,, | Performed by: PHYSICAL MEDICINE & REHABILITATION

## 2022-05-10 PROCEDURE — 3044F PR MOST RECENT HEMOGLOBIN A1C LEVEL <7.0%: ICD-10-PCS | Mod: CPTII,S$GLB,, | Performed by: PHYSICAL MEDICINE & REHABILITATION

## 2022-05-10 RX ORDER — MELOXICAM 7.5 MG/1
TABLET ORAL
Qty: 60 TABLET | Refills: 1 | Status: SHIPPED | OUTPATIENT
Start: 2022-05-10 | End: 2022-05-11

## 2022-05-11 ENCOUNTER — PATIENT MESSAGE (OUTPATIENT)
Dept: SPINE | Facility: CLINIC | Age: 52
End: 2022-05-11
Payer: COMMERCIAL

## 2022-05-11 ENCOUNTER — OFFICE VISIT (OUTPATIENT)
Dept: PSYCHIATRY | Facility: CLINIC | Age: 52
End: 2022-05-11
Payer: COMMERCIAL

## 2022-05-11 DIAGNOSIS — F43.22 ADJUSTMENT DISORDER WITH ANXIETY: Primary | ICD-10-CM

## 2022-05-11 PROCEDURE — 90834 PR PSYCHOTHERAPY W/PATIENT, 45 MIN: ICD-10-PCS | Mod: S$GLB,,, | Performed by: SOCIAL WORKER

## 2022-05-11 PROCEDURE — 99999 PR PBB SHADOW E&M-EST. PATIENT-LVL I: CPT | Mod: PBBFAC,,, | Performed by: SOCIAL WORKER

## 2022-05-11 PROCEDURE — 90834 PSYTX W PT 45 MINUTES: CPT | Mod: S$GLB,,, | Performed by: SOCIAL WORKER

## 2022-05-11 PROCEDURE — 3044F PR MOST RECENT HEMOGLOBIN A1C LEVEL <7.0%: ICD-10-PCS | Mod: CPTII,S$GLB,, | Performed by: SOCIAL WORKER

## 2022-05-11 PROCEDURE — 3044F HG A1C LEVEL LT 7.0%: CPT | Mod: CPTII,S$GLB,, | Performed by: SOCIAL WORKER

## 2022-05-11 PROCEDURE — 99999 PR PBB SHADOW E&M-EST. PATIENT-LVL I: ICD-10-PCS | Mod: PBBFAC,,, | Performed by: SOCIAL WORKER

## 2022-05-11 NOTE — PROGRESS NOTES
The patient location is: [place], [place]  The chief complaint leading to consultation is: adjustment disorder    Visit type: audiovisual    Face to Face time with patient: 45 minutes  50 minutes of total time spent on the encounter, which includes face to face time and non-face to face time preparing to see the patient (eg, review of tests), Obtaining and/or reviewing separately obtained history, Documenting clinical information in the electronic or other health record, Independently interpreting results (not separately reported) and communicating results to the patient/family/caregiver, or Care coordination (not separately reported).         Each patient to whom he or she provides medical services by telemedicine is:  (1) informed of the relationship between the physician and patient and the respective role of any other health care provider with respect to management of the patient; and (2) notified that he or she may decline to receive medical services by telemedicine and may withdraw from such care at any time.    Notes:     Individual Psychotherapy (PhD/LCSW)    5/11/2022    Site:  telemed    Therapeutic Intervention: Met with patient alone  Outpatient - Insight oriented psychotherapy 45 min - CPT code 19914    Chief complaint/reason for encounter: anxiety     Interval history and content of current session: Pt is , no major reaction to this, generally doing well, seeing friends. Main grief is loss of relationship with daughters. They sent her a photo as E was leaving for [place], sent Mother's Day texts and M left a small gift from both of them at her door.  Pt feels powerless to change this until girls are ready to see her. Pt sees herself gaining self-confidence, more aware of how anxious she has been much of her life, learning to take time to reflect before taking action in many areas of her life.     Treatment plan:  · Target symptoms: anxiety   · Why chosen therapy is appropriate versus another  modality: relevant to diagnosis  · Outcome monitoring methods: self-report  · Therapeutic intervention type: insight oriented psychotherapy    Risk parameters:  Patient reports no suicidal ideation  Patient reports no homicidal ideation  Patient reports no self-injurious behavior  Patient reports no violent behavior    Verbal deficits: None    Patient's response to intervention:  The patient's response to intervention is motivated.    Progress toward goals and other mental status changes:  The patient's progress toward goals is mixed    Diagnosis:   Adjustment disorder with anxiety    Plan:  individual psychotherapy    Return to clinic: pt will schedule further appointments    Length of Service (minutes): 45

## 2022-05-17 ENCOUNTER — OFFICE VISIT (OUTPATIENT)
Dept: PRIMARY CARE CLINIC | Facility: CLINIC | Age: 52
End: 2022-05-17
Payer: COMMERCIAL

## 2022-05-17 ENCOUNTER — PATIENT MESSAGE (OUTPATIENT)
Dept: PSYCHIATRY | Facility: CLINIC | Age: 52
End: 2022-05-17
Payer: COMMERCIAL

## 2022-05-17 VITALS — BODY MASS INDEX: 23.05 KG/M2 | WEIGHT: 135 LBS | HEIGHT: 64 IN

## 2022-05-17 DIAGNOSIS — M54.12 CERVICAL RADICULOPATHY: ICD-10-CM

## 2022-05-17 DIAGNOSIS — F41.1 GAD (GENERALIZED ANXIETY DISORDER): Primary | ICD-10-CM

## 2022-05-17 DIAGNOSIS — M47.22 OSTEOARTHRITIS OF SPINE WITH RADICULOPATHY, CERVICAL REGION: ICD-10-CM

## 2022-05-17 DIAGNOSIS — Z72.820 POOR SLEEP: ICD-10-CM

## 2022-05-17 PROCEDURE — 99213 PR OFFICE/OUTPT VISIT, EST, LEVL III, 20-29 MIN: ICD-10-PCS | Mod: 95,,, | Performed by: FAMILY MEDICINE

## 2022-05-17 PROCEDURE — 3008F PR BODY MASS INDEX (BMI) DOCUMENTED: ICD-10-PCS | Mod: CPTII,95,, | Performed by: FAMILY MEDICINE

## 2022-05-17 PROCEDURE — 99213 OFFICE O/P EST LOW 20 MIN: CPT | Mod: 95,,, | Performed by: FAMILY MEDICINE

## 2022-05-17 PROCEDURE — 3044F HG A1C LEVEL LT 7.0%: CPT | Mod: CPTII,95,, | Performed by: FAMILY MEDICINE

## 2022-05-17 PROCEDURE — 3044F PR MOST RECENT HEMOGLOBIN A1C LEVEL <7.0%: ICD-10-PCS | Mod: CPTII,95,, | Performed by: FAMILY MEDICINE

## 2022-05-17 PROCEDURE — 3008F BODY MASS INDEX DOCD: CPT | Mod: CPTII,95,, | Performed by: FAMILY MEDICINE

## 2022-05-17 RX ORDER — DESOGESTREL AND ETHINYL ESTRADIOL 0.15-0.03
1 KIT ORAL DAILY
COMMUNITY
End: 2022-09-07 | Stop reason: SDUPTHER

## 2022-05-17 NOTE — PATIENT INSTRUCTIONS
Pelvic Floor PT #: [phone]  Please call to schedule an appt    Centralized scheduling #: [phone]    Annual in 11 months. Please call in 6 months to schedule.

## 2022-05-17 NOTE — PROGRESS NOTES
"Subjective:       Patient ID: Becky Ospina is a 51 y.o. female.    Chief Complaint: Anxiety ([place]ool[place] up for anxiety// mri)    HPI  50 y/o female with hypothyroidism, cervical djd, ANAM, history of COVID here for follow-up ANAM.     Since last visit she found she was allergic to the meloxicam, she was given a medrol dose pack and her arm/hand and neck pain resolved. She feels her mood is in a good place, she is sleeping better overall with hydroxyzine 10 mg.  She denies f/n/v/d/constipation/cp/sob/urinary sx. Plans to start pelvic floor PT. Not doing any dedicated exercise, she is active at work and walks her dog.    Has gyn appt set for July with Dr. Villarreal to discuss HRT     Hx of covid 1/2022 mild outpatient symptoms  ANAM:  Counseling, paxil 30 mg daily, Ativan 0.5 mg nightly  Hypothyroidism:never had procedure or biopsy, levothyroxine 75mcg  GYN: following with Dr. Gotti, pap 7/2021, OCP, mmg 2/2022  Colonoscopy 9/2020 repeat 10 years  Eye exam 8/2021  OTC: b12, vit D    Review of Systems   Constitutional: Negative for activity change and unexpected weight change.   HENT: Negative for hearing loss, rhinorrhea and trouble swallowing.    Eyes: Negative for discharge and visual disturbance.   Respiratory: Negative for chest tightness and wheezing.    Cardiovascular: Negative for chest pain and palpitations.   Gastrointestinal: Negative for blood in stool, constipation, diarrhea and vomiting.   Endocrine: Negative for polydipsia and polyuria.   Genitourinary: Negative for difficulty urinating, dysuria, hematuria and menstrual problem.   Musculoskeletal: Negative for arthralgias, joint swelling and neck pain.   Neurological: Negative for weakness and headaches.   Psychiatric/Behavioral: Negative for confusion and dysphoric mood.       Objective:      Ht 5' 4" (1.626 m)   Wt 61.2 kg (135 lb)   LMP 05/04/2022 (Approximate)   BMI 23.17 kg/m²   Physical Exam  Constitutional:       General: She is not in acute " distress.     Appearance: She is well-developed. She is not diaphoretic.   HENT:      Head: Normocephalic and atraumatic.   Pulmonary:      Effort: No respiratory distress.   Neurological:      Mental Status: She is alert.         Assessment:       1. RICKEY (generalized anxiety disorder)    2. Cervical radiculopathy    3. Osteoarthritis of spine with radiculopathy, cervical region    4. Poor sleep        Plan:   Becky was seen today for anxiety.    Diagnoses and all orders for this visit:    RICKEY (generalized anxiety disorder)    Cervical radiculopathy    Osteoarthritis of spine with radiculopathy, cervical region    Poor sleep    The patient location is: [place]  The chief complaint leading to consultation is: rickey, cervical djd    Visit type: audiovisual    Face to Face time with patient: 20 minutes of total time spent on the encounter, which includes face to face time and non-face to face time preparing to see the patient (eg, review of tests), Obtaining and/or reviewing separately obtained history, Documenting clinical information in the electronic or other health record, Independently interpreting results (not separately reported) and communicating results to the patient/family/caregiver, or Care coordination (not separately reported).         Each patient to whom he or she provides medical services by telemedicine is:  (1) informed of the relationship between the physician and patient and the respective role of any other health care provider with respect to management of the patient; and (2) notified that he or she may decline to receive medical services by telemedicine and may withdraw from such care at any time.    Notes:

## 2022-05-18 ENCOUNTER — OFFICE VISIT (OUTPATIENT)
Dept: PSYCHIATRY | Facility: CLINIC | Age: 52
End: 2022-05-18
Payer: COMMERCIAL

## 2022-05-18 DIAGNOSIS — F43.22 ADJUSTMENT DISORDER WITH ANXIETY: Primary | ICD-10-CM

## 2022-05-18 PROCEDURE — 99999 PR PBB SHADOW E&M-EST. PATIENT-LVL I: CPT | Mod: PBBFAC,,, | Performed by: SOCIAL WORKER

## 2022-05-18 PROCEDURE — 90834 PR PSYCHOTHERAPY W/PATIENT, 45 MIN: ICD-10-PCS | Mod: S$GLB,,, | Performed by: SOCIAL WORKER

## 2022-05-18 PROCEDURE — 3044F PR MOST RECENT HEMOGLOBIN A1C LEVEL <7.0%: ICD-10-PCS | Mod: CPTII,S$GLB,, | Performed by: SOCIAL WORKER

## 2022-05-18 PROCEDURE — 90834 PSYTX W PT 45 MINUTES: CPT | Mod: S$GLB,,, | Performed by: SOCIAL WORKER

## 2022-05-18 PROCEDURE — 99999 PR PBB SHADOW E&M-EST. PATIENT-LVL I: ICD-10-PCS | Mod: PBBFAC,,, | Performed by: SOCIAL WORKER

## 2022-05-18 PROCEDURE — 3044F HG A1C LEVEL LT 7.0%: CPT | Mod: CPTII,S$GLB,, | Performed by: SOCIAL WORKER

## 2022-05-18 NOTE — PROGRESS NOTES
"The patient location is: [place], [place]  The chief complaint leading to consultation is: adjustment disorder    Visit type: audiovisual    Face to Face time with patient: 45 minutes  50 minutes of total time spent on the encounter, which includes face to face time and non-face to face time preparing to see the patient (eg, review of tests), Obtaining and/or reviewing separately obtained history, Documenting clinical information in the electronic or other health record, Independently interpreting results (not separately reported) and communicating results to the patient/family/caregiver, or Care coordination (not separately reported).         Each patient to whom he or she provides medical services by telemedicine is:  (1) informed of the relationship between the physician and patient and the respective role of any other health care provider with respect to management of the patient; and (2) notified that he or she may decline to receive medical services by telemedicine and may withdraw from such care at any time.    Notes:     Individual Psychotherapy (PhD/LCSW)    5/18/2022    Site:  telemed    Therapeutic Intervention: Met with patient alone  Outpatient - Insight oriented psychotherapy 45 min - CPT code 84733    Chief complaint/reason for encounter: anxiety     Interval history and content of current session: Pt legally changed back to maiden name, feels "more like myself."  She reports Dr. Stephens encouraged her to increase Paxil to 30mg and this has helped generally lower level of somatic tension.  She got a nice text and photo from [employer] in [place], texted Linda and did get a reply, is working to accept that daughters are in a stage of normal separation as well as distancing because of family issues.      Treatment plan:  · Target symptoms: anxiety   · Why chosen therapy is appropriate versus another modality: relevant to diagnosis  · Outcome monitoring methods: self-report  · Therapeutic intervention type: insight " oriented psychotherapy    Risk parameters:  Patient reports no suicidal ideation  Patient reports no homicidal ideation  Patient reports no self-injurious behavior  Patient reports no violent behavior    Verbal deficits: None    Patient's response to intervention:  The patient's response to intervention is motivated.    Progress toward goals and other mental status changes:  The patient's progress toward goals is mixed    Diagnosis:   Adjustment disorder with anxiety    Plan:  individual psychotherapy    Return to clinic: pt will schedule further appointments    Length of Service (minutes): 45

## 2022-05-25 ENCOUNTER — OFFICE VISIT (OUTPATIENT)
Dept: PSYCHIATRY | Facility: CLINIC | Age: 52
End: 2022-05-25
Payer: COMMERCIAL

## 2022-05-25 DIAGNOSIS — F43.22 ADJUSTMENT DISORDER WITH ANXIETY: Primary | ICD-10-CM

## 2022-05-25 PROCEDURE — 90834 PSYTX W PT 45 MINUTES: CPT | Mod: S$GLB,,, | Performed by: SOCIAL WORKER

## 2022-05-25 PROCEDURE — 3044F HG A1C LEVEL LT 7.0%: CPT | Mod: CPTII,S$GLB,, | Performed by: SOCIAL WORKER

## 2022-05-25 PROCEDURE — 90834 PR PSYCHOTHERAPY W/PATIENT, 45 MIN: ICD-10-PCS | Mod: S$GLB,,, | Performed by: SOCIAL WORKER

## 2022-05-25 PROCEDURE — 3044F PR MOST RECENT HEMOGLOBIN A1C LEVEL <7.0%: ICD-10-PCS | Mod: CPTII,S$GLB,, | Performed by: SOCIAL WORKER

## 2022-05-25 PROCEDURE — 99999 PR PBB SHADOW E&M-EST. PATIENT-LVL I: CPT | Mod: PBBFAC,,, | Performed by: SOCIAL WORKER

## 2022-05-25 PROCEDURE — 99999 PR PBB SHADOW E&M-EST. PATIENT-LVL I: ICD-10-PCS | Mod: PBBFAC,,, | Performed by: SOCIAL WORKER

## 2022-05-25 NOTE — PROGRESS NOTES
The patient location is: [place], [place]  The chief complaint leading to consultation is: adjustment disorder    Visit type: audiovisual    Face to Face time with patient: 45 minutes  50 minutes of total time spent on the encounter, which includes face to face time and non-face to face time preparing to see the patient (eg, review of tests), Obtaining and/or reviewing separately obtained history, Documenting clinical information in the electronic or other health record, Independently interpreting results (not separately reported) and communicating results to the patient/family/caregiver, or Care coordination (not separately reported).         Each patient to whom he or she provides medical services by telemedicine is:  (1) informed of the relationship between the physician and patient and the respective role of any other health care provider with respect to management of the patient; and (2) notified that he or she may decline to receive medical services by telemedicine and may withdraw from such care at any time.    Notes:     Individual Psychotherapy (PhD/LCSW)    5/25/2022    Site:  telemed    Therapeutic Intervention: Met with patient alone  Outpatient - Insight oriented psychotherapy 45 min - CPT code 98870    Chief complaint/reason for encounter: anxiety     Interval history and content of current session: Pt doing very well, got text from [place], invited Linda to lunch but got no reply, is tolerating this well, accepting that the girls are ok and that she has to wait until they're ready to reconnect.  Pt feeling increased self-confidence, has close supportive relationship with her mother, friends and family, has some social activity but is also enjoying being alone.     Treatment plan:  · Target symptoms: anxiety   · Why chosen therapy is appropriate versus another modality: relevant to diagnosis  · Outcome monitoring methods: self-report  · Therapeutic intervention type: insight oriented  psychotherapy    Risk parameters:  Patient reports no suicidal ideation  Patient reports no homicidal ideation  Patient reports no self-injurious behavior  Patient reports no violent behavior    Verbal deficits: None    Patient's response to intervention:  The patient's response to intervention is motivated.    Progress toward goals and other mental status changes:  The patient's progress toward goals is good    Diagnosis:   Adjustment disorder with anxiety    Plan:  individual psychotherapy    Return to clinic: pt will schedule further appointments    Length of Service (minutes): 45

## 2022-06-01 ENCOUNTER — OFFICE VISIT (OUTPATIENT)
Dept: PSYCHIATRY | Facility: CLINIC | Age: 52
End: 2022-06-01
Payer: COMMERCIAL

## 2022-06-01 DIAGNOSIS — F43.22 ADJUSTMENT DISORDER WITH ANXIETY: Primary | ICD-10-CM

## 2022-06-01 PROCEDURE — 3044F HG A1C LEVEL LT 7.0%: CPT | Mod: CPTII,S$GLB,, | Performed by: SOCIAL WORKER

## 2022-06-01 PROCEDURE — 90834 PR PSYCHOTHERAPY W/PATIENT, 45 MIN: ICD-10-PCS | Mod: S$GLB,,, | Performed by: SOCIAL WORKER

## 2022-06-01 PROCEDURE — 99999 PR PBB SHADOW E&M-EST. PATIENT-LVL I: CPT | Mod: PBBFAC,,, | Performed by: SOCIAL WORKER

## 2022-06-01 PROCEDURE — 90834 PSYTX W PT 45 MINUTES: CPT | Mod: S$GLB,,, | Performed by: SOCIAL WORKER

## 2022-06-01 PROCEDURE — 3044F PR MOST RECENT HEMOGLOBIN A1C LEVEL <7.0%: ICD-10-PCS | Mod: CPTII,S$GLB,, | Performed by: SOCIAL WORKER

## 2022-06-01 PROCEDURE — 99999 PR PBB SHADOW E&M-EST. PATIENT-LVL I: ICD-10-PCS | Mod: PBBFAC,,, | Performed by: SOCIAL WORKER

## 2022-06-01 NOTE — PROGRESS NOTES
The patient location is: [place], [place]  The chief complaint leading to consultation is: adjustment disorder    Visit type: audiovisual    Face to Face time with patient: 45 minutes  50 minutes of total time spent on the encounter, which includes face to face time and non-face to face time preparing to see the patient (eg, review of tests), Obtaining and/or reviewing separately obtained history, Documenting clinical information in the electronic or other health record, Independently interpreting results (not separately reported) and communicating results to the patient/family/caregiver, or Care coordination (not separately reported).         Each patient to whom he or she provides medical services by telemedicine is:  (1) informed of the relationship between the physician and patient and the respective role of any other health care provider with respect to management of the patient; and (2) notified that he or she may decline to receive medical services by telemedicine and may withdraw from such care at any time.    Notes:     Individual Psychotherapy (PhD/LCSW)    6/1/2022    Site:  telemed    Therapeutic Intervention: Met with patient alone  Outpatient - Insight oriented psychotherapy 45 min - CPT code 39421    Chief complaint/reason for encounter: anxiety     Interval history and content of current session: Pt doing well, preparing to attend brother's wedding in [place]. Discuss attachment to her routine and anxiety she experiences prior to travel, help pt identify sources of anxiety, agree she is gradually shifting to a more confident sense of self as she does things on her own.     Treatment plan:  · Target symptoms: anxiety   · Why chosen therapy is appropriate versus another modality: relevant to diagnosis  · Outcome monitoring methods: self-report  · Therapeutic intervention type: insight oriented psychotherapy    Risk parameters:  Patient reports no suicidal ideation  Patient reports no homicidal  ideation  Patient reports no self-injurious behavior  Patient reports no violent behavior    Verbal deficits: None    Patient's response to intervention:  The patient's response to intervention is motivated.    Progress toward goals and other mental status changes:  The patient's progress toward goals is good    Diagnosis:   Adjustment disorder with anxiety    Plan:  individual psychotherapy    Return to clinic: pt will schedule further appointments    Length of Service (minutes): 45

## 2022-06-08 ENCOUNTER — OFFICE VISIT (OUTPATIENT)
Dept: PSYCHIATRY | Facility: CLINIC | Age: 52
End: 2022-06-08
Payer: COMMERCIAL

## 2022-06-08 DIAGNOSIS — F43.22 ADJUSTMENT DISORDER WITH ANXIETY: Primary | ICD-10-CM

## 2022-06-08 PROCEDURE — 99999 PR PBB SHADOW E&M-EST. PATIENT-LVL I: ICD-10-PCS | Mod: PBBFAC,,, | Performed by: SOCIAL WORKER

## 2022-06-08 PROCEDURE — 3044F PR MOST RECENT HEMOGLOBIN A1C LEVEL <7.0%: ICD-10-PCS | Mod: CPTII,S$GLB,, | Performed by: SOCIAL WORKER

## 2022-06-08 PROCEDURE — 90834 PR PSYCHOTHERAPY W/PATIENT, 45 MIN: ICD-10-PCS | Mod: S$GLB,,, | Performed by: SOCIAL WORKER

## 2022-06-08 PROCEDURE — 90834 PSYTX W PT 45 MINUTES: CPT | Mod: S$GLB,,, | Performed by: SOCIAL WORKER

## 2022-06-08 PROCEDURE — 3044F HG A1C LEVEL LT 7.0%: CPT | Mod: CPTII,S$GLB,, | Performed by: SOCIAL WORKER

## 2022-06-08 PROCEDURE — 99999 PR PBB SHADOW E&M-EST. PATIENT-LVL I: CPT | Mod: PBBFAC,,, | Performed by: SOCIAL WORKER

## 2022-06-08 NOTE — PROGRESS NOTES
The patient location is: [place], [place]  The chief complaint leading to consultation is: adjustment disorder    Visit type: audiovisual    Face to Face time with patient: 45 minutes  50 minutes of total time spent on the encounter, which includes face to face time and non-face to face time preparing to see the patient (eg, review of tests), Obtaining and/or reviewing separately obtained history, Documenting clinical information in the electronic or other health record, Independently interpreting results (not separately reported) and communicating results to the patient/family/caregiver, or Care coordination (not separately reported).         Each patient to whom he or she provides medical services by telemedicine is:  (1) informed of the relationship between the physician and patient and the respective role of any other health care provider with respect to management of the patient; and (2) notified that he or she may decline to receive medical services by telemedicine and may withdraw from such care at any time.    Notes:     Individual Psychotherapy (PhD/LCSW)    6/8/2022    Site:  telemed    Therapeutic Intervention: Met with patient alone  Outpatient - Insight oriented psychotherapy 45 min - CPT code 06245    Chief complaint/reason for encounter: anxiety     Interval history and content of current session: Pt attended brother's 3rd wedding in [place] with sister Noemy. Mom passed out twice at wedding creating drama in which multiple family conflicts were addressed and potentially resolved. Pt felt grounded and able to use skills learned in [place] to address multiple family issues, noticed that she did not get the somatic Sx she has experienced in past traumas, feels very positive about her personal growth. After fearing mom was dying, pt reached out to tell daughters she loves them, got reply from E asking her to keep her distance and no reply from M.  Pt trying to accept that their perception of her currently  prevents real communication.     Treatment plan:  · Target symptoms: anxiety   · Why chosen therapy is appropriate versus another modality: relevant to diagnosis  · Outcome monitoring methods: self-report  · Therapeutic intervention type: insight oriented psychotherapy    Risk parameters:  Patient reports no suicidal ideation  Patient reports no homicidal ideation  Patient reports no self-injurious behavior  Patient reports no violent behavior    Verbal deficits: None    Patient's response to intervention:  The patient's response to intervention is motivated.    Progress toward goals and other mental status changes:  The patient's progress toward goals is good    Diagnosis:   Adjustment disorder with anxiety    Plan:  individual psychotherapy    Return to clinic: pt will schedule further appointments    Length of Service (minutes): 45

## 2022-06-15 ENCOUNTER — OFFICE VISIT (OUTPATIENT)
Dept: PSYCHIATRY | Facility: CLINIC | Age: 52
End: 2022-06-15
Payer: COMMERCIAL

## 2022-06-15 DIAGNOSIS — F43.22 ADJUSTMENT DISORDER WITH ANXIETY: Primary | ICD-10-CM

## 2022-06-15 PROCEDURE — 3044F PR MOST RECENT HEMOGLOBIN A1C LEVEL <7.0%: ICD-10-PCS | Mod: CPTII,S$GLB,, | Performed by: SOCIAL WORKER

## 2022-06-15 PROCEDURE — 99999 PR PBB SHADOW E&M-EST. PATIENT-LVL I: CPT | Mod: PBBFAC,,, | Performed by: SOCIAL WORKER

## 2022-06-15 PROCEDURE — 99999 PR PBB SHADOW E&M-EST. PATIENT-LVL I: ICD-10-PCS | Mod: PBBFAC,,, | Performed by: SOCIAL WORKER

## 2022-06-15 PROCEDURE — 90834 PSYTX W PT 45 MINUTES: CPT | Mod: S$GLB,,, | Performed by: SOCIAL WORKER

## 2022-06-15 PROCEDURE — 3044F HG A1C LEVEL LT 7.0%: CPT | Mod: CPTII,S$GLB,, | Performed by: SOCIAL WORKER

## 2022-06-15 PROCEDURE — 90834 PR PSYCHOTHERAPY W/PATIENT, 45 MIN: ICD-10-PCS | Mod: S$GLB,,, | Performed by: SOCIAL WORKER

## 2022-06-15 NOTE — PROGRESS NOTES
The patient location is: [place], [place]  The chief complaint leading to consultation is: adjustment disorder    Visit type: audiovisual    Face to Face time with patient: 45 minutes  50 minutes of total time spent on the encounter, which includes face to face time and non-face to face time preparing to see the patient (eg, review of tests), Obtaining and/or reviewing separately obtained history, Documenting clinical information in the electronic or other health record, Independently interpreting results (not separately reported) and communicating results to the patient/family/caregiver, or Care coordination (not separately reported).         Each patient to whom he or she provides medical services by telemedicine is:  (1) informed of the relationship between the physician and patient and the respective role of any other health care provider with respect to management of the patient; and (2) notified that he or she may decline to receive medical services by telemedicine and may withdraw from such care at any time.    Notes:     Individual Psychotherapy (PhD/LCSW)    6/15/2022    Site:  telemed    Therapeutic Intervention: Met with patient alone  Outpatient - Insight oriented psychotherapy 45 min - CPT code 87953    Chief complaint/reason for encounter: anxiety     Interval history and content of current session: Pt reports doing well but has somatic tightness and SOB, unsure why. Help pt to notice concern about finances as she and Elbert work on [employer] and she awaits either getting a raise in current job or having to find a better income. Encourage pt to focus on challenges she has overcome in her life, noting the anticipatory anxiety vs positive outcome and to allow herself to somatically experience the relief and pride of successes in the past.     Treatment plan:  · Target symptoms: anxiety   · Why chosen therapy is appropriate versus another modality: relevant to diagnosis  · Outcome monitoring  methods: self-report  · Therapeutic intervention type: insight oriented psychotherapy    Risk parameters:  Patient reports no suicidal ideation  Patient reports no homicidal ideation  Patient reports no self-injurious behavior  Patient reports no violent behavior    Verbal deficits: None    Patient's response to intervention:  The patient's response to intervention is motivated.    Progress toward goals and other mental status changes:  The patient's progress toward goals is good    Diagnosis:   Adjustment disorder with anxiety    Plan:  individual psychotherapy    Return to clinic: pt will schedule further appointments    Length of Service (minutes): 45

## 2022-06-19 ENCOUNTER — PATIENT MESSAGE (OUTPATIENT)
Dept: PSYCHIATRY | Facility: CLINIC | Age: 52
End: 2022-06-19
Payer: COMMERCIAL

## 2022-06-20 ENCOUNTER — OFFICE VISIT (OUTPATIENT)
Dept: DERMATOLOGY | Facility: CLINIC | Age: 52
End: 2022-06-20
Payer: COMMERCIAL

## 2022-06-20 DIAGNOSIS — D22.9 MULTIPLE BENIGN NEVI: Primary | ICD-10-CM

## 2022-06-20 DIAGNOSIS — L81.4 SOLAR LENTIGO: ICD-10-CM

## 2022-06-20 DIAGNOSIS — Z12.83 SKIN CANCER SCREENING: ICD-10-CM

## 2022-06-20 DIAGNOSIS — Z12.83 SCREENING EXAM FOR SKIN CANCER: ICD-10-CM

## 2022-06-20 DIAGNOSIS — L82.1 SK (SEBORRHEIC KERATOSIS): ICD-10-CM

## 2022-06-20 PROCEDURE — 99999 PR PBB SHADOW E&M-EST. PATIENT-LVL III: CPT | Mod: PBBFAC,,, | Performed by: DERMATOLOGY

## 2022-06-20 PROCEDURE — 99213 PR OFFICE/OUTPT VISIT, EST, LEVL III, 20-29 MIN: ICD-10-PCS | Mod: S$GLB,,, | Performed by: DERMATOLOGY

## 2022-06-20 PROCEDURE — 99213 OFFICE O/P EST LOW 20 MIN: CPT | Mod: S$GLB,,, | Performed by: DERMATOLOGY

## 2022-06-20 PROCEDURE — 99999 PR PBB SHADOW E&M-EST. PATIENT-LVL III: ICD-10-PCS | Mod: PBBFAC,,, | Performed by: DERMATOLOGY

## 2022-06-20 PROCEDURE — 3044F PR MOST RECENT HEMOGLOBIN A1C LEVEL <7.0%: ICD-10-PCS | Mod: CPTII,S$GLB,, | Performed by: DERMATOLOGY

## 2022-06-20 PROCEDURE — 1160F RVW MEDS BY RX/DR IN RCRD: CPT | Mod: CPTII,S$GLB,, | Performed by: DERMATOLOGY

## 2022-06-20 PROCEDURE — 1159F PR MEDICATION LIST DOCUMENTED IN MEDICAL RECORD: ICD-10-PCS | Mod: CPTII,S$GLB,, | Performed by: DERMATOLOGY

## 2022-06-20 PROCEDURE — 1159F MED LIST DOCD IN RCRD: CPT | Mod: CPTII,S$GLB,, | Performed by: DERMATOLOGY

## 2022-06-20 PROCEDURE — 1160F PR REVIEW ALL MEDS BY PRESCRIBER/CLIN PHARMACIST DOCUMENTED: ICD-10-PCS | Mod: CPTII,S$GLB,, | Performed by: DERMATOLOGY

## 2022-06-20 PROCEDURE — 3044F HG A1C LEVEL LT 7.0%: CPT | Mod: CPTII,S$GLB,, | Performed by: DERMATOLOGY

## 2022-06-20 NOTE — PROGRESS NOTES
Subjective:       Patient ID:  Becky Cade is a 51 y.o. female who presents for   Chief Complaint   Patient presents with    Skin Check     UBSE     History of Present Illness: The patient presents for follow up of skin check.    The patient was last seen on: 7/16/2020 for bx of right post neck (benign nevus) and no other abnormal lesions were found.    No hx skin cancer.     Patient with new complaint of lesion(s)  Location: left side of nose  Duration: whole life  Symptoms: none  Relieving factors/Previous treatments: none    Patient with new complaint of lesion(s)  Location: tip of nose  Duration: whole life  Symptoms: none  Relieving factors/Previous treatments: none            Review of Systems   Skin: Positive for daily sunscreen use and activity-related sunscreen use. Negative for recent sunburn.   Hematologic/Lymphatic: Does not bruise/bleed easily.        Objective:    Physical Exam   Constitutional: She appears well-developed and well-nourished. No distress.   Neurological: She is alert and oriented to person, place, and time. She is not disoriented.   Psychiatric: She has a normal mood and affect.   Skin:   Areas Examined (abnormalities noted in diagram):   Head / Face Inspection Performed  Neck Inspection Performed  Chest / Axilla Inspection Performed  Back Inspection Performed  RUE Inspected  LUE Inspection Performed  RLE Inspected  LLE Inspection Performed                   Diagram Legend     Erythematous scaling macule/papule c/w actinic keratosis       Vascular papule c/w angioma      Pigmented verrucoid papule/plaque c/w seborrheic keratosis      Yellow umbilicated papule c/w sebaceous hyperplasia      Irregularly shaped tan macule c/w lentigo     1-2 mm smooth white papules consistent with Milia      Movable subcutaneous cyst with punctum c/w epidermal inclusion cyst      Subcutaneous movable cyst c/w pilar cyst      Firm pink to brown papule c/w dermatofibroma      Pedunculated fleshy  papule(s) c/w skin tag(s)      Evenly pigmented macule c/w junctional nevus     Mildly variegated pigmented, slightly irregular-bordered macule c/w mildly atypical nevus      Flesh colored to evenly pigmented papule c/w intradermal nevus       Pink pearly papule/plaque c/w basal cell carcinoma      Erythematous hyperkeratotic cursted plaque c/w SCC      Surgical scar with no sign of skin cancer recurrence      Open and closed comedones      Inflammatory papules and pustules      Verrucoid papule consistent consistent with wart     Erythematous eczematous patches and plaques     Dystrophic onycholytic nail with subungual debris c/w onychomycosis     Umbilicated papule    Erythematous-base heme-crusted tan verrucoid plaque consistent with inflamed seborrheic keratosis     Erythematous Silvery Scaling Plaque c/w Psoriasis     See annotation      Assessment / Plan:        Multiple benign nevi  Reassurance given to patient. No treatment is necessary.   Treatment of benign, asymptomatic lesions may be considered cosmetic.        SK (seborrheic keratosis)  These are benign inherited growths without a malignant potential. Reassurance given to patient. No treatment is necessary.     Solar lentigo   - minor problem and chronic.   Reassurance given to patient. No treatment necessary.       Screening exam for skin cancer    Upper body skin examination performed today including at least 6 points as noted in physical examination. No lesions suspicious for malignancy noted.    Recommend daily sun protection/avoidance and use of at least SPF 30, broad spectrum sunscreen (OTC drug).                No follow-ups on file.

## 2022-06-24 ENCOUNTER — PATIENT MESSAGE (OUTPATIENT)
Dept: PSYCHIATRY | Facility: CLINIC | Age: 52
End: 2022-06-24
Payer: COMMERCIAL

## 2022-06-27 ENCOUNTER — PATIENT MESSAGE (OUTPATIENT)
Dept: PRIMARY CARE CLINIC | Facility: CLINIC | Age: 52
End: 2022-06-27
Payer: COMMERCIAL

## 2022-06-29 ENCOUNTER — OFFICE VISIT (OUTPATIENT)
Dept: PSYCHIATRY | Facility: CLINIC | Age: 52
End: 2022-06-29
Payer: COMMERCIAL

## 2022-06-29 DIAGNOSIS — F43.22 ADJUSTMENT DISORDER WITH ANXIETY: Primary | ICD-10-CM

## 2022-06-29 PROCEDURE — 3044F PR MOST RECENT HEMOGLOBIN A1C LEVEL <7.0%: ICD-10-PCS | Mod: CPTII,S$GLB,, | Performed by: SOCIAL WORKER

## 2022-06-29 PROCEDURE — 3044F HG A1C LEVEL LT 7.0%: CPT | Mod: CPTII,S$GLB,, | Performed by: SOCIAL WORKER

## 2022-06-29 PROCEDURE — 90834 PR PSYCHOTHERAPY W/PATIENT, 45 MIN: ICD-10-PCS | Mod: S$GLB,,, | Performed by: SOCIAL WORKER

## 2022-06-29 PROCEDURE — 99999 PR PBB SHADOW E&M-EST. PATIENT-LVL I: CPT | Mod: PBBFAC,,, | Performed by: SOCIAL WORKER

## 2022-06-29 PROCEDURE — 90834 PSYTX W PT 45 MINUTES: CPT | Mod: S$GLB,,, | Performed by: SOCIAL WORKER

## 2022-06-29 PROCEDURE — 99999 PR PBB SHADOW E&M-EST. PATIENT-LVL I: ICD-10-PCS | Mod: PBBFAC,,, | Performed by: SOCIAL WORKER

## 2022-06-29 NOTE — TELEPHONE ENCOUNTER
Left message for pt to return office call.  
----- Message from Joi Putnam sent at 10/2/2018 10:11 AM CDT -----  Contact: [phone]  Patient is returning a phone call.  Who left a message for the patient: Clemencia  Does patient know what this is regarding:    Comments:   
----- Message from Rosana Velazquez sent at 10/2/2018  9:26 AM CDT -----  Contact: Sheri [phone]  Type: Orders Request    What orders/ testing are being requested? Colonscopy    Is there a future appointment scheduled for the patient with PCP? no    When?    Would you prefer a response via [employer]? no    Comments: Prefers to be scheduled on Fridays    Please call and advise  Thank you      
Informed patient   
Please tell Ms. Ospina the colonoscopy was ordered and the colonoscopy dept should call her within 2 weeks to describe prep and schedule appt. Please tell her to call us if this does not happen. GML  
Spoke to the patient, states that she is requesting a colonoscopy because her best friend has stage 4 colon cancer.     I did ask her about family history, she wasn't sure. Our guide lines is that you have to be 50 to get a colonoscopy, but I also told her that I will send you a message     Please advise. Thank you   
RN Handoff given to SHALINI Wiggins.

## 2022-06-29 NOTE — PROGRESS NOTES
"The patient location is: [place], [place]  The chief complaint leading to consultation is: adjustment disorder    Visit type: audiovisual    Face to Face time with patient: 45 minutes  50 minutes of total time spent on the encounter, which includes face to face time and non-face to face time preparing to see the patient (eg, review of tests), Obtaining and/or reviewing separately obtained history, Documenting clinical information in the electronic or other health record, Independently interpreting results (not separately reported) and communicating results to the patient/family/caregiver, or Care coordination (not separately reported).         Each patient to whom he or she provides medical services by telemedicine is:  (1) informed of the relationship between the physician and patient and the respective role of any other health care provider with respect to management of the patient; and (2) notified that he or she may decline to receive medical services by telemedicine and may withdraw from such care at any time.    Notes:     Individual Psychotherapy (PhD/LCSW)    6/29/2022    Site:  telemed    Therapeutic Intervention: Met with patient alone  Outpatient - Insight oriented psychotherapy 45 min - CPT code 19614    Chief complaint/reason for encounter: anxiety     Interval history and content of current session: Pt generally doing well, felt ready to go on a brief date and enjoyed herself, clear she is not looking for a relationship at this point, but building confidence in trying new experiences. Pt concerned about recent legal correspondence from  but feels confident that her attorneys are handling the property settlement. Long discussion about request from Linda's therapist to meet with her to discuss relationship with both girls. Pt tearful in discussing missing her daughters, but acutely uncomfortable about requirement that I not be present, aware girls want "apology" and anxious about being misunderstood. " Support pt's concerns and agree this meeting would not be a good idea at this time.     Treatment plan:  · Target symptoms: anxiety   · Why chosen therapy is appropriate versus another modality: relevant to diagnosis  · Outcome monitoring methods: self-report  · Therapeutic intervention type: insight oriented psychotherapy    Risk parameters:  Patient reports no suicidal ideation  Patient reports no homicidal ideation  Patient reports no self-injurious behavior  Patient reports no violent behavior    Verbal deficits: None    Patient's response to intervention:  The patient's response to intervention is motivated.    Progress toward goals and other mental status changes:  The patient's progress toward goals is good    Diagnosis:   Adjustment disorder with anxiety    Plan:  individual psychotherapy    Return to clinic: pt will schedule further appointments    Length of Service (minutes): 45

## 2022-07-01 PROBLEM — R27.9 LACK OF COORDINATION: Status: ACTIVE | Noted: 2022-07-01

## 2022-07-06 ENCOUNTER — OFFICE VISIT (OUTPATIENT)
Dept: PSYCHIATRY | Facility: CLINIC | Age: 52
End: 2022-07-06
Payer: COMMERCIAL

## 2022-07-06 DIAGNOSIS — F43.22 ADJUSTMENT DISORDER WITH ANXIETY: Primary | ICD-10-CM

## 2022-07-06 PROCEDURE — 99999 PR PBB SHADOW E&M-EST. PATIENT-LVL I: ICD-10-PCS | Mod: PBBFAC,,, | Performed by: SOCIAL WORKER

## 2022-07-06 PROCEDURE — 99999 PR PBB SHADOW E&M-EST. PATIENT-LVL I: CPT | Mod: PBBFAC,,, | Performed by: SOCIAL WORKER

## 2022-07-06 PROCEDURE — 3044F HG A1C LEVEL LT 7.0%: CPT | Mod: CPTII,S$GLB,, | Performed by: SOCIAL WORKER

## 2022-07-06 PROCEDURE — 3044F PR MOST RECENT HEMOGLOBIN A1C LEVEL <7.0%: ICD-10-PCS | Mod: CPTII,S$GLB,, | Performed by: SOCIAL WORKER

## 2022-07-06 PROCEDURE — 90834 PSYTX W PT 45 MINUTES: CPT | Mod: S$GLB,,, | Performed by: SOCIAL WORKER

## 2022-07-06 PROCEDURE — 90834 PR PSYCHOTHERAPY W/PATIENT, 45 MIN: ICD-10-PCS | Mod: S$GLB,,, | Performed by: SOCIAL WORKER

## 2022-07-06 NOTE — PROGRESS NOTES
The patient location is: [place], [place]  The chief complaint leading to consultation is: adjustment disorder    Visit type: audiovisual    Face to Face time with patient: 45 minutes  50 minutes of total time spent on the encounter, which includes face to face time and non-face to face time preparing to see the patient (eg, review of tests), Obtaining and/or reviewing separately obtained history, Documenting clinical information in the electronic or other health record, Independently interpreting results (not separately reported) and communicating results to the patient/family/caregiver, or Care coordination (not separately reported).         Each patient to whom he or she provides medical services by telemedicine is:  (1) informed of the relationship between the physician and patient and the respective role of any other health care provider with respect to management of the patient; and (2) notified that he or she may decline to receive medical services by telemedicine and may withdraw from such care at any time.    Notes:     Individual Psychotherapy (PhD/LCSW)    7/6/2022    Site:  telemed    Therapeutic Intervention: Met with patient alone  Outpatient - Insight oriented psychotherapy 45 min - CPT code 92044    Chief complaint/reason for encounter: anxiety     Interval history and content of current session: Pt doing well in her life except for grief about estrangement of daughters, learned Linda has left for [place] without any contact with pt, took this hard. Pt struggles with hurtful messages from the girls telling her she is not a good mother, agree now is not a useful time to reach out given that ROXANA is in [place] and going on a wilderness program, and M is starting her new life in [place].     Treatment plan:  · Target symptoms: anxiety   · Why chosen therapy is appropriate versus another modality: relevant to diagnosis  · Outcome monitoring methods: self-report  · Therapeutic intervention type: insight  oriented psychotherapy    Risk parameters:  Patient reports no suicidal ideation  Patient reports no homicidal ideation  Patient reports no self-injurious behavior  Patient reports no violent behavior    Verbal deficits: None    Patient's response to intervention:  The patient's response to intervention is motivated.    Progress toward goals and other mental status changes:  The patient's progress toward goals is good    Diagnosis:   Adjustment disorder with anxiety    Plan:  individual psychotherapy    Return to clinic: pt will schedule further appointments    Length of Service (minutes): 45

## 2022-07-08 ENCOUNTER — CLINICAL SUPPORT (OUTPATIENT)
Dept: REHABILITATION | Facility: OTHER | Age: 52
End: 2022-07-08
Payer: COMMERCIAL

## 2022-07-08 DIAGNOSIS — R27.9 LACK OF COORDINATION: ICD-10-CM

## 2022-07-08 PROCEDURE — 97161 PT EVAL LOW COMPLEX 20 MIN: CPT

## 2022-07-08 PROCEDURE — 97530 THERAPEUTIC ACTIVITIES: CPT

## 2022-07-08 NOTE — PLAN OF CARE
[facility]  Pelvic Health Physical Therapy Initial Evaluation    Date: 2022   Name: Becky Cade  Clinic Number: [id number]  Therapy Diagnosis:   Encounter Diagnosis   Name Primary?    Lack of coordination      Physician: Brian Babcock MD    Physician Orders: PT Eval and Treat  Medical Diagnosis from Referral: N39.8 (ICD-10-CM) - Voiding dysfunction  Evaluation Date: 2022  Authorization Period Expiration: 2022  Plan of Care Expiration: 10/6/2022  Visit # / Visits authorized:     Time In: 900  Time Out: 1000  Total Appointment Time (timed & untimed codes): 60 minutes    Precautions: s/p retropubic tension-free midurethral sling on 3/25/2022, IBS    Pronouns: she/her    Subjective     Date of onset: ~24 years ago    History of current condition - Becky reports: since having 2 children she has had issues with urinary incontinence. Pt reports that she finalized her divorce 2 weeks ago and decided she was ready to start working on herself. She states that Dr. Babcock performed bladder sling in March and started noticing improvement 2 months ago to where she is not experiencing any urinary incontinence. She reports that she has noticed that she is straining to urinate but has been on slowing down and relaxing while she voids. Pt states that she had pain with intercourse previously which she attributes to poor relationship with her  and inability to relax around him. She reports that she will be seeing Dr. Villarreal to address hormones.     OB/GYN History: , vaginal delivery and episiotomy  Birth Control: OCP  Sexually active? Not since divorce  Pain with vaginal exams, intercourse or tampon use? No    Bladder/Bowel History:    Frequency of urination:   Daytime: 8x/day            Nighttime: 1-2x related to drinking hot tea before bed   Difficulty initiating urine stream: No   Urine stream: strong   Complete emptying: Yes   Bladder leakage: Not since bladder  "sling   Urinary Urgency: No   Frequency of bowel movements: 4 times a day   Difficulty initiating BM: No   Quality/Shape of BM: Mayes Type 6 when stressed, usually type 3   Does Patient Feel Empty after BM? Yes   Fiber Supplements or Laxative Use? 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     Surgical History: Becky Cade  has a past surgical history that includes Tonsillectomy (1993); Cold knife conization of cervix (2009); Colonoscopy (N/A, 9/18/2020); Insertion of midurethral sling (N/A, 3/25/2022); and Cystoscopy (N/A, 3/25/2022).    Medications: Becky has a current medication list which includes the following prescription(s): acyclovir, desogestrel-ethinyl estradiol, famotidine, gabapentin, hydroxyzine hcl, levothyroxine, lorazepam, paroxetine, triamcinolone acetonide 0.1%, and triamcinolone acetonide 0.1%.    Allergies:   Review of patient's allergies indicates:   Allergen Reactions    Mobic [meloxicam] Rash          Prior Therapy/Previous treatment included: none  Social History: lives alone  Current exercise: none, walks frequently at work  Occupation: Pt works as at [employer] and job-related duties include prolonged standing and walking.  Prior Level of Function: IND  Current Level of Function: IND    Types of fluid intake: water (3 24 oz) with lemon, virginie grey tea with skim milk and 3 Splendas, 1 diet coke, 1 glass milk with dinner  Diet: clean and balanced  Habitus: well developed, well nourished  Abuse/Neglect: No     Pts goals: "learning more about pelvic PT"     OBJECTIVE     See EMR under MEDIA for written consent " provided 7/8/2022  Chaperone: Declined     ORTHO SCREEN  Posture in sitting: left leg crossed over right leg  Posture in standing: not assessed today  Pelvic alignment: Not assessed today      ABDOMINAL WALL ASSESSMENT  Palpation: WNL  Abdominal strength: good  Scarring: none  Pelvic Floor Muscle and Transverse Abdominus Synergy: absent  Diastasis: absent      BREATHING MECHANICS ASSESSMENT   Thorax Assessment During Quiet Respiration: WNL excursion of ribcage and WNL excursion of abdominal wall  Thorax Assessment During Deep Respiration: WNL excursion of ribcage and WNL excursion of abdominal wall    VAGINAL PELVIC FLOOR EXAM    EXTERNAL ASSESSMENT  Introitus: WNL  Skin condition: WNL  Scarring: none   Sensation: WNL   Pain: none  Voluntary contraction: visible lift  Voluntary relaxation: visible drop  Involuntary contraction: reflex tightening  Bearing down: visible drop  Perineal descent: absent      INTERNAL ASSESSMENT  Pain: none   Sensation: WNL   Vaginal vault: WNL   Muscle Bulk: WFL   Muscle Power: 3/5  Muscle Endurance: 5 sec  # Reps To Fatigue: 10    Fast Contractions in 10 seconds: 4     Quality of contraction: WNL   Specificity: WNL   Coordination: WNL   Prolapse check: NT    TREATMENT     Treatment Time In: 930  Treatment Time Out: 1000  Total Treatment time (time-based codes) separate from Evaluation: 30 minutes    Therapeutic Activity Patient participated in dynamic functional therapeutic activities to improve functional performance for 20 minutes. Including: Education as described below.   Edu on bladder health, bladder irritants, and daily water intake  Edu on pressure management strategies to decrease strain on PFM  Edu on double voiding techniques. Practiced technique with patient and utilized teach back strategy to confirm understanding.     Neuromuscular Re-education to develop Coordination and Control for 10 minutes including:   Nick edu and practice.  Increased time spent on edu on proper  technique.  Pt improves with practice and verbal cues.    Patient Education provided:   general anatomy/physiology of urinary/ bowel  system, benefits of treatment and risks of treatment were discussed with the pt. Additionally, bladder irritants, anatomy/physiology of pelvic floor, posture/body mechanices, bladder retraining, diaphragmatic breathing, kegels, fluid intake/dietary modifications and behavior modifications were reviewed.     Home Exercises provided:  Written Home Exercises provided: Yes  Exercises were reviewed and Becky was able to demonstrate them prior to the end of the session.    Becky demonstrated good  understanding of the education provided.     See EMR under Patient Instructions for exercises provided prior visit.    Assessment     Becky is a 51 y.o. female referred to outpatient Physical Therapy with a medical diagnosis of N39.8 (ICD-10-CM) - Voiding dysfunction. Pt presents to PT s/p midurethral sling performed in March 2022 with initial complaints of urinary incontinence and straining to urinate which she reports resolved completely after the procedure. Intravaginal assessment reveals slight decreased PFM strength, however performance of kegels improves with verbal cues. Pt does not report any TTP or demonstrate increased tension throughout PFM or periurethral tissues. Discussed pressure management strategies to avoid strain on pelvic structures, bladder health, and double voiding strategies to avoid straining during voiding. Pt states that she does not need to schedule follow up visits and is mainly interested in learning more about pelvic therapy in the event she should have complaints in the future. Pt presents with decreased pelvic muscle strength.     Pt prognosis is Excellent  Pt will benefit from skilled outpatient Physical Therapy to address the deficits stated above and in the chart below, provide pt/family education, and to maximize pt's level of independence.     Plan  of care discussed with patient: Yes  Pt's spiritual, cultural and educational needs considered and patient is agreeable to the plan of care and goals as stated below:     Anticipated Barriers for therapy: None    Medical Necessity is demonstrated by the following:    History  Co-morbidities and personal factors that may impact the plan of care Co-morbidities   prior pelvic surgery    Personal Factors  no deficits     low   Examination  Body structures and functions, activity limitations and participation restrictions that may impact the plan of care Body Regions/Systems/Functions:  decreased pelvic muscle strength     Activity Limitations:  none    Participation Restrictions:  none    Activity limitations:   Learning and applying knowledge  No deficits    General Tasks and Commands  No deficits    Communication  No deficits    Mobility  No deficits    Self care  No deficits    Domestic Life  No deficits    Interactions/Relationships  No deficits    Life Areas  No deficits    Community and Social Life  No deficits       low   Clinical Presentation stable and uncomplicated low   Decision Making/ Complexity Score: low       Goals:  Pt services deferred at this time.     Plan     Plan of care Certification: 7/8/2022 to 10/6/2022.    Pt reports urinary complaints have resolved since having surgery with Dr. Babcock and that she was interested in learning more about pelvic PT in the event she should need therapy in the future. Pt was provided education and general pelvic screen and given information to contact to schedule should her situation change.    Anna Kimura, PT

## 2022-07-08 NOTE — PATIENT INSTRUCTIONS
Home Exercise Program: 07/08/2022      BLADDER HEALTH      WHAT IS CONSIDERED NORMAL?  The average bladder can hold about 2 cups of urine before it needs to be emptied.  The normal range of voiding urine is 6 to 8 times during a 24 hour period, or every 3-4 hours. As we get older, our bladder capacity can get smaller and we may need to pass urine more frequently but usually not more than every 2 hours.  Urine should flow easily without discomfort in a good, steady stream until the bladder is empty. No pushing or straining is necessary to empty the bladder.  An urge is a normal signal that you feel as the bladder stretches to fill with urine. Urges can be felt even if the bladder is not full. Urges are not commands to go to the toilet, merely a signal and can be controlled.    WHAT ARE GOOD BLADDER HABITS?  Take your time when emptying your bladder. Dont strain or push to empty your bladder. Make sure you empty your bladder completely each time you pass urine.  Dont wait too long - consistently ignoring the urge to go (waiting more than 4 hours between toileting) or urinating too infrequently may be convenient but not healthy for your bladder. You can actually overstretch your bladder beyond its normal size.   Dont go too often - avoid going to the toilet just in case (more often than every 2 hours). It is usually not necessary to go when you feel the first urge. Try to go only when your bladder is full. Dont let your bladder control your life.    DIET CAN AFFECT THE BLADDER  Maintain a healthy fluid intake. Many people decrease their intake of liquids in hopes that they will need to urinate less frequently or have less urinary leakage. While a decrease in liquid intake does result in a decrease in the volume of urine, the smaller amount of urine may be more highly concentrated. Highly concentrated, dark yellow urine is irritating to the bladder surface and may actually cause you to go to the bathroom more  frequently. It also encourages the growth of bacteria, which may lead to infections resulting in incontinence.  Depending on your body size and environment, drink 4-8 cups (8 oz each) of fluid per day, spread throughout the entire day, unless otherwise advised by your doctor.    Avoid or use the following acidic food/beverages in moderation:  Alcoholic beverages    Tomato-based products  Vinegar  Coffee (regular and decaf)  Tea (regular and decaf)  Citrus fruits and juices  Spicy foods, moore  Caffeinated beverages & soda   Milk  Food colorings and flavorings  Artificial sweeteners  Chocolate    Try using these dietary substitutions:  Water: grape juice, apple juice  Fruit: pears, apricots, papaya, watermelon  Coffee: KAVA®, Postum®, Evonne®, Kaffree Calumet®  Tea: Non-citrus herbal, sun-brewed tea  Vitamin C: Calcium carbonate co-buffered with calcium ascorbate    Avoid constipation by maintaining a balanced diet of dietary fiber. Typical dietary recommendations for fiber are between 25-35 grams per day. You should discuss your fiber needs with your physician, pharmacist or nutritionist.    Try to limit fluids 2 hours before bedtime to decrease nighttime urination.       PELVIC FLOOR PRESSURE MANAGEMENT         Your pelvic floor muscles and your diaphragm work closely together to regulate the pressure in your body. Each time you inhale, your diaphragm contracts, flattens, and moves downward. In response, your pelvic floor muscles relax and drop down as well. When you exhale, both muscles lift upwards. Throughout a diaphragmatic breath cycle, your pelvic floor goes through a full range of motion that contributes to its optimal function.    Think of your body like a canister, with one opening at the top where your mouth is and another opening at the bottom through your pelvic floor. If the top of the canister is closed off (as with straining during bowel movements or Valsalva maneuver during lifting) all of the pressure  moves downward. If your pelvic floor muscles are not strong enough to counteract this increased pressure, you may experience leaking or increased sensations of pelvic organ prolapse.     This is why it is extremely important to implement the following pressure management techniques:  Avoiding Constipation - make high-fiber foods part of your regular diet (fruits, vegetables, bran, and beans), reduce the amount of processed foods in your diet, drink plenty of water and fluids every day, exercise daily, and manage your stress with meditation or other relaxation techniques.  No Straining! Straining (bearing down and holding your breath) puts additional downward pressure on our organs, causing increased prolapse and pelvic pressure. Instead, blow out the candles as you gently push down. Do NOT hold your breath!  Use a Stool - Utilize a squat position when voiding. Get your knees up higher than your hips. Squatting helps relax the pelvic floor muscles and reduces straining.  Avoid heavy lifting and high-impact activities until you can strengthen your core and pelvic floor muscles appropriately. When you do have to lift, hold the load close to your body to reduce strain and do not hold your breath when lifting.  Do not Valsalva (hold your breath and push) at any time.  Use diaphragmatic breathing (belly breathing) to manage stress, help empty bladder, help empty bowels, and help lightly stretch pelvic floor muscles.       DOUBLE VOIDING - Double voiding is a technique that may assist the bladder to empty more effectively when urine is left in the bladder. It involves passing urine more than once each time that you go to the toilet. This makes sure that the bladder is completely empty.    Here are 3 strategies you can try to fully empty your bladder.  You do not have to do all of these things every single time. Find which ones work best for you.     Check to make sure your pelvic floor is relaxed   Do a body scan - make  sure your legs, buttocks, and abdominals are relaxed.  Take a couple deep, slow breaths to encourage your pelvic floor muscles to DROP (ie. Try Diaphragmatic Breathing).  Gently apply pressure over your bladder.  Change your pelvic position: lean forward, rock your pelvis forward and backward, stand up then sit back down.     Wait at least 30 seconds to 1 minute to see if a second urine stream begins.     Do not stop your urine stream or push/strain!    Kegels:    1. Sit or lie comfortably with legs and buttocks relaxed.  2. Squeeze and LIFT the muscles that stop the flow of urine and passage of gas.  Keep legs and buttock muscles quiet.  3. Hold lift for 5 seconds without holding breath.  4. Release and DROP the pelvic floor muscles for 10 seconds.  5. Repeat 15 times, 2-3 sets per day. Spread throughout the day.    No Kegels while urinating!        *Nerva sisi for IBS

## 2022-07-12 ENCOUNTER — OFFICE VISIT (OUTPATIENT)
Dept: OBSTETRICS AND GYNECOLOGY | Facility: CLINIC | Age: 52
End: 2022-07-12
Attending: OBSTETRICS & GYNECOLOGY
Payer: COMMERCIAL

## 2022-07-12 VITALS
SYSTOLIC BLOOD PRESSURE: 122 MMHG | DIASTOLIC BLOOD PRESSURE: 84 MMHG | BODY MASS INDEX: 23.68 KG/M2 | HEIGHT: 64 IN | WEIGHT: 138.69 LBS

## 2022-07-12 DIAGNOSIS — N95.1 PERIMENOPAUSE: Primary | ICD-10-CM

## 2022-07-12 DIAGNOSIS — Z13.21 ENCOUNTER FOR VITAMIN DEFICIENCY SCREENING: ICD-10-CM

## 2022-07-12 PROCEDURE — 3044F HG A1C LEVEL LT 7.0%: CPT | Mod: CPTII,S$GLB,, | Performed by: OBSTETRICS & GYNECOLOGY

## 2022-07-12 PROCEDURE — 3008F BODY MASS INDEX DOCD: CPT | Mod: CPTII,S$GLB,, | Performed by: OBSTETRICS & GYNECOLOGY

## 2022-07-12 PROCEDURE — 1159F PR MEDICATION LIST DOCUMENTED IN MEDICAL RECORD: ICD-10-PCS | Mod: CPTII,S$GLB,, | Performed by: OBSTETRICS & GYNECOLOGY

## 2022-07-12 PROCEDURE — 3079F DIAST BP 80-89 MM HG: CPT | Mod: CPTII,S$GLB,, | Performed by: OBSTETRICS & GYNECOLOGY

## 2022-07-12 PROCEDURE — 99999 PR PBB SHADOW E&M-EST. PATIENT-LVL III: CPT | Mod: PBBFAC,,, | Performed by: OBSTETRICS & GYNECOLOGY

## 2022-07-12 PROCEDURE — 99999 PR PBB SHADOW E&M-EST. PATIENT-LVL III: ICD-10-PCS | Mod: PBBFAC,,, | Performed by: OBSTETRICS & GYNECOLOGY

## 2022-07-12 PROCEDURE — 99214 OFFICE O/P EST MOD 30 MIN: CPT | Mod: S$GLB,,, | Performed by: OBSTETRICS & GYNECOLOGY

## 2022-07-12 PROCEDURE — 3079F PR MOST RECENT DIASTOLIC BLOOD PRESSURE 80-89 MM HG: ICD-10-PCS | Mod: CPTII,S$GLB,, | Performed by: OBSTETRICS & GYNECOLOGY

## 2022-07-12 PROCEDURE — 3044F PR MOST RECENT HEMOGLOBIN A1C LEVEL <7.0%: ICD-10-PCS | Mod: CPTII,S$GLB,, | Performed by: OBSTETRICS & GYNECOLOGY

## 2022-07-12 PROCEDURE — 3008F PR BODY MASS INDEX (BMI) DOCUMENTED: ICD-10-PCS | Mod: CPTII,S$GLB,, | Performed by: OBSTETRICS & GYNECOLOGY

## 2022-07-12 PROCEDURE — 3074F SYST BP LT 130 MM HG: CPT | Mod: CPTII,S$GLB,, | Performed by: OBSTETRICS & GYNECOLOGY

## 2022-07-12 PROCEDURE — 99214 PR OFFICE/OUTPT VISIT, EST, LEVL IV, 30-39 MIN: ICD-10-PCS | Mod: S$GLB,,, | Performed by: OBSTETRICS & GYNECOLOGY

## 2022-07-12 PROCEDURE — 3074F PR MOST RECENT SYSTOLIC BLOOD PRESSURE < 130 MM HG: ICD-10-PCS | Mod: CPTII,S$GLB,, | Performed by: OBSTETRICS & GYNECOLOGY

## 2022-07-12 PROCEDURE — 1159F MED LIST DOCD IN RCRD: CPT | Mod: CPTII,S$GLB,, | Performed by: OBSTETRICS & GYNECOLOGY

## 2022-07-12 NOTE — PROGRESS NOTES
"Subjective:      Becky Cade is a 51 y.o. female who presents to discuss hormone replacement therapy.  Menarche occurred at age 12 and the patient went into menopause at *** years of age, which was *** years ago. Patient is requesting hormone replacement therapy due to {hrt reasons:03368}, {menopause s ymptoms:58029}.The patient {is/is not:80258} taking hormone replacement therapy. {post-men bleed:57826::"Patient denies post-menopausal vaginal bleeding."} The patient {is/is not/has never been:89337} sexually active.  She denies the following contraindications to HRT:  Vaginal bleeding, history of VTE/PE, thrombophilia,  breast cancer, or active liver disease.       PCP: ***       Routine labs: ***  Pap smear: 8/10/2020  Mammogram: ***  DEXA: ***  Colonoscopy: ***    No visits with results within 3 Month(s) from this visit.   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     Past Surgical History:   Procedure Laterality Date    COLD KNIFE CONIZATION OF CERVIX  2009    [facility]    COLONOSCOPY N/A 9/18/2020    Procedure: COLONOSCOPY;  Surgeon: Thaddeus Blackburn MD;  Location: [facility] ([address] [place] [address]);  Service: Endoscopy;  Laterality: N/A;  family history malignant hyperthermia  covid test [facility] urgent care 9/15    CYSTOSCOPY N/A 3/25/2022    Procedure: CYSTOSCOPY;  Surgeon: Brian Babcock MD;  Location: [facility] OR [address] [place] [address];  Service: OB/GYN;  " Laterality: N/A;    INSERTION OF MIDURETHRAL SLING N/A 3/25/2022    Procedure: SLING, MIDURETHRAL;  Surgeon: Brian Babcock MD;  Location: [facility] OR [address] [place] [address];  Service: OB/GYN;  Laterality: N/A;    TONSILLECTOMY       Social History     Tobacco Use    Smoking status: Never Smoker    Smokeless tobacco: Never Used   Substance Use Topics    Alcohol use: Yes     Alcohol/week: 2.0 standard drinks     Types: 2 Glasses of wine per week     Comment: socially on weekends    Drug use: No     Family History   Problem Relation Age of Onset    Rheum arthritis Mother     Arthritis Mother     Parkinsonism Father     Malignant hyperthermia Brother     Breast cancer Neg Hx     Colon cancer Neg Hx     Ovarian cancer Neg Hx     Melanoma Neg Hx     Cancer Neg Hx     Heart disease Neg Hx      OB History    Para Term  AB Living   2 2 0     2   SAB IAB Ectopic Multiple Live Births           2      # Outcome Date GA Lbr Alexis/2nd Weight Sex Delivery Anes PTL Lv   2 Para      Vag-Spont      1 Para      Vag-Spont          Current Outpatient Medications:     acyclovir (ZOVIRAX) 400 MG tablet, Take 1 tablet (400 mg total) by mouth 2 (two) times daily. Fever blisters, Disp: 14 tablet, Rfl: 0    desogestreL-ethinyl estradioL (APRI) 0.15-0.03 mg per tablet, Take 1 tablet by mouth once daily., Disp: , Rfl:     hydrOXYzine HCL (ATARAX) 10 MG Tab, TAKE 1 TABLET BY MOUTH EVERY DAY NIGHTLY AS NEEDED, Disp: 90 tablet, Rfl: 0    levothyroxine (SYNTHROID) 75 MCG tablet, Take 1 tablet (75 mcg total) by mouth before breakfast., Disp: 90 tablet, Rfl: 3    paroxetine (PAXIL) 30 MG tablet, Take 1 tablet (30 mg total) by mouth once daily. Cancel the 10 mg, Disp: 90 tablet, Rfl: 1    triamcinolone acetonide 0.1% (KENALOG) 0.1 % cream, AAA bid, Disp: 454 g, Rfl: 3    triamcinolone acetonide 0.1% (KENALOG) 0.1 % paste, APPLY TO MOUTH SORES 2 TIMES A DAY, Disp: 5 g, Rfl: 0    LORazepam (ATIVAN) 0.5 MG tablet, Take 1 tablet  "(0.5 mg total) by mouth nightly as needed for Anxiety. Use only infrequently, can be addictive (Patient not taking: Reported on 4/19/2022), Disp: 30 tablet, Rfl: 0    Vitals:    07/12/22 0903   BP: 122/84   Weight: 62.9 kg (138 lb 10.7 oz)   Height: 5' 4" (1.626 m)   PainSc: 0-No pain     Body mass index is 23.8 kg/m².    Assessment:    There are no diagnoses linked to this encounter.    Plan:   Risks and benefits of hormone replacement therapy were discussed.  Hormone replacement therapy options, including bioidentical versus non-bioidentical hormones, as well as alternatives discussed.    Start:   Estradiol *** mg orally QAM.   Progesterone *** mg orally QPM   Testosterone cypionate 50 mg IM monthly.  FDA warning for MI, stroke, and DVT reviewed.  Patient is aware this is off-label use.   Nature Made Calcium orally 600 mg ***D     Discussed:   Pregnenolone  mg orally QAM.   DHEA after T optimized   Melatonin after optimized on P4   Vitamin D and Vitamin B12 recommendations after lab results    Follow up in {1-10:54037} {time; units:95524}  Will recheck labs once on typical optimal dose or if having side effects.  Instructed patient to call if she experiences any side effects or has any questions.    "

## 2022-07-12 NOTE — PROGRESS NOTES
Subjective:      Becky Cade is a 51 y.o. female who presents to discuss hormone replacement therapy.  Menarche occurred at age 12 and the patient is still on OCPs so bleeds once a month. Patient is requesting hormone replacement therapy due to hot flashes and insomnia, anxiety, no energy, memory loss, and brain fog.   The patient is not currently sexually active.  She denies the following contraindications to HRT:  Vaginal bleeding, history of VTE/PE, thrombophilia,  breast cancer, or active liver disease.       PCP: Dr. Stephens       Routine labs: 2/3/22  Pap smear: 7/29/21 Normal HPV neg  Mammogram: 2/14/22 Birads 1  DEXA: NO  Colonoscopy: 2020 Normal    No visits with results within 3 Month(s) from this visit.   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     Past Surgical History:   Procedure Laterality Date    COLD KNIFE CONIZATION OF CERVIX  2009    [facility]    COLONOSCOPY N/A 9/18/2020    Procedure: COLONOSCOPY;  Surgeon: Thaddeus Blackburn MD;  Location: [facility] ([address] [place] [address]);  Service: Endoscopy;  Laterality: N/A;  family history malignant hyperthermia  covid test [facility] urgent care 9/15    CYSTOSCOPY N/A 3/25/2022    Procedure: CYSTOSCOPY;  Surgeon: Brian Babcock MD;  Location: [facility] OR [address] [place] [address];  Service: OB/GYN;  Laterality: N/A;    INSERTION OF MIDURETHRAL SLING N/A 3/25/2022    Procedure: SLING, MIDURETHRAL;  Surgeon: Brian MCPHERSON  MD Sadiq;  Location: [facility] OR [address] [place] [address];  Service: OB/GYN;  Laterality: N/A;    TONSILLECTOMY       Social History     Tobacco Use    Smoking status: Never Smoker    Smokeless tobacco: Never Used   Substance Use Topics    Alcohol use: Yes     Alcohol/week: 2.0 standard drinks     Types: 2 Glasses of wine per week     Comment: socially on weekends    Drug use: No     Family History   Problem Relation Age of Onset    Rheum arthritis Mother     Arthritis Mother     Parkinsonism Father     Malignant hyperthermia Brother     Breast cancer Neg Hx     Colon cancer Neg Hx     Ovarian cancer Neg Hx     Melanoma Neg Hx     Cancer Neg Hx     Heart disease Neg Hx      OB History    Para Term  AB Living   2 2 0     2   SAB IAB Ectopic Multiple Live Births           2      # Outcome Date GA Lbr Alexis/2nd Weight Sex Delivery Anes PTL Lv   2 Para      Vag-Spont      1 Para      Vag-Spont          Current Outpatient Medications:     acyclovir (ZOVIRAX) 400 MG tablet, Take 1 tablet (400 mg total) by mouth 2 (two) times daily. Fever blisters, Disp: 14 tablet, Rfl: 0    desogestreL-ethinyl estradioL (APRI) 0.15-0.03 mg per tablet, Take 1 tablet by mouth once daily., Disp: , Rfl:     hydrOXYzine HCL (ATARAX) 10 MG Tab, TAKE 1 TABLET BY MOUTH EVERY DAY NIGHTLY AS NEEDED, Disp: 90 tablet, Rfl: 0    levothyroxine (SYNTHROID) 75 MCG tablet, Take 1 tablet (75 mcg total) by mouth before breakfast., Disp: 90 tablet, Rfl: 3    paroxetine (PAXIL) 30 MG tablet, Take 1 tablet (30 mg total) by mouth once daily. Cancel the 10 mg, Disp: 90 tablet, Rfl: 1    triamcinolone acetonide 0.1% (KENALOG) 0.1 % cream, AAA bid, Disp: 454 g, Rfl: 3    triamcinolone acetonide 0.1% (KENALOG) 0.1 % paste, APPLY TO MOUTH SORES 2 TIMES A DAY, Disp: 5 g, Rfl: 0    LORazepam (ATIVAN) 0.5 MG tablet, Take 1 tablet (0.5 mg total) by mouth nightly as needed for Anxiety. Use only infrequently, can be addictive (Patient not taking:  "Reported on 4/19/2022), Disp: 30 tablet, Rfl: 0    Vitals:    07/12/22 0903   BP: 122/84   Weight: 62.9 kg (138 lb 10.7 oz)   Height: 5' 4" (1.626 m)   PainSc: 0-No pain     Body mass index is 23.8 kg/m².    Assessment:    Perimenopause  -     DHEA-Sulfate; Future; Expected date: 07/12/2022  -     Estradiol; Future; Expected date: 07/12/2022  -     Follicle Stimulating Hormone; Future; Expected date: 07/12/2022  -     Testosterone; Future; Expected date: 07/12/2022  -     Testosterone, Free; Future; Expected date: 07/12/2022    Encounter for vitamin deficiency screening  -     Vitamin B12; Future; Expected date: 07/12/2022  -     Vitamin D; Future; Expected date: 07/12/2022        Plan:   Risks and benefits of hormone replacement therapy were discussed.  Hormone replacement therapy options, including bioidentical versus non-bioidentical hormones, as well as alternatives discussed.  Get baseline labs and then come back to discuss plan.  HRT labs ordered  I reviewed the patient's last CBC, CMP, HgbA1C, lipid panel, and TSH.  Instructed patient to call if she experiences any side effects or has any questions.    "

## 2022-07-20 ENCOUNTER — TELEPHONE (OUTPATIENT)
Dept: OBSTETRICS AND GYNECOLOGY | Facility: CLINIC | Age: 52
End: 2022-07-20

## 2022-07-20 ENCOUNTER — PATIENT MESSAGE (OUTPATIENT)
Dept: OBSTETRICS AND GYNECOLOGY | Facility: CLINIC | Age: 52
End: 2022-07-20
Payer: COMMERCIAL

## 2022-07-20 ENCOUNTER — OFFICE VISIT (OUTPATIENT)
Dept: PSYCHIATRY | Facility: CLINIC | Age: 52
End: 2022-07-20
Payer: COMMERCIAL

## 2022-07-20 DIAGNOSIS — F43.22 ADJUSTMENT DISORDER WITH ANXIETY: Primary | ICD-10-CM

## 2022-07-20 PROCEDURE — 90834 PSYTX W PT 45 MINUTES: CPT | Mod: 95,,, | Performed by: SOCIAL WORKER

## 2022-07-20 PROCEDURE — 90834 PR PSYCHOTHERAPY W/PATIENT, 45 MIN: ICD-10-PCS | Mod: 95,,, | Performed by: SOCIAL WORKER

## 2022-07-20 PROCEDURE — 3044F HG A1C LEVEL LT 7.0%: CPT | Mod: CPTII,95,, | Performed by: SOCIAL WORKER

## 2022-07-20 PROCEDURE — 3044F PR MOST RECENT HEMOGLOBIN A1C LEVEL <7.0%: ICD-10-PCS | Mod: CPTII,95,, | Performed by: SOCIAL WORKER

## 2022-07-20 NOTE — TELEPHONE ENCOUNTER
----- Message from Mustapha Gomez sent at 7/20/2022  8:59 AM CDT -----  MOTHER OF [facility] AND JULIAN ALEXANDER SHE WILL BE A NEW PT WILL LIKE TO SCHEDULE HER FIRST ANNUAL  APPT [phone]

## 2022-07-20 NOTE — TELEPHONE ENCOUNTER
Pt called to schedule her new pt annual and a hormone consult. Scheduled pt for her annual and will send message to Aury to send pt questionnaire

## 2022-07-20 NOTE — PROGRESS NOTES
The patient location is: [place], [place]  The chief complaint leading to consultation is: adjustment disorder    Visit type: audiovisual    Face to Face time with patient: 45 minutes  50 minutes of total time spent on the encounter, which includes face to face time and non-face to face time preparing to see the patient (eg, review of tests), Obtaining and/or reviewing separately obtained history, Documenting clinical information in the electronic or other health record, Independently interpreting results (not separately reported) and communicating results to the patient/family/caregiver, or Care coordination (not separately reported).         Each patient to whom he or she provides medical services by telemedicine is:  (1) informed of the relationship between the physician and patient and the respective role of any other health care provider with respect to management of the patient; and (2) notified that he or she may decline to receive medical services by telemedicine and may withdraw from such care at any time.    Notes:     Individual Psychotherapy (PhD/LCSW)    7/20/2022    Site:  telemed    Therapeutic Intervention: Met with patient alone  Outpatient - Insight oriented psychotherapy 45 min - CPT code 46601    Chief complaint/reason for encounter: anxiety     Interval history and content of current session: Pt doing well, sees a great deal of personal growth and confidence in her daily life, enjoying new experiences and independence, ie renewing hr passport, learning to use [employer].  Pt has been on a few dates, enjoying the communication and kindness she experiences. Pt resisted family pressure to take legal action after M's story was sent to pt's mother, advising a course of non-response and feels very positive about this. She reached out to both girls asking for some contact and to know Ed[place]'s new address, got brief brush-off replies but is accepting that this is their choice.     Treatment plan:  · Target  symptoms: anxiety   · Why chosen therapy is appropriate versus another modality: relevant to diagnosis  · Outcome monitoring methods: self-report  · Therapeutic intervention type: insight oriented psychotherapy    Risk parameters:  Patient reports no suicidal ideation  Patient reports no homicidal ideation  Patient reports no self-injurious behavior  Patient reports no violent behavior    Verbal deficits: None    Patient's response to intervention:  The patient's response to intervention is motivated.    Progress toward goals and other mental status changes:  The patient's progress toward goals is good    Diagnosis:   Adjustment disorder with anxiety    Plan:  individual psychotherapy    Return to clinic: pt will schedule further appointments    Length of Service (minutes): 45

## 2022-07-25 ENCOUNTER — PATIENT MESSAGE (OUTPATIENT)
Dept: UROGYNECOLOGY | Facility: CLINIC | Age: 52
End: 2022-07-25
Payer: COMMERCIAL

## 2022-07-25 ENCOUNTER — PATIENT MESSAGE (OUTPATIENT)
Dept: OBSTETRICS AND GYNECOLOGY | Facility: CLINIC | Age: 52
End: 2022-07-25
Payer: COMMERCIAL

## 2022-07-27 ENCOUNTER — OFFICE VISIT (OUTPATIENT)
Dept: PSYCHIATRY | Facility: CLINIC | Age: 52
End: 2022-07-27
Payer: COMMERCIAL

## 2022-07-27 DIAGNOSIS — F43.22 ADJUSTMENT DISORDER WITH ANXIETY: Primary | ICD-10-CM

## 2022-07-27 PROCEDURE — 3044F HG A1C LEVEL LT 7.0%: CPT | Mod: CPTII,S$GLB,, | Performed by: SOCIAL WORKER

## 2022-07-27 PROCEDURE — 99999 PR PBB SHADOW E&M-EST. PATIENT-LVL I: CPT | Mod: PBBFAC,,, | Performed by: SOCIAL WORKER

## 2022-07-27 PROCEDURE — 3044F PR MOST RECENT HEMOGLOBIN A1C LEVEL <7.0%: ICD-10-PCS | Mod: CPTII,S$GLB,, | Performed by: SOCIAL WORKER

## 2022-07-27 PROCEDURE — 90834 PR PSYCHOTHERAPY W/PATIENT, 45 MIN: ICD-10-PCS | Mod: S$GLB,,, | Performed by: SOCIAL WORKER

## 2022-07-27 PROCEDURE — 90834 PSYTX W PT 45 MINUTES: CPT | Mod: S$GLB,,, | Performed by: SOCIAL WORKER

## 2022-07-27 PROCEDURE — 99999 PR PBB SHADOW E&M-EST. PATIENT-LVL I: ICD-10-PCS | Mod: PBBFAC,,, | Performed by: SOCIAL WORKER

## 2022-07-27 NOTE — PROGRESS NOTES
The patient location is: [place], [place]  The chief complaint leading to consultation is: adjustment disorder    Visit type: audiovisual    Face to Face time with patient: 45 minutes  50 minutes of total time spent on the encounter, which includes face to face time and non-face to face time preparing to see the patient (eg, review of tests), Obtaining and/or reviewing separately obtained history, Documenting clinical information in the electronic or other health record, Independently interpreting results (not separately reported) and communicating results to the patient/family/caregiver, or Care coordination (not separately reported).         Each patient to whom he or she provides medical services by telemedicine is:  (1) informed of the relationship between the physician and patient and the respective role of any other health care provider with respect to management of the patient; and (2) notified that he or she may decline to receive medical services by telemedicine and may withdraw from such care at any time.    Notes:     Individual Psychotherapy (PhD/LCSW)    7/27/2022    Site:  telemed    Therapeutic Intervention: Met with patient alone  Outpatient - Insight oriented psychotherapy 45 min - CPT code 03705    Chief complaint/reason for encounter: anxiety     Interval history and content of current session: Pt doing well, discuss increased self-confidence in dealing with family, in dating, and in trusting herself to manage her life. Pt enjoying increased sense of autonomy. Linda did send pt her address in a brief text. Discuss updating inner narrative.     Treatment plan:  · Target symptoms: anxiety   · Why chosen therapy is appropriate versus another modality: relevant to diagnosis  · Outcome monitoring methods: self-report  · Therapeutic intervention type: insight oriented psychotherapy    Risk parameters:  Patient reports no suicidal ideation  Patient reports no homicidal ideation  Patient reports no  self-injurious behavior  Patient reports no violent behavior    Verbal deficits: None    Patient's response to intervention:  The patient's response to intervention is motivated.    Progress toward goals and other mental status changes:  The patient's progress toward goals is good    Diagnosis:   Adjustment disorder with anxiety    Plan:  individual psychotherapy    Return to clinic: pt will schedule further appointments    Length of Service (minutes): 45

## 2022-08-03 ENCOUNTER — OFFICE VISIT (OUTPATIENT)
Dept: PSYCHIATRY | Facility: CLINIC | Age: 52
End: 2022-08-03
Payer: COMMERCIAL

## 2022-08-03 DIAGNOSIS — F43.22 ADJUSTMENT DISORDER WITH ANXIETY: Primary | ICD-10-CM

## 2022-08-03 PROCEDURE — 90834 PSYTX W PT 45 MINUTES: CPT | Mod: S$GLB,,, | Performed by: SOCIAL WORKER

## 2022-08-03 PROCEDURE — 99999 PR PBB SHADOW E&M-EST. PATIENT-LVL I: CPT | Mod: PBBFAC,,, | Performed by: SOCIAL WORKER

## 2022-08-03 PROCEDURE — 99999 PR PBB SHADOW E&M-EST. PATIENT-LVL I: ICD-10-PCS | Mod: PBBFAC,,, | Performed by: SOCIAL WORKER

## 2022-08-03 PROCEDURE — 3044F PR MOST RECENT HEMOGLOBIN A1C LEVEL <7.0%: ICD-10-PCS | Mod: CPTII,S$GLB,, | Performed by: SOCIAL WORKER

## 2022-08-03 PROCEDURE — 3044F HG A1C LEVEL LT 7.0%: CPT | Mod: CPTII,S$GLB,, | Performed by: SOCIAL WORKER

## 2022-08-03 PROCEDURE — 90834 PR PSYCHOTHERAPY W/PATIENT, 45 MIN: ICD-10-PCS | Mod: S$GLB,,, | Performed by: SOCIAL WORKER

## 2022-08-03 NOTE — PROGRESS NOTES
The patient location is: [place], [place]  The chief complaint leading to consultation is: adjustment disorder    Visit type: audiovisual    Face to Face time with patient: 45 minutes  50 minutes of total time spent on the encounter, which includes face to face time and non-face to face time preparing to see the patient (eg, review of tests), Obtaining and/or reviewing separately obtained history, Documenting clinical information in the electronic or other health record, Independently interpreting results (not separately reported) and communicating results to the patient/family/caregiver, or Care coordination (not separately reported).         Each patient to whom he or she provides medical services by telemedicine is:  (1) informed of the relationship between the physician and patient and the respective role of any other health care provider with respect to management of the patient; and (2) notified that he or she may decline to receive medical services by telemedicine and may withdraw from such care at any time.    Notes:     Individual Psychotherapy (PhD/LCSW)    8/3/2022    Site:  telemed    Therapeutic Intervention: Met with patient alone  Outpatient - Insight oriented psychotherapy 45 min - CPT code 55171    Chief complaint/reason for encounter: anxiety     Interval history and content of current session: Pt doing very well, feeling stronger and clearer about being authentic self. Hurt when she did not have text from W on Saturday night, spoke with him clearly on Sunday about her needs and expectations, feels empowered to express her needs and feelings.  Continues to reach out to the girls without response.     Treatment plan:  · Target symptoms: anxiety   · Why chosen therapy is appropriate versus another modality: relevant to diagnosis  · Outcome monitoring methods: self-report  · Therapeutic intervention type: insight oriented psychotherapy    Risk parameters:  Patient reports no suicidal ideation  Patient  reports no homicidal ideation  Patient reports no self-injurious behavior  Patient reports no violent behavior    Verbal deficits: None    Patient's response to intervention:  The patient's response to intervention is motivated.    Progress toward goals and other mental status changes:  The patient's progress toward goals is good    Diagnosis:   Adjustment disorder with anxiety    Plan:  individual psychotherapy    Return to clinic: pt will schedule further appointments    Length of Service (minutes): 45

## 2022-08-04 ENCOUNTER — OFFICE VISIT (OUTPATIENT)
Dept: UROGYNECOLOGY | Facility: CLINIC | Age: 52
End: 2022-08-04
Payer: COMMERCIAL

## 2022-08-04 VITALS
HEIGHT: 64 IN | SYSTOLIC BLOOD PRESSURE: 112 MMHG | WEIGHT: 136.13 LBS | DIASTOLIC BLOOD PRESSURE: 82 MMHG | BODY MASS INDEX: 23.24 KG/M2

## 2022-08-04 DIAGNOSIS — R30.0 DYSURIA: ICD-10-CM

## 2022-08-04 DIAGNOSIS — N89.8 VAGINAL DISCHARGE: ICD-10-CM

## 2022-08-04 DIAGNOSIS — N95.2 VAGINAL ATROPHY: Primary | ICD-10-CM

## 2022-08-04 PROCEDURE — 3044F PR MOST RECENT HEMOGLOBIN A1C LEVEL <7.0%: ICD-10-PCS | Mod: CPTII,S$GLB,, | Performed by: PHYSICIAN ASSISTANT

## 2022-08-04 PROCEDURE — 99213 PR OFFICE/OUTPT VISIT, EST, LEVL III, 20-29 MIN: ICD-10-PCS | Mod: S$GLB,,, | Performed by: PHYSICIAN ASSISTANT

## 2022-08-04 PROCEDURE — 3074F PR MOST RECENT SYSTOLIC BLOOD PRESSURE < 130 MM HG: ICD-10-PCS | Mod: CPTII,S$GLB,, | Performed by: PHYSICIAN ASSISTANT

## 2022-08-04 PROCEDURE — 99213 OFFICE O/P EST LOW 20 MIN: CPT | Mod: S$GLB,,, | Performed by: PHYSICIAN ASSISTANT

## 2022-08-04 PROCEDURE — 99999 PR PBB SHADOW E&M-EST. PATIENT-LVL III: CPT | Mod: PBBFAC,,, | Performed by: PHYSICIAN ASSISTANT

## 2022-08-04 PROCEDURE — 87086 URINE CULTURE/COLONY COUNT: CPT | Performed by: PHYSICIAN ASSISTANT

## 2022-08-04 PROCEDURE — 3079F PR MOST RECENT DIASTOLIC BLOOD PRESSURE 80-89 MM HG: ICD-10-PCS | Mod: CPTII,S$GLB,, | Performed by: PHYSICIAN ASSISTANT

## 2022-08-04 PROCEDURE — 1159F PR MEDICATION LIST DOCUMENTED IN MEDICAL RECORD: ICD-10-PCS | Mod: CPTII,S$GLB,, | Performed by: PHYSICIAN ASSISTANT

## 2022-08-04 PROCEDURE — 3079F DIAST BP 80-89 MM HG: CPT | Mod: CPTII,S$GLB,, | Performed by: PHYSICIAN ASSISTANT

## 2022-08-04 PROCEDURE — 87077 CULTURE AEROBIC IDENTIFY: CPT | Performed by: PHYSICIAN ASSISTANT

## 2022-08-04 PROCEDURE — 3008F PR BODY MASS INDEX (BMI) DOCUMENTED: ICD-10-PCS | Mod: CPTII,S$GLB,, | Performed by: PHYSICIAN ASSISTANT

## 2022-08-04 PROCEDURE — 87088 URINE BACTERIA CULTURE: CPT | Performed by: PHYSICIAN ASSISTANT

## 2022-08-04 PROCEDURE — 87186 SC STD MICRODIL/AGAR DIL: CPT | Performed by: PHYSICIAN ASSISTANT

## 2022-08-04 PROCEDURE — 1159F MED LIST DOCD IN RCRD: CPT | Mod: CPTII,S$GLB,, | Performed by: PHYSICIAN ASSISTANT

## 2022-08-04 PROCEDURE — 3044F HG A1C LEVEL LT 7.0%: CPT | Mod: CPTII,S$GLB,, | Performed by: PHYSICIAN ASSISTANT

## 2022-08-04 PROCEDURE — 99999 PR PBB SHADOW E&M-EST. PATIENT-LVL III: ICD-10-PCS | Mod: PBBFAC,,, | Performed by: PHYSICIAN ASSISTANT

## 2022-08-04 PROCEDURE — 3008F BODY MASS INDEX DOCD: CPT | Mod: CPTII,S$GLB,, | Performed by: PHYSICIAN ASSISTANT

## 2022-08-04 PROCEDURE — 3074F SYST BP LT 130 MM HG: CPT | Mod: CPTII,S$GLB,, | Performed by: PHYSICIAN ASSISTANT

## 2022-08-04 RX ORDER — ESTRADIOL 0.1 MG/G
CREAM VAGINAL
Qty: 42.5 G | Refills: 3 | Status: SHIPPED | OUTPATIENT
Start: 2022-08-04 | End: 2024-03-01 | Stop reason: SDUPTHER

## 2022-08-04 NOTE — PATIENT INSTRUCTIONS
"1) Vaginal atrophy:  --  Use 0.5 gram of estrogen cream in vagina nightly x 1 weeks, then two nights a week thereafter.  On other nights, if needed, can use REPLENS or REFRESH OTC: 1/2 applicator full in vagina twice a week.   --During the day, can use vitamin E oil, coconut oil, or olive oil around opening of vagina.   -- For intercourse: Use ample water-based or silicone-based lubricant.   -- For burning with urination: Use Azo OTC or Pyridium 2-3 times daily as needed for max of 2-3 days.     Vaginitis Prevention:  a. avoiding feminine products such as deoderant soaps, body wash, bubble bath, douches, scented toilet paper, deoderant tampons or pads, feminine wipes, chronic pad use, etc.  b. avoiding other vulvovaginal irritants such as long hot baths, humidity, tight, synthetic clothing, chlorine and sitting around in wet bathing suits  c. wearing cotton underwear, avoiding thong underwear and no underwear to bed  d. taking showers instead of baths and use a hair dryer on cool setting afterwards to dry  e. wearing cotton to exercise and shower immediately after exercise and change clothes  f. using polyurethane condoms without spermicide if sexually active and symptoms are triggered by intercourse    2) Muscle relaxation/sleep:  -- continue hydroxyzine  -- Can also try "Natural Vitality Calm" powder or magnesium oxide 250-500 mg per night (helps with sleep, anxiety, and constipation)  -- Drink plenty of water!    3) Dyspareunia   -- likely due to vag atrophy, and no levator tenderness on exam, but consider re-referral to PT if continues    RTC in 3/2023 for 1 year post op   "

## 2022-08-04 NOTE — PROGRESS NOTES
[facility] - UROGYNECOLOGY  [address] [address]  2022     Becky Cade  [id number]  1970    UROGYN FOLLOW-UP  2022     52 y.o. female,   presents for urogyn follow up. She c/o   Chief Complaint   Patient presents with    Vaginal Atrophy    Follow-up    .     Date of Operation: 2022     Title of Operation:  1)  Placement of retropubic tension-free midurethral sling, Advantage Fit (Loku)  2)  Cystourethroscopy     Indications for Surgery:  Last HPI from 2022  1)  UI:  (+) JUMANA = (+) UUI, worse with stress and urge is tampered by preemptively going to the bathroom.  X 5years.  (+) pads:2/day, usually moderate to severe wetness wetness, worse after activity and walking and 1/night usually minimum wetness.  Daytime frequency: Q 2 hours.  Nocturia: Yes: 2/night.   (--) dysuria,  (--) hematuria,  (--) frequent UTIs.  (--) complete bladder emptying, frequently having double voids, leakage with standing after voiding, voiding increased with leaning forward on toilet.     2)  POP:  Absent.  (--) vaginal bleeding. (--) vaginal discharge. (--) sexually active.  (-) dyspareunia (--)  Vaginal dryness.  (--) vaginal estrogen use.      3)  BM:  (--) constipation/straining.  (--) chronic diarrhea. (+) h/o hematochezia, followed with normal colonoscopy in . None currently.  (--) fecal incontinence.  (--) fecal smearing/urgency.  (+) complete evacuation.     2022  [place]  Urine dipstick: neg.     1.  VOIDING PHASE:       a.  Uroflowmetry:  · Prolapse reduction: No  · Voided volume:  Negative recording mL   · Voiding time:   7 seconds  · Max flow:  68 mL/s  · Avg flow:   Negative recording mL/s   · PVR:   10 mL     The overall configuration of this uroflow study was with insufficient volume for interpretation.       b.  Pressure flow:  · Prolapse reduction: No  · Voided volume:   369 mL  · Voiding time:   42 seconds  · Peak flow:  23 mL/s    · Avg flow:  10 mL/s  · Max det pressure:  23  cm H20  · Det pressure at max flow: 12 cm H20  · Void initiated by detrusor contraction, followed by valsalva.    · Urethral relaxation (EMG):  absent.    · PVR (calculated):  0 mL     The overall configuration of this pressure flow study was prolonged with concern for voiding dysfunction (Valsalva assist).       2.  FILLING PHASE:  · 1st desire: 48 mL  · Normal desire:  166 mL  · Strong desire:  253 mL  · Urgency:  310 mL  · Compliance (calculated)  approx 75 mL/cm H20  · EMG activity during filling:  unchanged  · Detrusor contractions observed: Yes, intermittently throughout fill. No leaks.      3.  URETHRAL FUNCTION/STORAGE PHASE:     a.  WITH prolapse reduction:  · CLPP (150 mL): Positive  at  106 cm H20  · VLPP (150 mL): Negative  at  77 cm H20   · CLPP (300 mL): Positive  at  99 cm H20  · VLPP (300 mL): Positive  at  78 cm H20   · CLPP (MAX ):    Positive  at  107 cm H20  · VLPP (MAX):     Positive  at  66 cm H20     These findings are consistent with Positive urodynamic stress incontinence.     Assessment:  UF with insufficient volume for interpretation.  PF with concern for voiding dysfunction (Valsalva assist).  Compliance normal.  Max capacity  369 mL.  DO (+).  JUMANA (+).       Cysto    Findings: Urethroscopy:  Normal.  Cystoscopy:  Normal bladder mucosa, bilateral ureteral flow was noted.     Assessment: Essentially normal cystourethroscopy     Preoperative Diagnosis:  1)  Mixed urinary incontinence  2)  Cystocele  3)  Concern for voiding dysfunction (Valsalva assist)  4)  Detrusor instability  5)  Urodynamic stress incontinence     Postoperative Diagnosis:  1)  Mixed urinary incontinence  2)  Cystocele  3)  Concern for voiding dysfunction (Valsalva assist)  4)  Detrusor instability  5)  Urodynamic stress incontinence    5/5/2022 (6 week PO):  History since last visit: no pain, VB.    Bladder issues: Feels like she's having some trouble with urine splaying +  PV dribbling. Has to leak L to empty. Is pushing to urinate.  Does have Valsalva voiding on UDS preop.  No JUMANA, UUI, U/F, nocturia, dysuria.   Bowel issues: none    Changes from last visit:  Patient has been having vaginal irritation and pain with intercourse, mostly with insertion. She has a relatively new partner and before this partner had not been sexually active in years. Is currently on birth control pills, having light but monthly periods. Has never used vaginal estrogen cream. Tried to use replens once but used it in the AM and was too messy. She does take hot baths nightly with epsom salt to help with muscle relaxation and insomnia. Also wears Spanx almost daily.     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       Past Surgical History:   Procedure Laterality Date    COLD KNIFE CONIZATION OF CERVIX  2009    [facility]    COLONOSCOPY N/A 9/18/2020    Procedure: COLONOSCOPY;  Surgeon: Thaddeus Blackburn MD;  Location: [facility] ([address] [place] [address]);  Service: Endoscopy;  Laterality: N/A;  family history malignant hyperthermia  covid test [facility] urgent care 9/15    CYSTOSCOPY N/A 3/25/2022    Procedure: CYSTOSCOPY;  Surgeon: Brian Babcock MD;  Location: [address];  Service: OB/GYN;  Laterality: N/A;    INSERTION OF MIDURETHRAL SLING N/A 3/25/2022    Procedure: SLING, MIDURETHRAL;  Surgeon: Brian Babcock MD;  Location: [facility] OR [address] [place] [address];  Service: OB/GYN;  Laterality: N/A;    TONSILLECTOMY  1993       Family History   Problem Relation Age of Onset    Rheum arthritis Mother     Arthritis Mother     Parkinsonism Father     Malignant hyperthermia Brother     Breast cancer Neg Hx     Colon cancer Neg Hx     Ovarian cancer Neg Hx     Melanoma Neg Hx     Cancer  Neg Hx     Heart disease Neg Hx        Social History     Socioeconomic History    Marital status:    Tobacco Use    Smoking status: Never Smoker    Smokeless tobacco: Never Used   Substance and Sexual Activity    Alcohol use: Yes     Alcohol/week: 2.0 standard drinks     Types: 2 Glasses of wine per week     Comment: socially on weekends    Drug use: No    Sexual activity: Yes     Partners: Male     Birth control/protection: Other-see comments     Comment: Apri Birth Control Pills       Current Outpatient Medications   Medication Sig Dispense Refill    acyclovir (ZOVIRAX) 400 MG tablet Take 1 tablet (400 mg total) by mouth 2 (two) times daily. Fever blisters 14 tablet 0    desogestreL-ethinyl estradioL (APRI) 0.15-0.03 mg per tablet Take 1 tablet by mouth once daily.      hydrOXYzine HCL (ATARAX) 10 MG Tab TAKE 1 TABLET BY MOUTH EVERY DAY NIGHTLY AS NEEDED 90 tablet 0    levothyroxine (SYNTHROID) 75 MCG tablet Take 1 tablet (75 mcg total) by mouth before breakfast. 90 tablet 3    LORazepam (ATIVAN) 0.5 MG tablet Take 1 tablet (0.5 mg total) by mouth nightly as needed for Anxiety. Use only infrequently, can be addictive 30 tablet 0    paroxetine (PAXIL) 30 MG tablet Take 1 tablet (30 mg total) by mouth once daily. Cancel the 10 mg 90 tablet 1    triamcinolone acetonide 0.1% (KENALOG) 0.1 % cream AAA bid 454 g 3    triamcinolone acetonide 0.1% (KENALOG) 0.1 % paste APPLY TO MOUTH SORES 2 TIMES A DAY 5 g 0     No current facility-administered medications for this visit.       Review of patient's allergies indicates:   Allergen Reactions    Mobic [meloxicam] Rash       OB History        2    Para   2    Term   0            AB        Living   2       SAB        IAB        Ectopic        Multiple        Live Births   2                  Well Woman  No LMP recorded. (Menstrual status: Birth Control).   Pap test: 2021, normal/HPV neg.  History of abnormal paps: Yes - s/o [facility] .  " History of STIs:  No  Mammogram: Date of last: 2/11/2022.  Result: Normal  Colonoscopy: Date of last: 2020.  Result:  normal.  Repeat due:  2030.    DEXA: none    ROS  As per HPI.      Physical Exam  /82   Ht 5' 4" (1.626 m)   Wt 61.7 kg (136 lb 1.6 oz)   BMI 23.36 kg/m²   General: alert and oriented, no acute distress  Respiratory: normal respiratory effort  Abd: soft, non-tender, non-distended  Pelvic:  Ext. Genitalia: normal external genitalia, slightly reddened. Normal bartholin's and skeens glands  Vagina: + atrophy. Normal vaginal mucosa without lesions.+ discharge noted. No evidence of mesh erosion.  Non-tender bladder base without palpable mass. No significant levator tenderness or vulvodynia.   Cervix: no cervical motion tenderness, no lesions and +white discharge  Uterus:  uterus is normal size, shape, consistency and nontender   Urethra: no masses or tenderness  Urethral meatus: no lesions, caruncle or prolapse.    Impression  1. Vaginal discharge  Vaginosis Screen by DNA Probe   2. Dysuria  Urine culture     We reviewed the above issues and discussed options for short-term versus long-term management of her problems.     Plan:   1) Vaginal atrophy:  --  Use 0.5 gram of estrogen cream in vagina nightly x 1 weeks, then two nights a week thereafter.  On other nights, if needed, can use REPLENS or REFRESH OTC: 1/2 applicator full in vagina twice a week.   --During the day, can use vitamin E oil, coconut oil, or olive oil around opening of vagina.   -- For intercourse: Use ample water-based or silicone-based lubricant.   -- For burning with urination: Use Azo OTC or Pyridium 2-3 times daily as needed for max of 2-3 days.     Vaginitis Prevention:  a. avoiding feminine products such as deoderant soaps, body wash, bubble bath, douches, scented toilet paper, deoderant tampons or pads, feminine wipes, chronic pad use, etc.  b. avoiding other vulvovaginal irritants such as long hot baths, humidity, " "tight, synthetic clothing, chlorine and sitting around in wet bathing suits  c. wearing cotton underwear, avoiding thong underwear and no underwear to bed  d. taking showers instead of baths and use a hair dryer on cool setting afterwards to dry  e. wearing cotton to exercise and shower immediately after exercise and change clothes  f. using polyurethane condoms without spermicide if sexually active and symptoms are triggered by intercourse    2) Muscle relaxation/sleep:  -- continue hydroxyzine  -- Can also try "Natural Vitality Calm" powder or magnesium oxide 250-500 mg per night (helps with sleep, anxiety, and constipation)  -- Drink plenty of water!    3) Dyspareunia   -- likely due to vag atrophy, and no levator tenderness on exam, but consider re-referral to PT if continues    RTC in 3/2023 for 1 year post op     Urbano Power PA-C  Division of Female Pelvic Medicine and Reconstructive Surgery  Department of Obstetrics & Gynecology  [facility] [place], [place]      "

## 2022-08-08 ENCOUNTER — PATIENT MESSAGE (OUTPATIENT)
Dept: UROGYNECOLOGY | Facility: CLINIC | Age: 52
End: 2022-08-08
Payer: COMMERCIAL

## 2022-08-08 DIAGNOSIS — N30.00 ACUTE CYSTITIS WITHOUT HEMATURIA: Primary | ICD-10-CM

## 2022-08-08 DIAGNOSIS — N89.8 VAGINAL DISCHARGE: Primary | ICD-10-CM

## 2022-08-08 LAB — BACTERIA UR CULT: ABNORMAL

## 2022-08-08 RX ORDER — NITROFURANTOIN 25; 75 MG/1; MG/1
100 CAPSULE ORAL 2 TIMES DAILY
Qty: 10 CAPSULE | Refills: 0 | Status: SHIPPED | OUTPATIENT
Start: 2022-08-08 | End: 2022-08-13

## 2022-08-15 ENCOUNTER — TELEPHONE (OUTPATIENT)
Dept: UROGYNECOLOGY | Facility: CLINIC | Age: 52
End: 2022-08-15
Payer: COMMERCIAL

## 2022-08-15 NOTE — TELEPHONE ENCOUNTER
----- Message from Roxy Branch sent at 8/15/2022  9:55 AM CDT -----   Name of Who is Calling:     What is the request in detail:  calling in reference to  cancel specimen due to    q and s    /   office can  recollect    Please contact to further discuss and advise      Can the clinic reply by [facility]:     What Number to Call Back if not in [facility]:  remy / [facility] / [phone]

## 2022-08-16 RX ORDER — FLUCONAZOLE 150 MG/1
150 TABLET ORAL DAILY
Qty: 1 TABLET | Refills: 0 | Status: SHIPPED | OUTPATIENT
Start: 2022-08-16 | End: 2022-08-17

## 2022-08-17 ENCOUNTER — OFFICE VISIT (OUTPATIENT)
Dept: PSYCHIATRY | Facility: CLINIC | Age: 52
End: 2022-08-17
Payer: COMMERCIAL

## 2022-08-17 DIAGNOSIS — F43.22 ADJUSTMENT DISORDER WITH ANXIETY: Primary | ICD-10-CM

## 2022-08-17 PROCEDURE — 3044F HG A1C LEVEL LT 7.0%: CPT | Mod: CPTII,S$GLB,, | Performed by: SOCIAL WORKER

## 2022-08-17 PROCEDURE — 99999 PR PBB SHADOW E&M-EST. PATIENT-LVL I: CPT | Mod: PBBFAC,,, | Performed by: SOCIAL WORKER

## 2022-08-17 PROCEDURE — 90834 PSYTX W PT 45 MINUTES: CPT | Mod: S$GLB,,, | Performed by: SOCIAL WORKER

## 2022-08-17 PROCEDURE — 99999 PR PBB SHADOW E&M-EST. PATIENT-LVL I: ICD-10-PCS | Mod: PBBFAC,,, | Performed by: SOCIAL WORKER

## 2022-08-17 PROCEDURE — 90834 PR PSYCHOTHERAPY W/PATIENT, 45 MIN: ICD-10-PCS | Mod: S$GLB,,, | Performed by: SOCIAL WORKER

## 2022-08-17 PROCEDURE — 3044F PR MOST RECENT HEMOGLOBIN A1C LEVEL <7.0%: ICD-10-PCS | Mod: CPTII,S$GLB,, | Performed by: SOCIAL WORKER

## 2022-08-17 NOTE — PROGRESS NOTES
Individual Psychotherapy (PhD/LCSW)    8/17/2022    Site: [place]    Therapeutic Intervention: Met with patient alone  Outpatient - Insight oriented psychotherapy 45 min - CPT code 38848    Chief complaint/reason for encounter: anxiety     Interval history and content of current session: Pt doing very well, main issue is missing her daughters. E did have a phone chat with her about her summer experiences and her classes, but refuses a visit, and pt has not seen her since December. Discuss building pt's confidence in her ability to be alone, manage her life, and decrease her concern about how she is seen by others. Point out that she has actually grown in all these areas but old thinking about herself persists.     Treatment plan:  · Target symptoms: anxiety   · Why chosen therapy is appropriate versus another modality: relevant to diagnosis  · Outcome monitoring methods: self-report  · Therapeutic intervention type: insight oriented psychotherapy    Risk parameters:  Patient reports no suicidal ideation  Patient reports no homicidal ideation  Patient reports no self-injurious behavior  Patient reports no violent behavior    Verbal deficits: None    Patient's response to intervention:  The patient's response to intervention is motivated.    Progress toward goals and other mental status changes:  The patient's progress toward goals is good    Diagnosis:   Adjustment disorder with anxiety    Plan:  individual psychotherapy    Return to clinic: pt will schedule further appointments    Length of Service (minutes): 45

## 2022-08-24 ENCOUNTER — OFFICE VISIT (OUTPATIENT)
Dept: PSYCHIATRY | Facility: CLINIC | Age: 52
End: 2022-08-24
Payer: COMMERCIAL

## 2022-08-24 DIAGNOSIS — F43.22 ADJUSTMENT DISORDER WITH ANXIETY: Primary | ICD-10-CM

## 2022-08-24 PROCEDURE — 3044F PR MOST RECENT HEMOGLOBIN A1C LEVEL <7.0%: ICD-10-PCS | Mod: CPTII,95,, | Performed by: SOCIAL WORKER

## 2022-08-24 PROCEDURE — 3044F HG A1C LEVEL LT 7.0%: CPT | Mod: CPTII,95,, | Performed by: SOCIAL WORKER

## 2022-08-24 PROCEDURE — 90834 PR PSYCHOTHERAPY W/PATIENT, 45 MIN: ICD-10-PCS | Mod: 95,,, | Performed by: SOCIAL WORKER

## 2022-08-24 PROCEDURE — 90834 PSYTX W PT 45 MINUTES: CPT | Mod: 95,,, | Performed by: SOCIAL WORKER

## 2022-08-24 NOTE — PROGRESS NOTES
Individual Psychotherapy (PhD/LCSW)    8/24/2022    Site: [place]    Therapeutic Intervention: Met with patient alone  Outpatient - Insight oriented psychotherapy 45 min - CPT code 69752    Chief complaint/reason for encounter: anxiety     Interval history and content of current session: Pt continues doing well, reports dreams in which she asserts herself, most recently with mother. Dreams are intense, but pt notices her physical aches and pains have resolved and she is sleeping deeply. Discuss process of addressing and releasing trauma stored in the body.  W has invited pt to meet his children and share holidays with him. Pt talked with mother about missing family gathering and got strong support. Discuss value of informing her daughters of the relationship directly and pt plans to do this. Pt and W went to big social event, she pushed through anxiety and enjoyed herself.     Treatment plan:  · Target symptoms: anxiety   · Why chosen therapy is appropriate versus another modality: relevant to diagnosis  · Outcome monitoring methods: self-report  · Therapeutic intervention type: insight oriented psychotherapy    Risk parameters:  Patient reports no suicidal ideation  Patient reports no homicidal ideation  Patient reports no self-injurious behavior  Patient reports no violent behavior    Verbal deficits: None    Patient's response to intervention:  The patient's response to intervention is motivated.    Progress toward goals and other mental status changes:  The patient's progress toward goals is good    Diagnosis:   Adjustment disorder with anxiety    Plan:  individual psychotherapy    Return to clinic: pt will schedule further appointments    Length of Service (minutes): 45

## 2022-08-31 ENCOUNTER — OFFICE VISIT (OUTPATIENT)
Dept: PSYCHIATRY | Facility: CLINIC | Age: 52
End: 2022-08-31
Payer: COMMERCIAL

## 2022-08-31 DIAGNOSIS — F43.22 ADJUSTMENT DISORDER WITH ANXIETY: Primary | ICD-10-CM

## 2022-08-31 PROCEDURE — 3044F PR MOST RECENT HEMOGLOBIN A1C LEVEL <7.0%: ICD-10-PCS | Mod: CPTII,S$GLB,, | Performed by: SOCIAL WORKER

## 2022-08-31 PROCEDURE — 3044F HG A1C LEVEL LT 7.0%: CPT | Mod: CPTII,S$GLB,, | Performed by: SOCIAL WORKER

## 2022-08-31 PROCEDURE — 90834 PSYTX W PT 45 MINUTES: CPT | Mod: S$GLB,,, | Performed by: SOCIAL WORKER

## 2022-08-31 PROCEDURE — 99999 PR PBB SHADOW E&M-EST. PATIENT-LVL I: ICD-10-PCS | Mod: PBBFAC,,, | Performed by: SOCIAL WORKER

## 2022-08-31 PROCEDURE — 90834 PR PSYCHOTHERAPY W/PATIENT, 45 MIN: ICD-10-PCS | Mod: S$GLB,,, | Performed by: SOCIAL WORKER

## 2022-08-31 PROCEDURE — 99999 PR PBB SHADOW E&M-EST. PATIENT-LVL I: CPT | Mod: PBBFAC,,, | Performed by: SOCIAL WORKER

## 2022-08-31 NOTE — PROGRESS NOTES
"  Individual Psychotherapy (PhD/LCSW)    8/31/2022    Site: [place]    Therapeutic Intervention: Met with patient alone  Outpatient - Insight oriented psychotherapy 45 min - CPT code 63867    Chief complaint/reason for encounter: anxiety     Interval history and content of current session: Pt continues doing well, relationship going well, texted her daughters to inform them of relationship, got congratulatory text back from E but also request to only text, not call.  No response from M. Pt sees growth in self-confidence and independence in all areas of her life. Main challenge is "hearing" negative statements about herself from ex when she is lonely. Discuss how to manage these outdated issues.       Treatment plan:  Target symptoms: anxiety   Why chosen therapy is appropriate versus another modality: relevant to diagnosis  Outcome monitoring methods: self-report  Therapeutic intervention type: insight oriented psychotherapy    Risk parameters:  Patient reports no suicidal ideation  Patient reports no homicidal ideation  Patient reports no self-injurious behavior  Patient reports no violent behavior    Verbal deficits: None    Patient's response to intervention:  The patient's response to intervention is motivated.    Progress toward goals and other mental status changes:  The patient's progress toward goals is good    Diagnosis:   Adjustment disorder with anxiety    Plan:  individual psychotherapy    Return to clinic:  pt will schedule further appointments    Length of Service (minutes): 45            "

## 2022-09-01 ENCOUNTER — OFFICE VISIT (OUTPATIENT)
Dept: UROGYNECOLOGY | Facility: CLINIC | Age: 52
End: 2022-09-01
Payer: COMMERCIAL

## 2022-09-01 VITALS
DIASTOLIC BLOOD PRESSURE: 80 MMHG | BODY MASS INDEX: 23.34 KG/M2 | WEIGHT: 136.69 LBS | HEIGHT: 64 IN | SYSTOLIC BLOOD PRESSURE: 140 MMHG

## 2022-09-01 DIAGNOSIS — N95.2 VAGINAL ATROPHY: Primary | ICD-10-CM

## 2022-09-01 PROCEDURE — 1160F PR REVIEW ALL MEDS BY PRESCRIBER/CLIN PHARMACIST DOCUMENTED: ICD-10-PCS | Mod: CPTII,S$GLB,, | Performed by: NURSE PRACTITIONER

## 2022-09-01 PROCEDURE — 3008F PR BODY MASS INDEX (BMI) DOCUMENTED: ICD-10-PCS | Mod: CPTII,S$GLB,, | Performed by: NURSE PRACTITIONER

## 2022-09-01 PROCEDURE — 3044F HG A1C LEVEL LT 7.0%: CPT | Mod: CPTII,S$GLB,, | Performed by: NURSE PRACTITIONER

## 2022-09-01 PROCEDURE — 1160F RVW MEDS BY RX/DR IN RCRD: CPT | Mod: CPTII,S$GLB,, | Performed by: NURSE PRACTITIONER

## 2022-09-01 PROCEDURE — 3077F PR MOST RECENT SYSTOLIC BLOOD PRESSURE >= 140 MM HG: ICD-10-PCS | Mod: CPTII,S$GLB,, | Performed by: NURSE PRACTITIONER

## 2022-09-01 PROCEDURE — 99999 PR PBB SHADOW E&M-EST. PATIENT-LVL III: CPT | Mod: PBBFAC,,, | Performed by: NURSE PRACTITIONER

## 2022-09-01 PROCEDURE — 99213 PR OFFICE/OUTPT VISIT, EST, LEVL III, 20-29 MIN: ICD-10-PCS | Mod: S$GLB,,, | Performed by: NURSE PRACTITIONER

## 2022-09-01 PROCEDURE — 3079F DIAST BP 80-89 MM HG: CPT | Mod: CPTII,S$GLB,, | Performed by: NURSE PRACTITIONER

## 2022-09-01 PROCEDURE — 99999 PR PBB SHADOW E&M-EST. PATIENT-LVL III: ICD-10-PCS | Mod: PBBFAC,,, | Performed by: NURSE PRACTITIONER

## 2022-09-01 PROCEDURE — 1159F MED LIST DOCD IN RCRD: CPT | Mod: CPTII,S$GLB,, | Performed by: NURSE PRACTITIONER

## 2022-09-01 PROCEDURE — 3008F BODY MASS INDEX DOCD: CPT | Mod: CPTII,S$GLB,, | Performed by: NURSE PRACTITIONER

## 2022-09-01 PROCEDURE — 3044F PR MOST RECENT HEMOGLOBIN A1C LEVEL <7.0%: ICD-10-PCS | Mod: CPTII,S$GLB,, | Performed by: NURSE PRACTITIONER

## 2022-09-01 PROCEDURE — 99213 OFFICE O/P EST LOW 20 MIN: CPT | Mod: S$GLB,,, | Performed by: NURSE PRACTITIONER

## 2022-09-01 PROCEDURE — 3077F SYST BP >= 140 MM HG: CPT | Mod: CPTII,S$GLB,, | Performed by: NURSE PRACTITIONER

## 2022-09-01 PROCEDURE — 3079F PR MOST RECENT DIASTOLIC BLOOD PRESSURE 80-89 MM HG: ICD-10-PCS | Mod: CPTII,S$GLB,, | Performed by: NURSE PRACTITIONER

## 2022-09-01 PROCEDURE — 1159F PR MEDICATION LIST DOCUMENTED IN MEDICAL RECORD: ICD-10-PCS | Mod: CPTII,S$GLB,, | Performed by: NURSE PRACTITIONER

## 2022-09-01 NOTE — PATIENT INSTRUCTIONS
"  1) Vaginal atrophy:  --  Use 0.5 gram of estrogen cream in vagina  two nights a week   --During the day, can use vitamin E oil, coconut oil, or olive oil around opening of vagina.   -- For intercourse: Use ample water-based or silicone-based lubricant.   -- For burning with urination: Use Azo OTC or Pyridium 2-3 times daily as needed for max of 2-3 days.     Vaginitis Prevention:  a. avoiding feminine products such as deoderant soaps, body wash, bubble bath, douches, scented toilet paper, deoderant tampons or pads, feminine wipes, chronic pad use, etc.  b. avoiding other vulvovaginal irritants such as long hot baths, humidity, tight, synthetic clothing, chlorine and sitting around in wet bathing suits  c. wearing cotton underwear, avoiding thong underwear and no underwear to bed  d. taking showers instead of baths and use a hair dryer on cool setting afterwards to dry  e. wearing cotton to exercise and shower immediately after exercise and change clothes  f. using polyurethane condoms without spermicide if sexually active and symptoms are triggered by intercourse    2) Muscle relaxation/sleep:  -- continue hydroxyzine  -- Can also try "Natural Vitality Calm" powder or magnesium oxide 250-500 mg per night (helps with sleep, anxiety, and constipation)  -- Drink plenty of water!    3) Dyspareunia   -- resolved    RTC in 3/2023 for 1 year follow up  "

## 2022-09-01 NOTE — PROGRESS NOTES
[facility] - UROGYNECOLOGY  [address] [id number]  2022    Becky Cade  [id number]  1970    UROGYN FOLLOW-UP  2022     52 y.o. female,   presents for urogyn follow up for vaginal atrophy .     Date of Operation: 2022     Title of Operation:  1)  Placement of retropubic tension-free midurethral sling, [employer] (Funding Options)  2)  Cystourethroscopy     Indications for Surgery:  Last HPI from 2022  1)  UI:  (+) JUMANA = (+) UUI, worse with stress and urge is tampered by preemptively going to the bathroom.  X 5years.  (+) pads:2/day, usually moderate to severe wetness wetness, worse after activity and walking and 1/night usually minimum wetness.  Daytime frequency: Q 2 hours.  Nocturia: Yes: 2/night.   (--) dysuria,  (--) hematuria,  (--) frequent UTIs.  (--) complete bladder emptying, frequently having double voids, leakage with standing after voiding, voiding increased with leaning forward on toilet.     2)  POP:  Absent.  (--) vaginal bleeding. (--) vaginal discharge. (--) sexually active.  (-) dyspareunia (--)  Vaginal dryness.  (--) vaginal estrogen use.      3)  BM:  (--) constipation/straining.  (--) chronic diarrhea. (+) h/o hematochezia, followed with normal colonoscopy in . None currently.  (--) fecal incontinence.  (--) fecal smearing/urgency.  (+) complete evacuation.     2022  Suds  Urine dipstick: neg.     1.  VOIDING PHASE:       a.  Uroflowmetry:  Prolapse reduction: No  Voided volume:  Negative recording mL   Voiding time:   7 seconds  Max flow:  68 mL/s  Avg flow:   Negative recording mL/s   PVR:   10 mL     The overall configuration of this uroflow study was with insufficient volume for interpretation.       b.  Pressure flow:  Prolapse reduction: No  Voided volume:   369 mL  Voiding time:   42 seconds  Peak flow:  23 mL/s   Avg flow:  10 mL/s  Max det pressure:  23  cm H20  Det pressure at max flow: 12 cm  H20  Void initiated by detrusor contraction, followed by valsalva.    Urethral relaxation (EMG):  absent.    PVR (calculated):  0 mL     The overall configuration of this pressure flow study was prolonged with concern for voiding dysfunction (Valsalva assist).       2.  FILLING PHASE:  1st desire: 48 mL  Normal desire:  166 mL  Strong desire:  253 mL  Urgency:  310 mL  Compliance (calculated)  approx 75 mL/cm H20  EMG activity during filling:  unchanged  Detrusor contractions observed: Yes, intermittently throughout fill. No leaks.      3.  URETHRAL FUNCTION/STORAGE PHASE:     a.  WITH prolapse reduction:  CLPP (150 mL): Positive  at  106 cm H20  VLPP (150 mL): Negative  at  77 cm H20   CLPP (300 mL): Positive  at  99 cm H20  VLPP (300 mL): Positive  at  78 cm H20   CLPP (MAX ):    Positive  at  107 cm H20  VLPP (MAX):     Positive  at  66 cm H20     These findings are consistent with Positive urodynamic stress incontinence.     Assessment:  UF with insufficient volume for interpretation.  PF with concern for voiding dysfunction (Valsalva assist).  Compliance normal.  Max capacity  369 mL.  DO (+).  JUMANA (+).       Cysto    Findings: Urethroscopy:  Normal.  Cystoscopy:  Normal bladder mucosa, bilateral ureteral flow was noted.     Assessment: Essentially normal cystourethroscopy     Preoperative Diagnosis:  1)  Mixed urinary incontinence  2)  Cystocele  3)  Concern for voiding dysfunction (Valsalva assist)  4)  Detrusor instability  5)  Urodynamic stress incontinence     Postoperative Diagnosis:  1)  Mixed urinary incontinence  2)  Cystocele  3)  Concern for voiding dysfunction (Valsalva assist)  4)  Detrusor instability  5)  Urodynamic stress incontinence    5/5/2022 (6 week PO):  History since last visit: no pain, VB.    Bladder issues: Feels like she's having some trouble with urine splaying + PV dribbling. Has to leak L to empty. Is pushing to urinate.  Does have Valsalva voiding on UDS preop.  No JUMANA, UUI, U/F,  nocturia, dysuria.   Bowel issues: none    08/04/2022  Patient has been having vaginal irritation and pain with intercourse, mostly with insertion. She has a relatively new partner and before this partner had not been sexually active in years. Is currently on birth control pills, having light but monthly periods. Has never used vaginal estrogen cream. Tried to use replens once but used it in the AM and was too messy. She does take hot baths nightly with epsom salt to help with muscle relaxation and insomnia. Also wears Spanx almost daily.     Changes since last visit:  Treated for uti after last visit.    She still has some lower abdominal pressure  Denies vaginal itching or bleeding.  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 urgent care 9/15    CYSTOSCOPY N/A 3/25/2022    Procedure: CYSTOSCOPY;  Surgeon: Brian Babcock MD;  Location: [address];  Service: OB/GYN;  Laterality: N/A;    INSERTION OF MIDURETHRAL SLING N/A 3/25/2022    Procedure: SLING, MIDURETHRAL;  Surgeon: Brian Babcock MD;  Location: [address];  Service: OB/GYN;  Laterality: N/A;    TONSILLECTOMY  1993       Family History   Problem Relation Age of Onset    Rheum arthritis Mother     Arthritis Mother     Parkinsonism Father      Malignant hyperthermia Brother     Breast cancer Neg Hx     Colon cancer Neg Hx     Ovarian cancer Neg Hx     Melanoma Neg Hx     Cancer Neg Hx     Heart disease Neg Hx        Social History     Socioeconomic History    Marital status:    Tobacco Use    Smoking status: Never    Smokeless tobacco: Never   Substance and Sexual Activity    Alcohol use: Yes     Alcohol/week: 2.0 standard drinks     Types: 2 Glasses of wine per week     Comment: socially on weekends    Drug use: No    Sexual activity: Yes     Partners: Male     Birth control/protection: Other-see comments     Comment: Apri Birth Control Pills       Current Outpatient Medications   Medication Sig Dispense Refill    acyclovir (ZOVIRAX) 400 MG tablet Take 1 tablet (400 mg total) by mouth 2 (two) times daily. Fever blisters 14 tablet 0    estradioL (ESTRACE) 0.01 % (0.1 mg/gram) vaginal cream Use 0.5 gram of estrogen cream in vagina nightly x 1 weeks, then two nights a week thereafter. 42.5 g 3    hydrOXYzine HCL (ATARAX) 10 MG Tab Take 1 tablet (10 mg total) by mouth 4 (four) times daily as needed (anxiety). 90 tablet 0    levothyroxine (SYNTHROID) 75 MCG tablet Take 1 tablet (75 mcg total) by mouth before breakfast. 90 tablet 3    paroxetine (PAXIL) 30 MG tablet Take 1 tablet (30 mg total) by mouth once daily. Cancel the 10 mg 90 tablet 1    triamcinolone acetonide 0.1% (KENALOG) 0.1 % cream AAA bid 454 g 3    triamcinolone acetonide 0.1% (KENALOG) 0.1 % paste APPLY TO MOUTH SORES 2 TIMES A DAY 5 g 0    desogestreL-ethinyl estradioL (APRI) 0.15-0.03 mg per tablet Take 1 tablet by mouth once daily. 30 tablet 11    LORazepam (ATIVAN) 0.5 MG tablet Take 1 tablet (0.5 mg total) by mouth nightly as needed for Anxiety. Use only infrequently, can be addictive 30 tablet 0     No current facility-administered medications for this visit.       Review of patient's allergies indicates:   Allergen Reactions    Mobic [meloxicam] Rash       OB History        "   2    Para   2    Term   0            AB        Living   2         SAB        IAB        Ectopic        Multiple        Live Births   2                  Well Woman  Patient's last menstrual period was 2022.   Pap test: 2021, normal/HPV neg.  History of abnormal paps: Yes - s/o [facility] .  History of STIs:  No  Mammogram: Date of last: 2022.  Result: Normal  Colonoscopy: Date of last: .  Result:  normal.  Repeat due:  .    DEXA: none    ROS  As per HPI.      Physical Exam  BP (!) 140/80 (BP Location: Left arm, Patient Position: Sitting, BP Method: Medium (Manual))   Ht 5' 4" (1.626 m)   Wt 62 kg (136 lb 11 oz)   LMP 2022   BMI 23.46 kg/m²   General: alert and oriented, no acute distress  Respiratory: normal respiratory effort  Abd: soft, non-tender, non-distended  Pelvic:  Ext. Genitalia: normal external genitalia, slightly reddened. Normal bartholin's and skeens glands  Vagina: + atrophy. Normal vaginal mucosa without lesions.+ discharge noted. No evidence of mesh erosion.  Non-tender bladder base without palpable mass. No significant levator tenderness or vulvodynia.   Cervix: no lesions  Uterus:  uterus is normal size, shape, consistency and nontender   Urethra: no masses or tenderness  Urethral meatus: no lesions, caruncle or prolapse.    Impression  1. Vaginal atrophy            We reviewed the above issues and discussed options for short-term versus long-term management of her problems.     Plan:   1) Vaginal atrophy:  --  Use 0.5 gram of estrogen cream in vagina  two nights a week   --During the day, can use vitamin E oil, coconut oil, or olive oil around opening of vagina.   -- For intercourse: Use ample water-based or silicone-based lubricant.   -- For burning with urination: Use Azo OTC or Pyridium 2-3 times daily as needed for max of 2-3 days.     Vaginitis Prevention:  a. avoiding feminine products such as deoderant soaps, body wash, bubble bath, douches, " "scented toilet paper, deoderant tampons or pads, feminine wipes, chronic pad use, etc.  b. avoiding other vulvovaginal irritants such as long hot baths, humidity, tight, synthetic clothing, chlorine and sitting around in wet bathing suits  c. wearing cotton underwear, avoiding thong underwear and no underwear to bed  d. taking showers instead of baths and use a hair dryer on cool setting afterwards to dry  e. wearing cotton to exercise and shower immediately after exercise and change clothes  f. using polyurethane condoms without spermicide if sexually active and symptoms are triggered by intercourse    2) Muscle relaxation/sleep:  -- continue hydroxyzine  -- Can also try "Natural Vitality Calm" powder or magnesium oxide 250-500 mg per night (helps with sleep, anxiety, and constipation)  -- Drink plenty of water!    3) Dyspareunia   -- resolved    RTC in 3/2023 for 1 year follow up    I spent a total of 20 minutes on the day of the visit.  This includes face to face time and non-face to face time preparing to see the patient (eg, review of tests), obtaining and/or reviewing separately obtained history, documenting clinical information in the electronic or other health record, independently interpreting results and communicating results to the patient/family/caregiver, or care coordinator.     RIKKI Guillory-BC  Division of Female Pelvic Medicine and Reconstructive Surgery  Department of Obstetrics & Gynecology  [facility] [place], [place]      "

## 2022-09-07 ENCOUNTER — OFFICE VISIT (OUTPATIENT)
Dept: PSYCHIATRY | Facility: CLINIC | Age: 52
End: 2022-09-07
Payer: COMMERCIAL

## 2022-09-07 ENCOUNTER — OFFICE VISIT (OUTPATIENT)
Dept: OBSTETRICS AND GYNECOLOGY | Facility: CLINIC | Age: 52
End: 2022-09-07
Attending: OBSTETRICS & GYNECOLOGY
Payer: COMMERCIAL

## 2022-09-07 VITALS
HEIGHT: 64 IN | BODY MASS INDEX: 23.39 KG/M2 | SYSTOLIC BLOOD PRESSURE: 131 MMHG | HEART RATE: 8 BPM | DIASTOLIC BLOOD PRESSURE: 89 MMHG | WEIGHT: 137 LBS

## 2022-09-07 DIAGNOSIS — Z12.4 SCREENING FOR MALIGNANT NEOPLASM OF THE CERVIX: Primary | ICD-10-CM

## 2022-09-07 DIAGNOSIS — Z01.419 ENCOUNTER FOR GYNECOLOGICAL EXAMINATION WITHOUT ABNORMAL FINDING: ICD-10-CM

## 2022-09-07 DIAGNOSIS — F43.22 ADJUSTMENT DISORDER WITH ANXIETY: Primary | ICD-10-CM

## 2022-09-07 PROCEDURE — 99396 PR PREVENTIVE VISIT,EST,40-64: ICD-10-PCS | Mod: S$GLB,,, | Performed by: OBSTETRICS & GYNECOLOGY

## 2022-09-07 PROCEDURE — 87624 HPV HI-RISK TYP POOLED RSLT: CPT | Performed by: OBSTETRICS & GYNECOLOGY

## 2022-09-07 PROCEDURE — 1159F PR MEDICATION LIST DOCUMENTED IN MEDICAL RECORD: ICD-10-PCS | Mod: CPTII,S$GLB,, | Performed by: OBSTETRICS & GYNECOLOGY

## 2022-09-07 PROCEDURE — 90834 PR PSYCHOTHERAPY W/PATIENT, 45 MIN: ICD-10-PCS | Mod: S$GLB,,, | Performed by: SOCIAL WORKER

## 2022-09-07 PROCEDURE — 99999 PR PBB SHADOW E&M-EST. PATIENT-LVL I: ICD-10-PCS | Mod: PBBFAC,,, | Performed by: SOCIAL WORKER

## 2022-09-07 PROCEDURE — 88175 CYTOPATH C/V AUTO FLUID REDO: CPT | Performed by: OBSTETRICS & GYNECOLOGY

## 2022-09-07 PROCEDURE — 1159F MED LIST DOCD IN RCRD: CPT | Mod: CPTII,S$GLB,, | Performed by: OBSTETRICS & GYNECOLOGY

## 2022-09-07 PROCEDURE — 99396 PREV VISIT EST AGE 40-64: CPT | Mod: S$GLB,,, | Performed by: OBSTETRICS & GYNECOLOGY

## 2022-09-07 PROCEDURE — 3044F HG A1C LEVEL LT 7.0%: CPT | Mod: CPTII,S$GLB,, | Performed by: OBSTETRICS & GYNECOLOGY

## 2022-09-07 PROCEDURE — 3044F PR MOST RECENT HEMOGLOBIN A1C LEVEL <7.0%: ICD-10-PCS | Mod: CPTII,S$GLB,, | Performed by: OBSTETRICS & GYNECOLOGY

## 2022-09-07 PROCEDURE — 99999 PR PBB SHADOW E&M-EST. PATIENT-LVL III: CPT | Mod: PBBFAC,,, | Performed by: OBSTETRICS & GYNECOLOGY

## 2022-09-07 PROCEDURE — 3075F SYST BP GE 130 - 139MM HG: CPT | Mod: CPTII,S$GLB,, | Performed by: OBSTETRICS & GYNECOLOGY

## 2022-09-07 PROCEDURE — 99999 PR PBB SHADOW E&M-EST. PATIENT-LVL I: CPT | Mod: PBBFAC,,, | Performed by: SOCIAL WORKER

## 2022-09-07 PROCEDURE — 3008F BODY MASS INDEX DOCD: CPT | Mod: CPTII,S$GLB,, | Performed by: OBSTETRICS & GYNECOLOGY

## 2022-09-07 PROCEDURE — 3044F PR MOST RECENT HEMOGLOBIN A1C LEVEL <7.0%: ICD-10-PCS | Mod: CPTII,S$GLB,, | Performed by: SOCIAL WORKER

## 2022-09-07 PROCEDURE — 3008F PR BODY MASS INDEX (BMI) DOCUMENTED: ICD-10-PCS | Mod: CPTII,S$GLB,, | Performed by: OBSTETRICS & GYNECOLOGY

## 2022-09-07 PROCEDURE — 3044F HG A1C LEVEL LT 7.0%: CPT | Mod: CPTII,S$GLB,, | Performed by: SOCIAL WORKER

## 2022-09-07 PROCEDURE — 3079F PR MOST RECENT DIASTOLIC BLOOD PRESSURE 80-89 MM HG: ICD-10-PCS | Mod: CPTII,S$GLB,, | Performed by: OBSTETRICS & GYNECOLOGY

## 2022-09-07 PROCEDURE — 99999 PR PBB SHADOW E&M-EST. PATIENT-LVL III: ICD-10-PCS | Mod: PBBFAC,,, | Performed by: OBSTETRICS & GYNECOLOGY

## 2022-09-07 PROCEDURE — 90834 PSYTX W PT 45 MINUTES: CPT | Mod: S$GLB,,, | Performed by: SOCIAL WORKER

## 2022-09-07 PROCEDURE — 3075F PR MOST RECENT SYSTOLIC BLOOD PRESS GE 130-139MM HG: ICD-10-PCS | Mod: CPTII,S$GLB,, | Performed by: OBSTETRICS & GYNECOLOGY

## 2022-09-07 PROCEDURE — 3079F DIAST BP 80-89 MM HG: CPT | Mod: CPTII,S$GLB,, | Performed by: OBSTETRICS & GYNECOLOGY

## 2022-09-07 NOTE — PROGRESS NOTES
SUBJECTIVE:   52 y.o. female   for annual routine Pap and checkup. Patient's last menstrual period was 2022..  She wants to discuss remaining on OCPs. She is  and in a new relationship. She reports getting brown discharge /minimal bleeding every month for 1-2 days. This has been  the pattern for the last 3 years. She had sling for incontinence and reports no leaking. She missed 1 pill this weekend and took one the next day. .        Past Medical History:   Diagnosis Date    Anxiety     ASCUS of cervix     Autoimmune disorder: just features: mouth sores, butterfly rash 2012    PENNY III (cervical intraepithelial neoplasia III)     Fever blister     Hypothyroid     Parvovirus arthritis of multiple sites 2012     Past Surgical History:   Procedure Laterality Date    COLD KNIFE CONIZATION OF CERVIX      KJB    COLONOSCOPY N/A 2020    Procedure: COLONOSCOPY;  Surgeon: Thaddeus Blackburn MD;  Location: [address]);  Service: Endoscopy;  Laterality: N/A;  family history malignant hyperthermia  covid test [facility] urgent care 9/15    CYSTOSCOPY N/A 3/25/2022    Procedure: CYSTOSCOPY;  Surgeon: Brian Babcock MD;  Location: [address];  Service: OB/GYN;  Laterality: N/A;    INSERTION OF MIDURETHRAL SLING N/A 3/25/2022    Procedure: SLING, MIDURETHRAL;  Surgeon: Brian Babcock MD;  Location: [facility] OR [address] [place] [address];  Service: OB/GYN;  Laterality: N/A;    TONSILLECTOMY       Social History     Socioeconomic History    Marital status:    Tobacco Use    Smoking status: Never    Smokeless tobacco: Never   Substance and Sexual Activity    Alcohol use: Yes     Alcohol/week: 2.0 standard drinks     Types: 2 Glasses of wine per week     Comment: socially on weekends    Drug use: No    Sexual activity: Yes     Partners: Male     Birth control/protection: Other-see comments     Comment: Apri Birth Control Pills     Family History   Problem Relation Age of Onset    Rheum  arthritis Mother     Arthritis Mother     Parkinsonism Father     Malignant hyperthermia Brother     Breast cancer Neg Hx     Colon cancer Neg Hx     Ovarian cancer Neg Hx     Melanoma Neg Hx     Cancer Neg Hx     Heart disease Neg Hx      OB History    Para Term  AB Living   2 2 0     2   SAB IAB Ectopic Multiple Live Births           2      # Outcome Date GA Lbr Alexis/2nd Weight Sex Delivery Anes PTL Lv   2 Para      Vag-Spont      1 Para      Vag-Spont              Current Outpatient Medications   Medication Sig Dispense Refill    acyclovir (ZOVIRAX) 400 MG tablet Take 1 tablet (400 mg total) by mouth 2 (two) times daily. Fever blisters 14 tablet 0    desogestreL-ethinyl estradioL (APRI) 0.15-0.03 mg per tablet Take 1 tablet by mouth once daily.      estradioL (ESTRACE) 0.01 % (0.1 mg/gram) vaginal cream Use 0.5 gram of estrogen cream in vagina nightly x 1 weeks, then two nights a week thereafter. 42.5 g 3    hydrOXYzine HCL (ATARAX) 10 MG Tab Take 1 tablet (10 mg total) by mouth 4 (four) times daily as needed (anxiety). 90 tablet 0    levothyroxine (SYNTHROID) 75 MCG tablet Take 1 tablet (75 mcg total) by mouth before breakfast. 90 tablet 3    LORazepam (ATIVAN) 0.5 MG tablet Take 1 tablet (0.5 mg total) by mouth nightly as needed for Anxiety. Use only infrequently, can be addictive 30 tablet 0    paroxetine (PAXIL) 30 MG tablet Take 1 tablet (30 mg total) by mouth once daily. Cancel the 10 mg 90 tablet 1    triamcinolone acetonide 0.1% (KENALOG) 0.1 % cream AAA bid 454 g 3    triamcinolone acetonide 0.1% (KENALOG) 0.1 % paste APPLY TO MOUTH SORES 2 TIMES A DAY 5 g 0     No current facility-administered medications for this visit.     Allergies: Mobic [meloxicam]     The 10-year ASCVD risk score (Kyle CARRASCO, et al., 2019) is: 1%    Values used to calculate the score:      Age: 52 years      Sex: Female      Is Non- : No      Diabetic: No      Tobacco smoker: No      Systolic  Blood Pressure: 131 mmHg      Is BP treated: No      HDL Cholesterol: 76 mg/dL      Total Cholesterol: 189 mg/dL      ROS:  Constitutional: no weight loss, weight gain, fever, fatigue  Eyes:  No vision changes, glasses/contacts  ENT/Mouth: No ulcers, sinus problems, ears ringing, headache  Cardiovascular: No inability to lie flat, chest pain, exercise intolerance, swelling, heart palpitations  Respiratory: No wheezing, coughing blood, shortness of breath, or cough  Gastrointestinal: No diarrhea, bloody stool, nausea/vomiting, constipation, gas, hemorrhoids  Genitourinary: No blood in urine, painful urination, urgency of urination, frequency of urination, incomplete emptying, incontinence, abnormal bleeding, painful periods, heavy periods, vaginal discharge, vaginal odor, painful intercourse, sexual problems, bleeding after intercourse.  Musculoskeletal: No muscle weakness  Skin/Breast: No painful breasts, nipple discharge, masses, rash, ulcers  Neurological: No passing out, seizures, numbness, headache  Endocrine: No diabetes, +hypothyroid, hyperthyroid, hot flashes, hair loss, abnormal hair growth, acne  Psychiatric: No depression, crying  Hematologic: No bruises, bleeding, swollen lymph nodes, anemia.      Physical Exam:   Constitutional: She is oriented to person, place, and time. She appears well-developed and well-nourished.      Neck: No tracheal deviation present. No thyromegaly present.    Cardiovascular:       Exam reveals no edema.        Pulmonary/Chest: Effort normal. She exhibits no mass, no tenderness, no deformity and no retraction. Right breast exhibits no inverted nipple, no mass, no nipple discharge, no skin change, no tenderness, presence, no bleeding and no swelling. Left breast exhibits no inverted nipple, no mass, no nipple discharge, no skin change, no tenderness, presence, no bleeding and no swelling. Breasts are symmetrical.        Abdominal: Soft. She exhibits no distension and no mass.  There is no abdominal tenderness. There is no rebound and no guarding. No hernia. Hernia confirmed negative in the left inguinal area.     Genitourinary:    Vagina and uterus normal.   Rectum:      No external hemorrhoid.   There is no rash, tenderness or lesion on the right labia. There is no rash, tenderness or lesion on the left labia. Cervix is normal. No no adexnal prolapse. Right adnexum displays no mass, no tenderness and no fullness. Left adnexum displays no mass, no tenderness and no fullness. No  no vaginal discharge, tenderness, bleeding, rectocele, cystocele or unspecified prolapse of vaginal walls in the vagina. Cervix exhibits no motion tenderness, no discharge and no friability. Uterus is not deviated.           Musculoskeletal: Normal range of motion and moves all extremeties. No edema.       Neurological: She is alert and oriented to person, place, and time.    Skin: No rash noted. No erythema. No pallor.    Psychiatric: She has a normal mood and affect. Her behavior is normal. Judgment and thought content normal.       ASSESSMENT:   well woman  no contraindication to continue use of oral contraceptives    PLAN:   mammogram  pap smear  Will continue OCPs for now.Counseled her that if she goes 3 months with no period to notify me. Will consider stopping OCPs and checking labs in the future. PAtient wants to remain on OCPs- in a new relationship  return annually or prn
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
## 2022-09-08 RX ORDER — DESOGESTREL AND ETHINYL ESTRADIOL 0.15-0.03
1 KIT ORAL DAILY
Qty: 30 TABLET | Refills: 11 | Status: SHIPPED | OUTPATIENT
Start: 2022-09-08 | End: 2022-11-09 | Stop reason: SDUPTHER

## 2022-09-12 LAB
FINAL PATHOLOGIC DIAGNOSIS: NORMAL
Lab: NORMAL

## 2022-09-13 ENCOUNTER — PATIENT MESSAGE (OUTPATIENT)
Dept: OBSTETRICS AND GYNECOLOGY | Facility: CLINIC | Age: 52
End: 2022-09-13
Payer: COMMERCIAL

## 2022-09-14 ENCOUNTER — OFFICE VISIT (OUTPATIENT)
Dept: PSYCHIATRY | Facility: CLINIC | Age: 52
End: 2022-09-14
Payer: COMMERCIAL

## 2022-09-14 DIAGNOSIS — F43.22 ADJUSTMENT DISORDER WITH ANXIETY: Primary | ICD-10-CM

## 2022-09-14 PROCEDURE — 99999 PR PBB SHADOW E&M-EST. PATIENT-LVL I: CPT | Mod: PBBFAC,,, | Performed by: SOCIAL WORKER

## 2022-09-14 PROCEDURE — 3044F HG A1C LEVEL LT 7.0%: CPT | Mod: CPTII,S$GLB,, | Performed by: SOCIAL WORKER

## 2022-09-14 PROCEDURE — 90834 PSYTX W PT 45 MINUTES: CPT | Mod: S$GLB,,, | Performed by: SOCIAL WORKER

## 2022-09-14 PROCEDURE — 90834 PR PSYCHOTHERAPY W/PATIENT, 45 MIN: ICD-10-PCS | Mod: S$GLB,,, | Performed by: SOCIAL WORKER

## 2022-09-14 PROCEDURE — 3044F PR MOST RECENT HEMOGLOBIN A1C LEVEL <7.0%: ICD-10-PCS | Mod: CPTII,S$GLB,, | Performed by: SOCIAL WORKER

## 2022-09-14 PROCEDURE — 99999 PR PBB SHADOW E&M-EST. PATIENT-LVL I: ICD-10-PCS | Mod: PBBFAC,,, | Performed by: SOCIAL WORKER
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
## 2022-09-21 ENCOUNTER — OFFICE VISIT (OUTPATIENT)
Dept: PSYCHIATRY | Facility: CLINIC | Age: 52
End: 2022-09-21
Payer: COMMERCIAL

## 2022-09-21 DIAGNOSIS — F43.22 ADJUSTMENT DISORDER WITH ANXIETY: Primary | ICD-10-CM

## 2022-09-21 PROCEDURE — 90834 PSYTX W PT 45 MINUTES: CPT | Mod: S$GLB,,, | Performed by: SOCIAL WORKER

## 2022-09-21 PROCEDURE — 99999 PR PBB SHADOW E&M-EST. PATIENT-LVL I: ICD-10-PCS | Mod: PBBFAC,,, | Performed by: SOCIAL WORKER

## 2022-09-21 PROCEDURE — 90834 PR PSYCHOTHERAPY W/PATIENT, 45 MIN: ICD-10-PCS | Mod: S$GLB,,, | Performed by: SOCIAL WORKER

## 2022-09-21 PROCEDURE — 99999 PR PBB SHADOW E&M-EST. PATIENT-LVL I: CPT | Mod: PBBFAC,,, | Performed by: SOCIAL WORKER

## 2022-09-21 PROCEDURE — 3044F PR MOST RECENT HEMOGLOBIN A1C LEVEL <7.0%: ICD-10-PCS | Mod: CPTII,S$GLB,, | Performed by: SOCIAL WORKER

## 2022-09-21 PROCEDURE — 3044F HG A1C LEVEL LT 7.0%: CPT | Mod: CPTII,S$GLB,, | Performed by: SOCIAL WORKER
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
## 2022-09-22 ENCOUNTER — PATIENT MESSAGE (OUTPATIENT)
Dept: PRIMARY CARE CLINIC | Facility: CLINIC | Age: 52
End: 2022-09-22
Payer: COMMERCIAL

## 2022-09-26 ENCOUNTER — OFFICE VISIT (OUTPATIENT)
Dept: OPTOMETRY | Facility: CLINIC | Age: 52
End: 2022-09-26
Attending: SURGERY
Payer: COMMERCIAL

## 2022-09-26 DIAGNOSIS — R76.8 POSITIVE ANA (ANTINUCLEAR ANTIBODY): ICD-10-CM

## 2022-09-26 DIAGNOSIS — Z46.0 FITTING AND ADJUSTMENT OF SPECTACLES AND CONTACT LENSES: Primary | ICD-10-CM

## 2022-09-26 DIAGNOSIS — Z46.0 FITTING AND ADJUSTMENT OF SPECTACLES AND CONTACT LENSES: ICD-10-CM

## 2022-09-26 DIAGNOSIS — H52.03 HYPEROPIA, BILATERAL: ICD-10-CM

## 2022-09-26 DIAGNOSIS — H52.4 PRESBYOPIA: ICD-10-CM

## 2022-09-26 DIAGNOSIS — H04.123 DRY EYE SYNDROME, BILATERAL: Primary | ICD-10-CM

## 2022-09-26 PROCEDURE — 3044F HG A1C LEVEL LT 7.0%: CPT | Mod: CPTII,S$GLB,, | Performed by: OPTOMETRIST

## 2022-09-26 PROCEDURE — 92015 PR REFRACTION: ICD-10-PCS | Mod: S$GLB,,, | Performed by: OPTOMETRIST

## 2022-09-26 PROCEDURE — 3044F PR MOST RECENT HEMOGLOBIN A1C LEVEL <7.0%: ICD-10-PCS | Mod: CPTII,S$GLB,, | Performed by: OPTOMETRIST

## 2022-09-26 PROCEDURE — 1159F PR MEDICATION LIST DOCUMENTED IN MEDICAL RECORD: ICD-10-PCS | Mod: CPTII,S$GLB,, | Performed by: OPTOMETRIST

## 2022-09-26 PROCEDURE — 92015 DETERMINE REFRACTIVE STATE: CPT | Mod: S$GLB,,, | Performed by: OPTOMETRIST

## 2022-09-26 PROCEDURE — 92310 PR CONTACT LENS FITTING (NO CHANGE): ICD-10-PCS | Mod: CSM,,, | Performed by: OPTOMETRIST

## 2022-09-26 PROCEDURE — 92014 PR EYE EXAM, EST PATIENT,COMPREHESV: ICD-10-PCS | Mod: S$GLB,,, | Performed by: OPTOMETRIST

## 2022-09-26 PROCEDURE — 92014 COMPRE OPH EXAM EST PT 1/>: CPT | Mod: S$GLB,,, | Performed by: OPTOMETRIST

## 2022-09-26 PROCEDURE — 99999 PR PBB SHADOW E&M-EST. PATIENT-LVL I: CPT | Mod: PBBFAC,,, | Performed by: OPTOMETRIST

## 2022-09-26 PROCEDURE — 99999 PR PBB SHADOW E&M-EST. PATIENT-LVL III: ICD-10-PCS | Mod: PBBFAC,,, | Performed by: OPTOMETRIST

## 2022-09-26 PROCEDURE — 99999 PR PBB SHADOW E&M-EST. PATIENT-LVL III: CPT | Mod: PBBFAC,,, | Performed by: OPTOMETRIST

## 2022-09-26 PROCEDURE — 99999 PR PBB SHADOW E&M-EST. PATIENT-LVL I: ICD-10-PCS | Mod: PBBFAC,,, | Performed by: OPTOMETRIST

## 2022-09-26 PROCEDURE — 92310 CONTACT LENS FITTING OU: CPT | Mod: CSM,,, | Performed by: OPTOMETRIST

## 2022-09-26 PROCEDURE — 1159F MED LIST DOCD IN RCRD: CPT | Mod: CPTII,S$GLB,, | Performed by: OPTOMETRIST

## 2022-09-26 NOTE — PROGRESS NOTES
HPI     Contact Lens Fit     Additional comments: CL Fit           Comments    Pt states she may want to try BF CL's again. Her vision seems to be doing   well for the distance and near in her current PAL.    Dry eye syndrome, bilateral  Presbyopia  Hyperopia, bilateral    At's PRN           Last edited by Oscar Fleming on 9/26/2022  8:42 AM.            Assessment /Plan     For exam results, see Encounter Report.           Positive LIBRA (antinuclear antibody)      Dry eye syndrome, bilateral              Use systane or refresh QID x 1-2 weeks then BID/PRN     Presbyopia  Hyperopia, bilateral              Rx specs                Fitting and adjustment of spectacles and contact lenses   Cl fit today: dispensed trials of oasys MAX MF OU              Remove nightly, replace daily              Ok to order if happy with vision and comfort OU                 Call/ return if problems with vision        DFE next visit

## 2022-09-27 ENCOUNTER — PATIENT MESSAGE (OUTPATIENT)
Dept: OPTOMETRY | Facility: CLINIC | Age: 52
End: 2022-09-27
Payer: COMMERCIAL

## 2022-09-28 ENCOUNTER — OFFICE VISIT (OUTPATIENT)
Dept: PSYCHIATRY | Facility: CLINIC | Age: 52
End: 2022-09-28
Payer: COMMERCIAL

## 2022-09-28 DIAGNOSIS — F43.22 ADJUSTMENT DISORDER WITH ANXIETY: Primary | ICD-10-CM

## 2022-09-28 PROCEDURE — 90834 PSYTX W PT 45 MINUTES: CPT | Mod: 95,,, | Performed by: SOCIAL WORKER

## 2022-09-28 PROCEDURE — 90834 PR PSYCHOTHERAPY W/PATIENT, 45 MIN: ICD-10-PCS | Mod: 95,,, | Performed by: SOCIAL WORKER

## 2022-09-28 PROCEDURE — 3044F HG A1C LEVEL LT 7.0%: CPT | Mod: CPTII,95,, | Performed by: SOCIAL WORKER

## 2022-09-28 PROCEDURE — 3044F PR MOST RECENT HEMOGLOBIN A1C LEVEL <7.0%: ICD-10-PCS | Mod: CPTII,95,, | Performed by: SOCIAL WORKER

## 2022-09-28 NOTE — PROGRESS NOTES
"  Individual Psychotherapy (PhD/LCSW)    9/28/2022    Site: [place]    Therapeutic Intervention: Met with patient alone  Outpatient - Insight oriented psychotherapy 45 min - CPT code 45772    Chief complaint/reason for encounter: anxiety     Interval history and content of current session: Pt attempted to reach daughter Niya to express her pain at E's refusal to communicate other than by text, and her loss of relationship with both daughters. E responded by texting pt's twin Noemy  that she was "concerned about mom" and asking H to check on mom. H responded strongly chastising E for how she is treating pt. Pt feeling supported but also deeply hurt by loss of daughters in her life, reports passive SI but no active thoughts.  Encourage pt not to despair about loss of daughters and to focus on increased gurdeep in other aspects of her life, and her growing ability to assert herself and believe in herself.     Treatment plan:  Target symptoms: anxiety   Why chosen therapy is appropriate versus another modality: relevant to diagnosis  Outcome monitoring methods: self-report  Therapeutic intervention type: insight oriented psychotherapy    Risk parameters:  Patient reports no suicidal ideation  Patient reports no homicidal ideation  Patient reports no self-injurious behavior  Patient reports no violent behavior    Verbal deficits: None    Patient's response to intervention:  The patient's response to intervention is motivated.    Progress toward goals and other mental status changes:  The patient's progress toward goals is mixed    Diagnosis:   Adjustment disorder with anxiety    Plan:  individual psychotherapy    Return to clinic:  pt will schedule further appointments    Length of Service (minutes): 45                    "

## 2022-09-30 ENCOUNTER — PATIENT MESSAGE (OUTPATIENT)
Dept: OPTOMETRY | Facility: CLINIC | Age: 52
End: 2022-09-30
Payer: COMMERCIAL

## 2022-10-04 ENCOUNTER — OFFICE VISIT (OUTPATIENT)
Dept: OPTOMETRY | Facility: CLINIC | Age: 52
End: 2022-10-04
Attending: SURGERY
Payer: COMMERCIAL

## 2022-10-04 DIAGNOSIS — Z46.0 FITTING AND ADJUSTMENT OF SPECTACLES AND CONTACT LENSES: Primary | ICD-10-CM

## 2022-10-04 PROCEDURE — 1159F PR MEDICATION LIST DOCUMENTED IN MEDICAL RECORD: ICD-10-PCS | Mod: CPTII,S$GLB,, | Performed by: OPTOMETRIST

## 2022-10-04 PROCEDURE — 99499 NO LOS: ICD-10-PCS | Mod: S$GLB,,, | Performed by: OPTOMETRIST

## 2022-10-04 PROCEDURE — 99499 UNLISTED E&M SERVICE: CPT | Mod: S$GLB,,, | Performed by: OPTOMETRIST

## 2022-10-04 PROCEDURE — 3044F HG A1C LEVEL LT 7.0%: CPT | Mod: CPTII,S$GLB,, | Performed by: OPTOMETRIST

## 2022-10-04 PROCEDURE — 99999 PR PBB SHADOW E&M-EST. PATIENT-LVL III: CPT | Mod: PBBFAC,,, | Performed by: OPTOMETRIST

## 2022-10-04 PROCEDURE — 1159F MED LIST DOCD IN RCRD: CPT | Mod: CPTII,S$GLB,, | Performed by: OPTOMETRIST

## 2022-10-04 PROCEDURE — 3044F PR MOST RECENT HEMOGLOBIN A1C LEVEL <7.0%: ICD-10-PCS | Mod: CPTII,S$GLB,, | Performed by: OPTOMETRIST

## 2022-10-04 PROCEDURE — 99999 PR PBB SHADOW E&M-EST. PATIENT-LVL III: ICD-10-PCS | Mod: PBBFAC,,, | Performed by: OPTOMETRIST

## 2022-10-04 NOTE — PROGRESS NOTES
HPI    DLS: 09/26/2022    Patient is here today for contact lens fitting     Dry eye syndrome, bilateral  Presbyopia  Hyperopia, bilateral    At's PRN   Last edited by Kin Odom MA on 10/4/2022  9:01 AM.            Assessment /Plan     For exam results, see Encounter Report.    Fitting and adjustment of spectacles and contact lenses      Change power OU  Dispensed trials  OK to order if happy with vision and comfort  Ok to use +1.75 readers over the contacts

## 2022-10-10 ENCOUNTER — PATIENT MESSAGE (OUTPATIENT)
Dept: OPTOMETRY | Facility: CLINIC | Age: 52
End: 2022-10-10
Payer: COMMERCIAL

## 2022-10-11 ENCOUNTER — PATIENT MESSAGE (OUTPATIENT)
Dept: RHEUMATOLOGY | Facility: CLINIC | Age: 52
End: 2022-10-11
Payer: COMMERCIAL

## 2022-10-12 ENCOUNTER — OFFICE VISIT (OUTPATIENT)
Dept: PSYCHIATRY | Facility: CLINIC | Age: 52
End: 2022-10-12
Payer: COMMERCIAL

## 2022-10-12 DIAGNOSIS — F43.22 ADJUSTMENT DISORDER WITH ANXIETY: Primary | ICD-10-CM

## 2022-10-12 PROCEDURE — 3044F PR MOST RECENT HEMOGLOBIN A1C LEVEL <7.0%: ICD-10-PCS | Mod: CPTII,S$GLB,, | Performed by: SOCIAL WORKER

## 2022-10-12 PROCEDURE — 99999 PR PBB SHADOW E&M-EST. PATIENT-LVL I: ICD-10-PCS | Mod: PBBFAC,,, | Performed by: SOCIAL WORKER

## 2022-10-12 PROCEDURE — 3044F HG A1C LEVEL LT 7.0%: CPT | Mod: CPTII,S$GLB,, | Performed by: SOCIAL WORKER

## 2022-10-12 PROCEDURE — 90834 PR PSYCHOTHERAPY W/PATIENT, 45 MIN: ICD-10-PCS | Mod: S$GLB,,, | Performed by: SOCIAL WORKER

## 2022-10-12 PROCEDURE — 90834 PSYTX W PT 45 MINUTES: CPT | Mod: S$GLB,,, | Performed by: SOCIAL WORKER

## 2022-10-12 PROCEDURE — 99999 PR PBB SHADOW E&M-EST. PATIENT-LVL I: CPT | Mod: PBBFAC,,, | Performed by: SOCIAL WORKER

## 2022-10-12 NOTE — PROGRESS NOTES
Individual Psychotherapy (PhD/LCSW)    10/12/2022    Site: [place]    Therapeutic Intervention: Met with patient alone  Outpatient - Insight oriented psychotherapy 45 min - CPT code 10737    Chief complaint/reason for encounter: anxiety     Interval history and content of current session: Pt reviews recents events with her attorneys and frustration with M dragging things out. Encourage pt to focus on her goals and not get pulled into battles she is not interested in fighting. Pt doing well, enjoying her new relationship, feeling emotionally stronger, having vivid dreams in which she is empowered. Main grief is cutoff by her daughters.     Treatment plan:  Target symptoms: anxiety   Why chosen therapy is appropriate versus another modality: relevant to diagnosis  Outcome monitoring methods: self-report  Therapeutic intervention type: insight oriented psychotherapy    Risk parameters:  Patient reports no suicidal ideation  Patient reports no homicidal ideation  Patient reports no self-injurious behavior  Patient reports no violent behavior    Verbal deficits: None    Patient's response to intervention:  The patient's response to intervention is motivated.    Progress toward goals and other mental status changes:  The patient's progress toward goals is good.    Diagnosis:   Adjustment disorder with anxiety    Plan:  individual psychotherapy    Return to clinic:  pt will schedule further appointments    Length of Service (minutes): 45

## 2022-10-15 ENCOUNTER — PATIENT MESSAGE (OUTPATIENT)
Dept: PRIMARY CARE CLINIC | Facility: CLINIC | Age: 52
End: 2022-10-15
Payer: COMMERCIAL

## 2022-10-15 DIAGNOSIS — F41.1 GAD (GENERALIZED ANXIETY DISORDER): ICD-10-CM

## 2022-10-17 RX ORDER — PAROXETINE 30 MG/1
30 TABLET, FILM COATED ORAL DAILY
Qty: 90 TABLET | Refills: 0 | Status: SHIPPED | OUTPATIENT
Start: 2022-10-17 | End: 2023-01-09 | Stop reason: SDUPTHER

## 2022-10-17 NOTE — TELEPHONE ENCOUNTER
No new care gaps identified.  [facility] Embedded Care Gaps. Reference number: [id number]. 10/17/2022   8:54:03 AM CDT

## 2022-10-19 ENCOUNTER — OFFICE VISIT (OUTPATIENT)
Dept: PSYCHIATRY | Facility: CLINIC | Age: 52
End: 2022-10-19
Payer: COMMERCIAL

## 2022-10-19 DIAGNOSIS — F43.22 ADJUSTMENT DISORDER WITH ANXIETY: Primary | ICD-10-CM

## 2022-10-19 PROCEDURE — 90834 PSYTX W PT 45 MINUTES: CPT | Mod: S$GLB,,, | Performed by: SOCIAL WORKER

## 2022-10-19 PROCEDURE — 3044F PR MOST RECENT HEMOGLOBIN A1C LEVEL <7.0%: ICD-10-PCS | Mod: CPTII,S$GLB,, | Performed by: SOCIAL WORKER

## 2022-10-19 PROCEDURE — 99999 PR PBB SHADOW E&M-EST. PATIENT-LVL I: ICD-10-PCS | Mod: PBBFAC,,, | Performed by: SOCIAL WORKER

## 2022-10-19 PROCEDURE — 99999 PR PBB SHADOW E&M-EST. PATIENT-LVL I: CPT | Mod: PBBFAC,,, | Performed by: SOCIAL WORKER

## 2022-10-19 PROCEDURE — 90834 PR PSYCHOTHERAPY W/PATIENT, 45 MIN: ICD-10-PCS | Mod: S$GLB,,, | Performed by: SOCIAL WORKER

## 2022-10-19 PROCEDURE — 3044F HG A1C LEVEL LT 7.0%: CPT | Mod: CPTII,S$GLB,, | Performed by: SOCIAL WORKER

## 2022-10-19 NOTE — PROGRESS NOTES
Individual Psychotherapy (PhD/LCSW)    10/19/2022    Site: [place]    Therapeutic Intervention: Met with patient alone  Outpatient - Insight oriented psychotherapy 45 min - CPT code 11378    Chief complaint/reason for encounter: anxiety     Interval history and content of current session: Pt discusses legal issues related to divorce settlement. Draw pt's attention to her somatic tightness and encourage her to be aware of this and work on self-soothing when she gets triggered by her frustration.  Pt agrees her life is going very well and that the legal issues are of secondary importance for her well-being.     Treatment plan:  Target symptoms: anxiety   Why chosen therapy is appropriate versus another modality: relevant to diagnosis  Outcome monitoring methods: self-report  Therapeutic intervention type: insight oriented psychotherapy    Risk parameters:  Patient reports no suicidal ideation  Patient reports no homicidal ideation  Patient reports no self-injurious behavior  Patient reports no violent behavior    Verbal deficits: None    Patient's response to intervention:  The patient's response to intervention is motivated.    Progress toward goals and other mental status changes:  The patient's progress toward goals is good.    Diagnosis:   Adjustment disorder with anxiety    Plan:  individual psychotherapy    Return to clinic:  pt will schedule further appointments    Length of Service (minutes): 45

## 2022-10-25 ENCOUNTER — TELEPHONE (OUTPATIENT)
Dept: PRIMARY CARE CLINIC | Facility: CLINIC | Age: 52
End: 2022-10-25
Payer: COMMERCIAL

## 2022-10-25 NOTE — TELEPHONE ENCOUNTER
----- Message from Yoselin Baltazar sent at 10/25/2022  8:09 AM CDT -----  Contact: [phone]  Pt states he has acid reflux and is requesting an appt to see Dr Stephens today but nothing is available. I did offer her Thursday but she declined. I did offer her multiple appts today at [facility] and she declined. Can you please give her a call to discuss? Thanks

## 2022-10-25 NOTE — TELEPHONE ENCOUNTER
Spoke with pt re: having acid reflux bad even while taking Nexium for the last 3 days.  Made appt for tomorrow with pt

## 2022-10-26 ENCOUNTER — OFFICE VISIT (OUTPATIENT)
Dept: PSYCHIATRY | Facility: CLINIC | Age: 52
End: 2022-10-26
Payer: COMMERCIAL

## 2022-10-26 DIAGNOSIS — F43.22 ADJUSTMENT DISORDER WITH ANXIETY: Primary | ICD-10-CM

## 2022-10-26 PROCEDURE — 3044F HG A1C LEVEL LT 7.0%: CPT | Mod: CPTII,S$GLB,, | Performed by: SOCIAL WORKER

## 2022-10-26 PROCEDURE — 3044F PR MOST RECENT HEMOGLOBIN A1C LEVEL <7.0%: ICD-10-PCS | Mod: CPTII,S$GLB,, | Performed by: SOCIAL WORKER

## 2022-10-26 PROCEDURE — 90834 PR PSYCHOTHERAPY W/PATIENT, 45 MIN: ICD-10-PCS | Mod: S$GLB,,, | Performed by: SOCIAL WORKER

## 2022-10-26 PROCEDURE — 99999 PR PBB SHADOW E&M-EST. PATIENT-LVL I: CPT | Mod: PBBFAC,,, | Performed by: SOCIAL WORKER

## 2022-10-26 PROCEDURE — 90834 PSYTX W PT 45 MINUTES: CPT | Mod: S$GLB,,, | Performed by: SOCIAL WORKER

## 2022-10-26 PROCEDURE — 99999 PR PBB SHADOW E&M-EST. PATIENT-LVL I: ICD-10-PCS | Mod: PBBFAC,,, | Performed by: SOCIAL WORKER

## 2022-10-26 NOTE — PROGRESS NOTES
Individual Psychotherapy (PhD/LCSW)    10/26/2022    Site: [place]    Therapeutic Intervention: Met with patient alone  Outpatient - Insight oriented psychotherapy 45 min - CPT code 20032    Chief complaint/reason for encounter: anxiety     Interval history and content of current session: Pt c/o reflux and tight chest, going to court today, hoping conflict with M will be over. He is asking for partial custody of their dog, [place] living [place].  Reflect with pt on her increased confidence and assertiveness in all aspects of her life.     Treatment plan:  Target symptoms: anxiety   Why chosen therapy is appropriate versus another modality: relevant to diagnosis  Outcome monitoring methods: self-report  Therapeutic intervention type: insight oriented psychotherapy    Risk parameters:  Patient reports no suicidal ideation  Patient reports no homicidal ideation  Patient reports no self-injurious behavior  Patient reports no violent behavior    Verbal deficits: None    Patient's response to intervention:  The patient's response to intervention is motivated.    Progress toward goals and other mental status changes:  The patient's progress toward goals is good.    Diagnosis:   Adjustment disorder with anxiety    Plan:  individual psychotherapy    Return to clinic:  pt will schedule further appointments    Length of Service (minutes): 45

## 2022-11-02 ENCOUNTER — OFFICE VISIT (OUTPATIENT)
Dept: PSYCHIATRY | Facility: CLINIC | Age: 52
End: 2022-11-02
Payer: COMMERCIAL

## 2022-11-02 DIAGNOSIS — F43.22 ADJUSTMENT DISORDER WITH ANXIETY: Primary | ICD-10-CM

## 2022-11-02 PROCEDURE — 90834 PSYTX W PT 45 MINUTES: CPT | Mod: S$GLB,,, | Performed by: SOCIAL WORKER

## 2022-11-02 PROCEDURE — 99999 PR PBB SHADOW E&M-EST. PATIENT-LVL I: ICD-10-PCS | Mod: PBBFAC,,, | Performed by: SOCIAL WORKER

## 2022-11-02 PROCEDURE — 3044F HG A1C LEVEL LT 7.0%: CPT | Mod: CPTII,S$GLB,, | Performed by: SOCIAL WORKER

## 2022-11-02 PROCEDURE — 90834 PR PSYCHOTHERAPY W/PATIENT, 45 MIN: ICD-10-PCS | Mod: S$GLB,,, | Performed by: SOCIAL WORKER

## 2022-11-02 PROCEDURE — 3044F PR MOST RECENT HEMOGLOBIN A1C LEVEL <7.0%: ICD-10-PCS | Mod: CPTII,S$GLB,, | Performed by: SOCIAL WORKER

## 2022-11-02 PROCEDURE — 99999 PR PBB SHADOW E&M-EST. PATIENT-LVL I: CPT | Mod: PBBFAC,,, | Performed by: SOCIAL WORKER

## 2022-11-02 NOTE — PROGRESS NOTES
"  Individual Psychotherapy (PhD/LCSW)    11/2/2022    Site: [place]    Therapeutic Intervention: Met with patient alone  Outpatient - Insight oriented psychotherapy 45 min - CPT code 13967    Chief complaint/reason for encounter: anxiety     Interval history and content of current session: Discuss recent events in court, pt's growth in confidence and "finding her voice."  Discuss somatic panic reactions based on past history and way to address this. GERD Sx resolved completely within 2 days after court hearing. Pt doing well, enjoying social life, missing daughters.     Treatment plan:  Target symptoms: anxiety   Why chosen therapy is appropriate versus another modality: relevant to diagnosis  Outcome monitoring methods: self-report  Therapeutic intervention type: insight oriented psychotherapy    Risk parameters:  Patient reports no suicidal ideation  Patient reports no homicidal ideation  Patient reports no self-injurious behavior  Patient reports no violent behavior    Verbal deficits: None    Patient's response to intervention:  The patient's response to intervention is motivated.    Progress toward goals and other mental status changes:  The patient's progress toward goals is good.    Diagnosis:   Adjustment disorder with anxiety    Plan:  individual psychotherapy    Return to clinic:  pt will schedule further appointments    Length of Service (minutes): 45                            "

## 2022-11-09 ENCOUNTER — PATIENT MESSAGE (OUTPATIENT)
Dept: OBSTETRICS AND GYNECOLOGY | Facility: CLINIC | Age: 52
End: 2022-11-09
Payer: COMMERCIAL

## 2022-11-09 ENCOUNTER — OFFICE VISIT (OUTPATIENT)
Dept: PSYCHIATRY | Facility: CLINIC | Age: 52
End: 2022-11-09
Payer: COMMERCIAL

## 2022-11-09 DIAGNOSIS — F43.22 ADJUSTMENT DISORDER WITH ANXIETY: Primary | ICD-10-CM

## 2022-11-09 PROCEDURE — 90834 PSYTX W PT 45 MINUTES: CPT | Mod: S$GLB,,, | Performed by: SOCIAL WORKER

## 2022-11-09 PROCEDURE — 90834 PR PSYCHOTHERAPY W/PATIENT, 45 MIN: ICD-10-PCS | Mod: S$GLB,,, | Performed by: SOCIAL WORKER

## 2022-11-09 PROCEDURE — 3044F HG A1C LEVEL LT 7.0%: CPT | Mod: CPTII,S$GLB,, | Performed by: SOCIAL WORKER

## 2022-11-09 PROCEDURE — 99999 PR PBB SHADOW E&M-EST. PATIENT-LVL I: ICD-10-PCS | Mod: PBBFAC,,, | Performed by: SOCIAL WORKER

## 2022-11-09 PROCEDURE — 99999 PR PBB SHADOW E&M-EST. PATIENT-LVL I: CPT | Mod: PBBFAC,,, | Performed by: SOCIAL WORKER

## 2022-11-09 PROCEDURE — 3044F PR MOST RECENT HEMOGLOBIN A1C LEVEL <7.0%: ICD-10-PCS | Mod: CPTII,S$GLB,, | Performed by: SOCIAL WORKER

## 2022-11-09 RX ORDER — DESOGESTREL AND ETHINYL ESTRADIOL 0.15-0.03
1 KIT ORAL DAILY
Qty: 30 TABLET | Refills: 9 | Status: SHIPPED | OUTPATIENT
Start: 2022-11-09 | End: 2023-09-27

## 2022-11-09 NOTE — PROGRESS NOTES
Individual Psychotherapy (PhD/LCSW)    11/9/2022    Site: [place]    Therapeutic Intervention: Met with patient alone  Outpatient - Insight oriented psychotherapy 45 min - CPT code 12304    Chief complaint/reason for encounter: anxiety     Interval history and content of current session: Pt increasingly aware of her ability to handle her own affairs and enjoy her life. She continues to miss her daughters and dream about them,did reach out to  to express wish to see her at the holidays, unable to reach [place], who has blocked pt.  She continues to enjoy her relationship with W, new experience of feeling respected and having her own voice.  Pt reports that until a few weeks ago she had chronic passive suicidal ideation, but now feels more gurdeep in life and hope for her future. Pt is clear that at no time was she actively suicidal.     Treatment plan:  Target symptoms: anxiety   Why chosen therapy is appropriate versus another modality: relevant to diagnosis  Outcome monitoring methods: self-report  Therapeutic intervention type: insight oriented psychotherapy    Risk parameters:  Patient reports no suicidal ideation  Patient reports no homicidal ideation  Patient reports no self-injurious behavior  Patient reports no violent behavior    Verbal deficits: None    Patient's response to intervention:  The patient's response to intervention is motivated.    Progress toward goals and other mental status changes:  The patient's progress toward goals is good.    Diagnosis:   Adjustment disorder with anxiety    Plan:  individual psychotherapy    Return to clinic:  pt will schedule further appointments    Length of Service (minutes): 45

## 2022-11-16 ENCOUNTER — OFFICE VISIT (OUTPATIENT)
Dept: PSYCHIATRY | Facility: CLINIC | Age: 52
End: 2022-11-16
Payer: COMMERCIAL

## 2022-11-16 DIAGNOSIS — F43.22 ADJUSTMENT DISORDER WITH ANXIETY: Primary | ICD-10-CM

## 2022-11-16 PROCEDURE — 99999 PR PBB SHADOW E&M-EST. PATIENT-LVL I: CPT | Mod: PBBFAC,,, | Performed by: SOCIAL WORKER

## 2022-11-16 PROCEDURE — 90834 PSYTX W PT 45 MINUTES: CPT | Mod: S$GLB,,, | Performed by: SOCIAL WORKER

## 2022-11-16 PROCEDURE — 3044F HG A1C LEVEL LT 7.0%: CPT | Mod: CPTII,S$GLB,, | Performed by: SOCIAL WORKER

## 2022-11-16 PROCEDURE — 3044F PR MOST RECENT HEMOGLOBIN A1C LEVEL <7.0%: ICD-10-PCS | Mod: CPTII,S$GLB,, | Performed by: SOCIAL WORKER

## 2022-11-16 PROCEDURE — 99999 PR PBB SHADOW E&M-EST. PATIENT-LVL I: ICD-10-PCS | Mod: PBBFAC,,, | Performed by: SOCIAL WORKER

## 2022-11-16 PROCEDURE — 90834 PR PSYCHOTHERAPY W/PATIENT, 45 MIN: ICD-10-PCS | Mod: S$GLB,,, | Performed by: SOCIAL WORKER

## 2022-11-21 ENCOUNTER — OFFICE VISIT (OUTPATIENT)
Dept: INTERNAL MEDICINE | Facility: CLINIC | Age: 52
End: 2022-11-21
Attending: FAMILY MEDICINE
Payer: COMMERCIAL

## 2022-11-21 VITALS
HEIGHT: 64 IN | BODY MASS INDEX: 24.07 KG/M2 | HEART RATE: 84 BPM | WEIGHT: 141 LBS | SYSTOLIC BLOOD PRESSURE: 120 MMHG | DIASTOLIC BLOOD PRESSURE: 82 MMHG | OXYGEN SATURATION: 98 %

## 2022-11-21 DIAGNOSIS — E03.9 ACQUIRED HYPOTHYROIDISM: ICD-10-CM

## 2022-11-21 DIAGNOSIS — J06.9 VIRAL UPPER RESPIRATORY TRACT INFECTION: Primary | ICD-10-CM

## 2022-11-21 LAB
CTP QC/QA: YES
POC MOLECULAR INFLUENZA A AGN: NEGATIVE
POC MOLECULAR INFLUENZA B AGN: NEGATIVE

## 2022-11-21 PROCEDURE — 1160F RVW MEDS BY RX/DR IN RCRD: CPT | Mod: CPTII,S$GLB,, | Performed by: FAMILY MEDICINE

## 2022-11-21 PROCEDURE — 3079F PR MOST RECENT DIASTOLIC BLOOD PRESSURE 80-89 MM HG: ICD-10-PCS | Mod: CPTII,S$GLB,, | Performed by: FAMILY MEDICINE

## 2022-11-21 PROCEDURE — 87502 POCT INFLUENZA A/B MOLECULAR: ICD-10-PCS | Mod: QW,S$GLB,, | Performed by: FAMILY MEDICINE

## 2022-11-21 PROCEDURE — 3008F PR BODY MASS INDEX (BMI) DOCUMENTED: ICD-10-PCS | Mod: CPTII,S$GLB,, | Performed by: FAMILY MEDICINE

## 2022-11-21 PROCEDURE — 3044F HG A1C LEVEL LT 7.0%: CPT | Mod: CPTII,S$GLB,, | Performed by: FAMILY MEDICINE

## 2022-11-21 PROCEDURE — 1160F PR REVIEW ALL MEDS BY PRESCRIBER/CLIN PHARMACIST DOCUMENTED: ICD-10-PCS | Mod: CPTII,S$GLB,, | Performed by: FAMILY MEDICINE

## 2022-11-21 PROCEDURE — 1159F PR MEDICATION LIST DOCUMENTED IN MEDICAL RECORD: ICD-10-PCS | Mod: CPTII,S$GLB,, | Performed by: FAMILY MEDICINE

## 2022-11-21 PROCEDURE — 1159F MED LIST DOCD IN RCRD: CPT | Mod: CPTII,S$GLB,, | Performed by: FAMILY MEDICINE

## 2022-11-21 PROCEDURE — 99999 PR PBB SHADOW E&M-EST. PATIENT-LVL III: CPT | Mod: PBBFAC,,, | Performed by: FAMILY MEDICINE

## 2022-11-21 PROCEDURE — 99999 PR PBB SHADOW E&M-EST. PATIENT-LVL III: ICD-10-PCS | Mod: PBBFAC,,, | Performed by: FAMILY MEDICINE

## 2022-11-21 PROCEDURE — 3074F SYST BP LT 130 MM HG: CPT | Mod: CPTII,S$GLB,, | Performed by: FAMILY MEDICINE

## 2022-11-21 PROCEDURE — 3074F PR MOST RECENT SYSTOLIC BLOOD PRESSURE < 130 MM HG: ICD-10-PCS | Mod: CPTII,S$GLB,, | Performed by: FAMILY MEDICINE

## 2022-11-21 PROCEDURE — 99214 OFFICE O/P EST MOD 30 MIN: CPT | Mod: S$GLB,,, | Performed by: FAMILY MEDICINE

## 2022-11-21 PROCEDURE — 3079F DIAST BP 80-89 MM HG: CPT | Mod: CPTII,S$GLB,, | Performed by: FAMILY MEDICINE

## 2022-11-21 PROCEDURE — 3044F PR MOST RECENT HEMOGLOBIN A1C LEVEL <7.0%: ICD-10-PCS | Mod: CPTII,S$GLB,, | Performed by: FAMILY MEDICINE

## 2022-11-21 PROCEDURE — 99214 PR OFFICE/OUTPT VISIT, EST, LEVL IV, 30-39 MIN: ICD-10-PCS | Mod: S$GLB,,, | Performed by: FAMILY MEDICINE

## 2022-11-21 PROCEDURE — 3008F BODY MASS INDEX DOCD: CPT | Mod: CPTII,S$GLB,, | Performed by: FAMILY MEDICINE

## 2022-11-21 PROCEDURE — 87502 INFLUENZA DNA AMP PROBE: CPT | Mod: QW,S$GLB,, | Performed by: FAMILY MEDICINE

## 2022-11-21 RX ORDER — PROMETHAZINE HYDROCHLORIDE AND DEXTROMETHORPHAN HYDROBROMIDE 6.25; 15 MG/5ML; MG/5ML
5 SYRUP ORAL EVERY 4 HOURS PRN
Qty: 120 ML | Refills: 1 | Status: SHIPPED | OUTPATIENT
Start: 2022-11-21 | End: 2022-12-01

## 2022-11-21 NOTE — PROGRESS NOTES
"CHIEF COMPLAINT: The patient presents with several days of cough and low-grade fever    HISTORY OF PRESENT ILLNESS: The patient presents with several days of cough and low-grade fever.  The patient has some chest congestion.  There is no history of asthma.  There is no history of HIV.  There is no history of frequent respiratory infections.  Cough is a problem.    REVIEW OF SYSTEMS:  GENERAL: No fatigability or weight loss.  SKIN: No rashes, itching or changes in color or texture of skin.  HEAD: No headaches or recent head trauma.  EYES: Visual acuity fine. No photophobia, ocular pain or diplopia.  EARS: Denies ear pain, discharge or vertigo.  NOSE: No loss of smell, no epistaxis or postnasal drip.  MOUTH & THROAT: No hoarseness or change in voice. No excessive gum bleeding.  NODES: Denies swollen glands.  CHEST: Denies SAVAGE, cyanosis, wheezing, and sputum production.  CARDIOVASCULAR: Denies chest pain, PND, orthopnea or reduced exercise tolerance.  ABDOMEN: Appetite fine. No weight loss. Denies diarrhea, abdominal pain, hematemesis or blood in stool.  URINARY: No flank pain, dysuria or hematuria.  PERIPHERAL VASCULAR: No claudication or cyanosis.  MUSCULOSKELETAL: No joint stiffness or swelling. Denies back pain.  NEUROLOGIC: No history of seizures, paralysis, alteration of gait or coordination.    SOCIAL HISTORY: Unchanged since recent note    PHYSICAL EXAMINATION:   Blood pressure 120/82, pulse 84, height 5' 4" (1.626 m), weight 64 kg (140 lb 15.8 oz), last menstrual period 11/17/2022, SpO2 98 %.    APPEARANCE: Well nourished, well developed, in no acute distress.    HEAD: Normocephalic, atraumatic.  EYES: PERRL. EOMI.  Conjunctivae without injection and  anicteric  EARS: TM's intact. Light reflex normal. No retraction or perforation.    NOSE: Mucosa pink. Airway clear.  MOUTH & THROAT: No tonsillar enlargement. No pharyngeal erythema or exudate. No stridor.  NECK: Supple.   NODES: No cervical, axillary or " inguinal lymph node enlargement.  CHEST: Lungs demonstrate scattered mild wheezes bilaterally.  No retractions are noted.  No rales or rhonchi are present.  CARDIOVASCULAR: Normal S1, S2. No rubs, murmurs or gallops.  ABDOMEN: Bowel sounds normal. Not distended. Soft. No tenderness or masses.  No ascites is noted.  MUSCULOSKELETAL:  There is no clubbing, cyanosis, or edema of the extremities x4.  There is full range of motion of the lumbar spine.  There is full range of motion of the extremities x4.  There is no deformity noted.    NEUROLOGIC:       Normal speech development.      Hearing normal.      Normal gait.      Motor and sensory exams grossly normal.      DTR's normal.  PSYCHIATRIC: Patient is alert and oriented x3.  Thought processes are all normal.  There is no homicidality.  There is no suicidality.  There is no evidence of psychosis.    LABORATORY/RADIOLOGY: Chart reviewed.  FLU test negative in the office    ASSESSMENT:   Acute bronchitis  Hypothyroidism    PLAN:  Phen DM  Patient to call with failure to improve.      Bronchitis for several days

## 2022-11-22 ENCOUNTER — PATIENT MESSAGE (OUTPATIENT)
Dept: PSYCHIATRY | Facility: CLINIC | Age: 52
End: 2022-11-22
Payer: COMMERCIAL

## 2022-11-30 ENCOUNTER — OFFICE VISIT (OUTPATIENT)
Dept: PSYCHIATRY | Facility: CLINIC | Age: 52
End: 2022-11-30
Payer: COMMERCIAL

## 2022-11-30 DIAGNOSIS — F43.22 ADJUSTMENT DISORDER WITH ANXIETY: Primary | ICD-10-CM

## 2022-11-30 PROCEDURE — 90834 PSYTX W PT 45 MINUTES: CPT | Mod: S$GLB,,, | Performed by: SOCIAL WORKER

## 2022-11-30 PROCEDURE — 99999 PR PBB SHADOW E&M-EST. PATIENT-LVL I: CPT | Mod: PBBFAC,,, | Performed by: SOCIAL WORKER

## 2022-11-30 PROCEDURE — 3044F HG A1C LEVEL LT 7.0%: CPT | Mod: CPTII,S$GLB,, | Performed by: SOCIAL WORKER

## 2022-11-30 PROCEDURE — 3044F PR MOST RECENT HEMOGLOBIN A1C LEVEL <7.0%: ICD-10-PCS | Mod: CPTII,S$GLB,, | Performed by: SOCIAL WORKER

## 2022-11-30 PROCEDURE — 99999 PR PBB SHADOW E&M-EST. PATIENT-LVL I: ICD-10-PCS | Mod: PBBFAC,,, | Performed by: SOCIAL WORKER

## 2022-11-30 PROCEDURE — 90834 PR PSYCHOTHERAPY W/PATIENT, 45 MIN: ICD-10-PCS | Mod: S$GLB,,, | Performed by: SOCIAL WORKER

## 2022-11-30 NOTE — PROGRESS NOTES
Individual Psychotherapy (PhD/LCSW)    11/30/2022    Site: [place]    Therapeutic Intervention: Met with patient alone  Outpatient - Insight oriented psychotherapy 45 min - CPT code 64687    Chief complaint/reason for encounter: anxiety     Interval history and content of current session: Pt started reviewing papers M released to attorneys and felt very emotional, stopped and asked attorneys to review them. Discuss her natural reactions to dredging up old issues. Pt is aware of improved physical well-being, no longer holding high levels of tension in her body. She is acutely missing contact with her daughters during the holiday season. She is applying for a new job, feels greater confidence in herself in this area. She is working with W to get him to talk more directly with his sons about how they feel about her being in their lives, mindful of respecting their boundaries.      Treatment plan:  Target symptoms: anxiety   Why chosen therapy is appropriate versus another modality: relevant to diagnosis  Outcome monitoring methods: self-report  Therapeutic intervention type: insight oriented psychotherapy    Risk parameters:  Patient reports no suicidal ideation  Patient reports no homicidal ideation  Patient reports no self-injurious behavior  Patient reports no violent behavior    Verbal deficits: None    Patient's response to intervention:  The patient's response to intervention is motivated.    Progress toward goals and other mental status changes:  The patient's progress toward goals is good.    Diagnosis:   Adjustment disorder with anxiety    Plan:  individual psychotherapy    Return to clinic:  pt will schedule further appointments    Length of Service (minutes): 45

## 2022-12-07 ENCOUNTER — OFFICE VISIT (OUTPATIENT)
Dept: PSYCHIATRY | Facility: CLINIC | Age: 52
End: 2022-12-07
Payer: COMMERCIAL

## 2022-12-07 DIAGNOSIS — F43.22 ADJUSTMENT DISORDER WITH ANXIETY: Primary | ICD-10-CM

## 2022-12-07 PROCEDURE — 3044F PR MOST RECENT HEMOGLOBIN A1C LEVEL <7.0%: ICD-10-PCS | Mod: CPTII,S$GLB,, | Performed by: SOCIAL WORKER

## 2022-12-07 PROCEDURE — 90834 PR PSYCHOTHERAPY W/PATIENT, 45 MIN: ICD-10-PCS | Mod: S$GLB,,, | Performed by: SOCIAL WORKER

## 2022-12-07 PROCEDURE — 99999 PR PBB SHADOW E&M-EST. PATIENT-LVL I: CPT | Mod: PBBFAC,,, | Performed by: SOCIAL WORKER

## 2022-12-07 PROCEDURE — 99999 PR PBB SHADOW E&M-EST. PATIENT-LVL I: ICD-10-PCS | Mod: PBBFAC,,, | Performed by: SOCIAL WORKER

## 2022-12-07 PROCEDURE — 90834 PSYTX W PT 45 MINUTES: CPT | Mod: S$GLB,,, | Performed by: SOCIAL WORKER

## 2022-12-07 PROCEDURE — 3044F HG A1C LEVEL LT 7.0%: CPT | Mod: CPTII,S$GLB,, | Performed by: SOCIAL WORKER

## 2022-12-07 NOTE — PROGRESS NOTES
"  Individual Psychotherapy (PhD/LCSW)    12/7/2022    Site: [place]    Therapeutic Intervention: Met with patient alone  Outpatient - Insight oriented psychotherapy 45 min - CPT code 32860    Chief complaint/reason for encounter: anxiety     Interval history and content of current session: Pt outwardly doing very well, interviewed for new job, standing up for herself in all areas of her life, enjoying friends and family, but c/o "tight chest", related to acute awareness of cutoff by her kids. Friend showed pt FB photos of Elbert and the girls at [facility], which pt is  blocked from seeing. Pt got text from Niya indicating fond past memories and intention to connect in future, but barring any current contact. Pt learned Linda was in town 2 weeks ago. Discuss managing the pain of this loss while enjoying the rest of her life.     Treatment plan:  Target symptoms: anxiety   Why chosen therapy is appropriate versus another modality: relevant to diagnosis  Outcome monitoring methods: self-report  Therapeutic intervention type: insight oriented psychotherapy    Risk parameters:  Patient reports no suicidal ideation  Patient reports no homicidal ideation  Patient reports no self-injurious behavior  Patient reports no violent behavior    Verbal deficits: None    Patient's response to intervention:  The patient's response to intervention is motivated.    Progress toward goals and other mental status changes:  The patient's progress toward goals is good.    Diagnosis:   Adjustment disorder with anxiety    Plan:  individual psychotherapy    Return to clinic:  pt will schedule further appointments    Length of Service (minutes): 45                                    "

## 2022-12-14 ENCOUNTER — OFFICE VISIT (OUTPATIENT)
Dept: PSYCHIATRY | Facility: CLINIC | Age: 52
End: 2022-12-14
Payer: COMMERCIAL

## 2022-12-14 DIAGNOSIS — F43.22 ADJUSTMENT DISORDER WITH ANXIETY: Primary | ICD-10-CM

## 2022-12-14 PROCEDURE — 99999 PR PBB SHADOW E&M-EST. PATIENT-LVL I: CPT | Mod: PBBFAC,,, | Performed by: SOCIAL WORKER

## 2022-12-14 PROCEDURE — 3044F PR MOST RECENT HEMOGLOBIN A1C LEVEL <7.0%: ICD-10-PCS | Mod: CPTII,S$GLB,, | Performed by: SOCIAL WORKER

## 2022-12-14 PROCEDURE — 3044F HG A1C LEVEL LT 7.0%: CPT | Mod: CPTII,S$GLB,, | Performed by: SOCIAL WORKER

## 2022-12-14 PROCEDURE — 90834 PR PSYCHOTHERAPY W/PATIENT, 45 MIN: ICD-10-PCS | Mod: S$GLB,,, | Performed by: SOCIAL WORKER

## 2022-12-14 PROCEDURE — 90834 PSYTX W PT 45 MINUTES: CPT | Mod: S$GLB,,, | Performed by: SOCIAL WORKER

## 2022-12-14 PROCEDURE — 99999 PR PBB SHADOW E&M-EST. PATIENT-LVL I: ICD-10-PCS | Mod: PBBFAC,,, | Performed by: SOCIAL WORKER

## 2022-12-14 NOTE — PROGRESS NOTES
Individual Psychotherapy (PhD/LCSW)    12/14/2022    Site: [place]    Therapeutic Intervention: Met with patient alone  Outpatient - Insight oriented psychotherapy 45 min - CPT code 26948    Chief complaint/reason for encounter: anxiety     Interval history and content of current session: Discuss pt's strong reaction to mom chiding her recently, look at ways pt has gotten stronger and more confident, and where she is still vulnerable. Main grief is lack of contact with daughters during holidays and awareness she has not seen them for a year. She reached out to  and got no response, is  blocked from contacting [place].      Treatment plan:  Target symptoms: anxiety   Why chosen therapy is appropriate versus another modality: relevant to diagnosis  Outcome monitoring methods: self-report  Therapeutic intervention type: insight oriented psychotherapy    Risk parameters:  Patient reports no suicidal ideation  Patient reports no homicidal ideation  Patient reports no self-injurious behavior  Patient reports no violent behavior    Verbal deficits: None    Patient's response to intervention:  The patient's response to intervention is motivated.    Progress toward goals and other mental status changes:  The patient's progress toward goals is good.    Diagnosis:   Adjustment disorder with anxiety    Plan:  individual psychotherapy    Return to clinic:  pt will schedule further appointments    Length of Service (minutes): 45

## 2022-12-21 ENCOUNTER — OFFICE VISIT (OUTPATIENT)
Dept: PSYCHIATRY | Facility: CLINIC | Age: 52
End: 2022-12-21
Payer: COMMERCIAL

## 2022-12-21 DIAGNOSIS — F43.22 ADJUSTMENT DISORDER WITH ANXIETY: Primary | ICD-10-CM

## 2022-12-21 PROCEDURE — 99999 PR PBB SHADOW E&M-EST. PATIENT-LVL I: ICD-10-PCS | Mod: PBBFAC,,, | Performed by: SOCIAL WORKER

## 2022-12-21 PROCEDURE — 90834 PR PSYCHOTHERAPY W/PATIENT, 45 MIN: ICD-10-PCS | Mod: S$GLB,,, | Performed by: SOCIAL WORKER

## 2022-12-21 PROCEDURE — 3044F PR MOST RECENT HEMOGLOBIN A1C LEVEL <7.0%: ICD-10-PCS | Mod: CPTII,S$GLB,, | Performed by: SOCIAL WORKER

## 2022-12-21 PROCEDURE — 3044F HG A1C LEVEL LT 7.0%: CPT | Mod: CPTII,S$GLB,, | Performed by: SOCIAL WORKER

## 2022-12-21 PROCEDURE — 99999 PR PBB SHADOW E&M-EST. PATIENT-LVL I: CPT | Mod: PBBFAC,,, | Performed by: SOCIAL WORKER

## 2022-12-21 PROCEDURE — 90834 PSYTX W PT 45 MINUTES: CPT | Mod: S$GLB,,, | Performed by: SOCIAL WORKER

## 2022-12-21 NOTE — PROGRESS NOTES
Individual Psychotherapy (PhD/LCSW)    12/21/2022    Site: [place]    Therapeutic Intervention: Met with patient alone  Outpatient - Insight oriented psychotherapy 45 min - CPT code 01587    Chief complaint/reason for encounter: anxiety     Interval history and content of current session: Pt sad and hurt seeing photos of daughter in [place], had no idea she was traveling, struggles to be shut out of Linda's life. She and W [place], one was continually rude and pt upset that W made no effort to correct the child. She discussed this afterward with him, and is seeing that W resists setting limits or disciplining his kids. Talk with pt about risks of her moving into parenting role with the kids and options to back off of contact with them while W works on this with the boys.      Treatment plan:  Target symptoms: anxiety   Why chosen therapy is appropriate versus another modality: relevant to diagnosis  Outcome monitoring methods: self-report  Therapeutic intervention type: insight oriented psychotherapy    Risk parameters:  Patient reports no suicidal ideation  Patient reports no homicidal ideation  Patient reports no self-injurious behavior  Patient reports no violent behavior    Verbal deficits: None    Patient's response to intervention:  The patient's response to intervention is motivated.    Progress toward goals and other mental status changes:  The patient's progress toward goals is fair    Diagnosis:   Adjustment disorder with anxiety    Plan:  individual psychotherapy    Return to clinic:  pt will schedule further appointments    Length of Service (minutes): 45

## 2022-12-26 ENCOUNTER — OFFICE VISIT (OUTPATIENT)
Dept: URGENT CARE | Facility: CLINIC | Age: 52
End: 2022-12-26
Payer: COMMERCIAL

## 2022-12-26 VITALS
HEIGHT: 64 IN | BODY MASS INDEX: 23.9 KG/M2 | DIASTOLIC BLOOD PRESSURE: 83 MMHG | WEIGHT: 140 LBS | OXYGEN SATURATION: 98 % | TEMPERATURE: 99 F | RESPIRATION RATE: 18 BRPM | HEART RATE: 89 BPM | SYSTOLIC BLOOD PRESSURE: 125 MMHG

## 2022-12-26 DIAGNOSIS — J10.1 INFLUENZA A: Primary | ICD-10-CM

## 2022-12-26 DIAGNOSIS — R05.9 COUGH, UNSPECIFIED TYPE: ICD-10-CM

## 2022-12-26 LAB
CTP QC/QA: YES
POC MOLECULAR INFLUENZA A AGN: POSITIVE
POC MOLECULAR INFLUENZA B AGN: NEGATIVE

## 2022-12-26 PROCEDURE — 3044F HG A1C LEVEL LT 7.0%: CPT | Mod: CPTII,S$GLB,, | Performed by: PHYSICIAN ASSISTANT

## 2022-12-26 PROCEDURE — 3074F SYST BP LT 130 MM HG: CPT | Mod: CPTII,S$GLB,, | Performed by: PHYSICIAN ASSISTANT

## 2022-12-26 PROCEDURE — 1159F MED LIST DOCD IN RCRD: CPT | Mod: CPTII,S$GLB,, | Performed by: PHYSICIAN ASSISTANT

## 2022-12-26 PROCEDURE — 3079F DIAST BP 80-89 MM HG: CPT | Mod: CPTII,S$GLB,, | Performed by: PHYSICIAN ASSISTANT

## 2022-12-26 PROCEDURE — 1160F PR REVIEW ALL MEDS BY PRESCRIBER/CLIN PHARMACIST DOCUMENTED: ICD-10-PCS | Mod: CPTII,S$GLB,, | Performed by: PHYSICIAN ASSISTANT

## 2022-12-26 PROCEDURE — 87502 INFLUENZA DNA AMP PROBE: CPT | Mod: QW,S$GLB,, | Performed by: PHYSICIAN ASSISTANT

## 2022-12-26 PROCEDURE — 3044F PR MOST RECENT HEMOGLOBIN A1C LEVEL <7.0%: ICD-10-PCS | Mod: CPTII,S$GLB,, | Performed by: PHYSICIAN ASSISTANT

## 2022-12-26 PROCEDURE — 3074F PR MOST RECENT SYSTOLIC BLOOD PRESSURE < 130 MM HG: ICD-10-PCS | Mod: CPTII,S$GLB,, | Performed by: PHYSICIAN ASSISTANT

## 2022-12-26 PROCEDURE — 3008F PR BODY MASS INDEX (BMI) DOCUMENTED: ICD-10-PCS | Mod: CPTII,S$GLB,, | Performed by: PHYSICIAN ASSISTANT

## 2022-12-26 PROCEDURE — 1160F RVW MEDS BY RX/DR IN RCRD: CPT | Mod: CPTII,S$GLB,, | Performed by: PHYSICIAN ASSISTANT

## 2022-12-26 PROCEDURE — 87502 POCT INFLUENZA A/B MOLECULAR: ICD-10-PCS | Mod: QW,S$GLB,, | Performed by: PHYSICIAN ASSISTANT

## 2022-12-26 PROCEDURE — 3079F PR MOST RECENT DIASTOLIC BLOOD PRESSURE 80-89 MM HG: ICD-10-PCS | Mod: CPTII,S$GLB,, | Performed by: PHYSICIAN ASSISTANT

## 2022-12-26 PROCEDURE — 3008F BODY MASS INDEX DOCD: CPT | Mod: CPTII,S$GLB,, | Performed by: PHYSICIAN ASSISTANT

## 2022-12-26 PROCEDURE — 99213 PR OFFICE/OUTPT VISIT, EST, LEVL III, 20-29 MIN: ICD-10-PCS | Mod: S$GLB,,, | Performed by: PHYSICIAN ASSISTANT

## 2022-12-26 PROCEDURE — 99213 OFFICE O/P EST LOW 20 MIN: CPT | Mod: S$GLB,,, | Performed by: PHYSICIAN ASSISTANT

## 2022-12-26 PROCEDURE — 1159F PR MEDICATION LIST DOCUMENTED IN MEDICAL RECORD: ICD-10-PCS | Mod: CPTII,S$GLB,, | Performed by: PHYSICIAN ASSISTANT

## 2022-12-26 RX ORDER — BENZONATATE 200 MG/1
200 CAPSULE ORAL 3 TIMES DAILY PRN
Qty: 30 CAPSULE | Refills: 0 | Status: SHIPPED | OUTPATIENT
Start: 2022-12-26 | End: 2023-01-05

## 2022-12-26 RX ORDER — FLUTICASONE PROPIONATE 50 MCG
1 SPRAY, SUSPENSION (ML) NASAL DAILY
Qty: 16 G | Refills: 0 | Status: SHIPPED | OUTPATIENT
Start: 2022-12-26 | End: 2023-01-02

## 2022-12-26 NOTE — PROGRESS NOTES
"Subjective:       Patient ID: Becky Cade is a 52 y.o. female.    Vitals:  height is 5' 4" (1.626 m) and weight is 63.5 kg (140 lb). Her temperature is 98.7 °F (37.1 °C). Her blood pressure is 125/83 and her pulse is 89. Her respiration is 18 and oxygen saturation is 98%.     Chief Complaint: Cough    Pt complains x3 days of cough, chest congestion, nasal congestion, post nasal drip, sinus pressure, headache, fever 101.0 at home, bodyache, chills. Took tylenol, cough syrup with mild relief. Took a home covid test yesterday that was negative.     Cough  This is a new problem. The current episode started in the past 7 days. The problem has been unchanged. The problem occurs constantly. The cough is Non-productive. Associated symptoms include chills, a fever, headaches, nasal congestion, postnasal drip and sweats. Pertinent negatives include no chest pain, ear congestion, ear pain, heartburn, hemoptysis, myalgias, rash, rhinorrhea, sore throat, shortness of breath, weight loss or wheezing.     Constitution: Positive for chills and fever. Negative for appetite change, sweating, fatigue and unexpected weight change.   HENT:  Positive for congestion and postnasal drip. Negative for ear pain, ear discharge, mouth sores, tongue pain, tongue lesion, sinus pain, sinus pressure, sore throat and trouble swallowing.    Neck: Negative for neck pain, neck stiffness and painful lymph nodes.   Cardiovascular:  Negative for chest pain and sob on exertion.   Eyes:  Negative for eye discharge and eye itching.   Respiratory:  Positive for cough. Negative for chest tightness, sputum production, bloody sputum, shortness of breath and wheezing.    Gastrointestinal:  Negative for abdominal pain, nausea, vomiting, constipation, diarrhea and heartburn.   Musculoskeletal:  Negative for pain, muscle cramps and muscle ache.   Skin:  Negative for color change, pale and rash.   Neurological:  Positive for headaches. Negative for " dizziness, disorientation and altered mental status.   Hematologic/Lymphatic: Negative for swollen lymph nodes.   Psychiatric/Behavioral:  Negative for altered mental status, disorientation and confusion.    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       Past Surgical History:   Procedure Laterality Date    COLD KNIFE CONIZATION OF CERVIX  2009    [facility]    COLONOSCOPY N/A 9/18/2020    Procedure: COLONOSCOPY;  Surgeon: Thaddeus Blackburn MD;  Location: [facility] ([address] [place] [address]);  Service: Endoscopy;  Laterality: N/A;  family history malignant hyperthermia  covid test [facility] urgent care 9/15    CYSTOSCOPY N/A 3/25/2022    Procedure: CYSTOSCOPY;  Surgeon: Brian Babcock MD;  Location: [facility] OR [address] [place] [address];  Service: OB/GYN;  Laterality: N/A;    INSERTION OF MIDURETHRAL SLING N/A 3/25/2022    Procedure: SLING, MIDURETHRAL;  Surgeon: Brian Babcock MD;  Location: [facility] OR [address] [place] [address];  Service: OB/GYN;  Laterality: N/A;    TONSILLECTOMY  1993       Family History   Problem Relation Age of Onset    Rheum arthritis Mother     Arthritis Mother     Parkinsonism Father     Malignant hyperthermia Brother     Breast cancer Neg Hx     Colon cancer Neg Hx     Ovarian cancer Neg Hx     Melanoma Neg Hx     Cancer Neg Hx     Heart disease Neg Hx        Social History     Socioeconomic History    Marital status:    Tobacco Use    Smoking status: Never    Smokeless tobacco: Never   Substance and Sexual Activity    Alcohol use: Yes     Alcohol/week: 2.0 standard drinks     Types: 2 Glasses of wine per week     Comment: socially on weekends    Drug use: No    Sexual activity: Yes     Partners: Male     Birth control/protection: Other-see comments     Comment: [employer] Birth Control Pills       Current  Outpatient Medications   Medication Sig Dispense Refill    acyclovir (ZOVIRAX) 400 MG tablet Take 1 tablet (400 mg total) by mouth 2 (two) times daily. Fever blisters 14 tablet 0    desogestreL-ethinyl estradioL (APRI) 0.15-0.03 mg per tablet Take 1 tablet by mouth once daily. 30 tablet 9    estradioL (ESTRACE) 0.01 % (0.1 mg/gram) vaginal cream Use 0.5 gram of estrogen cream in vagina nightly x 1 weeks, then two nights a week thereafter. 42.5 g 3    hydrOXYzine HCL (ATARAX) 10 MG Tab TAKE 1 TABLET(10 MG) BY MOUTH FOUR TIMES DAILY AS NEEDED FOR ANXIETY 90 tablet 0    levothyroxine (SYNTHROID) 75 MCG tablet Take 1 tablet (75 mcg total) by mouth before breakfast. 90 tablet 3    benzonatate (TESSALON) 200 MG capsule Take 1 capsule (200 mg total) by mouth 3 (three) times daily as needed for Cough. 30 capsule 0    fluticasone propionate (FLONASE) 50 mcg/actuation nasal spray 1 spray (50 mcg total) by Each Nostril route once daily. for 7 days 16 g 0    LORazepam (ATIVAN) 0.5 MG tablet Take 1 tablet (0.5 mg total) by mouth nightly as needed for Anxiety. Use only infrequently, can be addictive 30 tablet 0    paroxetine (PAXIL) 30 MG tablet Take 1 tablet (30 mg total) by mouth once daily. Cancel the 10 mg 90 tablet 0    triamcinolone acetonide 0.1% (KENALOG) 0.1 % cream AAA bid 454 g 3    triamcinolone acetonide 0.1% (KENALOG) 0.1 % paste APPLY TO MOUTH SORES 2 TIMES A DAY 5 g 0     No current facility-administered medications for this visit.       Review of patient's allergies indicates:   Allergen Reactions    Mobic [meloxicam] Rash         Objective:      Physical Exam   Constitutional: She is oriented to person, place, and time. She appears well-developed. She is cooperative.  Non-toxic appearance. She does not appear ill. No distress.   HENT:   Head: Normocephalic and atraumatic.   Ears:   Right Ear: Hearing, tympanic membrane, external ear and ear canal normal. impacted cerumen  Left Ear: Hearing, tympanic membrane,  external ear and ear canal normal. impacted cerumen  Nose: Rhinorrhea and congestion present. No mucosal edema or nasal deformity. No epistaxis. Right sinus exhibits no maxillary sinus tenderness and no frontal sinus tenderness. Left sinus exhibits no maxillary sinus tenderness and no frontal sinus tenderness.   Mouth/Throat: Uvula is midline and mucous membranes are normal. Mucous membranes are moist. No trismus in the jaw. Normal dentition. No uvula swelling. Posterior oropharyngeal erythema present. No oropharyngeal exudate or posterior oropharyngeal edema.   Eyes: Conjunctivae and lids are normal. Right eye exhibits no discharge. Left eye exhibits no discharge. No scleral icterus.   Neck: Trachea normal and phonation normal. Neck supple. No edema present. No erythema present. No neck rigidity present.   Cardiovascular: Normal rate, regular rhythm, normal heart sounds and normal pulses.   No murmur heard.Exam reveals no gallop.   Pulmonary/Chest: Effort normal and breath sounds normal. No stridor. No respiratory distress. She has no decreased breath sounds. She has no wheezes. She has no rhonchi. She has no rales.   Abdominal: Normal appearance.   Musculoskeletal:      Cervical back: She exhibits no tenderness.   Lymphadenopathy:     She has no cervical adenopathy.   Neurological: She is alert and oriented to person, place, and time. She exhibits normal muscle tone. Coordination normal.   Skin: Skin is warm, dry, intact, not diaphoretic, not pale and no rash.   Psychiatric: Her speech is normal and behavior is normal. Judgment and thought content normal.   Nursing note and vitals reviewed.  Results for orders placed or performed in visit on 12/26/22   POCT Influenza A/B MOLECULAR   Result Value Ref Range    POC Molecular Influenza A Ag Positive (A) Negative, Not Reported    POC Molecular Influenza B Ag Negative Negative, Not Reported     Acceptable Yes              Assessment:       1. Influenza A     2. Cough, unspecified type          Plan:       Results reviewed  Influenza A  -     benzonatate (TESSALON) 200 MG capsule; Take 1 capsule (200 mg total) by mouth 3 (three) times daily as needed for Cough.  Dispense: 30 capsule; Refill: 0  -     fluticasone propionate (FLONASE) 50 mcg/actuation nasal spray; 1 spray (50 mcg total) by Each Nostril route once daily. for 7 days  Dispense: 16 g; Refill: 0    Cough, unspecified type  -     POCT Influenza A/B MOLECULAR  -     benzonatate (TESSALON) 200 MG capsule; Take 1 capsule (200 mg total) by mouth 3 (three) times daily as needed for Cough.  Dispense: 30 capsule; Refill: 0         Patient Instructions   Tested positive for Influenza A today. Take flonase for nasal congestion, tessalon for cough and Tylenol/ibuprofen with food for fevers/pain. Drink plenty of fluids and get plenty of rest. Do not return to school/work until 24 hour fever free without the use of tylenol/ibuprofen. If your symptoms worsen please return or go to the ER for further evaluation.

## 2022-12-26 NOTE — LETTER
December 26, 2022      [facility]  [address] [place] [address]  Phone: [phone]  Fax: [phone]       Patient: Becky Cade   YOB: 1970  Date of Visit: 12/26/2022    To Whom It May Concern:    Yadira Cade  was at [facility] on 12/26/2022. The patient may return to work on 12/28/2022 with no restrictions if fever free for 24 hours without the use of tylenol/ibuprofen. If you have any questions or concerns, or if I can be of further assistance, please do not hesitate to contact me.    Sincerely,      Merary Gaytan PA-C

## 2022-12-26 NOTE — PATIENT INSTRUCTIONS
Tested positive for Influenza A today. Take flonase for nasal congestion, tessalon for cough and Tylenol/ibuprofen with food for fevers/pain. Drink plenty of fluids and get plenty of rest. Do not return to school/work until 24 hour fever free without the use of tylenol/ibuprofen. If your symptoms worsen please return or go to the ER for further evaluation.

## 2022-12-29 ENCOUNTER — PATIENT MESSAGE (OUTPATIENT)
Dept: INTERNAL MEDICINE | Facility: CLINIC | Age: 52
End: 2022-12-29
Payer: COMMERCIAL

## 2023-01-04 ENCOUNTER — OFFICE VISIT (OUTPATIENT)
Dept: PSYCHIATRY | Facility: CLINIC | Age: 53
End: 2023-01-04
Payer: COMMERCIAL

## 2023-01-04 ENCOUNTER — PATIENT MESSAGE (OUTPATIENT)
Dept: PRIMARY CARE CLINIC | Facility: CLINIC | Age: 53
End: 2023-01-04
Payer: COMMERCIAL

## 2023-01-04 DIAGNOSIS — F43.22 ADJUSTMENT DISORDER WITH ANXIETY: Primary | ICD-10-CM

## 2023-01-04 PROCEDURE — 90834 PR PSYCHOTHERAPY W/PATIENT, 45 MIN: ICD-10-PCS | Mod: S$GLB,,, | Performed by: SOCIAL WORKER

## 2023-01-04 PROCEDURE — 90834 PSYTX W PT 45 MINUTES: CPT | Mod: S$GLB,,, | Performed by: SOCIAL WORKER

## 2023-01-04 PROCEDURE — 99999 PR PBB SHADOW E&M-EST. PATIENT-LVL I: CPT | Mod: PBBFAC,,, | Performed by: SOCIAL WORKER

## 2023-01-04 PROCEDURE — 99999 PR PBB SHADOW E&M-EST. PATIENT-LVL I: ICD-10-PCS | Mod: PBBFAC,,, | Performed by: SOCIAL WORKER

## 2023-01-04 NOTE — PROGRESS NOTES
Individual Psychotherapy (PhD/LCSW)    1/4/2023    Site: [place]    Therapeutic Intervention: Met with patient alone  Outpatient - Insight oriented psychotherapy 45 min - CPT code 41276    Chief complaint/reason for encounter: anxiety     Interval history and content of current session: Pt doing well in most aspects of her life but anxious about seeing M in court next week, where he is seeking joint custody of their dog.  Work with pt to focus on getting past the court date and knowing she will be ok.     Treatment plan:  Target symptoms: anxiety   Why chosen therapy is appropriate versus another modality: relevant to diagnosis  Outcome monitoring methods: self-report  Therapeutic intervention type: insight oriented psychotherapy    Risk parameters:  Patient reports no suicidal ideation  Patient reports no homicidal ideation  Patient reports no self-injurious behavior  Patient reports no violent behavior    Verbal deficits: None    Patient's response to intervention:  The patient's response to intervention is motivated.    Progress toward goals and other mental status changes:  The patient's progress toward goals is good    Diagnosis:   Adjustment disorder with anxiety    Plan:  individual psychotherapy    Return to clinic:  pt will schedule further appointments    Length of Service (minutes): 45

## 2023-01-09 ENCOUNTER — OFFICE VISIT (OUTPATIENT)
Dept: PRIMARY CARE CLINIC | Facility: CLINIC | Age: 53
End: 2023-01-09
Payer: COMMERCIAL

## 2023-01-09 ENCOUNTER — TELEPHONE (OUTPATIENT)
Dept: PRIMARY CARE CLINIC | Facility: CLINIC | Age: 53
End: 2023-01-09
Payer: COMMERCIAL

## 2023-01-09 VITALS — BODY MASS INDEX: 23.9 KG/M2 | WEIGHT: 140 LBS | HEIGHT: 64 IN

## 2023-01-09 DIAGNOSIS — Z00.00 ANNUAL PHYSICAL EXAM: ICD-10-CM

## 2023-01-09 DIAGNOSIS — J40 BRONCHITIS: Primary | ICD-10-CM

## 2023-01-09 DIAGNOSIS — Z13.6 ENCOUNTER FOR LIPID SCREENING FOR CARDIOVASCULAR DISEASE: ICD-10-CM

## 2023-01-09 DIAGNOSIS — Z13.220 ENCOUNTER FOR LIPID SCREENING FOR CARDIOVASCULAR DISEASE: ICD-10-CM

## 2023-01-09 DIAGNOSIS — Z79.899 LONG-TERM CURRENT USE OF BENZODIAZEPINE: ICD-10-CM

## 2023-01-09 DIAGNOSIS — Z72.820 POOR SLEEP: ICD-10-CM

## 2023-01-09 DIAGNOSIS — E53.8 LOW SERUM VITAMIN B12: ICD-10-CM

## 2023-01-09 DIAGNOSIS — F41.1 GAD (GENERALIZED ANXIETY DISORDER): ICD-10-CM

## 2023-01-09 DIAGNOSIS — Z12.31 SCREENING MAMMOGRAM FOR BREAST CANCER: ICD-10-CM

## 2023-01-09 DIAGNOSIS — E03.9 HYPOTHYROIDISM, UNSPECIFIED TYPE: ICD-10-CM

## 2023-01-09 DIAGNOSIS — R05.3 PERSISTENT COUGH: ICD-10-CM

## 2023-01-09 DIAGNOSIS — Z87.09 HISTORY OF INFLUENZA: ICD-10-CM

## 2023-01-09 PROCEDURE — 3008F PR BODY MASS INDEX (BMI) DOCUMENTED: ICD-10-PCS | Mod: CPTII,95,, | Performed by: FAMILY MEDICINE

## 2023-01-09 PROCEDURE — 99214 OFFICE O/P EST MOD 30 MIN: CPT | Mod: 95,,, | Performed by: FAMILY MEDICINE

## 2023-01-09 PROCEDURE — 3008F BODY MASS INDEX DOCD: CPT | Mod: CPTII,95,, | Performed by: FAMILY MEDICINE

## 2023-01-09 PROCEDURE — 1159F MED LIST DOCD IN RCRD: CPT | Mod: CPTII,95,, | Performed by: FAMILY MEDICINE

## 2023-01-09 PROCEDURE — 99214 PR OFFICE/OUTPT VISIT, EST, LEVL IV, 30-39 MIN: ICD-10-PCS | Mod: 95,,, | Performed by: FAMILY MEDICINE

## 2023-01-09 PROCEDURE — 1159F PR MEDICATION LIST DOCUMENTED IN MEDICAL RECORD: ICD-10-PCS | Mod: CPTII,95,, | Performed by: FAMILY MEDICINE

## 2023-01-09 RX ORDER — ALBUTEROL SULFATE 90 UG/1
2 AEROSOL, METERED RESPIRATORY (INHALATION) EVERY 6 HOURS PRN
Qty: 18 G | Refills: 0 | Status: SHIPPED | OUTPATIENT
Start: 2023-01-09 | End: 2023-05-18 | Stop reason: ALTCHOICE

## 2023-01-09 RX ORDER — AMOXICILLIN AND CLAVULANATE POTASSIUM 875; 125 MG/1; MG/1
1 TABLET, FILM COATED ORAL EVERY 12 HOURS
Qty: 14 TABLET | Refills: 0 | Status: SHIPPED | OUTPATIENT
Start: 2023-01-09 | End: 2023-01-16

## 2023-01-09 RX ORDER — PREDNISONE 20 MG/1
40 TABLET ORAL DAILY
Qty: 10 TABLET | Refills: 0 | Status: SHIPPED | OUTPATIENT
Start: 2023-01-09 | End: 2023-01-14

## 2023-01-09 RX ORDER — LORAZEPAM 0.5 MG/1
0.5 TABLET ORAL NIGHTLY PRN
Qty: 30 TABLET | Refills: 0 | Status: SHIPPED | OUTPATIENT
Start: 2023-01-09 | End: 2023-05-18 | Stop reason: SDUPTHER

## 2023-01-09 RX ORDER — PAROXETINE HYDROCHLORIDE 40 MG/1
40 TABLET, FILM COATED ORAL DAILY
Qty: 90 TABLET | Refills: 3 | Status: SHIPPED | OUTPATIENT
Start: 2023-01-09 | End: 2023-07-07 | Stop reason: SDUPTHER

## 2023-01-09 RX ORDER — HYDROXYZINE HYDROCHLORIDE 10 MG/1
20 TABLET, FILM COATED ORAL NIGHTLY
Qty: 180 TABLET | Refills: 1 | Status: SHIPPED | OUTPATIENT
Start: 2023-01-09 | End: 2023-05-08 | Stop reason: SDUPTHER

## 2023-01-09 NOTE — TELEPHONE ENCOUNTER
----- Message from Ana Saleh sent at 1/9/2023 11:31 AM CST -----  Contact: self [phone]  Per pt awaiting provider to join [facility].    Please call and advise

## 2023-01-09 NOTE — PROGRESS NOTES
Subjective:       Patient ID: Becky Cade is a 52 y.o. female.    Chief Complaint: Cough (Flu day after howard/Still dealing with cough) and Medication Problem (Discuss medication )    Cough  This is a chronic problem. The current episode started 1 to 4 weeks ago. The problem has been waxing and waning. The problem occurs constantly. The cough is Productive of sputum. Associated symptoms include chest pain and shortness of breath. The symptoms are aggravated by exercise, lying down and stress. She has tried prescription cough suppressant for the symptoms. There is no history of asthma, bronchiectasis, bronchitis, COPD, emphysema, environmental allergies or pneumonia.      53 y/o female with hypothyroidism, cervical djd, ANAM, history of COVID here for follow-up ANAM.    She has been going through at difficult divorce, she has been needing her Ativan more lately, initially when we went up on the Paxil and added hydroxyzine which helped reduce the Ativan usage.     She had the Flu late December and since then she has continued cough productive of green sputum, she has chest congestion, when she laughs or talks a lot she has a coughing spasm and gets out of breath. Tessalon is not helpful. She is taking mucinex. She reports in high school she would get this and take albuterol for exercise but it made her feel dizzy. She denies f/n/v/d/constipation/abdominal pain, chest tightness, feels like its hard to take a deep breath. Appetite is good. She is active at work no dedicated exercise.        Hx of covid 1/2022 mild outpatient symptoms  ANAM:  Counseling, paxil 30 mg daily, Ativan 0.5 mg nightly  Hypothyroidism:never had procedure or biopsy, levothyroxine 75mcg  GYN: following with Dr. Gotti, pap 7/2021, OCP, mmg 2/2022  Colonoscopy 9/2020 repeat 10 years  Eye exam 8/2021  OTC: b12, vit D    Review of Systems   Respiratory:  Positive for cough and shortness of breath.    Cardiovascular:  Positive for chest pain.  "  Allergic/Immunologic: Negative for environmental allergies.   see HPI  Objective:      Ht 5' 4" (1.626 m)   Wt 63.5 kg (140 lb)   BMI 24.03 kg/m²   Physical Exam  Constitutional:       General: She is not in acute distress.     Appearance: She is well-developed. She is not diaphoretic.   HENT:      Head: Normocephalic and atraumatic.   Pulmonary:      Effort: No respiratory distress.   Neurological:      Mental Status: She is alert.       Assessment:       1. Bronchitis    2. ANAM (generalized anxiety disorder)    3. Long-term current use of benzodiazepine    4. Poor sleep    5. History of influenza    6. Persistent cough    7. Annual physical exam    8. Hypothyroidism, unspecified type    9. Low serum vitamin B12    10. Encounter for lipid screening for cardiovascular disease    11. Screening mammogram for breast cancer          Plan:   Becky was seen today for cough and medication problem.    Diagnoses and all orders for this visit:    Bronchitis  -     predniSONE (DELTASONE) 20 MG tablet; Take 2 tablets (40 mg total) by mouth once daily. for 5 days  -     amoxicillin-clavulanate 875-125mg (AUGMENTIN) 875-125 mg per tablet; Take 1 tablet by mouth every 12 (twelve) hours. for 7 days  -     albuterol (VENTOLIN HFA) 90 mcg/actuation inhaler; Inhale 2 puffs into the lungs every 6 (six) hours as needed for Wheezing. Rescue    ANAM (generalized anxiety disorder)  -     paroxetine (PAXIL) 40 MG tablet; Take 1 tablet (40 mg total) by mouth once daily. Cancel the 10 mg  -     LORazepam (ATIVAN) 0.5 MG tablet; Take 1 tablet (0.5 mg total) by mouth nightly as needed for Anxiety. Use only infrequently, can be addictive  -     Ambulatory referral/consult to Psychiatry; Future  -     hydrOXYzine HCL (ATARAX) 10 MG Tab; Take 2 tablets (20 mg total) by mouth every evening.    Long-term current use of benzodiazepine  -     LORazepam (ATIVAN) 0.5 MG tablet; Take 1 tablet (0.5 mg total) by mouth nightly as needed for Anxiety. " Use only infrequently, can be addictive  -     Ambulatory referral/consult to Psychiatry; Future    Poor sleep  -     hydrOXYzine HCL (ATARAX) 10 MG Tab; Take 2 tablets (20 mg total) by mouth every evening.    History of influenza  -     predniSONE (DELTASONE) 20 MG tablet; Take 2 tablets (40 mg total) by mouth once daily. for 5 days  -     amoxicillin-clavulanate 875-125mg (AUGMENTIN) 875-125 mg per tablet; Take 1 tablet by mouth every 12 (twelve) hours. for 7 days  -     albuterol (VENTOLIN HFA) 90 mcg/actuation inhaler; Inhale 2 puffs into the lungs every 6 (six) hours as needed for Wheezing. Rescue    Persistent cough  -     predniSONE (DELTASONE) 20 MG tablet; Take 2 tablets (40 mg total) by mouth once daily. for 5 days  -     amoxicillin-clavulanate 875-125mg (AUGMENTIN) 875-125 mg per tablet; Take 1 tablet by mouth every 12 (twelve) hours. for 7 days  -     albuterol (VENTOLIN HFA) 90 mcg/actuation inhaler; Inhale 2 puffs into the lungs every 6 (six) hours as needed for Wheezing. Rescue    Annual physical exam  -     CBC Auto Differential; Future  -     Comprehensive Metabolic Panel; Future  -     Hemoglobin A1C; Future  -     Lipid Panel; Future  -     TSH; Future  -     Vitamin D; Future  -     Vitamin B12; Future    Hypothyroidism, unspecified type    Low serum vitamin B12  -     Vitamin B12; Future    Encounter for lipid screening for cardiovascular disease  -     Lipid Panel; Future    Screening mammogram for breast cancer  -     Mammo Digital Screening Bilat w/ [facility]; Future    The patient location is: [place]  The chief complaint leading to consultation is: rickey, cough    Visit type: audiovisual    Face to Face time with patient: 25 minutes of total time spent on the encounter, which includes face to face time and non-face to face time preparing to see the patient (eg, review of tests), Obtaining and/or reviewing separately obtained history, Documenting clinical information in the electronic or other health  record, Independently interpreting results (not separately reported) and communicating results to the patient/family/caregiver, or Care coordination (not separately reported).         Each patient to whom he or she provides medical services by telemedicine is:  (1) informed of the relationship between the physician and patient and the respective role of any other health care provider with respect to management of the patient; and (2) notified that he or she may decline to receive medical services by telemedicine and may withdraw from such care at any time.    Notes:   Discussed that the goal would be to come off of Ativan completely, we will try to maximize Paxil and increase hydroxyzine at night, also she is willing to get an opinion from a psychiatrist

## 2023-01-11 ENCOUNTER — OFFICE VISIT (OUTPATIENT)
Dept: PSYCHIATRY | Facility: CLINIC | Age: 53
End: 2023-01-11
Payer: COMMERCIAL

## 2023-01-11 DIAGNOSIS — F43.22 ADJUSTMENT DISORDER WITH ANXIETY: Primary | ICD-10-CM

## 2023-01-11 PROCEDURE — 99999 PR PBB SHADOW E&M-EST. PATIENT-LVL I: CPT | Mod: PBBFAC,,, | Performed by: SOCIAL WORKER

## 2023-01-11 PROCEDURE — 90834 PSYTX W PT 45 MINUTES: CPT | Mod: S$GLB,,, | Performed by: SOCIAL WORKER

## 2023-01-11 PROCEDURE — 90834 PR PSYCHOTHERAPY W/PATIENT, 45 MIN: ICD-10-PCS | Mod: S$GLB,,, | Performed by: SOCIAL WORKER

## 2023-01-11 PROCEDURE — 99999 PR PBB SHADOW E&M-EST. PATIENT-LVL I: ICD-10-PCS | Mod: PBBFAC,,, | Performed by: SOCIAL WORKER

## 2023-01-11 NOTE — PROGRESS NOTES
Individual Psychotherapy (PhD/LCSW)    1/11/2023    Site: [place]    Therapeutic Intervention: Met with patient alone  Outpatient - Insight oriented psychotherapy 45 min - CPT code 76010    Chief complaint/reason for encounter: anxiety     Interval history and content of current session: Pt feels prepared for court today, discuss possible emotional challenges and strategies for managing triggers. Pt got job offer but with extemely low salary, will stay at [employer] for now.  Doing well.     Treatment plan:  Target symptoms: anxiety   Why chosen therapy is appropriate versus another modality: relevant to diagnosis  Outcome monitoring methods: self-report  Therapeutic intervention type: insight oriented psychotherapy    Risk parameters:  Patient reports no suicidal ideation  Patient reports no homicidal ideation  Patient reports no self-injurious behavior  Patient reports no violent behavior    Verbal deficits: None    Patient's response to intervention:  The patient's response to intervention is motivated.    Progress toward goals and other mental status changes:  The patient's progress toward goals is good    Diagnosis:   Adjustment disorder with anxiety    Plan:  individual psychotherapy    Return to clinic:  pt will schedule further appointments    Length of Service (minutes): 45

## 2023-01-18 ENCOUNTER — OFFICE VISIT (OUTPATIENT)
Dept: PSYCHIATRY | Facility: CLINIC | Age: 53
End: 2023-01-18
Payer: COMMERCIAL

## 2023-01-18 DIAGNOSIS — F43.22 ADJUSTMENT DISORDER WITH ANXIETY: Primary | ICD-10-CM

## 2023-01-18 PROCEDURE — 90834 PR PSYCHOTHERAPY W/PATIENT, 45 MIN: ICD-10-PCS | Mod: S$GLB,,, | Performed by: SOCIAL WORKER

## 2023-01-18 PROCEDURE — 99999 PR PBB SHADOW E&M-EST. PATIENT-LVL I: ICD-10-PCS | Mod: PBBFAC,,, | Performed by: SOCIAL WORKER

## 2023-01-18 PROCEDURE — 90834 PSYTX W PT 45 MINUTES: CPT | Mod: S$GLB,,, | Performed by: SOCIAL WORKER

## 2023-01-18 PROCEDURE — 99999 PR PBB SHADOW E&M-EST. PATIENT-LVL I: CPT | Mod: PBBFAC,,, | Performed by: SOCIAL WORKER

## 2023-01-18 NOTE — PROGRESS NOTES
Individual Psychotherapy (PhD/LCSW)    1/18/2023    Site: [place]    Therapeutic Intervention: Met with patient alone  Outpatient - Insight oriented psychotherapy 45 min - CPT code 35301    Chief complaint/reason for encounter: anxiety     Interval history and content of current session: Pt describes events of court last week and court-ordered visitation for her ex with dog. Pt feeling good about how she is handling these events. Discuss brief spat with W and how well they worked things out.     Treatment plan:  Target symptoms: anxiety   Why chosen therapy is appropriate versus another modality: relevant to diagnosis  Outcome monitoring methods: self-report  Therapeutic intervention type: insight oriented psychotherapy    Risk parameters:  Patient reports no suicidal ideation  Patient reports no homicidal ideation  Patient reports no self-injurious behavior  Patient reports no violent behavior    Verbal deficits: None    Patient's response to intervention:  The patient's response to intervention is motivated.    Progress toward goals and other mental status changes:  The patient's progress toward goals is good    Diagnosis:   Adjustment disorder with anxiety    Plan:  individual psychotherapy    Return to clinic:  pt will schedule further appointments    Length of Service (minutes): 45

## 2023-01-25 ENCOUNTER — OFFICE VISIT (OUTPATIENT)
Dept: PSYCHIATRY | Facility: CLINIC | Age: 53
End: 2023-01-25
Payer: COMMERCIAL

## 2023-01-25 DIAGNOSIS — F43.22 ADJUSTMENT DISORDER WITH ANXIETY: Primary | ICD-10-CM

## 2023-01-25 PROCEDURE — 99999 PR PBB SHADOW E&M-EST. PATIENT-LVL I: CPT | Mod: PBBFAC,,, | Performed by: SOCIAL WORKER

## 2023-01-25 PROCEDURE — 90834 PSYTX W PT 45 MINUTES: CPT | Mod: S$GLB,,, | Performed by: SOCIAL WORKER

## 2023-01-25 PROCEDURE — 99999 PR PBB SHADOW E&M-EST. PATIENT-LVL I: ICD-10-PCS | Mod: PBBFAC,,, | Performed by: SOCIAL WORKER

## 2023-01-25 PROCEDURE — 90834 PR PSYCHOTHERAPY W/PATIENT, 45 MIN: ICD-10-PCS | Mod: S$GLB,,, | Performed by: SOCIAL WORKER

## 2023-01-25 NOTE — PROGRESS NOTES
"  Individual Psychotherapy (PhD/LCSW)    1/25/2023    Site: [place]    Therapeutic Intervention: Met with patient alone  Outpatient - Insight oriented psychotherapy 45 min - CPT code 37860    Chief complaint/reason for encounter: anxiety     Interval history and content of current session: Pt reports calm week, discuss learning to be comfortable in calm after lifetime of tension in childhood and marital homes. Discuss progress in building her "inner strength" and confidence in herself and her decisions.     Treatment plan:  Target symptoms: anxiety   Why chosen therapy is appropriate versus another modality: relevant to diagnosis  Outcome monitoring methods: self-report  Therapeutic intervention type: insight oriented psychotherapy    Risk parameters:  Patient reports no suicidal ideation  Patient reports no homicidal ideation  Patient reports no self-injurious behavior  Patient reports no violent behavior    Verbal deficits: None    Patient's response to intervention:  The patient's response to intervention is motivated.    Progress toward goals and other mental status changes:  The patient's progress toward goals is good    Diagnosis:   Adjustment disorder with anxiety    Plan:  individual psychotherapy    Return to clinic:  pt will schedule further appointments    Length of Service (minutes): 45                                                "

## 2023-02-01 ENCOUNTER — OFFICE VISIT (OUTPATIENT)
Dept: PSYCHIATRY | Facility: CLINIC | Age: 53
End: 2023-02-01
Payer: COMMERCIAL

## 2023-02-01 DIAGNOSIS — F43.22 ADJUSTMENT DISORDER WITH ANXIETY: Primary | ICD-10-CM

## 2023-02-01 PROCEDURE — 99999 PR PBB SHADOW E&M-EST. PATIENT-LVL I: ICD-10-PCS | Mod: PBBFAC,,, | Performed by: SOCIAL WORKER

## 2023-02-01 PROCEDURE — 90834 PR PSYCHOTHERAPY W/PATIENT, 45 MIN: ICD-10-PCS | Mod: S$GLB,,, | Performed by: SOCIAL WORKER

## 2023-02-01 PROCEDURE — 90834 PSYTX W PT 45 MINUTES: CPT | Mod: S$GLB,,, | Performed by: SOCIAL WORKER

## 2023-02-01 PROCEDURE — 99999 PR PBB SHADOW E&M-EST. PATIENT-LVL I: CPT | Mod: PBBFAC,,, | Performed by: SOCIAL WORKER

## 2023-02-01 NOTE — PROGRESS NOTES
Individual Psychotherapy (PhD/LCSW)    2/1/2023    Site: [place]    Therapeutic Intervention: Met with patient alone  Outpatient - Insight oriented psychotherapy 45 min - CPT code 32869    Chief complaint/reason for encounter: anxiety     Interval history and content of current session: Pt doing well, considering decrease to eow, more confident and comfortable in her relationships, enjoying her life, comfortable at work, only grief is cutoff from her daughters. Discuss her growth and progress in [place].       Treatment plan:  Target symptoms: anxiety   Why chosen therapy is appropriate versus another modality: relevant to diagnosis  Outcome monitoring methods: self-report  Therapeutic intervention type: insight oriented psychotherapy    Risk parameters:  Patient reports no suicidal ideation  Patient reports no homicidal ideation  Patient reports no self-injurious behavior  Patient reports no violent behavior    Verbal deficits: None    Patient's response to intervention:  The patient's response to intervention is motivated.    Progress toward goals and other mental status changes:  The patient's progress toward goals is good    Diagnosis:   Adjustment disorder with anxiety    Plan:  individual psychotherapy    Return to clinic:  pt will schedule further appointments    Length of Service (minutes): 45

## 2023-02-07 ENCOUNTER — HOSPITAL ENCOUNTER (OUTPATIENT)
Dept: RADIOLOGY | Facility: HOSPITAL | Age: 53
Discharge: HOME | End: 2023-02-07
Attending: INTERNAL MEDICINE
Payer: COMMERCIAL

## 2023-02-07 ENCOUNTER — OFFICE VISIT (OUTPATIENT)
Dept: RHEUMATOLOGY | Facility: CLINIC | Age: 53
End: 2023-02-07
Payer: COMMERCIAL

## 2023-02-07 VITALS
BODY MASS INDEX: 24.69 KG/M2 | HEART RATE: 84 BPM | SYSTOLIC BLOOD PRESSURE: 132 MMHG | HEIGHT: 64 IN | DIASTOLIC BLOOD PRESSURE: 92 MMHG | WEIGHT: 144.63 LBS

## 2023-02-07 DIAGNOSIS — M13.0 POLYARTHRITIS: ICD-10-CM

## 2023-02-07 DIAGNOSIS — R76.8 POSITIVE ANA (ANTINUCLEAR ANTIBODY): ICD-10-CM

## 2023-02-07 DIAGNOSIS — M13.0 POLYARTHRITIS: Primary | ICD-10-CM

## 2023-02-07 PROCEDURE — 1159F MED LIST DOCD IN RCRD: CPT | Mod: CPTII,S$GLB,, | Performed by: INTERNAL MEDICINE

## 2023-02-07 PROCEDURE — 3080F PR MOST RECENT DIASTOLIC BLOOD PRESSURE >= 90 MM HG: ICD-10-PCS | Mod: CPTII,S$GLB,, | Performed by: INTERNAL MEDICINE

## 2023-02-07 PROCEDURE — 1159F PR MEDICATION LIST DOCUMENTED IN MEDICAL RECORD: ICD-10-PCS | Mod: CPTII,S$GLB,, | Performed by: INTERNAL MEDICINE

## 2023-02-07 PROCEDURE — 73130 X-RAY EXAM OF HAND: CPT | Mod: 26,50,, | Performed by: RADIOLOGY

## 2023-02-07 PROCEDURE — 3075F SYST BP GE 130 - 139MM HG: CPT | Mod: CPTII,S$GLB,, | Performed by: INTERNAL MEDICINE

## 2023-02-07 PROCEDURE — 3075F PR MOST RECENT SYSTOLIC BLOOD PRESS GE 130-139MM HG: ICD-10-PCS | Mod: CPTII,S$GLB,, | Performed by: INTERNAL MEDICINE

## 2023-02-07 PROCEDURE — 3008F BODY MASS INDEX DOCD: CPT | Mod: CPTII,S$GLB,, | Performed by: INTERNAL MEDICINE

## 2023-02-07 PROCEDURE — 73130 XR HAND COMPLETE 3 VIEWS BILATERAL: ICD-10-PCS | Mod: 26,50,, | Performed by: RADIOLOGY

## 2023-02-07 PROCEDURE — 99204 PR OFFICE/OUTPT VISIT, NEW, LEVL IV, 45-59 MIN: ICD-10-PCS | Mod: S$GLB,,, | Performed by: INTERNAL MEDICINE

## 2023-02-07 PROCEDURE — 99204 OFFICE O/P NEW MOD 45 MIN: CPT | Mod: S$GLB,,, | Performed by: INTERNAL MEDICINE

## 2023-02-07 PROCEDURE — 73130 X-RAY EXAM OF HAND: CPT | Mod: TC,50

## 2023-02-07 PROCEDURE — 3080F DIAST BP >= 90 MM HG: CPT | Mod: CPTII,S$GLB,, | Performed by: INTERNAL MEDICINE

## 2023-02-07 PROCEDURE — 3008F PR BODY MASS INDEX (BMI) DOCUMENTED: ICD-10-PCS | Mod: CPTII,S$GLB,, | Performed by: INTERNAL MEDICINE

## 2023-02-07 PROCEDURE — 99999 PR PBB SHADOW E&M-EST. PATIENT-LVL V: CPT | Mod: PBBFAC,,, | Performed by: INTERNAL MEDICINE

## 2023-02-07 PROCEDURE — 99999 PR PBB SHADOW E&M-EST. PATIENT-LVL V: ICD-10-PCS | Mod: PBBFAC,,, | Performed by: INTERNAL MEDICINE

## 2023-02-07 RX ORDER — FERROUS SULFATE, DRIED 160(50) MG
1 TABLET, EXTENDED RELEASE ORAL 2 TIMES DAILY WITH MEALS
COMMUNITY

## 2023-02-07 RX ORDER — MAGNESIUM 30 MG
TABLET ORAL ONCE
COMMUNITY

## 2023-02-07 RX ORDER — LANOLIN ALCOHOL/MO/W.PET/CERES
1000 CREAM (GRAM) TOPICAL DAILY
COMMUNITY

## 2023-02-07 NOTE — PROGRESS NOTES
Subjective:       Patient ID: Becky Cade is a 52 y.o. female.    Chief Complaint: Disease Management    Patient was last seen here over 10 years ago for Parvo management. She describes that she has had stiffness to Left 2nd PIP since Parvovirus years ago. She also noticed that her BL elbow, hand joints are stiff in the morning for about 15 minutes. Denies morning stiffness to BLEs. Endorses BL hip pain after sitting for long drives. She presents today to get a baseline as she recognizes some similar RA symptoms from her mom who has RA. No other complaints today.    In May, her right arm had sudden horrible pain and weakness from Right c-spine distal. Weakness was so severe she was unable to lift a glass of water. MRI showed degenerative changes of the lower C-spine, prominent right paracentral disc osteophyte complex at C5-C6 results in moderate narrowing of the right lateral recess and moderate to severe narrowing of the right neural foramen. These arm symptoms coincided with her divorce and stressful life changes. She was given a steroid dose pack which resolved symptoms.     Exercise: Pilates  Diet: Balanced  CDAI 1 pending labs            She reports no joint swelling. Pertinent negatives include no dysuria, fever, trouble swallowing or headaches.       Review of Systems   Constitutional:  Negative for fever and unexpected weight change.   HENT:  Negative for mouth sores and trouble swallowing.    Eyes:  Negative for redness.   Respiratory:  Negative for cough and shortness of breath.    Cardiovascular:  Negative for chest pain.   Gastrointestinal:  Negative for constipation and diarrhea.   Genitourinary:  Negative for dysuria and genital sores.   Musculoskeletal:  Positive for arthralgias (Left 2nd digit). Negative for joint swelling.        With some tightness to all digits but especially 2nd digits BL   Skin:  Negative for rash.   Neurological:  Negative for headaches.   Hematological:  Does not  "bruise/bleed easily.       Objective:   BP (!) 132/92   Pulse 84   Ht 5' 4" (1.626 m)   Wt 65.6 kg (144 lb 10 oz)   BMI 24.82 kg/m²      Physical Exam   Constitutional: She is oriented to person, place, and time. normal appearance. No distress.   HENT:   Head: Normocephalic and atraumatic.   Right Ear: External ear normal.   Left Ear: External ear normal.   Nose: No rhinorrhea or nasal congestion.   Mouth/Throat: Mucous membranes are moist. No oropharyngeal exudate or posterior oropharyngeal erythema. Oropharynx is clear.   Eyes: Conjunctivae are normal.   Cardiovascular: Normal rate, regular rhythm and normal heart sounds.   Pulmonary/Chest: Effort normal and breath sounds normal.   Abdominal: Soft. She exhibits no distension and no mass. There is no abdominal tenderness.   Musculoskeletal:         General: Tenderness (To Left 2nd PIP) and deformity (Early Heberden nodule to Right 2nd DIP) present. No swelling. Normal range of motion.      Cervical back: Normal range of motion and neck supple. No rigidity or tenderness.      Right lower leg: No edema.      Left lower leg: No edema.      Comments: Mild crepitus to BL knees   Neurological: She is alert and oriented to person, place, and time. She displays no weakness (5/5 BUE and BLE).   Skin: Skin is warm and dry. Capillary refill takes less than 2 seconds.   Psychiatric: Her behavior is normal. Mood normal.       2/7/2023   Tender (JOVEL-28) 1 / 28    Swollen (JOVEL-28) 0 / 28    Provider Global 0 mm   Patient Global 0 mm   ESR --   CRP --   JOVEL-28 (ESR) --   JOVEL-28 (CRP) --   CDAI Score 1         Assessment:       1. Polyarthritis    2. Positive LIBRA (antinuclear antibody)            Plan:       Problem List Items Addressed This Visit          Immunology/Multi System    Positive LIBRA (antinuclear antibody)    Relevant Orders    Urinalysis    C3 Complement    Anti-DNA Ab, Double-Stranded    Protein/Creatinine Ratio, Urine    C4 Complement    Direct antiglobulin test "    DRVVT    Cardiolipin antibody    Beta-2 Glycoprotein Abs (IgA, IgG, IgM)    DFS-70 Antibodies     Other Visit Diagnoses       Polyarthritis    -  Primary    Relevant Orders    Cyclic Citrullinated Peptide Antibody, IgG    LIBRA Screen w/Reflex    Rheumatoid Factor    Sedimentation rate    C-Reactive Protein    CBC Auto Differential    Comprehensive Metabolic Panel    X-Ray Hand Complete Bilateral    MRI Hand Wrist W WO Bilat_Rheumatoid    Ambulatory referral/consult to Physical/Occupational Therapy          -Bilateral hand Xray  -Bilateral hand MRI  -Referral placed to PT/OT for hands  -Please complete all labs today  -Return to Clinic in 3 months

## 2023-02-07 NOTE — PROGRESS NOTES
Rapid3 Question Responses and Scores 2/4/2023   MDHAQ Score 0   Psychologic Score 2.2   Pain Score 0   When you awakened in the morning OVER THE LAST WEEK, did you feel stiff? Yes   If Yes, please indicate the number of hours until you are as limber as you will be for the day 1   Fatigue Score 0   Global Health Score 0   RAPID3 Score 0     Answers submitted by the patient for this visit:  Rheumatology Questionnaire (Submitted on 2/4/2023)  fever: No  eye redness: No  mouth sores: No  headaches: No  shortness of breath: No  chest pain: No  trouble swallowing: No  diarrhea: No  constipation: No  unexpected weight change: No  genital sore: No  dysuria: No  During the last 3 days, have you had a skin rash?: No  Bruises or bleeds easily: No  cough: No

## 2023-02-07 NOTE — PROGRESS NOTES
"I have personally taken the history and examined the patient and agree with the resident,s note as stated above    Pt I saw 7/20/12:  Onset 2nd wk June R middle pip stiff then all the other pips, and MCP's. and swollen(mild) then ankles, knees and shoulders without much swelling but limited ROM. Severe AM stiffness. Saw Dr. Fleming 6/15: labs with BMP with calcium 8.4 but alb. 3. The CBC nl. TSH nl. RF -, LIBRA 1:160 speckled - profile. RX: Aleve- worsened. ED, Vicodin, Anaprox.  Still no better. ED "shot". Dr. Fleming took prednisone taper for one week. Helped after 5 days. All symptoms resolved over 2 weeks. No fever, appetitie fine, no wgt. + fatigue. No hair fall or rash. She freq gets sores in mouth since child. Does have lifelong malar and chin erythema with sun exposure, and with wine. No pleurisy/pericarditis. No raynaud's. No history of seizures.No sicca syndrome. 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    Resolved acute parvovirus B19 due to school exposure to children with fifth disease. Mild + LIBRA with - profile. Lifelong history of recurrent oral ulcers, wine or sun-induced malar erythema, but transient and not fixed more suggestive of vascular hypereremia/rosacea than true butterfly rash.  She is well by symptoms except mild residual fatigue and normal physical exam       Latest Reference Range & Units 06/15/12 13:40 05/27/13 15:20   LIBRA Neg <1:160  Pos, Titer to follow !    LIBRA HEP-2 Titer Neg <1:160 titer Pos 1:160 !    ds DNA Ab Negative Titer Negative    Anti-SSA Antibody <20 EU 1.78    Anti-SSA Interpretation Negative  Negative    Anti-SSB Antibody <20 EU 0.66    Anti-SSB Interpretation Negative  Negative    Anti Sm Antibody <20 EU 1.99    Anti-Sm Interpretation Negative  Negative    Anti Sm/RNP Antibody <20 EU 0.44    Anti-Sm/RNP Interpretation Negative  Negative    LIBRA Hep-2 Pattern  Speckled !    CCP Antibodies 0 - 4.9 U/mL  0.5   Rheumatoid Factor 0 - 15 IU/ml " < 10.0    !: Data is abnormal   Latest Reference Range & Units 06/15/12 13:40 05/27/13 15:20 03/14/16 10:07 09/02/16 10:08   Sed Rate 0 - 20 mm/Hr 48 (H) 20 9 10   (H): Data is abnormally high     Latest Reference Range & Units 06/15/12 13:40 05/27/13 15:20 09/02/16 10:08   CRP 0.0 - 8.2 mg/L 9.5 (H) 5.6 5.5   (H): Data is abnormally high        Answers submitted by the patient for this visit:  Rheumatology Questionnaire (Submitted on 2/4/2023)  fever: No  eye redness: No  mouth sores: No  headaches: No  shortness of breath: No  chest pain: No  trouble swallowing: No  diarrhea: No  constipation: No  unexpected weight change: No  genital sore: No  dysuria: No  During the last 3 days, have you had a skin rash?: No  Bruises or bleeds easily: No  cough: No      AM stiffness hands 15 min  no swollen joints so cannot apply ACR/EULAR criteria   TJ 1 SJ 0 Pt global 0  ESR,  CRP  DAS28   [id number]    CDAI  Mother patient of mine with RA  LIBRA+ 1:160 speckled no symptoms or signs of SLE  Minimal OA several DIPs and knees  S/p right cervical radiculopathy    CBC, CMP, ESR, CRP, RF, ACPA, LIBRA. C3 C4 dsDNA DFS-70  UA  UPCR  X-ray hands  MRI hands w/wo Gd  Ref to OT  RTC 3 months

## 2023-02-07 NOTE — PATIENT INSTRUCTIONS
-Bilateral hand xray  -Bilateral hand MRI  -Referral placed to PT/OT for hands  -Please complete all labs today  -Return to clinic in 3 months

## 2023-02-08 ENCOUNTER — OFFICE VISIT (OUTPATIENT)
Dept: PSYCHIATRY | Facility: CLINIC | Age: 53
End: 2023-02-08
Payer: COMMERCIAL

## 2023-02-08 DIAGNOSIS — F43.22 ADJUSTMENT DISORDER WITH ANXIETY: Primary | ICD-10-CM

## 2023-02-08 PROCEDURE — 3044F HG A1C LEVEL LT 7.0%: CPT | Mod: CPTII,S$GLB,, | Performed by: SOCIAL WORKER

## 2023-02-08 PROCEDURE — 3044F PR MOST RECENT HEMOGLOBIN A1C LEVEL <7.0%: ICD-10-PCS | Mod: CPTII,S$GLB,, | Performed by: SOCIAL WORKER

## 2023-02-08 PROCEDURE — 99999 PR PBB SHADOW E&M-EST. PATIENT-LVL I: ICD-10-PCS | Mod: PBBFAC,,, | Performed by: SOCIAL WORKER

## 2023-02-08 PROCEDURE — 99999 PR PBB SHADOW E&M-EST. PATIENT-LVL I: CPT | Mod: PBBFAC,,, | Performed by: SOCIAL WORKER

## 2023-02-08 PROCEDURE — 90834 PR PSYCHOTHERAPY W/PATIENT, 45 MIN: ICD-10-PCS | Mod: S$GLB,,, | Performed by: SOCIAL WORKER

## 2023-02-08 PROCEDURE — 90834 PSYTX W PT 45 MINUTES: CPT | Mod: S$GLB,,, | Performed by: SOCIAL WORKER

## 2023-02-08 NOTE — PROGRESS NOTES
I                        Individual Psychotherapy (PhD/LCSW)    2/8/2023    Site: [place]    Therapeutic Intervention: Met with patient alone  Outpatient - Insight oriented psychotherapy 45 min - CPT code 19926    Chief complaint/reason for encounter: anxiety     Interval history and content of current session: Pt doing very well, handled daughter's birthday with deep sadness but without tears, took time for herself, planted her garden. Pt aware of huge improvement in boundaries and ability to stay calm and manage challenges.       Treatment plan:  Target symptoms: anxiety   Why chosen therapy is appropriate versus another modality: relevant to diagnosis  Outcome monitoring methods: self-report  Therapeutic intervention type: insight oriented psychotherapy    Risk parameters:  Patient reports no suicidal ideation  Patient reports no homicidal ideation  Patient reports no self-injurious behavior  Patient reports no violent behavior    Verbal deficits: None    Patient's response to intervention:  The patient's response to intervention is motivated.    Progress toward goals and other mental status changes:  The patient's progress toward goals is good    Diagnosis:   Adjustment disorder with anxiety    Plan:  individual psychotherapy    Return to clinic:  pt will schedule further appointments    Length of Service (minutes): 45

## 2023-02-10 ENCOUNTER — TELEPHONE (OUTPATIENT)
Dept: RHEUMATOLOGY | Facility: CLINIC | Age: 53
End: 2023-02-10
Payer: COMMERCIAL

## 2023-02-10 DIAGNOSIS — R76.0 ANTI-CARDIOLIPIN ANTIBODY POSITIVE: Primary | ICD-10-CM

## 2023-02-13 ENCOUNTER — TELEPHONE (OUTPATIENT)
Dept: RHEUMATOLOGY | Facility: CLINIC | Age: 53
End: 2023-02-13
Payer: COMMERCIAL

## 2023-02-13 DIAGNOSIS — R76.8 POSITIVE ANA (ANTINUCLEAR ANTIBODY): Primary | ICD-10-CM

## 2023-02-15 ENCOUNTER — OFFICE VISIT (OUTPATIENT)
Dept: PSYCHIATRY | Facility: CLINIC | Age: 53
End: 2023-02-15
Payer: COMMERCIAL

## 2023-02-15 DIAGNOSIS — F43.22 ADJUSTMENT DISORDER WITH ANXIETY: Primary | ICD-10-CM

## 2023-02-15 PROCEDURE — 3044F HG A1C LEVEL LT 7.0%: CPT | Mod: CPTII,S$GLB,, | Performed by: SOCIAL WORKER

## 2023-02-15 PROCEDURE — 90834 PSYTX W PT 45 MINUTES: CPT | Mod: S$GLB,,, | Performed by: SOCIAL WORKER

## 2023-02-15 PROCEDURE — 3044F PR MOST RECENT HEMOGLOBIN A1C LEVEL <7.0%: ICD-10-PCS | Mod: CPTII,S$GLB,, | Performed by: SOCIAL WORKER

## 2023-02-15 PROCEDURE — 90834 PR PSYCHOTHERAPY W/PATIENT, 45 MIN: ICD-10-PCS | Mod: S$GLB,,, | Performed by: SOCIAL WORKER

## 2023-02-15 PROCEDURE — 99999 PR PBB SHADOW E&M-EST. PATIENT-LVL I: ICD-10-PCS | Mod: PBBFAC,,, | Performed by: SOCIAL WORKER

## 2023-02-15 PROCEDURE — 99999 PR PBB SHADOW E&M-EST. PATIENT-LVL I: CPT | Mod: PBBFAC,,, | Performed by: SOCIAL WORKER

## 2023-02-15 NOTE — PROGRESS NOTES
Individual Psychotherapy (PhD/LCSW)    2/15/2023    Site: [place]    Therapeutic Intervention: Met with patient alone  Outpatient - Insight oriented psychotherapy 45 min - CPT code 09782    Chief complaint/reason for encounter: anxiety     Interval history and content of current session: Pt doing very well, got a loving response from Linda to birthday e-mail, which has lifted pt's spirits. She is working with W to manage sons' acting out behavior and to help him know how to address conflict with them.       Treatment plan:  Target symptoms: anxiety   Why chosen therapy is appropriate versus another modality: relevant to diagnosis  Outcome monitoring methods: self-report  Therapeutic intervention type: insight oriented psychotherapy    Risk parameters:  Patient reports no suicidal ideation  Patient reports no homicidal ideation  Patient reports no self-injurious behavior  Patient reports no violent behavior    Verbal deficits: None    Patient's response to intervention:  The patient's response to intervention is motivated.    Progress toward goals and other mental status changes:  The patient's progress toward goals is good    Diagnosis:   Adjustment disorder with anxiety    Plan:  individual psychotherapy    Return to clinic:  pt will schedule further appointments    Length of Service (minutes): 45

## 2023-02-28 ENCOUNTER — HOSPITAL ENCOUNTER (OUTPATIENT)
Dept: RADIOLOGY | Facility: HOSPITAL | Age: 53
Discharge: HOME | End: 2023-02-28
Attending: FAMILY MEDICINE
Payer: COMMERCIAL

## 2023-02-28 VITALS — BODY MASS INDEX: 23.17 KG/M2 | WEIGHT: 135 LBS

## 2023-02-28 DIAGNOSIS — Z12.31 SCREENING MAMMOGRAM FOR BREAST CANCER: ICD-10-CM

## 2023-02-28 PROCEDURE — 77067 SCR MAMMO BI INCL CAD: CPT | Mod: TC

## 2023-02-28 PROCEDURE — 77063 MAMMO DIGITAL SCREENING BILAT WITH TOMO: ICD-10-PCS | Mod: 26,,, | Performed by: RADIOLOGY

## 2023-02-28 PROCEDURE — 77067 MAMMO DIGITAL SCREENING BILAT WITH TOMO: ICD-10-PCS | Mod: 26,,, | Performed by: RADIOLOGY

## 2023-02-28 PROCEDURE — 77063 BREAST TOMOSYNTHESIS BI: CPT | Mod: 26,,, | Performed by: RADIOLOGY

## 2023-02-28 PROCEDURE — 77067 SCR MAMMO BI INCL CAD: CPT | Mod: 26,,, | Performed by: RADIOLOGY

## 2023-03-01 ENCOUNTER — OFFICE VISIT (OUTPATIENT)
Dept: PSYCHIATRY | Facility: CLINIC | Age: 53
End: 2023-03-01
Payer: COMMERCIAL

## 2023-03-01 DIAGNOSIS — F43.22 ADJUSTMENT DISORDER WITH ANXIETY: Primary | ICD-10-CM

## 2023-03-01 PROCEDURE — 3044F PR MOST RECENT HEMOGLOBIN A1C LEVEL <7.0%: ICD-10-PCS | Mod: CPTII,S$GLB,, | Performed by: SOCIAL WORKER

## 2023-03-01 PROCEDURE — 99999 PR PBB SHADOW E&M-EST. PATIENT-LVL I: CPT | Mod: PBBFAC,,, | Performed by: SOCIAL WORKER

## 2023-03-01 PROCEDURE — 90834 PSYTX W PT 45 MINUTES: CPT | Mod: S$GLB,,, | Performed by: SOCIAL WORKER

## 2023-03-01 PROCEDURE — 3044F HG A1C LEVEL LT 7.0%: CPT | Mod: CPTII,S$GLB,, | Performed by: SOCIAL WORKER

## 2023-03-01 PROCEDURE — 90834 PR PSYCHOTHERAPY W/PATIENT, 45 MIN: ICD-10-PCS | Mod: S$GLB,,, | Performed by: SOCIAL WORKER

## 2023-03-01 PROCEDURE — 99999 PR PBB SHADOW E&M-EST. PATIENT-LVL I: ICD-10-PCS | Mod: PBBFAC,,, | Performed by: SOCIAL WORKER

## 2023-03-01 NOTE — PROGRESS NOTES
"    Individual Psychotherapy (PhD/LCSW)    3/1/2023    Site: [place]    Therapeutic Intervention: Met with patient alone  Outpatient - Insight oriented psychotherapy 45 min - CPT code 46801    Chief complaint/reason for encounter: anxiety     Interval history and content of current session: Pt  got through [employer] activities but was strongly triggered by multiple events, felt extremely anxious, "held it together" but afterward was in bed for 3 days, meds did not help, felt "out of body", had some falls. Help pt to process the events and validate her increased emotional strength that allowed her to get through the challenges, normalize the "letdown" response afterward as somatic response to prolonged stress over the prior week.        Treatment plan:  Target symptoms: anxiety   Why chosen therapy is appropriate versus another modality: relevant to diagnosis  Outcome monitoring methods: self-report  Therapeutic intervention type: insight oriented psychotherapy    Risk parameters:  Patient reports no suicidal ideation  Patient reports no homicidal ideation  Patient reports no self-injurious behavior  Patient reports no violent behavior    Verbal deficits: None    Patient's response to intervention:  The patient's response to intervention is motivated.    Progress toward goals and other mental status changes:  The patient's progress toward goals is good    Diagnosis:   Adjustment disorder with anxiety    Plan:  individual psychotherapy    Return to clinic:  pt will schedule further appointments    Length of Service (minutes): 45                                                        "

## 2023-03-08 ENCOUNTER — OFFICE VISIT (OUTPATIENT)
Dept: PSYCHIATRY | Facility: CLINIC | Age: 53
End: 2023-03-08
Payer: COMMERCIAL

## 2023-03-08 DIAGNOSIS — F43.22 ADJUSTMENT DISORDER WITH ANXIETY: Primary | ICD-10-CM

## 2023-03-08 PROCEDURE — 99999 PR PBB SHADOW E&M-EST. PATIENT-LVL I: ICD-10-PCS | Mod: PBBFAC,,, | Performed by: SOCIAL WORKER

## 2023-03-08 PROCEDURE — 99999 PR PBB SHADOW E&M-EST. PATIENT-LVL I: CPT | Mod: PBBFAC,,, | Performed by: SOCIAL WORKER

## 2023-03-08 PROCEDURE — 90834 PR PSYCHOTHERAPY W/PATIENT, 45 MIN: ICD-10-PCS | Mod: S$GLB,,, | Performed by: SOCIAL WORKER

## 2023-03-08 PROCEDURE — 3044F PR MOST RECENT HEMOGLOBIN A1C LEVEL <7.0%: ICD-10-PCS | Mod: CPTII,S$GLB,, | Performed by: SOCIAL WORKER

## 2023-03-08 PROCEDURE — 90834 PSYTX W PT 45 MINUTES: CPT | Mod: S$GLB,,, | Performed by: SOCIAL WORKER

## 2023-03-08 PROCEDURE — 3044F HG A1C LEVEL LT 7.0%: CPT | Mod: CPTII,S$GLB,, | Performed by: SOCIAL WORKER

## 2023-03-08 NOTE — PROGRESS NOTES
Individual Psychotherapy (PhD/LCSW)    3/8/2023    Site: [place]    Therapeutic Intervention: Met with patient alone  Outpatient - Insight oriented psychotherapy 45 min - CPT code 82341    Chief complaint/reason for encounter: anxiety     Interval history and content of current session: Pt blew up at her  who is allowing another delay in court hearing, cites series of personal problems and lack of follow through, is interviewing new  tomorrow. Pt feels increased self-confidence in all areas of her life. Talk with pt about self-soothing when she starts to ruminate about the legal events.       Treatment plan:  Target symptoms: anxiety   Why chosen therapy is appropriate versus another modality: relevant to diagnosis  Outcome monitoring methods: self-report  Therapeutic intervention type: insight oriented psychotherapy    Risk parameters:  Patient reports no suicidal ideation  Patient reports no homicidal ideation  Patient reports no self-injurious behavior  Patient reports no violent behavior    Verbal deficits: None    Patient's response to intervention:  The patient's response to intervention is motivated.    Progress toward goals and other mental status changes:  The patient's progress toward goals is good    Diagnosis:   Adjustment disorder with anxiety    Plan:  individual psychotherapy    Return to clinic:  pt will schedule further appointments    Length of Service (minutes): 45

## 2023-03-15 ENCOUNTER — OFFICE VISIT (OUTPATIENT)
Dept: PSYCHIATRY | Facility: CLINIC | Age: 53
End: 2023-03-15
Payer: COMMERCIAL

## 2023-03-15 DIAGNOSIS — F43.22 ADJUSTMENT DISORDER WITH ANXIETY: Primary | ICD-10-CM

## 2023-03-15 PROCEDURE — 90834 PSYTX W PT 45 MINUTES: CPT | Mod: S$GLB,,, | Performed by: SOCIAL WORKER

## 2023-03-15 PROCEDURE — 99999 PR PBB SHADOW E&M-EST. PATIENT-LVL I: ICD-10-PCS | Mod: PBBFAC,,, | Performed by: SOCIAL WORKER

## 2023-03-15 PROCEDURE — 99999 PR PBB SHADOW E&M-EST. PATIENT-LVL I: CPT | Mod: PBBFAC,,, | Performed by: SOCIAL WORKER

## 2023-03-15 PROCEDURE — 90834 PR PSYCHOTHERAPY W/PATIENT, 45 MIN: ICD-10-PCS | Mod: S$GLB,,, | Performed by: SOCIAL WORKER

## 2023-03-15 PROCEDURE — 3044F HG A1C LEVEL LT 7.0%: CPT | Mod: CPTII,S$GLB,, | Performed by: SOCIAL WORKER

## 2023-03-15 PROCEDURE — 3044F PR MOST RECENT HEMOGLOBIN A1C LEVEL <7.0%: ICD-10-PCS | Mod: CPTII,S$GLB,, | Performed by: SOCIAL WORKER

## 2023-03-15 NOTE — PROGRESS NOTES
Individual Psychotherapy (PhD/LCSW)    3/15/2023    Site: [place]    Therapeutic Intervention: Met with patient alone  Outpatient - Insight oriented psychotherapy 45 min - CPT code 61267    Chief complaint/reason for encounter: anxiety     Interval history and content of current session: Pt pleased with new . She had good weekend with W and the boys, sees improvement in their behavior and his parenting. Pt got e-mail  responsefrom Niya indicating interest in re-opening communication. Help pt process complex feelings around expectations, hopes and concerns.       Treatment plan:  Target symptoms: anxiety   Why chosen therapy is appropriate versus another modality: relevant to diagnosis  Outcome monitoring methods: self-report  Therapeutic intervention type: insight oriented psychotherapy    Risk parameters:  Patient reports no suicidal ideation  Patient reports no homicidal ideation  Patient reports no self-injurious behavior  Patient reports no violent behavior    Verbal deficits: None    Patient's response to intervention:  The patient's response to intervention is motivated.    Progress toward goals and other mental status changes:  The patient's progress toward goals is good    Diagnosis:   Adjustment disorder with anxiety    Plan:  individual psychotherapy    Return to clinic:  pt will schedule further appointments    Length of Service (minutes): 45

## 2023-03-22 ENCOUNTER — OFFICE VISIT (OUTPATIENT)
Dept: PSYCHIATRY | Facility: CLINIC | Age: 53
End: 2023-03-22
Payer: COMMERCIAL

## 2023-03-22 DIAGNOSIS — F43.22 ADJUSTMENT DISORDER WITH ANXIETY: Primary | ICD-10-CM

## 2023-03-22 PROCEDURE — 90834 PR PSYCHOTHERAPY W/PATIENT, 45 MIN: ICD-10-PCS | Mod: S$GLB,,, | Performed by: SOCIAL WORKER

## 2023-03-22 PROCEDURE — 99999 PR PBB SHADOW E&M-EST. PATIENT-LVL I: ICD-10-PCS | Mod: PBBFAC,,, | Performed by: SOCIAL WORKER

## 2023-03-22 PROCEDURE — 99999 PR PBB SHADOW E&M-EST. PATIENT-LVL I: CPT | Mod: PBBFAC,,, | Performed by: SOCIAL WORKER

## 2023-03-22 PROCEDURE — 3044F PR MOST RECENT HEMOGLOBIN A1C LEVEL <7.0%: ICD-10-PCS | Mod: CPTII,S$GLB,, | Performed by: SOCIAL WORKER

## 2023-03-22 PROCEDURE — 3044F HG A1C LEVEL LT 7.0%: CPT | Mod: CPTII,S$GLB,, | Performed by: SOCIAL WORKER

## 2023-03-22 PROCEDURE — 90834 PSYTX W PT 45 MINUTES: CPT | Mod: S$GLB,,, | Performed by: SOCIAL WORKER

## 2023-03-22 NOTE — PROGRESS NOTES
Individual Psychotherapy (PhD/LCSW)    3/22/2023    Site: [place]    Therapeutic Intervention: Met with patient alone  Outpatient - Insight oriented psychotherapy 45 min - CPT code 98179    Chief complaint/reason for encounter: anxiety     Interval history and content of current session: Pt had run-in with new  when  re-scheduled divorce property hearing after assuring pt they would fight any changes. Pt notes she has become much more assured in standing up for and asserting herself. Discuss limits and boundaries. Pt attended event with W and his boys where his ex was present, dealt firmly but calmly with one of the boys acting out toward her, sees progress here. Doing well.       Treatment plan:  Target symptoms: anxiety   Why chosen therapy is appropriate versus another modality: relevant to diagnosis  Outcome monitoring methods: self-report  Therapeutic intervention type: insight oriented psychotherapy    Risk parameters:  Patient reports no suicidal ideation  Patient reports no homicidal ideation  Patient reports no self-injurious behavior  Patient reports no violent behavior    Verbal deficits: None    Patient's response to intervention:  The patient's response to intervention is motivated.    Progress toward goals and other mental status changes:  The patient's progress toward goals is good    Diagnosis:   Adjustment disorder with anxiety    Plan:  individual psychotherapy    Return to clinic:  pt will schedule further appointments    Length of Service (minutes): 45

## 2023-03-29 ENCOUNTER — OFFICE VISIT (OUTPATIENT)
Dept: PSYCHIATRY | Facility: CLINIC | Age: 53
End: 2023-03-29
Payer: COMMERCIAL

## 2023-03-29 DIAGNOSIS — F43.22 ADJUSTMENT DISORDER WITH ANXIETY: Primary | ICD-10-CM

## 2023-03-29 PROCEDURE — 90834 PSYTX W PT 45 MINUTES: CPT | Mod: S$GLB,,, | Performed by: SOCIAL WORKER

## 2023-03-29 PROCEDURE — 99999 PR PBB SHADOW E&M-EST. PATIENT-LVL I: ICD-10-PCS | Mod: PBBFAC,,, | Performed by: SOCIAL WORKER

## 2023-03-29 PROCEDURE — 90834 PR PSYCHOTHERAPY W/PATIENT, 45 MIN: ICD-10-PCS | Mod: S$GLB,,, | Performed by: SOCIAL WORKER

## 2023-03-29 PROCEDURE — 99999 PR PBB SHADOW E&M-EST. PATIENT-LVL I: CPT | Mod: PBBFAC,,, | Performed by: SOCIAL WORKER

## 2023-03-29 PROCEDURE — 3044F HG A1C LEVEL LT 7.0%: CPT | Mod: CPTII,S$GLB,, | Performed by: SOCIAL WORKER

## 2023-03-29 PROCEDURE — 3044F PR MOST RECENT HEMOGLOBIN A1C LEVEL <7.0%: ICD-10-PCS | Mod: CPTII,S$GLB,, | Performed by: SOCIAL WORKER

## 2023-03-29 NOTE — PROGRESS NOTES
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 Pt feeling more able to trust herself, stay grounded and find solutions.       Treatment plan:  Target symptoms: anxiety   Why chosen therapy is appropriate versus another modality: relevant to diagnosis  Outcome monitoring methods: self-report  Therapeutic intervention type: insight oriented psychotherapy    Risk parameters:  Patient reports no suicidal ideation  Patient reports no homicidal ideation  Patient reports no self-injurious behavior  Patient reports no violent behavior    Verbal deficits: None    Patient's response to intervention:  The patient's response to intervention is motivated.    Progress toward goals and other mental status changes:  The patient's progress toward goals is good    Diagnosis:   Adjustment disorder with anxiety    Plan:  individual psychotherapy    Return to clinic:  pt will schedule further appointments    Length of Service (minutes): 45

## 2023-04-05 ENCOUNTER — OFFICE VISIT (OUTPATIENT)
Dept: PSYCHIATRY | Facility: CLINIC | Age: 53
End: 2023-04-05
Payer: COMMERCIAL

## 2023-04-05 DIAGNOSIS — F43.22 ADJUSTMENT DISORDER WITH ANXIETY: Primary | ICD-10-CM

## 2023-04-05 PROCEDURE — 99999 PR PBB SHADOW E&M-EST. PATIENT-LVL I: ICD-10-PCS | Mod: PBBFAC,,, | Performed by: SOCIAL WORKER

## 2023-04-05 PROCEDURE — 90834 PR PSYCHOTHERAPY W/PATIENT, 45 MIN: ICD-10-PCS | Mod: S$GLB,,, | Performed by: SOCIAL WORKER

## 2023-04-05 PROCEDURE — 3044F HG A1C LEVEL LT 7.0%: CPT | Mod: CPTII,S$GLB,, | Performed by: SOCIAL WORKER

## 2023-04-05 PROCEDURE — 3044F PR MOST RECENT HEMOGLOBIN A1C LEVEL <7.0%: ICD-10-PCS | Mod: CPTII,S$GLB,, | Performed by: SOCIAL WORKER

## 2023-04-05 PROCEDURE — 99999 PR PBB SHADOW E&M-EST. PATIENT-LVL I: CPT | Mod: PBBFAC,,, | Performed by: SOCIAL WORKER

## 2023-04-05 PROCEDURE — 90834 PSYTX W PT 45 MINUTES: CPT | Mod: S$GLB,,, | Performed by: SOCIAL WORKER

## 2023-04-05 NOTE — PROGRESS NOTES
Individual Psychotherapy (PhD/LCSW)    4/5/2023    Site: [place]    Therapeutic Intervention: Met with patient alone  Outpatient - Insight oriented psychotherapy 45 min - CPT code 68582    Chief complaint/reason for encounter: anxiety     Interval history and content of current session: pt experiencing growth in self-confidence in multiple areas, at work, with attorneys, sees changes in her ability to express herself directly and handle conflict.  Look at strong emotional triggering at dinner when mom's boyfriend spoke in ways she found embarrassing, help pt see her urge to manage others' behavior vs accepting that this is beyond her control.     Treatment plan:  Target symptoms: anxiety   Why chosen therapy is appropriate versus another modality: relevant to diagnosis  Outcome monitoring methods: self-report  Therapeutic intervention type: insight oriented psychotherapy    Risk parameters:  Patient reports no suicidal ideation  Patient reports no homicidal ideation  Patient reports no self-injurious behavior  Patient reports no violent behavior    Verbal deficits: None    Patient's response to intervention:  The patient's response to intervention is motivated.    Progress toward goals and other mental status changes:  The patient's progress toward goals is good    Diagnosis:   Adjustment disorder with anxiety    Plan:  individual psychotherapy    Return to clinic: f/u as scheduled    Length of Service (minutes): 45

## 2023-04-12 ENCOUNTER — OFFICE VISIT (OUTPATIENT)
Dept: PSYCHIATRY | Facility: CLINIC | Age: 53
End: 2023-04-12
Payer: COMMERCIAL

## 2023-04-12 DIAGNOSIS — F43.22 ADJUSTMENT DISORDER WITH ANXIETY: Primary | ICD-10-CM

## 2023-04-12 PROCEDURE — 3044F PR MOST RECENT HEMOGLOBIN A1C LEVEL <7.0%: ICD-10-PCS | Mod: CPTII,S$GLB,, | Performed by: SOCIAL WORKER

## 2023-04-12 PROCEDURE — 99999 PR PBB SHADOW E&M-EST. PATIENT-LVL I: CPT | Mod: PBBFAC,,, | Performed by: SOCIAL WORKER

## 2023-04-12 PROCEDURE — 99999 PR PBB SHADOW E&M-EST. PATIENT-LVL I: ICD-10-PCS | Mod: PBBFAC,,, | Performed by: SOCIAL WORKER

## 2023-04-12 PROCEDURE — 90834 PSYTX W PT 45 MINUTES: CPT | Mod: S$GLB,,, | Performed by: SOCIAL WORKER

## 2023-04-12 PROCEDURE — 3044F HG A1C LEVEL LT 7.0%: CPT | Mod: CPTII,S$GLB,, | Performed by: SOCIAL WORKER

## 2023-04-12 PROCEDURE — 90834 PR PSYCHOTHERAPY W/PATIENT, 45 MIN: ICD-10-PCS | Mod: S$GLB,,, | Performed by: SOCIAL WORKER

## 2023-04-12 NOTE — PROGRESS NOTES
Individual Psychotherapy (PhD/LCSW)    4/12/2023    Site: [place]    Therapeutic Intervention: Met with patient alone  Outpatient - Insight oriented psychotherapy 45 min - CPT code 32769    Chief complaint/reason for encounter: anxiety     Interval history and content of current session: Pt aware during drive to [place] with W of triggers from past travel with Elbert, used frequent self-talk to ground herself in the present and remind herself how different her experience is in current relationship. She  e-mailed the girls for [place], heard nothing, then asked if they were getting her messages. E replied and called her, gave a cheery brief report of her plans with generic good wishes for pt and family, and hung up. Discuss pt's concerns for both daughters and gradual acceptance that she is powerless to parent them or influence their lives at this point.  She feels prepared for hearing with Elbert at end of month.     Treatment plan:  Target symptoms: anxiety   Why chosen therapy is appropriate versus another modality: relevant to diagnosis  Outcome monitoring methods: self-report  Therapeutic intervention type: insight oriented psychotherapy    Risk parameters:  Patient reports no suicidal ideation  Patient reports no homicidal ideation  Patient reports no self-injurious behavior  Patient reports no violent behavior    Verbal deficits: None    Patient's response to intervention:  The patient's response to intervention is motivated.    Progress toward goals and other mental status changes:  The patient's progress toward goals is good    Diagnosis:   Adjustment disorder with anxiety    Plan:  individual psychotherapy    Return to clinic: f/u as scheduled    Length of Service (minutes): 45

## 2023-05-03 ENCOUNTER — OFFICE VISIT (OUTPATIENT)
Dept: PSYCHIATRY | Facility: CLINIC | Age: 53
End: 2023-05-03
Payer: COMMERCIAL

## 2023-05-03 ENCOUNTER — TELEPHONE (OUTPATIENT)
Dept: RHEUMATOLOGY | Facility: CLINIC | Age: 53
End: 2023-05-03
Payer: COMMERCIAL

## 2023-05-03 DIAGNOSIS — F43.22 ADJUSTMENT DISORDER WITH ANXIETY: Primary | ICD-10-CM

## 2023-05-03 PROCEDURE — 99999 PR PBB SHADOW E&M-EST. PATIENT-LVL I: ICD-10-PCS | Mod: PBBFAC,,, | Performed by: SOCIAL WORKER

## 2023-05-03 PROCEDURE — 3044F PR MOST RECENT HEMOGLOBIN A1C LEVEL <7.0%: ICD-10-PCS | Mod: CPTII,S$GLB,, | Performed by: SOCIAL WORKER

## 2023-05-03 PROCEDURE — 90834 PSYTX W PT 45 MINUTES: CPT | Mod: S$GLB,,, | Performed by: SOCIAL WORKER

## 2023-05-03 PROCEDURE — 90834 PR PSYCHOTHERAPY W/PATIENT, 45 MIN: ICD-10-PCS | Mod: S$GLB,,, | Performed by: SOCIAL WORKER

## 2023-05-03 PROCEDURE — 99999 PR PBB SHADOW E&M-EST. PATIENT-LVL I: CPT | Mod: PBBFAC,,, | Performed by: SOCIAL WORKER

## 2023-05-03 PROCEDURE — 3044F HG A1C LEVEL LT 7.0%: CPT | Mod: CPTII,S$GLB,, | Performed by: SOCIAL WORKER

## 2023-05-03 NOTE — PROGRESS NOTES
"    Individual Psychotherapy (PhD/LCSW)    5/3/2023    Site: [place]    Therapeutic Intervention: Met with patient alone  Outpatient - Insight oriented psychotherapy 45 min - CPT code 72465    Chief complaint/reason for encounter: anxiety     Interval history and content of current session: Pt shares events of hearing with Elbert, felt secure, grounded and assertive, was able to see his acting out "like a fly on the wall", notes since the hearing she has felt increasingly calm and confident in all aspects of her life.      Treatment plan:  Target symptoms: anxiety   Why chosen therapy is appropriate versus another modality: relevant to diagnosis  Outcome monitoring methods: self-report  Therapeutic intervention type: insight oriented psychotherapy    Risk parameters:  Patient reports no suicidal ideation  Patient reports no homicidal ideation  Patient reports no self-injurious behavior  Patient reports no violent behavior    Verbal deficits: None    Patient's response to intervention:  The patient's response to intervention is motivated.    Progress toward goals and other mental status changes:  The patient's progress toward goals is excellent    Diagnosis:   Adjustment disorder with anxiety    Plan:  individual psychotherapy    Return to clinic: f/u as scheduled    Length of Service (minutes): 45                    "

## 2023-05-03 NOTE — TELEPHONE ENCOUNTER
----- Message from Tali Cullen MA sent at 5/3/2023  9:53 AM CDT -----  Regarding: FW: pt advice  Contact: pt    ----- Message -----  From: Catalina Alex  Sent: 5/3/2023   9:35 AM CDT  To: [facility] WILL[facility] Staff  Subject: pt advice                                        Type:  Pt  Advice    Who Called: Pt   Would the patient rather a call back or a response via [facility]?    Best Call Back Number: [phone]  Additional Information: pt would like to speak w/ you regarding labs orderd, Please call to advise, Thank you

## 2023-05-08 DIAGNOSIS — Z72.820 POOR SLEEP: ICD-10-CM

## 2023-05-08 DIAGNOSIS — F41.1 GAD (GENERALIZED ANXIETY DISORDER): ICD-10-CM

## 2023-05-08 RX ORDER — HYDROXYZINE HYDROCHLORIDE 10 MG/1
20 TABLET, FILM COATED ORAL NIGHTLY
Qty: 180 TABLET | Refills: 1 | Status: SHIPPED | OUTPATIENT
Start: 2023-05-08 | End: 2023-08-07 | Stop reason: SDUPTHER

## 2023-05-10 ENCOUNTER — OFFICE VISIT (OUTPATIENT)
Dept: PSYCHIATRY | Facility: CLINIC | Age: 53
End: 2023-05-10
Payer: COMMERCIAL

## 2023-05-10 DIAGNOSIS — F43.22 ADJUSTMENT DISORDER WITH ANXIETY: Primary | ICD-10-CM

## 2023-05-10 PROCEDURE — 99999 PR PBB SHADOW E&M-EST. PATIENT-LVL I: ICD-10-PCS | Mod: PBBFAC,,, | Performed by: SOCIAL WORKER

## 2023-05-10 PROCEDURE — 99999 PR PBB SHADOW E&M-EST. PATIENT-LVL I: CPT | Mod: PBBFAC,,, | Performed by: SOCIAL WORKER

## 2023-05-10 PROCEDURE — 90834 PSYTX W PT 45 MINUTES: CPT | Mod: S$GLB,,, | Performed by: SOCIAL WORKER

## 2023-05-10 PROCEDURE — 3044F HG A1C LEVEL LT 7.0%: CPT | Mod: CPTII,S$GLB,, | Performed by: SOCIAL WORKER

## 2023-05-10 PROCEDURE — 3044F PR MOST RECENT HEMOGLOBIN A1C LEVEL <7.0%: ICD-10-PCS | Mod: CPTII,S$GLB,, | Performed by: SOCIAL WORKER

## 2023-05-10 PROCEDURE — 90834 PR PSYCHOTHERAPY W/PATIENT, 45 MIN: ICD-10-PCS | Mod: S$GLB,,, | Performed by: SOCIAL WORKER

## 2023-05-10 NOTE — PROGRESS NOTES
Individual Psychotherapy (PhD/LCSW)    5/10/2023    Site: [place]    Therapeutic Intervention: Met with patient alone  Outpatient - Insight oriented psychotherapy 45 min - CPT code 94658    Chief complaint/reason for encounter: anxiety     Interval history and content of current session: Pt dealing with M's attempted delays of legal process, feels confident and assertive in these areas. Main issue is distressing dreams about daughters. Discuss reality that pt has no power in this area and feels natural pain of children alienating from her.      Treatment plan:  Target symptoms: anxiety   Why chosen therapy is appropriate versus another modality: relevant to diagnosis  Outcome monitoring methods: self-report  Therapeutic intervention type: insight oriented psychotherapy    Risk parameters:  Patient reports no suicidal ideation  Patient reports no homicidal ideation  Patient reports no self-injurious behavior  Patient reports no violent behavior    Verbal deficits: None    Patient's response to intervention:  The patient's response to intervention is motivated.    Progress toward goals and other mental status changes:  The patient's progress toward goals is excellent    Diagnosis:   Adjustment disorder with anxiety    Plan:  individual psychotherapy    Return to clinic: f/u as scheduled    Length of Service (minutes): 45

## 2023-05-17 ENCOUNTER — OFFICE VISIT (OUTPATIENT)
Dept: PSYCHIATRY | Facility: CLINIC | Age: 53
End: 2023-05-17
Payer: COMMERCIAL

## 2023-05-17 DIAGNOSIS — F43.22 ADJUSTMENT DISORDER WITH ANXIETY: Primary | ICD-10-CM

## 2023-05-17 PROCEDURE — 90834 PSYTX W PT 45 MINUTES: CPT | Mod: S$GLB,,, | Performed by: SOCIAL WORKER

## 2023-05-17 PROCEDURE — 3044F PR MOST RECENT HEMOGLOBIN A1C LEVEL <7.0%: ICD-10-PCS | Mod: CPTII,S$GLB,, | Performed by: SOCIAL WORKER

## 2023-05-17 PROCEDURE — 3044F HG A1C LEVEL LT 7.0%: CPT | Mod: CPTII,S$GLB,, | Performed by: SOCIAL WORKER

## 2023-05-17 PROCEDURE — 99999 PR PBB SHADOW E&M-EST. PATIENT-LVL I: CPT | Mod: PBBFAC,,, | Performed by: SOCIAL WORKER

## 2023-05-17 PROCEDURE — 90834 PR PSYCHOTHERAPY W/PATIENT, 45 MIN: ICD-10-PCS | Mod: S$GLB,,, | Performed by: SOCIAL WORKER

## 2023-05-17 PROCEDURE — 99999 PR PBB SHADOW E&M-EST. PATIENT-LVL I: ICD-10-PCS | Mod: PBBFAC,,, | Performed by: SOCIAL WORKER

## 2023-05-17 NOTE — PROGRESS NOTES
"    Individual Psychotherapy (PhD/LCSW)    5/17/2023    Site: [place]    Therapeutic Intervention: Met with patient alone  Outpatient - Insight oriented psychotherapy 45 min - CPT code 03654    Chief complaint/reason for encounter: anxiety     Interval history and content of current session: Pt elated over special master's recommendation that she have custody of her dog. She looked forward to celebrating with Harry but he lost his assistant at work, was in a bad mood and blurted out a desire to keep the boys out of their relationship, leaving pt with a sense of abandonment.  Pt has good insight into source of this "young" reaction.  Discuss concerns about how W's avoidance of confrontation and placating behavior with his sons affects the future of her relationship with him. Process and look at options for shifting boundaries while W, his ex and the boys work through issues in family Tx.     Treatment plan:  Target symptoms: anxiety   Why chosen therapy is appropriate versus another modality: relevant to diagnosis  Outcome monitoring methods: self-report  Therapeutic intervention type: insight oriented psychotherapy    Risk parameters:  Patient reports no suicidal ideation  Patient reports no homicidal ideation  Patient reports no self-injurious behavior  Patient reports no violent behavior    Verbal deficits: None    Patient's response to intervention:  The patient's response to intervention is motivated.    Progress toward goals and other mental status changes:  The patient's progress toward goals is good    Diagnosis:   Adjustment disorder with anxiety    Plan:  individual psychotherapy    Return to clinic: f/u as scheduled    Length of Service (minutes): 45          "

## 2023-05-18 ENCOUNTER — OFFICE VISIT (OUTPATIENT)
Dept: PRIMARY CARE CLINIC | Facility: CLINIC | Age: 53
End: 2023-05-18
Payer: COMMERCIAL

## 2023-05-18 VITALS
SYSTOLIC BLOOD PRESSURE: 110 MMHG | HEIGHT: 64 IN | BODY MASS INDEX: 24.35 KG/M2 | HEART RATE: 82 BPM | TEMPERATURE: 98 F | DIASTOLIC BLOOD PRESSURE: 68 MMHG | OXYGEN SATURATION: 100 % | WEIGHT: 142.63 LBS | RESPIRATION RATE: 18 BRPM

## 2023-05-18 DIAGNOSIS — B00.1 HERPES LABIALIS: ICD-10-CM

## 2023-05-18 DIAGNOSIS — E53.8 LOW SERUM VITAMIN B12: ICD-10-CM

## 2023-05-18 DIAGNOSIS — Z00.00 ANNUAL PHYSICAL EXAM: Primary | ICD-10-CM

## 2023-05-18 DIAGNOSIS — R76.8 POSITIVE ANA (ANTINUCLEAR ANTIBODY): ICD-10-CM

## 2023-05-18 DIAGNOSIS — Z82.61 FAMILY HISTORY OF RHEUMATOID ARTHRITIS: ICD-10-CM

## 2023-05-18 DIAGNOSIS — E03.9 HYPOTHYROIDISM, UNSPECIFIED TYPE: ICD-10-CM

## 2023-05-18 DIAGNOSIS — F41.1 GAD (GENERALIZED ANXIETY DISORDER): ICD-10-CM

## 2023-05-18 DIAGNOSIS — Z79.899 LONG-TERM CURRENT USE OF BENZODIAZEPINE: ICD-10-CM

## 2023-05-18 DIAGNOSIS — E03.9 ACQUIRED HYPOTHYROIDISM: ICD-10-CM

## 2023-05-18 PROCEDURE — 1159F PR MEDICATION LIST DOCUMENTED IN MEDICAL RECORD: ICD-10-PCS | Mod: CPTII,S$GLB,, | Performed by: FAMILY MEDICINE

## 2023-05-18 PROCEDURE — 1160F RVW MEDS BY RX/DR IN RCRD: CPT | Mod: CPTII,S$GLB,, | Performed by: FAMILY MEDICINE

## 2023-05-18 PROCEDURE — 3044F HG A1C LEVEL LT 7.0%: CPT | Mod: CPTII,S$GLB,, | Performed by: FAMILY MEDICINE

## 2023-05-18 PROCEDURE — 3008F BODY MASS INDEX DOCD: CPT | Mod: CPTII,S$GLB,, | Performed by: FAMILY MEDICINE

## 2023-05-18 PROCEDURE — 99396 PREV VISIT EST AGE 40-64: CPT | Mod: S$GLB,,, | Performed by: FAMILY MEDICINE

## 2023-05-18 PROCEDURE — 3008F PR BODY MASS INDEX (BMI) DOCUMENTED: ICD-10-PCS | Mod: CPTII,S$GLB,, | Performed by: FAMILY MEDICINE

## 2023-05-18 PROCEDURE — 3074F PR MOST RECENT SYSTOLIC BLOOD PRESSURE < 130 MM HG: ICD-10-PCS | Mod: CPTII,S$GLB,, | Performed by: FAMILY MEDICINE

## 2023-05-18 PROCEDURE — 99396 PR PREVENTIVE VISIT,EST,40-64: ICD-10-PCS | Mod: S$GLB,,, | Performed by: FAMILY MEDICINE

## 2023-05-18 PROCEDURE — 99999 PR PBB SHADOW E&M-EST. PATIENT-LVL IV: ICD-10-PCS | Mod: PBBFAC,,, | Performed by: FAMILY MEDICINE

## 2023-05-18 PROCEDURE — 3078F PR MOST RECENT DIASTOLIC BLOOD PRESSURE < 80 MM HG: ICD-10-PCS | Mod: CPTII,S$GLB,, | Performed by: FAMILY MEDICINE

## 2023-05-18 PROCEDURE — 3078F DIAST BP <80 MM HG: CPT | Mod: CPTII,S$GLB,, | Performed by: FAMILY MEDICINE

## 2023-05-18 PROCEDURE — 1159F MED LIST DOCD IN RCRD: CPT | Mod: CPTII,S$GLB,, | Performed by: FAMILY MEDICINE

## 2023-05-18 PROCEDURE — 3074F SYST BP LT 130 MM HG: CPT | Mod: CPTII,S$GLB,, | Performed by: FAMILY MEDICINE

## 2023-05-18 PROCEDURE — 1160F PR REVIEW ALL MEDS BY PRESCRIBER/CLIN PHARMACIST DOCUMENTED: ICD-10-PCS | Mod: CPTII,S$GLB,, | Performed by: FAMILY MEDICINE

## 2023-05-18 PROCEDURE — 3044F PR MOST RECENT HEMOGLOBIN A1C LEVEL <7.0%: ICD-10-PCS | Mod: CPTII,S$GLB,, | Performed by: FAMILY MEDICINE

## 2023-05-18 PROCEDURE — 99999 PR PBB SHADOW E&M-EST. PATIENT-LVL IV: CPT | Mod: PBBFAC,,, | Performed by: FAMILY MEDICINE

## 2023-05-18 RX ORDER — LORAZEPAM 0.5 MG/1
0.5 TABLET ORAL NIGHTLY PRN
Qty: 30 TABLET | Refills: 0 | Status: SHIPPED | OUTPATIENT
Start: 2023-05-18 | End: 2023-08-23 | Stop reason: SDUPTHER

## 2023-05-18 RX ORDER — HYDROGEN PEROXIDE 3 %
20 SOLUTION, NON-ORAL MISCELLANEOUS
COMMUNITY

## 2023-05-18 RX ORDER — ACYCLOVIR 400 MG/1
400 TABLET ORAL 2 TIMES DAILY
Qty: 14 TABLET | Refills: 0 | Status: SHIPPED | OUTPATIENT
Start: 2023-05-18 | End: 2023-08-23 | Stop reason: SDUPTHER

## 2023-05-18 RX ORDER — LEVOTHYROXINE SODIUM 75 UG/1
75 TABLET ORAL
Qty: 90 TABLET | Refills: 3 | Status: SHIPPED | OUTPATIENT
Start: 2023-05-18 | End: 2024-03-28

## 2023-05-18 NOTE — PROGRESS NOTES
"Subjective:       Patient ID: Becky Cade is a 52 y.o. female.    Chief Complaint: Annual Exam    HPI  51 y/o female with hypothyroidism, cervical djd, ANAM, herpes labialis, mixed incontinence, positive LIBRA, family hx of RA, history of COVID here for annual exam.     She has been going through at difficult divorce, using Ativan as needed, she started Pilates once a week, she is trying to eat healthy, on the weekends she goes out to eat, she is sleeping ok. She denies f/n/v, heartburn with stress, using Nexium 3 times a year, she denies d/constipation/cp/sob/urinary sx.      Hx of covid 1/2022 mild outpatient symptoms  ANAM:  Counseling, paxil 40 mg daily, hydroxyzine 20 mg at night, Ativan 0.5 mg nightly  Hypothyroidism:never had procedure or biopsy, levothyroxine 75 mcg  Herpes labialis: acyclovir prn, 2-3 times a year  Positive LIBRA/family RA: following with rheumatology  Mixed incontinence/cystocele: following with Urogyn  GYN: following with Dr. Parson, pelvic utd, cycles regular, OCP, mmg 2/2023  Colonoscopy 9/2020 repeat 10 years  Eye exam utd followed by Dr. Pascual  Dental utd  OTC: b12, vit D3, magnesium    Labs from 2/2023 reviewed     Review of Systems  see HPI  Objective:      /68 (BP Location: Right arm, Patient Position: Sitting, BP Method: Small (Manual))   Pulse 82   Temp 98 °F (36.7 °C) (Oral)   Resp 18   Ht 5' 4" (1.626 m)   Wt 64.7 kg (142 lb 10.2 oz)   SpO2 100%   BMI 24.48 kg/m²   Physical Exam  Vitals and nursing note reviewed.   Constitutional:       Appearance: She is well-developed.   HENT:      Head: Normocephalic and atraumatic.      Right Ear: Tympanic membrane normal.      Left Ear: Tympanic membrane normal.      Mouth/Throat:      Pharynx: No oropharyngeal exudate or posterior oropharyngeal erythema.   Neck:      Thyroid: No thyromegaly.   Cardiovascular:      Rate and Rhythm: Normal rate and regular rhythm.      Heart sounds: Normal heart sounds.   Pulmonary:      " Effort: Pulmonary effort is normal. No respiratory distress.      Breath sounds: Normal breath sounds.   Abdominal:      General: Abdomen is flat. Bowel sounds are normal. There is no distension.      Palpations: Abdomen is soft. There is no mass.      Tenderness: There is no abdominal tenderness.   Musculoskeletal:      Cervical back: Normal range of motion and neck supple.      Right lower leg: No edema.      Left lower leg: No edema.   Lymphadenopathy:      Cervical: No cervical adenopathy.   Skin:     General: Skin is warm and dry.   Neurological:      Mental Status: She is alert.       Assessment:       1. Annual physical exam    2. Herpes labialis    3. ANAM (generalized anxiety disorder)    4. Long-term current use of benzodiazepine    5. Hypothyroidism, unspecified type    6. Positive LIBRA (antinuclear antibody)    7. Low serum vitamin B12    8. Acquired hypothyroidism    9. Family history of rheumatoid arthritis        Plan:   Becky was seen today for annual exam.    Diagnoses and all orders for this visit:    Annual physical exam    Herpes labialis  -     acyclovir (ZOVIRAX) 400 MG tablet; Take 1 tablet (400 mg total) by mouth 2 (two) times daily. Fever blisters    ANAM (generalized anxiety disorder)  -     LORazepam (ATIVAN) 0.5 MG tablet; Take 1 tablet (0.5 mg total) by mouth nightly as needed for Anxiety. Use only infrequently, can be addictive    Long-term current use of benzodiazepine  -     LORazepam (ATIVAN) 0.5 MG tablet; Take 1 tablet (0.5 mg total) by mouth nightly as needed for Anxiety. Use only infrequently, can be addictive    Hypothyroidism, unspecified type  -     levothyroxine (SYNTHROID) 75 MCG tablet; Take 1 tablet (75 mcg total) by mouth before breakfast.    Positive LIBRA (antinuclear antibody)    Low serum vitamin B12    Acquired hypothyroidism    Family history of rheumatoid arthritis

## 2023-05-24 ENCOUNTER — OFFICE VISIT (OUTPATIENT)
Dept: PSYCHIATRY | Facility: CLINIC | Age: 53
End: 2023-05-24
Payer: COMMERCIAL

## 2023-05-24 DIAGNOSIS — F43.22 ADJUSTMENT DISORDER WITH ANXIETY: Primary | ICD-10-CM

## 2023-05-24 PROCEDURE — 90834 PSYTX W PT 45 MINUTES: CPT | Mod: S$GLB,,, | Performed by: SOCIAL WORKER

## 2023-05-24 PROCEDURE — 90834 PR PSYCHOTHERAPY W/PATIENT, 45 MIN: ICD-10-PCS | Mod: S$GLB,,, | Performed by: SOCIAL WORKER

## 2023-05-24 PROCEDURE — 99999 PR PBB SHADOW E&M-EST. PATIENT-LVL I: CPT | Mod: PBBFAC,,, | Performed by: SOCIAL WORKER

## 2023-05-24 PROCEDURE — 99999 PR PBB SHADOW E&M-EST. PATIENT-LVL I: ICD-10-PCS | Mod: PBBFAC,,, | Performed by: SOCIAL WORKER

## 2023-05-24 PROCEDURE — 3044F HG A1C LEVEL LT 7.0%: CPT | Mod: CPTII,S$GLB,, | Performed by: SOCIAL WORKER

## 2023-05-24 PROCEDURE — 3044F PR MOST RECENT HEMOGLOBIN A1C LEVEL <7.0%: ICD-10-PCS | Mod: CPTII,S$GLB,, | Performed by: SOCIAL WORKER

## 2023-05-24 NOTE — PROGRESS NOTES
Individual Psychotherapy (PhD/LCSW)    5/24/2023    Site: [place]    Therapeutic Intervention: Met with patient alone  Outpatient - Insight oriented psychotherapy 45 min - CPT code 64891    Chief complaint/reason for encounter: anxiety     Interval history and content of current session: Pt upset that [employer] is peppering her  with messages for her that she feels are just running up her bill, asking to see the dog as though judgment were still in place. She also got a small gift from [employer] and [employer] after Mother's Day but without any offer to see or speak to her. Pt feeling sadness about the lack of daughters' presence in her life.      Treatment plan:  Target symptoms: anxiety   Why chosen therapy is appropriate versus another modality: relevant to diagnosis  Outcome monitoring methods: self-report  Therapeutic intervention type: insight oriented psychotherapy    Risk parameters:  Patient reports no suicidal ideation  Patient reports no homicidal ideation  Patient reports no self-injurious behavior  Patient reports no violent behavior    Verbal deficits: None    Patient's response to intervention:  The patient's response to intervention is motivated.    Progress toward goals and other mental status changes:  The patient's progress toward goals is good    Diagnosis:   Adjustment disorder with anxiety    Plan:  individual psychotherapy    Return to clinic: f/u as scheduled    Length of Service (minutes): 45

## 2023-05-25 ENCOUNTER — PATIENT MESSAGE (OUTPATIENT)
Dept: DERMATOLOGY | Facility: CLINIC | Age: 53
End: 2023-05-25
Payer: COMMERCIAL

## 2023-05-31 ENCOUNTER — OFFICE VISIT (OUTPATIENT)
Dept: PSYCHIATRY | Facility: CLINIC | Age: 53
End: 2023-05-31
Payer: COMMERCIAL

## 2023-05-31 DIAGNOSIS — F43.22 ADJUSTMENT DISORDER WITH ANXIETY: Primary | ICD-10-CM

## 2023-05-31 PROCEDURE — 3044F PR MOST RECENT HEMOGLOBIN A1C LEVEL <7.0%: ICD-10-PCS | Mod: CPTII,S$GLB,, | Performed by: SOCIAL WORKER

## 2023-05-31 PROCEDURE — 90834 PSYTX W PT 45 MINUTES: CPT | Mod: S$GLB,,, | Performed by: SOCIAL WORKER

## 2023-05-31 PROCEDURE — 3044F HG A1C LEVEL LT 7.0%: CPT | Mod: CPTII,S$GLB,, | Performed by: SOCIAL WORKER

## 2023-05-31 PROCEDURE — 90834 PR PSYCHOTHERAPY W/PATIENT, 45 MIN: ICD-10-PCS | Mod: S$GLB,,, | Performed by: SOCIAL WORKER

## 2023-05-31 PROCEDURE — 99999 PR PBB SHADOW E&M-EST. PATIENT-LVL I: CPT | Mod: PBBFAC,,, | Performed by: SOCIAL WORKER

## 2023-05-31 PROCEDURE — 99999 PR PBB SHADOW E&M-EST. PATIENT-LVL I: ICD-10-PCS | Mod: PBBFAC,,, | Performed by: SOCIAL WORKER

## 2023-05-31 NOTE — PROGRESS NOTES
Individual Psychotherapy (PhD/LCSW)    5/31/2023    Site: [place]    Therapeutic Intervention: Met with patient alone  Outpatient - Insight oriented psychotherapy 45 min - CPT code 57640    Chief complaint/reason for encounter: anxiety     Interval history and content of current session: Pt looking at issues with W, feels these are manageable but clear that she needs to addess things as they come up, feels confident in doing so. Discuss boundaries with his children.     Treatment plan:  Target symptoms: anxiety   Why chosen therapy is appropriate versus another modality: relevant to diagnosis  Outcome monitoring methods: self-report  Therapeutic intervention type: insight oriented psychotherapy    Risk parameters:  Patient reports no suicidal ideation  Patient reports no homicidal ideation  Patient reports no self-injurious behavior  Patient reports no violent behavior    Verbal deficits: None    Patient's response to intervention:  The patient's response to intervention is motivated.    Progress toward goals and other mental status changes:  The patient's progress toward goals is good    Diagnosis:   Adjustment disorder with anxiety    Plan:  individual psychotherapy    Return to clinic: f/u as scheduled    Length of Service (minutes): 45

## 2023-06-07 ENCOUNTER — OFFICE VISIT (OUTPATIENT)
Dept: PSYCHIATRY | Facility: CLINIC | Age: 53
End: 2023-06-07
Payer: COMMERCIAL

## 2023-06-07 DIAGNOSIS — F43.22 ADJUSTMENT DISORDER WITH ANXIETY: Primary | ICD-10-CM

## 2023-06-07 PROCEDURE — 90834 PR PSYCHOTHERAPY W/PATIENT, 45 MIN: ICD-10-PCS | Mod: S$GLB,,, | Performed by: SOCIAL WORKER

## 2023-06-07 PROCEDURE — 99999 PR PBB SHADOW E&M-EST. PATIENT-LVL I: CPT | Mod: PBBFAC,,, | Performed by: SOCIAL WORKER

## 2023-06-07 PROCEDURE — 99999 PR PBB SHADOW E&M-EST. PATIENT-LVL I: ICD-10-PCS | Mod: PBBFAC,,, | Performed by: SOCIAL WORKER

## 2023-06-07 PROCEDURE — 3044F PR MOST RECENT HEMOGLOBIN A1C LEVEL <7.0%: ICD-10-PCS | Mod: CPTII,S$GLB,, | Performed by: SOCIAL WORKER

## 2023-06-07 PROCEDURE — 90834 PSYTX W PT 45 MINUTES: CPT | Mod: S$GLB,,, | Performed by: SOCIAL WORKER

## 2023-06-07 PROCEDURE — 3044F HG A1C LEVEL LT 7.0%: CPT | Mod: CPTII,S$GLB,, | Performed by: SOCIAL WORKER

## 2023-06-07 NOTE — PROGRESS NOTES
"    Individual Psychotherapy (PhD/LCSW)    6/7/2023    Site: [place]    Therapeutic Intervention: Met with patient alone  Outpatient - Insight oriented psychotherapy 45 min - CPT code 73467    Chief complaint/reason for encounter: anxiety     Interval history and content of current session: Pt reports M has contested the custody decision re the dog, discuss what this triggers in her cognitively and somatically. Pt and W having issues with his chronic conflict-avoidance, fear of alienating his kids if he sets limits. Discuss "freeze" response and W's need to script different ways to respond, suggest they read Dance of Anger or Dance of Intimacy to help pt find non-reactive ways to address him and to help him find ways to address instead of avoiding issues.     Treatment plan:  Target symptoms: anxiety   Why chosen therapy is appropriate versus another modality: relevant to diagnosis  Outcome monitoring methods: self-report  Therapeutic intervention type: insight oriented psychotherapy    Risk parameters:  Patient reports no suicidal ideation  Patient reports no homicidal ideation  Patient reports no self-injurious behavior  Patient reports no violent behavior    Verbal deficits: None    Patient's response to intervention:  The patient's response to intervention is motivated.    Progress toward goals and other mental status changes:  The patient's progress toward goals is good    Diagnosis:   Adjustment disorder with anxiety    Plan:  individual psychotherapy    Return to clinic: f/u as scheduled    Length of Service (minutes): 45                "

## 2023-06-28 ENCOUNTER — OFFICE VISIT (OUTPATIENT)
Dept: PSYCHIATRY | Facility: CLINIC | Age: 53
End: 2023-06-28
Payer: COMMERCIAL

## 2023-06-28 DIAGNOSIS — F43.22 ADJUSTMENT DISORDER WITH ANXIETY: Primary | ICD-10-CM

## 2023-06-28 PROCEDURE — 3044F PR MOST RECENT HEMOGLOBIN A1C LEVEL <7.0%: ICD-10-PCS | Mod: CPTII,S$GLB,, | Performed by: SOCIAL WORKER

## 2023-06-28 PROCEDURE — 3044F HG A1C LEVEL LT 7.0%: CPT | Mod: CPTII,S$GLB,, | Performed by: SOCIAL WORKER

## 2023-06-28 PROCEDURE — 99999 PR PBB SHADOW E&M-EST. PATIENT-LVL I: ICD-10-PCS | Mod: PBBFAC,,, | Performed by: SOCIAL WORKER

## 2023-06-28 PROCEDURE — 90834 PR PSYCHOTHERAPY W/PATIENT, 45 MIN: ICD-10-PCS | Mod: S$GLB,,, | Performed by: SOCIAL WORKER

## 2023-06-28 PROCEDURE — 90834 PSYTX W PT 45 MINUTES: CPT | Mod: S$GLB,,, | Performed by: SOCIAL WORKER

## 2023-06-28 PROCEDURE — 99999 PR PBB SHADOW E&M-EST. PATIENT-LVL I: CPT | Mod: PBBFAC,,, | Performed by: SOCIAL WORKER

## 2023-06-28 NOTE — PROGRESS NOTES
"    Individual Psychotherapy (PhD/LCSW)    6/28/2023    Site: [place]    Therapeutic Intervention: Met with patient alone  Outpatient - Insight oriented psychotherapy 45 min - CPT code 09487    Chief complaint/reason for encounter: anxiety     Interval history and content of current session: Pt's anxiety has escalated as M has started writing more coded stories about her family, sent one to mother with comment about running up legal bills.  He has also been mailing his 18 page request to review dog custody request, sending it to pt's friends. Pt forwarded the message her mom received to her daughters and got back angry responses that this has "nothing to do with us."  Pt's  is out of town, pt experiencing panic as she waits for court hearing 8/5.  She feels SOB during the day, racing heart, ruminating thoughts about M and grief about her daughters. Talk to pt about ways to ground herself, validate her feelings of anxiety as M is apparently spiraling. Discuss person safety if he comes to town, and pt has discussed this with her brother. Pt does have some travel plans in the next month that she looks forward to, getting excellent support from family, friends and W.     Treatment plan:  Target symptoms: anxiety   Why chosen therapy is appropriate versus another modality: relevant to diagnosis  Outcome monitoring methods: self-report  Therapeutic intervention type: insight oriented psychotherapy    Risk parameters:  Patient reports no suicidal ideation  Patient reports no homicidal ideation  Patient reports no self-injurious behavior  Patient reports no violent behavior    Verbal deficits: None    Patient's response to intervention:  The patient's response to intervention is motivated.    Progress toward goals and other mental status changes:  The patient's progress toward goals is  mixed    Diagnosis:   Adjustment disorder with anxiety    Plan:  individual psychotherapy    Return to clinic: f/u as " scheduled    Length of Service (minutes): 45

## 2023-07-05 ENCOUNTER — OFFICE VISIT (OUTPATIENT)
Dept: PSYCHIATRY | Facility: CLINIC | Age: 53
End: 2023-07-05
Payer: COMMERCIAL

## 2023-07-05 DIAGNOSIS — F43.22 ADJUSTMENT DISORDER WITH ANXIETY: Primary | ICD-10-CM

## 2023-07-05 PROCEDURE — 3044F HG A1C LEVEL LT 7.0%: CPT | Mod: CPTII,S$GLB,, | Performed by: SOCIAL WORKER

## 2023-07-05 PROCEDURE — 99999 PR PBB SHADOW E&M-EST. PATIENT-LVL I: ICD-10-PCS | Mod: PBBFAC,,, | Performed by: SOCIAL WORKER

## 2023-07-05 PROCEDURE — 3044F PR MOST RECENT HEMOGLOBIN A1C LEVEL <7.0%: ICD-10-PCS | Mod: CPTII,S$GLB,, | Performed by: SOCIAL WORKER

## 2023-07-05 PROCEDURE — 99999 PR PBB SHADOW E&M-EST. PATIENT-LVL I: CPT | Mod: PBBFAC,,, | Performed by: SOCIAL WORKER

## 2023-07-05 PROCEDURE — 90834 PSYTX W PT 45 MINUTES: CPT | Mod: S$GLB,,, | Performed by: SOCIAL WORKER

## 2023-07-05 PROCEDURE — 90834 PR PSYCHOTHERAPY W/PATIENT, 45 MIN: ICD-10-PCS | Mod: S$GLB,,, | Performed by: SOCIAL WORKER

## 2023-07-05 NOTE — PROGRESS NOTES
Individual Psychotherapy (PhD/LCSW)    7/5/2023    Site: [place]    Therapeutic Intervention: Met with patient alone  Outpatient - Insight oriented psychotherapy 45 min - CPT code 33035    Chief complaint/reason for encounter: anxiety     Interval history and content of current session: Pt has stabilized, distanced from legal issues and M has not stirred any more conflict. She and W are doing well and his boys have been much more receptive to including her in activities. Going to [place] next week, f/u in 2 weeks.     Treatment plan:  Target symptoms: anxiety   Why chosen therapy is appropriate versus another modality: relevant to diagnosis  Outcome monitoring methods: self-report  Therapeutic intervention type: insight oriented psychotherapy    Risk parameters:  Patient reports no suicidal ideation  Patient reports no homicidal ideation  Patient reports no self-injurious behavior  Patient reports no violent behavior    Verbal deficits: None    Patient's response to intervention:  The patient's response to intervention is motivated.    Progress toward goals and other mental status changes:  The patient's progress toward goals is good    Diagnosis:   Adjustment disorder with anxiety    Plan:  individual psychotherapy    Return to clinic: f/u as scheduled    Length of Service (minutes): 45

## 2023-07-07 DIAGNOSIS — F41.1 GAD (GENERALIZED ANXIETY DISORDER): ICD-10-CM

## 2023-07-07 RX ORDER — PAROXETINE HYDROCHLORIDE 40 MG/1
40 TABLET, FILM COATED ORAL DAILY
Qty: 90 TABLET | Refills: 3 | Status: SHIPPED | OUTPATIENT
Start: 2023-07-07 | End: 2023-10-04 | Stop reason: SDUPTHER

## 2023-07-07 NOTE — TELEPHONE ENCOUNTER
No care due was identified.  [facility] Embedded Care Due Messages. Reference number: [id number].   7/07/2023 11:49:15 AM CDT

## 2023-07-07 NOTE — TELEPHONE ENCOUNTER
Refill Decision Note   Becky Cade  is requesting a refill authorization.  Brief Assessment and Rationale for Refill:  Approve     Medication Therapy Plan:         Comments:     Note composed:3:33 PM 07/07/2023

## 2023-07-19 ENCOUNTER — OFFICE VISIT (OUTPATIENT)
Dept: PSYCHIATRY | Facility: CLINIC | Age: 53
End: 2023-07-19
Payer: COMMERCIAL

## 2023-07-19 DIAGNOSIS — F43.22 ADJUSTMENT DISORDER WITH ANXIETY: Primary | ICD-10-CM

## 2023-07-19 PROCEDURE — 90834 PSYTX W PT 45 MINUTES: CPT | Mod: S$GLB,,, | Performed by: SOCIAL WORKER

## 2023-07-19 PROCEDURE — 3044F HG A1C LEVEL LT 7.0%: CPT | Mod: CPTII,S$GLB,, | Performed by: SOCIAL WORKER

## 2023-07-19 PROCEDURE — 3044F PR MOST RECENT HEMOGLOBIN A1C LEVEL <7.0%: ICD-10-PCS | Mod: CPTII,S$GLB,, | Performed by: SOCIAL WORKER

## 2023-07-19 PROCEDURE — 99999 PR PBB SHADOW E&M-EST. PATIENT-LVL I: ICD-10-PCS | Mod: PBBFAC,,, | Performed by: SOCIAL WORKER

## 2023-07-19 PROCEDURE — 99999 PR PBB SHADOW E&M-EST. PATIENT-LVL I: CPT | Mod: PBBFAC,,, | Performed by: SOCIAL WORKER

## 2023-07-19 PROCEDURE — 90834 PR PSYCHOTHERAPY W/PATIENT, 45 MIN: ICD-10-PCS | Mod: S$GLB,,, | Performed by: SOCIAL WORKER

## 2023-07-19 NOTE — PROGRESS NOTES
Individual Psychotherapy (PhD/LCSW)    7/5/2023    Site: [place]    Therapeutic Intervention: Met with patient alone  Outpatient - Insight oriented psychotherapy 45 min - CPT code 12731    Chief complaint/reason for encounter: anxiety     Interval history and content of current session: Pt reports that M is now sending e-mail copies of his latest request for custody of the dog to pt's mom's extended family, and to other people in pt's [place], including the staff where the dog boards, leaving these people worried about being pulled into legal proceedings. Pt's  is working on plan to contain this acting out. Pt is doing well, no longer feeling somatic tension. She and W enjoyed visit with mom in [place]. Pt shares letter she got fro her daughters with a small birthday gift, wishing her a happy birthday and recalling positive past memories, with no reference to their refusal to see her or include her in their lives.     Treatment plan:  Target symptoms: anxiety   Why chosen therapy is appropriate versus another modality: relevant to diagnosis  Outcome monitoring methods: self-report  Therapeutic intervention type: insight oriented psychotherapy    Risk parameters:  Patient reports no suicidal ideation  Patient reports no homicidal ideation  Patient reports no self-injurious behavior  Patient reports no violent behavior    Verbal deficits: None    Patient's response to intervention:  The patient's response to intervention is motivated.    Progress toward goals and other mental status changes:  The patient's progress toward goals is good    Diagnosis:   Adjustment disorder with anxiety    Plan:  individual psychotherapy    Return to clinic: f/u as scheduled    Length of Service (minutes): 45

## 2023-07-26 ENCOUNTER — OFFICE VISIT (OUTPATIENT)
Dept: PSYCHIATRY | Facility: CLINIC | Age: 53
End: 2023-07-26
Payer: COMMERCIAL

## 2023-07-26 DIAGNOSIS — F43.22 ADJUSTMENT DISORDER WITH ANXIETY: Primary | ICD-10-CM

## 2023-07-26 PROCEDURE — 90834 PSYTX W PT 45 MINUTES: CPT | Mod: S$GLB,,, | Performed by: SOCIAL WORKER

## 2023-07-26 PROCEDURE — 3044F HG A1C LEVEL LT 7.0%: CPT | Mod: CPTII,S$GLB,, | Performed by: SOCIAL WORKER

## 2023-07-26 PROCEDURE — 3044F PR MOST RECENT HEMOGLOBIN A1C LEVEL <7.0%: ICD-10-PCS | Mod: CPTII,S$GLB,, | Performed by: SOCIAL WORKER

## 2023-07-26 PROCEDURE — 99999 PR PBB SHADOW E&M-EST. PATIENT-LVL I: ICD-10-PCS | Mod: PBBFAC,,, | Performed by: SOCIAL WORKER

## 2023-07-26 PROCEDURE — 90834 PR PSYCHOTHERAPY W/PATIENT, 45 MIN: ICD-10-PCS | Mod: S$GLB,,, | Performed by: SOCIAL WORKER

## 2023-07-26 PROCEDURE — 99999 PR PBB SHADOW E&M-EST. PATIENT-LVL I: CPT | Mod: PBBFAC,,, | Performed by: SOCIAL WORKER

## 2023-07-26 NOTE — PROGRESS NOTES
Individual Psychotherapy (PhD/LCSW)    7/5/2023    Site: [place]    Therapeutic Intervention: Met with patient alone  Outpatient - Insight oriented psychotherapy 45 min - CPT code 68486    Chief complaint/reason for encounter: anxiety     Interval history and content of current session: Pt and W visited her brother and his wife in [place]. Pt concerned about extent of W's fear of flying and lack of coping skills.   She is also concerned about where the relationship stands, his parenting style, and his difficulty setting limits with kids and ex-wife. Talk with pt about clarifying her own needs and boundaries and what she can/will tolerate in relationship.   Discuss pt's feeling of urgency to help and correct people she is close to.     Treatment plan:  Target symptoms: anxiety   Why chosen therapy is appropriate versus another modality: relevant to diagnosis  Outcome monitoring methods: self-report  Therapeutic intervention type: insight oriented psychotherapy    Risk parameters:  Patient reports no suicidal ideation  Patient reports no homicidal ideation  Patient reports no self-injurious behavior  Patient reports no violent behavior    Verbal deficits: None    Patient's response to intervention:  The patient's response to intervention is motivated.    Progress toward goals and other mental status changes:  The patient's progress toward goals is good    Diagnosis:   Adjustment disorder with anxiety    Plan:  individual psychotherapy    Return to clinic: f/u as scheduled    Length of Service (minutes): 45

## 2023-08-02 ENCOUNTER — OFFICE VISIT (OUTPATIENT)
Dept: PSYCHIATRY | Facility: CLINIC | Age: 53
End: 2023-08-02
Payer: COMMERCIAL

## 2023-08-02 DIAGNOSIS — F43.22 ADJUSTMENT DISORDER WITH ANXIETY: Primary | ICD-10-CM

## 2023-08-02 PROCEDURE — 90834 PR PSYCHOTHERAPY W/PATIENT, 45 MIN: ICD-10-PCS | Mod: S$GLB,,, | Performed by: SOCIAL WORKER

## 2023-08-02 PROCEDURE — 99999 PR PBB SHADOW E&M-EST. PATIENT-LVL I: ICD-10-PCS | Mod: PBBFAC,,, | Performed by: SOCIAL WORKER

## 2023-08-02 PROCEDURE — 3044F PR MOST RECENT HEMOGLOBIN A1C LEVEL <7.0%: ICD-10-PCS | Mod: CPTII,S$GLB,, | Performed by: SOCIAL WORKER

## 2023-08-02 PROCEDURE — 3044F HG A1C LEVEL LT 7.0%: CPT | Mod: CPTII,S$GLB,, | Performed by: SOCIAL WORKER

## 2023-08-02 PROCEDURE — 99999 PR PBB SHADOW E&M-EST. PATIENT-LVL I: CPT | Mod: PBBFAC,,, | Performed by: SOCIAL WORKER

## 2023-08-02 PROCEDURE — 90834 PSYTX W PT 45 MINUTES: CPT | Mod: S$GLB,,, | Performed by: SOCIAL WORKER

## 2023-08-02 NOTE — PROGRESS NOTES
"    Individual Psychotherapy (PhD/LCSW)    7/5/2023    Site: [place]    Therapeutic Intervention: Met with patient alone  Outpatient - Insight oriented psychotherapy 45 min - CPT code 77458    Chief complaint/reason for encounter: anxiety     Interval history and content of current session: Pt preparing for next court hearing tomorrow, anticipates M may  try to cancel it. She reports he has resumed sending "stories" with thinly veiled content about her and her family to her friends and relatives. Pt feeling somatic effects of chronic stress. Also upset that W's sons remain resistant to building relationship with her. Suggest she back off from trying to connect with them for now and just enjoy the relationship with W. Pt shares breezy letter from [place] making no reference to cutoff of past 2 years, telling her about plans to go to [place]. Pt debates how to respond, agree she can take her time and reflect on this after legal events pass. Talk with pt about grounding herself to calm her mind and body as she moves through current challenges.     Treatment plan:  Target symptoms: anxiety   Why chosen therapy is appropriate versus another modality: relevant to diagnosis  Outcome monitoring methods: self-report  Therapeutic intervention type: insight oriented psychotherapy    Risk parameters:  Patient reports no suicidal ideation  Patient reports no homicidal ideation  Patient reports no self-injurious behavior  Patient reports no violent behavior    Verbal deficits: None    Patient's response to intervention:  The patient's response to intervention is motivated.    Progress toward goals and other mental status changes:  The patient's progress toward goals is fair    Diagnosis:   Adjustment disorder with anxiety    Plan:  individual psychotherapy    Return to clinic: f/u as scheduled    Length of Service (minutes): 45            "

## 2023-08-07 DIAGNOSIS — Z72.820 POOR SLEEP: ICD-10-CM

## 2023-08-07 DIAGNOSIS — F41.1 GAD (GENERALIZED ANXIETY DISORDER): ICD-10-CM

## 2023-08-08 RX ORDER — HYDROXYZINE HYDROCHLORIDE 10 MG/1
20 TABLET, FILM COATED ORAL NIGHTLY
Qty: 180 TABLET | Refills: 1 | Status: SHIPPED | OUTPATIENT
Start: 2023-08-08

## 2023-08-09 ENCOUNTER — OFFICE VISIT (OUTPATIENT)
Dept: PSYCHIATRY | Facility: CLINIC | Age: 53
End: 2023-08-09
Payer: COMMERCIAL

## 2023-08-09 DIAGNOSIS — F43.22 ADJUSTMENT DISORDER WITH ANXIETY: Primary | ICD-10-CM

## 2023-08-09 PROCEDURE — 3044F HG A1C LEVEL LT 7.0%: CPT | Mod: CPTII,S$GLB,, | Performed by: SOCIAL WORKER

## 2023-08-09 PROCEDURE — 90834 PSYTX W PT 45 MINUTES: CPT | Mod: S$GLB,,, | Performed by: SOCIAL WORKER

## 2023-08-09 PROCEDURE — 3044F PR MOST RECENT HEMOGLOBIN A1C LEVEL <7.0%: ICD-10-PCS | Mod: CPTII,S$GLB,, | Performed by: SOCIAL WORKER

## 2023-08-09 PROCEDURE — 90834 PR PSYCHOTHERAPY W/PATIENT, 45 MIN: ICD-10-PCS | Mod: S$GLB,,, | Performed by: SOCIAL WORKER

## 2023-08-09 NOTE — PROGRESS NOTES
Individual Psychotherapy (PhD/LCSW)    7/5/2023    Site: [place]    Therapeutic Intervention: Met with patient alone  Outpatient - Insight oriented psychotherapy 45 min - CPT code 33199    Chief complaint/reason for encounter: anxiety     Interval history and content of current session: Pt shares events of most recent court date which M challenged on the day it was scheduled, Special Master refused further continuance, pt awaiting ruling. Main focus is relationship with W, pt's efforts to get him to be more present, awareness that he placates all with promises that are conflicting, leaving her feeling let down. Advise pt to stop trying to instruct him in how to change, since this is not working, and consider meeting with him and his therapist to outline issues of concern.     Treatment plan:  Target symptoms: anxiety   Why chosen therapy is appropriate versus another modality: relevant to diagnosis  Outcome monitoring methods: self-report  Therapeutic intervention type: insight oriented psychotherapy    Risk parameters:  Patient reports no suicidal ideation  Patient reports no homicidal ideation  Patient reports no self-injurious behavior  Patient reports no violent behavior    Verbal deficits: None    Patient's response to intervention:  The patient's response to intervention is motivated.    Progress toward goals and other mental status changes:  The patient's progress toward goals is good    Diagnosis:   Adjustment disorder with anxiety    Plan:  individual psychotherapy    Return to clinic: f/u as scheduled    Length of Service (minutes): 45

## 2023-08-13 ENCOUNTER — PATIENT MESSAGE (OUTPATIENT)
Dept: PSYCHIATRY | Facility: CLINIC | Age: 53
End: 2023-08-13
Payer: COMMERCIAL

## 2023-08-14 ENCOUNTER — PATIENT MESSAGE (OUTPATIENT)
Dept: OBSTETRICS AND GYNECOLOGY | Facility: CLINIC | Age: 53
End: 2023-08-14
Payer: COMMERCIAL

## 2023-08-16 ENCOUNTER — OFFICE VISIT (OUTPATIENT)
Dept: PSYCHIATRY | Facility: CLINIC | Age: 53
End: 2023-08-16
Payer: COMMERCIAL

## 2023-08-16 DIAGNOSIS — F43.22 ADJUSTMENT DISORDER WITH ANXIETY: Primary | ICD-10-CM

## 2023-08-16 PROCEDURE — 3044F PR MOST RECENT HEMOGLOBIN A1C LEVEL <7.0%: ICD-10-PCS | Mod: CPTII,S$GLB,, | Performed by: SOCIAL WORKER

## 2023-08-16 PROCEDURE — 99999 PR PBB SHADOW E&M-EST. PATIENT-LVL I: ICD-10-PCS | Mod: PBBFAC,,, | Performed by: SOCIAL WORKER

## 2023-08-16 PROCEDURE — 90834 PR PSYCHOTHERAPY W/PATIENT, 45 MIN: ICD-10-PCS | Mod: S$GLB,,, | Performed by: SOCIAL WORKER

## 2023-08-16 PROCEDURE — 99999 PR PBB SHADOW E&M-EST. PATIENT-LVL I: CPT | Mod: PBBFAC,,, | Performed by: SOCIAL WORKER

## 2023-08-16 PROCEDURE — 3044F HG A1C LEVEL LT 7.0%: CPT | Mod: CPTII,S$GLB,, | Performed by: SOCIAL WORKER

## 2023-08-16 PROCEDURE — 90834 PSYTX W PT 45 MINUTES: CPT | Mod: S$GLB,,, | Performed by: SOCIAL WORKER

## 2023-08-16 NOTE — PROGRESS NOTES
Family Psychotherapy (PhD/LCSW)    8/16/2023    Site: [place]    Length of service: 60    Therapeutic intervention: 90847-Family therapy with patient; needed because issues in relationship    Persons present: patient and significant other     Interval history: Pt and Harry seen re differences that cause issues in relationship. W is conflict-avoidant, coming out of marriage with borderline wife, tends to placate and tell people what they want to hear, aware this is triggering for pt who feels betrayed when this happens. Discuss pt's strong beliefs about parenting which are in conflict with W's passive style. W is working on the issues with his own therapist and son's therapist. Suggest discussing conflict issues in writing to slow and calm the process for both.    Target symptoms: anxiety      Patient's interpersonal/verbal exchanges: 90477-Family therapy with patient:  active listening, frequent questions, and self-disclosure    Progress toward goals: first meeting    Diagnosis: adjustment disorder with anxiety    Plan: individual psychotherapy  family psychotherapy as needed    Return to clinic: as scheduled

## 2023-08-23 ENCOUNTER — OFFICE VISIT (OUTPATIENT)
Dept: PSYCHIATRY | Facility: CLINIC | Age: 53
End: 2023-08-23
Payer: COMMERCIAL

## 2023-08-23 DIAGNOSIS — F43.22 ADJUSTMENT DISORDER WITH ANXIETY: Primary | ICD-10-CM

## 2023-08-23 PROCEDURE — 3044F HG A1C LEVEL LT 7.0%: CPT | Mod: CPTII,S$GLB,, | Performed by: SOCIAL WORKER

## 2023-08-23 PROCEDURE — 99999 PR PBB SHADOW E&M-EST. PATIENT-LVL I: ICD-10-PCS | Mod: PBBFAC,,, | Performed by: SOCIAL WORKER

## 2023-08-23 PROCEDURE — 3044F PR MOST RECENT HEMOGLOBIN A1C LEVEL <7.0%: ICD-10-PCS | Mod: CPTII,S$GLB,, | Performed by: SOCIAL WORKER

## 2023-08-23 PROCEDURE — 90834 PSYTX W PT 45 MINUTES: CPT | Mod: S$GLB,,, | Performed by: SOCIAL WORKER

## 2023-08-23 PROCEDURE — 90834 PR PSYCHOTHERAPY W/PATIENT, 45 MIN: ICD-10-PCS | Mod: S$GLB,,, | Performed by: SOCIAL WORKER

## 2023-08-23 PROCEDURE — 99999 PR PBB SHADOW E&M-EST. PATIENT-LVL I: CPT | Mod: PBBFAC,,, | Performed by: SOCIAL WORKER

## 2023-08-23 NOTE — PROGRESS NOTES
"    Individual Psychotherapy (PhD/LCSW)    7/5/2023    Site: [facility]    Therapeutic Intervention: Met with patient alone  Outpatient - Insight oriented psychotherapy 45 min - CPT code 52880    Chief complaint/reason for encounter: anxiety     Interval history and content of current session: Review joint session with W last week, pt felt good about this, discuss W's trauma/anxiety response and ways pt can back off of correcting his parenting and focus on enjoying the relationship. Pt reports that M's brother committed suicide, which she learned from a friend. Pt  finding herself strongly assertive in many areas of her life, discuss knowing when to use this "power" and when to step back, agree we will work on this.     Treatment plan:  Target symptoms: anxiety   Why chosen therapy is appropriate versus another modality: relevant to diagnosis  Outcome monitoring methods: self-report  Therapeutic intervention type: insight oriented psychotherapy    Risk parameters:  Patient reports no suicidal ideation  Patient reports no homicidal ideation  Patient reports no self-injurious behavior  Patient reports no violent behavior    Verbal deficits: None    Patient's response to intervention:  The patient's response to intervention is motivated.    Progress toward goals and other mental status changes:  The patient's progress toward goals is good    Diagnosis:   Adjustment disorder with anxiety    Plan:  individual psychotherapy    Return to clinic: f/u as scheduled    Length of Service (minutes): 45                "

## 2023-08-24 ENCOUNTER — OFFICE VISIT (OUTPATIENT)
Dept: DERMATOLOGY | Facility: CLINIC | Age: 53
End: 2023-08-24
Payer: COMMERCIAL

## 2023-08-24 DIAGNOSIS — L82.1 SK (SEBORRHEIC KERATOSIS): ICD-10-CM

## 2023-08-24 DIAGNOSIS — D48.5 NEOPLASM OF UNCERTAIN BEHAVIOR OF SKIN: Primary | ICD-10-CM

## 2023-08-24 DIAGNOSIS — L81.4 SOLAR LENTIGO: ICD-10-CM

## 2023-08-24 DIAGNOSIS — D22.9 MULTIPLE BENIGN NEVI: ICD-10-CM

## 2023-08-24 DIAGNOSIS — Z12.83 SCREENING EXAM FOR SKIN CANCER: ICD-10-CM

## 2023-08-24 PROCEDURE — 99999 PR PBB SHADOW E&M-EST. PATIENT-LVL III: ICD-10-PCS | Mod: PBBFAC,,, | Performed by: DERMATOLOGY

## 2023-08-24 PROCEDURE — 3044F HG A1C LEVEL LT 7.0%: CPT | Mod: CPTII,S$GLB,, | Performed by: DERMATOLOGY

## 2023-08-24 PROCEDURE — 3044F PR MOST RECENT HEMOGLOBIN A1C LEVEL <7.0%: ICD-10-PCS | Mod: CPTII,S$GLB,, | Performed by: DERMATOLOGY

## 2023-08-24 PROCEDURE — 1159F PR MEDICATION LIST DOCUMENTED IN MEDICAL RECORD: ICD-10-PCS | Mod: CPTII,S$GLB,, | Performed by: DERMATOLOGY

## 2023-08-24 PROCEDURE — 99213 OFFICE O/P EST LOW 20 MIN: CPT | Mod: 25,S$GLB,, | Performed by: DERMATOLOGY

## 2023-08-24 PROCEDURE — 1160F PR REVIEW ALL MEDS BY PRESCRIBER/CLIN PHARMACIST DOCUMENTED: ICD-10-PCS | Mod: CPTII,S$GLB,, | Performed by: DERMATOLOGY

## 2023-08-24 PROCEDURE — 1160F RVW MEDS BY RX/DR IN RCRD: CPT | Mod: CPTII,S$GLB,, | Performed by: DERMATOLOGY

## 2023-08-24 PROCEDURE — 99999 PR PBB SHADOW E&M-EST. PATIENT-LVL III: CPT | Mod: PBBFAC,,, | Performed by: DERMATOLOGY

## 2023-08-24 PROCEDURE — 11102 TANGNTL BX SKIN SINGLE LES: CPT | Mod: S$GLB,,, | Performed by: DERMATOLOGY

## 2023-08-24 PROCEDURE — 88305 TISSUE EXAM BY PATHOLOGIST: CPT | Performed by: PATHOLOGY

## 2023-08-24 PROCEDURE — 99213 PR OFFICE/OUTPT VISIT, EST, LEVL III, 20-29 MIN: ICD-10-PCS | Mod: 25,S$GLB,, | Performed by: DERMATOLOGY

## 2023-08-24 PROCEDURE — 11102 PR TANGENTIAL BIOPSY, SKIN, SINGLE LESION: ICD-10-PCS | Mod: S$GLB,,, | Performed by: DERMATOLOGY

## 2023-08-24 PROCEDURE — 88305 TISSUE EXAM BY PATHOLOGIST: CPT | Mod: 26,,, | Performed by: PATHOLOGY

## 2023-08-24 PROCEDURE — 1159F MED LIST DOCD IN RCRD: CPT | Mod: CPTII,S$GLB,, | Performed by: DERMATOLOGY

## 2023-08-24 PROCEDURE — 88305 TISSUE EXAM BY PATHOLOGIST: ICD-10-PCS | Mod: 26,,, | Performed by: PATHOLOGY

## 2023-08-24 NOTE — PROGRESS NOTES
Subjective:      Patient ID:  Becky Cade is a 53 y.o. female who presents for   Chief Complaint   Patient presents with    Skin Check     TBSE      History of Present Illness: The patient presents for follow up of skin check.    The patient was last seen on: 06/20/2022 for UBSE with no significant findings.     No hx skin cancer.     Patient with new complaint of lesion(s)  Location: R-groin area   Duration: 1 month   Symptoms: tender to touch. Heat worsens   Relieving factors/Previous treatments: none          Review of Systems   Skin:  Positive for activity-related sunscreen use and wears hat. Negative for daily sunscreen use and recent sunburn.   Hematologic/Lymphatic: Does not bruise/bleed easily.       Objective:   Physical Exam   Constitutional: She appears well-developed and well-nourished. No distress.   Neurological: She is alert and oriented to person, place, and time. She is not disoriented.   Psychiatric: She has a normal mood and affect.   Skin:   Areas Examined (abnormalities noted in diagram):   Head / Face Inspection Performed  Neck Inspection Performed  Chest / Axilla Inspection Performed  Back Inspection Performed  RUE Inspected  LUE Inspection Performed  RLE Inspected  LLE Inspection Performed                 Diagram Legend     Erythematous scaling macule/papule c/w actinic keratosis       Vascular papule c/w angioma      Pigmented verrucoid papule/plaque c/w seborrheic keratosis      Yellow umbilicated papule c/w sebaceous hyperplasia      Irregularly shaped tan macule c/w lentigo     1-2 mm smooth white papules consistent with Milia      Movable subcutaneous cyst with punctum c/w epidermal inclusion cyst      Subcutaneous movable cyst c/w pilar cyst      Firm pink to brown papule c/w dermatofibroma      Pedunculated fleshy papule(s) c/w skin tag(s)      Evenly pigmented macule c/w junctional nevus     Mildly variegated pigmented, slightly irregular-bordered macule c/w mildly atypical  nevus      Flesh colored to evenly pigmented papule c/w intradermal nevus       Pink pearly papule/plaque c/w basal cell carcinoma      Erythematous hyperkeratotic cursted plaque c/w SCC      Surgical scar with no sign of skin cancer recurrence      Open and closed comedones      Inflammatory papules and pustules      Verrucoid papule consistent consistent with wart     Erythematous eczematous patches and plaques     Dystrophic onycholytic nail with subungual debris c/w onychomycosis     Umbilicated papule    Erythematous-base heme-crusted tan verrucoid plaque consistent with inflamed seborrheic keratosis     Erythematous Silvery Scaling Plaque c/w Psoriasis     See annotation            Assessment / Plan:      Pathology Orders:       Normal Orders This Visit    Specimen to Pathology, Dermatology     Questions:    Procedure Type: Dermatology and skin neoplasms    Number of Specimens: 1    ------------------------: -------------------------    Spec 1 Procedure: Biopsy    Spec 1 Clinical Impression: r/o bcc vs nevus vs other    Spec 1 Source: right infraorbital    Release to patient: Immediate          Neoplasm of uncertain behavior of skin  -     Specimen to Pathology, Dermatology    Shave biopsy procedure note:    Shave biopsy performed after verbal consent including risk of infection, scar, recurrence, need for additional treatment of site. Area prepped with alcohol, anesthetized with approximately 1.0cc of 1% lidocaine with epinephrine. Lesional tissue shaved with razor blade. Hemostasis achieved with application of aluminum chloride followed by hyfrecation. No complications. Dressing applied. Wound care explained.    If biopsy positive for malignancy, refer to Dr. Laura for Mohs surgery consultation.      SK (seborrheic keratosis)  These are benign inherited growths without a malignant potential. Reassurance given to patient. No treatment is necessary.       Multiple benign nevi   - minor problem and chronic.    Reassurance given to patient. No treatment necessary.       Solar lentigo   - minor problem and chronic.   Reassurance given to patient. No treatment necessary.       Screening exam for skin cancer  Total body skin examination performed today including at least 12 points as noted in physical examination. Suspicious lesions noted.    Recommend daily sun protection/avoidance, use of at least SPF 30, broad spectrum sunscreen (OTC drug), skin self examinations, and routine physician surveillance to optimize early detection                 Follow up if symptoms worsen or fail to improve.

## 2023-08-24 NOTE — PATIENT INSTRUCTIONS
Shave Biopsy Wound Care    Your doctor has performed a shave biopsy today.  A band aid and vaseline ointment has been placed over the site.  This should remain in place for NO LONGER THAN 48 hours.  It is fine to remove the bandaid after 24 hours, if the area is no longer bleeding. It is recommended that you keep the area dry (do not wet)) for the first 24 hours.  After 24 hours, wash the area with warm soap and water and apply Vaseline jelly.  Many patients prefer to use Neosporin or Bacitracin ointment.  This is acceptable; however, know that you can develop an allergy to this medication even if you have used it safely for years.  It is important to keep the area moist.  Letting it dry out and get air slows healing time, and will worsen the scar.        If you notice increasing redness, tenderness, pain, or yellow drainage at the biopsy site, please notify your doctor.  These are signs of an infection.    If your biopsy site is bleeding, apply firm pressure for 15 minutes straight.  Repeat for another 15 minutes, if it is still bleeding.   If the surgical site continues to bleed, then please contact your doctor.      For [facility] users:   You will receive your biopsy results in [facility] as soon as they are available. Please be assured that your physician/provider will review your results and will then determine what further treatment, evaluation, or planning is required. You should be contacted by your physician's/provider's office within 5 business days of receiving your results; If not, please reach out to directly. This is one more way [facility] is putting you first.     [facility] [place] [address]/ [phone] FAX/ [email or web address]

## 2023-08-29 LAB
FINAL PATHOLOGIC DIAGNOSIS: NORMAL
GROSS: NORMAL
Lab: NORMAL
MICROSCOPIC EXAM: NORMAL

## 2023-08-30 ENCOUNTER — TELEPHONE (OUTPATIENT)
Dept: OBSTETRICS AND GYNECOLOGY | Facility: CLINIC | Age: 53
End: 2023-08-30
Payer: COMMERCIAL

## 2023-08-30 ENCOUNTER — PATIENT MESSAGE (OUTPATIENT)
Dept: OBSTETRICS AND GYNECOLOGY | Facility: CLINIC | Age: 53
End: 2023-08-30
Payer: COMMERCIAL

## 2023-08-30 DIAGNOSIS — N95.1 MENOPAUSAL SYMPTOMS: Primary | ICD-10-CM

## 2023-08-30 NOTE — TELEPHONE ENCOUNTER
Pt calling to get hormone labs done. Pt states stopped OCps August 1st. Orders placed and pt scheduled

## 2023-08-31 ENCOUNTER — PATIENT MESSAGE (OUTPATIENT)
Dept: DERMATOLOGY | Facility: CLINIC | Age: 53
End: 2023-08-31
Payer: COMMERCIAL

## 2023-09-05 ENCOUNTER — LAB VISIT (OUTPATIENT)
Dept: LAB | Facility: HOSPITAL | Age: 53
End: 2023-09-05
Attending: OBSTETRICS & GYNECOLOGY
Payer: COMMERCIAL

## 2023-09-05 DIAGNOSIS — N95.1 MENOPAUSAL SYMPTOMS: ICD-10-CM

## 2023-09-05 LAB
ESTRADIOL SERPL-MCNC: 17 PG/ML
FSH SERPL-ACNC: 86.08 MIU/ML
TESTOST SERPL-MCNC: 28 NG/DL (ref 5–73)

## 2023-09-05 PROCEDURE — 36415 COLL VENOUS BLD VENIPUNCTURE: CPT | Mod: PN | Performed by: OBSTETRICS & GYNECOLOGY

## 2023-09-05 PROCEDURE — 84403 ASSAY OF TOTAL TESTOSTERONE: CPT | Performed by: OBSTETRICS & GYNECOLOGY

## 2023-09-05 PROCEDURE — 83001 ASSAY OF GONADOTROPIN (FSH): CPT | Performed by: OBSTETRICS & GYNECOLOGY

## 2023-09-05 PROCEDURE — 82670 ASSAY OF TOTAL ESTRADIOL: CPT | Performed by: OBSTETRICS & GYNECOLOGY

## 2023-09-05 PROCEDURE — 84402 ASSAY OF FREE TESTOSTERONE: CPT | Performed by: OBSTETRICS & GYNECOLOGY

## 2023-09-06 ENCOUNTER — OFFICE VISIT (OUTPATIENT)
Dept: PSYCHIATRY | Facility: CLINIC | Age: 53
End: 2023-09-06
Payer: COMMERCIAL

## 2023-09-06 DIAGNOSIS — F43.22 ADJUSTMENT DISORDER WITH ANXIETY: Primary | ICD-10-CM

## 2023-09-06 PROCEDURE — 3044F PR MOST RECENT HEMOGLOBIN A1C LEVEL <7.0%: ICD-10-PCS | Mod: CPTII,S$GLB,, | Performed by: SOCIAL WORKER

## 2023-09-06 PROCEDURE — 3044F HG A1C LEVEL LT 7.0%: CPT | Mod: CPTII,S$GLB,, | Performed by: SOCIAL WORKER

## 2023-09-06 PROCEDURE — 90834 PR PSYCHOTHERAPY W/PATIENT, 45 MIN: ICD-10-PCS | Mod: S$GLB,,, | Performed by: SOCIAL WORKER

## 2023-09-06 PROCEDURE — 99999 PR PBB SHADOW E&M-EST. PATIENT-LVL I: ICD-10-PCS | Mod: PBBFAC,,, | Performed by: SOCIAL WORKER

## 2023-09-06 PROCEDURE — 90834 PSYTX W PT 45 MINUTES: CPT | Mod: S$GLB,,, | Performed by: SOCIAL WORKER

## 2023-09-06 PROCEDURE — 99999 PR PBB SHADOW E&M-EST. PATIENT-LVL I: CPT | Mod: PBBFAC,,, | Performed by: SOCIAL WORKER

## 2023-09-06 NOTE — PROGRESS NOTES
Individual Psychotherapy (PhD/LCSW)    7/5/2023    Site: [facility]    Therapeutic Intervention: Met with patient alone  Outpatient - Insight oriented psychotherapy 45 min - CPT code 98574    Chief complaint/reason for encounter: anxiety     Interval history and content of current session: Pt shares multiple challenging events of past 2 weeks. Met with M in court, which went well, but decisions from special master are still pending. Responded to message from Linda by telling both girls that their light statements of love to her belie the fact that she has not seen them in 2 years and they will not talk to her, inviting both of them to renew the relationship. She has been angrily confronting W about his passivity around parenting, and notices that he is distancing and unhappy. Again suggest they remove the boys from the relationship, stop trying to build connection with them and stop discussing parenting issues. Encourage pt to make plans with friends on weekends W has the boys. Pt experiencing sadness and physical pain related to stress in the relationship.      Treatment plan:  Target symptoms: anxiety   Why chosen therapy is appropriate versus another modality: relevant to diagnosis  Outcome monitoring methods: self-report  Therapeutic intervention type: insight oriented psychotherapy    Risk parameters:  Patient reports no suicidal ideation  Patient reports no homicidal ideation  Patient reports no self-injurious behavior  Patient reports no violent behavior    Verbal deficits: None    Patient's response to intervention:  The patient's response to intervention is motivated.    Progress toward goals and other mental status changes:  The patient's progress toward goals is fair    Diagnosis:   Adjustment disorder with anxiety    Plan:  individual psychotherapy    Return to clinic: f/u as scheduled    Length of Service (minutes): 45

## 2023-09-08 LAB — TESTOST FREE SERPL-MCNC: 0.6 PG/ML

## 2023-09-11 ENCOUNTER — TELEPHONE (OUTPATIENT)
Dept: OBSTETRICS AND GYNECOLOGY | Facility: CLINIC | Age: 53
End: 2023-09-11
Payer: COMMERCIAL

## 2023-09-11 NOTE — TELEPHONE ENCOUNTER
----- Message from Becky Abdul MD sent at 9/8/2023 12:53 PM CDT -----  Please schedule with Brian or Niya if they have something sooner

## 2023-09-12 ENCOUNTER — OFFICE VISIT (OUTPATIENT)
Dept: DERMATOLOGY | Facility: CLINIC | Age: 53
End: 2023-09-12
Payer: COMMERCIAL

## 2023-09-12 DIAGNOSIS — D48.5 NEOPLASM OF UNCERTAIN BEHAVIOR OF SKIN: Primary | ICD-10-CM

## 2023-09-12 PROCEDURE — 88305 TISSUE EXAM BY PATHOLOGIST: CPT | Mod: 26,,, | Performed by: PATHOLOGY

## 2023-09-12 PROCEDURE — 88342 IMHCHEM/IMCYTCHM 1ST ANTB: CPT | Mod: 26,,, | Performed by: PATHOLOGY

## 2023-09-12 PROCEDURE — 88342 CHG IMMUNOCYTOCHEMISTRY: ICD-10-PCS | Mod: 26,,, | Performed by: PATHOLOGY

## 2023-09-12 PROCEDURE — 88342 IMHCHEM/IMCYTCHM 1ST ANTB: CPT | Performed by: PATHOLOGY

## 2023-09-12 PROCEDURE — 88305 TISSUE EXAM BY PATHOLOGIST: ICD-10-PCS | Mod: 26,,, | Performed by: PATHOLOGY

## 2023-09-12 PROCEDURE — 88305 TISSUE EXAM BY PATHOLOGIST: CPT | Performed by: PATHOLOGY

## 2023-09-12 PROCEDURE — 99999 PR PBB SHADOW E&M-EST. PATIENT-LVL III: ICD-10-PCS | Mod: PBBFAC,,, | Performed by: DERMATOLOGY

## 2023-09-12 PROCEDURE — 99499 NO LOS: ICD-10-PCS | Mod: S$GLB,,, | Performed by: DERMATOLOGY

## 2023-09-12 PROCEDURE — 99999 PR PBB SHADOW E&M-EST. PATIENT-LVL III: CPT | Mod: PBBFAC,,, | Performed by: DERMATOLOGY

## 2023-09-12 PROCEDURE — 99499 UNLISTED E&M SERVICE: CPT | Mod: S$GLB,,, | Performed by: DERMATOLOGY

## 2023-09-12 NOTE — PROGRESS NOTES
Subjective:      Patient ID:  Becky Cade is a 53 y.o. female who presents for   Chief Complaint   Patient presents with    Follow-up     Biopsy check     History of Present Illness: The patient presents for follow up of skin check.    The patient was last seen on: 8/24/2023 for skin check and bx to the right infraorbital   Final Pathologic Diagnosis       Date                     Value               Ref Range           Status                08/24/2023                                                       Final             Skin, right infraorbital, shave biopsy: -SURFACE OF ADNEXAL PROLIFERATION, see comment  COMMENT:  Clinical images were reviewed in epic and differential diagnosis is noted.  The interpretation of this biopsy is limited due to its superficial nature.  I favor the lesion to represent the surface of a syringoma.  However, a microcystic adnexal  carcinoma (MAC) can not be excluded at this time histologically.  Clinical correlation is recommended.  If there is concern for a deeper / larger neoplasm (MAC), a deeper shave or punch biopsy is recommended to evaluate the deeper component of the  lesion.     Comment:    Interp By Jamey Urena M.D., Signed on 08/29/2023 at 10:53  ----------  Here for deeper bx    No hx nmsc or mm.    Other skin complaints: none        Review of Systems   Skin:  Positive for activity-related sunscreen use and wears hat. Negative for daily sunscreen use and recent sunburn.   Hematologic/Lymphatic: Does not bruise/bleed easily.       Objective:   Physical Exam   Constitutional: She appears well-developed and well-nourished. No distress.   Neurological: She is alert and oriented to person, place, and time. She is not disoriented.   Psychiatric: She has a normal mood and affect.   Skin:   Areas Examined (abnormalities noted in diagram):   Head / Face Inspection Performed            Diagram Legend     Erythematous scaling macule/papule c/w actinic keratosis        Vascular papule c/w angioma      Pigmented verrucoid papule/plaque c/w seborrheic keratosis      Yellow umbilicated papule c/w sebaceous hyperplasia      Irregularly shaped tan macule c/w lentigo     1-2 mm smooth white papules consistent with Milia      Movable subcutaneous cyst with punctum c/w epidermal inclusion cyst      Subcutaneous movable cyst c/w pilar cyst      Firm pink to brown papule c/w dermatofibroma      Pedunculated fleshy papule(s) c/w skin tag(s)      Evenly pigmented macule c/w junctional nevus     Mildly variegated pigmented, slightly irregular-bordered macule c/w mildly atypical nevus      Flesh colored to evenly pigmented papule c/w intradermal nevus       Pink pearly papule/plaque c/w basal cell carcinoma      Erythematous hyperkeratotic cursted plaque c/w SCC      Surgical scar with no sign of skin cancer recurrence      Open and closed comedones      Inflammatory papules and pustules      Verrucoid papule consistent consistent with wart     Erythematous eczematous patches and plaques     Dystrophic onycholytic nail with subungual debris c/w onychomycosis     Umbilicated papule    Erythematous-base heme-crusted tan verrucoid plaque consistent with inflamed seborrheic keratosis     Erythematous Silvery Scaling Plaque c/w Psoriasis     See annotation      Assessment / Plan:      Pathology Orders:       Normal Orders This Visit    Specimen to Pathology, Dermatology     Questions:    Procedure Type: Dermatology and skin neoplasms    Number of Specimens: 1    ------------------------: -------------------------    Spec 1 Procedure: Biopsy    Spec 1 Clinical Impression: r/o adnexal neoplasm vs MAC    Spec 1 Source: right infraorbital    Release to patient: Immediate          Neoplasm of uncertain behavior of skin  -     Specimen to Pathology, Dermatology    Punch biopsy procedure note:  Punch biopsy performed after verbal consent obtained. Area marked and prepped with alcohol. Approximately 1cc of  1% lidocaine with epinephrine injected. 2 mm disposable punch used to remove lesion. Hemostasis obtained and biopsy site closed with 1 - 2 Prolene sutures. Wound care instructions reviewed with patient and handout given.               Follow up in about 1 week (around 9/19/2023).

## 2023-09-12 NOTE — PATIENT INSTRUCTIONS

## 2023-09-13 ENCOUNTER — OFFICE VISIT (OUTPATIENT)
Dept: PSYCHIATRY | Facility: CLINIC | Age: 53
End: 2023-09-13
Payer: COMMERCIAL

## 2023-09-13 DIAGNOSIS — F43.22 ADJUSTMENT DISORDER WITH ANXIETY: Primary | ICD-10-CM

## 2023-09-13 PROCEDURE — 90834 PR PSYCHOTHERAPY W/PATIENT, 45 MIN: ICD-10-PCS | Mod: S$GLB,,, | Performed by: SOCIAL WORKER

## 2023-09-13 PROCEDURE — 3044F HG A1C LEVEL LT 7.0%: CPT | Mod: CPTII,S$GLB,, | Performed by: SOCIAL WORKER

## 2023-09-13 PROCEDURE — 90834 PSYTX W PT 45 MINUTES: CPT | Mod: S$GLB,,, | Performed by: SOCIAL WORKER

## 2023-09-13 PROCEDURE — 3044F PR MOST RECENT HEMOGLOBIN A1C LEVEL <7.0%: ICD-10-PCS | Mod: CPTII,S$GLB,, | Performed by: SOCIAL WORKER

## 2023-09-13 PROCEDURE — 99999 PR PBB SHADOW E&M-EST. PATIENT-LVL I: ICD-10-PCS | Mod: PBBFAC,,, | Performed by: SOCIAL WORKER

## 2023-09-13 PROCEDURE — 99999 PR PBB SHADOW E&M-EST. PATIENT-LVL I: CPT | Mod: PBBFAC,,, | Performed by: SOCIAL WORKER

## 2023-09-13 NOTE — PROGRESS NOTES
Individual Psychotherapy (PhD/LCSW)    7/5/2023    Site: [facility]    Therapeutic Intervention: Met with patient alone  Outpatient - Insight oriented psychotherapy 45 min - CPT code 01115    Chief complaint/reason for encounter: anxiety     Interval history and content of current session: Pt and W doing better since eliminating her contact with his kids, and he is increasing his Tx to weekly to address his anxiety issues. Pt reached out to E on her birthday and got no response, aware of a deep sadness that she has no meaningful contact with her daughters, fears this will be permanent.  M has resumed sending letters to people in her social [place] and  is addressing that. Pt reports lifelong sense of awakening not wanting to face the day. Suggest before going to sleep identifying things she looks forward to for the next day.     Treatment plan:  Target symptoms: anxiety   Why chosen therapy is appropriate versus another modality: relevant to diagnosis  Outcome monitoring methods: self-report  Therapeutic intervention type: insight oriented psychotherapy    Risk parameters:  Patient reports no suicidal ideation  Patient reports no homicidal ideation  Patient reports no self-injurious behavior  Patient reports no violent behavior    Verbal deficits: None    Patient's response to intervention:  The patient's response to intervention is motivated.    Progress toward goals and other mental status changes:  The patient's progress toward goals is good    Diagnosis:   Adjustment disorder with anxiety    Plan:  individual psychotherapy    Return to clinic: f/u as scheduled    Length of Service (minutes): 45

## 2023-09-14 ENCOUNTER — TELEPHONE (OUTPATIENT)
Dept: PRIMARY CARE CLINIC | Facility: CLINIC | Age: 53
End: 2023-09-14
Payer: COMMERCIAL

## 2023-09-14 DIAGNOSIS — E03.9 ACQUIRED HYPOTHYROIDISM: Primary | ICD-10-CM

## 2023-09-14 NOTE — TELEPHONE ENCOUNTER
----- Message from Montez Saleh sent at 9/14/2023 10:52 AM CDT -----  Contact: Becky/Nancy  [email or web address]     Patient is calling for Medical Advice regarding: pt states ever since she got off birth control she's been really tired and wants to get some lab work don't to make sure it doesn't have anything to do with her thyroids.     How long has patient had these symptoms:    Pharmacy name and phone#:    Would like response via [employer]:  yes    Comments:

## 2023-09-14 NOTE — TELEPHONE ENCOUNTER
pt states ever since she got off birth control she's been really tired and wants to get some lab work don't to make sure it doesn't have anything to do with her thyroid.    Had hormonal testing on 09/05 all normal.

## 2023-09-14 NOTE — PROGRESS NOTES
PT HERE FOR ANNUAL.  STILL WITH JUMANA BUT WANTS TO WAIT UNTIL AFTER COVIOD.    ROS:  GENERAL: No fever, chills, fatigability or weight loss.  VULVAR: No pain, no lesions and no itching.  VAGINAL: No relaxation, no itching, no discharge, no abnormal bleeding and no lesions.  ABDOMEN: No abdominal pain. Denies nausea. Denies vomiting. No diarrhea. No constipation  BREAST: Denies pain. No lumps. No discharge.  URINARY: No incontinence, no nocturia, no frequency and no dysuria.  CARDIOVASCULAR: No chest pain. No shortness of breath. No leg cramps.  NEUROLOGICAL: No headaches. No vision changes.  The remainder of the review of systems was negative.    PE:  General Appearance: normal weight And Well developed. Well nourished. In no acute distress.  Vulva: Lesions: No.  Urethral Meatus: Normal size. Normal location. No lesions. No prolapse.  Urethra: No masses. No tenderness. No prolapse. No scarring.  Bladder: No masses. No tenderness.  Vagina: Mucosa NI:yes discharge no, atrophy no, cystocele yes 1 ST or rectocele no.  Cervix: Lesion: no  Stenotic: no Cervical motion tenderness: no  Uterus: Uterus size: 6 weeks. Support good. Uterus size: Normal  Adnexa: Masses: No Tenderness: No CDS Nodularity: No  Abdomen: normal weight No masses. No tenderness.  Breasts: No bilateral masses. No bilateral discharge. No bilateral tenderness. No bilateral fibrocystic changes.  Neck: No thyroid enlargement. No thyroid masses.  Skin: Rashes: No      PROCEDURES:    PLAN:     DIAGNOSIS:  1. Encounter for gynecological examination without abnormal finding        MEDICATIONS & ORDERS:  Orders Placed This Encounter    Liquid-Based Pap Smear, Screening       Patient was counseled today on the new ACS guidelines for cervical cytology screening as well as the current recommendations for breast cancer screening. She was counseled to follow up with her PCP for other routine health maintenance. Counseling session lasted approximately 10 minutes, and  all her questions were answered.         FOLLOW-UP: With me in 12 month         yes

## 2023-09-15 ENCOUNTER — LAB VISIT (OUTPATIENT)
Dept: LAB | Facility: HOSPITAL | Age: 53
End: 2023-09-15
Attending: FAMILY MEDICINE
Payer: COMMERCIAL

## 2023-09-15 DIAGNOSIS — E03.9 ACQUIRED HYPOTHYROIDISM: ICD-10-CM

## 2023-09-15 LAB
T4 FREE SERPL-MCNC: 0.85 NG/DL (ref 0.71–1.51)
TSH SERPL DL<=0.005 MIU/L-ACNC: 1.35 UIU/ML (ref 0.4–4)

## 2023-09-15 PROCEDURE — 36415 COLL VENOUS BLD VENIPUNCTURE: CPT | Mod: PN | Performed by: FAMILY MEDICINE

## 2023-09-15 PROCEDURE — 84439 ASSAY OF FREE THYROXINE: CPT | Performed by: FAMILY MEDICINE

## 2023-09-15 PROCEDURE — 84443 ASSAY THYROID STIM HORMONE: CPT | Performed by: FAMILY MEDICINE

## 2023-09-18 ENCOUNTER — PATIENT MESSAGE (OUTPATIENT)
Dept: OBSTETRICS AND GYNECOLOGY | Facility: CLINIC | Age: 53
End: 2023-09-18
Payer: COMMERCIAL

## 2023-09-19 ENCOUNTER — CLINICAL SUPPORT (OUTPATIENT)
Dept: DERMATOLOGY | Facility: CLINIC | Age: 53
End: 2023-09-19
Payer: COMMERCIAL

## 2023-09-19 DIAGNOSIS — Z48.02 VISIT FOR SUTURE REMOVAL: Primary | ICD-10-CM

## 2023-09-19 PROCEDURE — 99024 PR POST-OP FOLLOW-UP VISIT: ICD-10-PCS | Mod: S$GLB,,, | Performed by: DERMATOLOGY

## 2023-09-19 PROCEDURE — 99024 POSTOP FOLLOW-UP VISIT: CPT | Mod: S$GLB,,, | Performed by: DERMATOLOGY

## 2023-09-19 NOTE — PROGRESS NOTES
CC: 53 y.o.female patient is here for suture removal.     HPI: Patient is s/p punch biopsy of SURFACE OF ADNEXAL PROLIFERATION from the right infraorbital on 9/12/23.  Patient reports no problems.    WOUND PE:  Sutures intact.  Wound healing well.  Good approximation of skin edges.  No signs or symptoms of infection.    IMPRESSION:  Results pending.    PLAN:  Sutures removed today.  Continue wound care.    RTC: In 1 year for skin check or sooner for new concerns.

## 2023-09-20 ENCOUNTER — OFFICE VISIT (OUTPATIENT)
Dept: PSYCHIATRY | Facility: CLINIC | Age: 53
End: 2023-09-20
Payer: COMMERCIAL

## 2023-09-20 DIAGNOSIS — F43.22 ADJUSTMENT DISORDER WITH ANXIETY: Primary | ICD-10-CM

## 2023-09-20 LAB
FINAL PATHOLOGIC DIAGNOSIS: NORMAL
Lab: NORMAL

## 2023-09-20 PROCEDURE — 90834 PR PSYCHOTHERAPY W/PATIENT, 45 MIN: ICD-10-PCS | Mod: S$GLB,,, | Performed by: SOCIAL WORKER

## 2023-09-20 PROCEDURE — 99999 PR PBB SHADOW E&M-EST. PATIENT-LVL I: ICD-10-PCS | Mod: PBBFAC,,, | Performed by: SOCIAL WORKER

## 2023-09-20 PROCEDURE — 3044F HG A1C LEVEL LT 7.0%: CPT | Mod: CPTII,S$GLB,, | Performed by: SOCIAL WORKER

## 2023-09-20 PROCEDURE — 90834 PSYTX W PT 45 MINUTES: CPT | Mod: S$GLB,,, | Performed by: SOCIAL WORKER

## 2023-09-20 PROCEDURE — 3044F PR MOST RECENT HEMOGLOBIN A1C LEVEL <7.0%: ICD-10-PCS | Mod: CPTII,S$GLB,, | Performed by: SOCIAL WORKER

## 2023-09-20 PROCEDURE — 99999 PR PBB SHADOW E&M-EST. PATIENT-LVL I: CPT | Mod: PBBFAC,,, | Performed by: SOCIAL WORKER

## 2023-09-20 NOTE — PROGRESS NOTES
Individual Psychotherapy (PhD/LCSW)    7/5/2023    Site: [facility]    Therapeutic Intervention: Met with patient alone  Outpatient - Insight oriented psychotherapy 45 min - CPT code 26475    Chief complaint/reason for encounter: anxiety     Interval history and content of current session: Pt experiencing high anxiety, panic, and upset stomach, long hx of stomach being reactive to stress. She is more deeply missing her daughters, tense about waiting for special master to rule, continuing to collect letters M is sending to friends or family, and recently led family meeting with mom and all siblings to address issues with brother Bernard. Pt recently d'cd birth control pills per her OB and feels hormonally dysregulated, will discuss with PCP whether she needs to adjust her meds in some way. She has had some flashbacks of ways M belittled or discounted or manipulated her. Talk with pt about how to manage flashbacks and ground herself, discuss use of airpods and music to ground her particularly at work, suggested music and dancing to shake off some of the somatic tension in the evenings.     Treatment plan:  Target symptoms: anxiety   Why chosen therapy is appropriate versus another modality: relevant to diagnosis  Outcome monitoring methods: self-report  Therapeutic intervention type: insight oriented psychotherapy    Risk parameters:  Patient reports no suicidal ideation  Patient reports no homicidal ideation  Patient reports no self-injurious behavior  Patient reports no violent behavior    Verbal deficits: None    Patient's response to intervention:  The patient's response to intervention is motivated.    Progress toward goals and other mental status changes:  The patient's progress toward goals is fair    Diagnosis:   Adjustment disorder with anxiety    Plan:  individual psychotherapy    Return to clinic: f/u as scheduled    Length of Service (minutes): 45

## 2023-09-24 ENCOUNTER — PATIENT MESSAGE (OUTPATIENT)
Dept: PSYCHIATRY | Facility: CLINIC | Age: 53
End: 2023-09-24
Payer: COMMERCIAL

## 2023-09-27 ENCOUNTER — OFFICE VISIT (OUTPATIENT)
Dept: PRIMARY CARE CLINIC | Facility: CLINIC | Age: 53
End: 2023-09-27
Payer: COMMERCIAL

## 2023-09-27 ENCOUNTER — PATIENT MESSAGE (OUTPATIENT)
Dept: PSYCHIATRY | Facility: CLINIC | Age: 53
End: 2023-09-27
Payer: COMMERCIAL

## 2023-09-27 ENCOUNTER — OFFICE VISIT (OUTPATIENT)
Dept: PSYCHIATRY | Facility: CLINIC | Age: 53
End: 2023-09-27
Payer: COMMERCIAL

## 2023-09-27 DIAGNOSIS — E03.9 HYPOTHYROIDISM, UNSPECIFIED TYPE: ICD-10-CM

## 2023-09-27 DIAGNOSIS — F43.22 ADJUSTMENT DISORDER WITH ANXIETY: Primary | ICD-10-CM

## 2023-09-27 DIAGNOSIS — F41.1 GAD (GENERALIZED ANXIETY DISORDER): Primary | ICD-10-CM

## 2023-09-27 PROCEDURE — 3044F HG A1C LEVEL LT 7.0%: CPT | Mod: CPTII,95,, | Performed by: FAMILY MEDICINE

## 2023-09-27 PROCEDURE — 1160F RVW MEDS BY RX/DR IN RCRD: CPT | Mod: CPTII,95,, | Performed by: FAMILY MEDICINE

## 2023-09-27 PROCEDURE — 3044F PR MOST RECENT HEMOGLOBIN A1C LEVEL <7.0%: ICD-10-PCS | Mod: CPTII,95,, | Performed by: FAMILY MEDICINE

## 2023-09-27 PROCEDURE — 1160F PR REVIEW ALL MEDS BY PRESCRIBER/CLIN PHARMACIST DOCUMENTED: ICD-10-PCS | Mod: CPTII,95,, | Performed by: FAMILY MEDICINE

## 2023-09-27 PROCEDURE — 99999 PR PBB SHADOW E&M-EST. PATIENT-LVL I: ICD-10-PCS | Mod: PBBFAC,,, | Performed by: SOCIAL WORKER

## 2023-09-27 PROCEDURE — 1159F MED LIST DOCD IN RCRD: CPT | Mod: CPTII,95,, | Performed by: FAMILY MEDICINE

## 2023-09-27 PROCEDURE — 99214 PR OFFICE/OUTPT VISIT, EST, LEVL IV, 30-39 MIN: ICD-10-PCS | Mod: 95,,, | Performed by: FAMILY MEDICINE

## 2023-09-27 PROCEDURE — 90834 PR PSYCHOTHERAPY W/PATIENT, 45 MIN: ICD-10-PCS | Mod: S$GLB,,, | Performed by: SOCIAL WORKER

## 2023-09-27 PROCEDURE — 90834 PSYTX W PT 45 MINUTES: CPT | Mod: S$GLB,,, | Performed by: SOCIAL WORKER

## 2023-09-27 PROCEDURE — 3044F PR MOST RECENT HEMOGLOBIN A1C LEVEL <7.0%: ICD-10-PCS | Mod: CPTII,S$GLB,, | Performed by: SOCIAL WORKER

## 2023-09-27 PROCEDURE — 3044F HG A1C LEVEL LT 7.0%: CPT | Mod: CPTII,S$GLB,, | Performed by: SOCIAL WORKER

## 2023-09-27 PROCEDURE — 99214 OFFICE O/P EST MOD 30 MIN: CPT | Mod: 95,,, | Performed by: FAMILY MEDICINE

## 2023-09-27 PROCEDURE — 1159F PR MEDICATION LIST DOCUMENTED IN MEDICAL RECORD: ICD-10-PCS | Mod: CPTII,95,, | Performed by: FAMILY MEDICINE

## 2023-09-27 PROCEDURE — 99999 PR PBB SHADOW E&M-EST. PATIENT-LVL I: CPT | Mod: PBBFAC,,, | Performed by: SOCIAL WORKER

## 2023-09-27 RX ORDER — BUSPIRONE HYDROCHLORIDE 5 MG/1
5 TABLET ORAL 2 TIMES DAILY
Qty: 60 TABLET | Refills: 0 | Status: SHIPPED | OUTPATIENT
Start: 2023-09-27 | End: 2024-01-31 | Stop reason: ALTCHOICE

## 2023-09-27 NOTE — PROGRESS NOTES
"    Individual Psychotherapy (PhD/LCSW)    7/5/2023    Site: [facility]    Therapeutic Intervention: Met with patient alone  Outpatient - Insight oriented psychotherapy 45 min - CPT code 31972    Chief complaint/reason for encounter: anxiety     Interval history and content of current session: Pt reached out to daughters to ask for visit, got tess e-mail back stating they "already have plans" for Thanksgiving, and "sorry you feel" so upset, without elaboration or acknowledgment that they have not seen her for almost 2 years. Pt has been devastated, experiencing panic Sx, SOB, dizzy, poor concentration, weak. Pt will talk with Dr. Stephens today about meds to deal with the intense panic while she digests the reality that the girls will not engage in dialogue or work to remediate the relationship. Pt habitually suppresses feelings of upset. Point out that letting the feelings up and experiencing them is part of grieving and working through them, reassure her that she will be ok, but agree she needs help right now to calm down. Pt is well-focused and articulate in session. She has social plans this weekend. Discuss how to field questions from friends about her daughters.     Treatment plan:  Target symptoms: anxiety   Why chosen therapy is appropriate versus another modality: relevant to diagnosis  Outcome monitoring methods: self-report  Therapeutic intervention type: insight oriented psychotherapy    Risk parameters:  Patient reports no suicidal ideation  Patient reports no homicidal ideation  Patient reports no self-injurious behavior  Patient reports no violent behavior    Verbal deficits: None    Patient's response to intervention:  The patient's response to intervention is motivated.    Progress toward goals and other mental status changes:  The patient's progress toward goals is fair    Diagnosis:   Adjustment disorder with anxiety    Plan:  individual psychotherapy    Return to clinic: f/u as scheduled    Length of " Service (minutes): 45

## 2023-09-27 NOTE — PROGRESS NOTES
Subjective:       Patient ID: Becky Cade is a 53 y.o. female.    Chief Complaint: Fatigue and Anxiety    Shortness of Breath  This is a new problem. The current episode started 1 to 4 weeks ago. The problem occurs constantly. The problem has been unchanged. The patient has no known risk factors for DVT/PE. The treatment provided no relief. There is no history of allergies, aspirin allergies, asthma, bronchiolitis, CAD, chronic lung disease, COPD, DVT, a heart failure, PE, pneumonia or a recent surgery.     54 y/o female with hypothyroidism, cervical djd, ANAM, herpes labialis, mixed incontinence, positive LIBRA, family hx of RA, history of COVID here to discuss fatigue and increased anxiety.    She is still struggling with increased anxiety, she has been taking Ativan nightly for the past few weeks as she recently got upsetting news about her daughters. She has been on Paxil for years, feels like its helped overall, but with her acute stress she has had trouble getting through the day in light of recent news, the Ativan makes her drowsy, she would like something during the day. She is trying to stay active, doing Pilates once a week, she is trying to eat healthy.  She denies f/n/v, occasional heartburn, relieved with victor manuel selarlene gummies, she denies d/constipation/cp/sob/urinary sx.      Hx of covid 1/2022 mild outpatient symptoms  ANAM:  Counseling, paxil 40 mg daily, hydroxyzine 20 mg at night, Ativan 0.5 mg nightly  Hypothyroidism:never had procedure or biopsy, levothyroxine 75 mcg  Herpes labialis: acyclovir prn, 2-3 times a year  Positive LIBRA/family RA: following with rheumatology  Mixed incontinence/cystocele: following with Urogyn  GYN: following with Dr. Parson, pelvic utd, off OCP,  no cycle since stopping, mmg 2/2023  Colonoscopy 9/2020 repeat 10 years  Eye exam utd followed by Dr. Pascual  Dental utd  OTC: b12, vit D3, magnesium     Review of Systems   Constitutional:  Positive for fatigue.    Respiratory:  Positive for shortness of breath.        Objective:      LMP 02/07/2023 (Approximate)   Physical Exam  Constitutional:       General: She is not in acute distress.     Appearance: She is well-developed. She is not diaphoretic.   HENT:      Head: Normocephalic and atraumatic.   Pulmonary:      Effort: No respiratory distress.   Neurological:      Mental Status: She is alert.         Assessment:       1. RICKEY (generalized anxiety disorder)    2. Hypothyroidism, unspecified type        Plan:   Becky was seen today for fatigue and anxiety.    Diagnoses and all orders for this visit:    RICKEY (generalized anxiety disorder)  -     busPIRone (BUSPAR) 5 MG Tab; Take 1 tablet (5 mg total) by mouth 2 (two) times daily.    Hypothyroidism, unspecified type    The patient location is: [place]  The chief complaint leading to consultation is: rickey    Visit type: audiovisual    Face to Face time with patient: 25 minutes of total time spent on the encounter, which includes face to face time and non-face to face time preparing to see the patient (eg, review of tests), Obtaining and/or reviewing separately obtained history, Documenting clinical information in the electronic or other health record, Independently interpreting results (not separately reported) and communicating results to the patient/family/caregiver, or Care coordination (not separately reported).         Each patient to whom he or she provides medical services by telemedicine is:  (1) informed of the relationship between the physician and patient and the respective role of any other health care provider with respect to management of the patient; and (2) notified that he or she may decline to receive medical services by telemedicine and may withdraw from such care at any time.    Notes:   Discussed buspar titration and that we can go up on Paxil some as well, in the meantime she will set up with psychiatry

## 2023-09-28 ENCOUNTER — PATIENT MESSAGE (OUTPATIENT)
Dept: PRIMARY CARE CLINIC | Facility: CLINIC | Age: 53
End: 2023-09-28
Payer: COMMERCIAL

## 2023-10-04 ENCOUNTER — OFFICE VISIT (OUTPATIENT)
Dept: PSYCHIATRY | Facility: CLINIC | Age: 53
End: 2023-10-04
Payer: COMMERCIAL

## 2023-10-04 DIAGNOSIS — F43.22 ADJUSTMENT DISORDER WITH ANXIETY: Primary | ICD-10-CM

## 2023-10-04 PROCEDURE — 3044F PR MOST RECENT HEMOGLOBIN A1C LEVEL <7.0%: ICD-10-PCS | Mod: CPTII,S$GLB,, | Performed by: SOCIAL WORKER

## 2023-10-04 PROCEDURE — 99999 PR PBB SHADOW E&M-EST. PATIENT-LVL I: CPT | Mod: PBBFAC,,, | Performed by: SOCIAL WORKER

## 2023-10-04 PROCEDURE — 3044F HG A1C LEVEL LT 7.0%: CPT | Mod: CPTII,S$GLB,, | Performed by: SOCIAL WORKER

## 2023-10-04 PROCEDURE — 99999 PR PBB SHADOW E&M-EST. PATIENT-LVL I: ICD-10-PCS | Mod: PBBFAC,,, | Performed by: SOCIAL WORKER

## 2023-10-04 PROCEDURE — 90834 PR PSYCHOTHERAPY W/PATIENT, 45 MIN: ICD-10-PCS | Mod: S$GLB,,, | Performed by: SOCIAL WORKER

## 2023-10-04 PROCEDURE — 90834 PSYTX W PT 45 MINUTES: CPT | Mod: S$GLB,,, | Performed by: SOCIAL WORKER

## 2023-10-04 NOTE — PROGRESS NOTES
Individual Psychotherapy (PhD/LCSW)    7/5/2023    Site: [place]    Therapeutic Intervention: Met with patient alone  Outpatient - Insight oriented psychotherapy 45 min - CPT code 43103    Chief complaint/reason for encounter: anxiety     Interval history and content of current session: Pt doing well, had fun weekend in [place] with W, enjoyed his ability to socialize without dependence on her, enjoyed old and new friends, finds him calmer on increased dose of meds. Pt tried Buspar, had adverse reaction of agitatio, blurred vision, d/c'd, and feels she is now back at baseline with no need for other medication. Discuss boundaries with W's parenting. Pt has recently seen the boys and let them come to her, rather than reaching out, with positive results.     Treatment plan:  Target symptoms: anxiety   Why chosen therapy is appropriate versus another modality: relevant to diagnosis  Outcome monitoring methods: self-report  Therapeutic intervention type: insight oriented psychotherapy    Risk parameters:  Patient reports no suicidal ideation  Patient reports no homicidal ideation  Patient reports no self-injurious behavior  Patient reports no violent behavior    Verbal deficits: None    Patient's response to intervention:  The patient's response to intervention is motivated.    Progress toward goals and other mental status changes:  The patient's progress toward goals is good    Diagnosis:   Adjustment disorder with anxiety    Plan:  individual psychotherapy    Return to clinic: f/u as scheduled    Length of Service (minutes): 45

## 2023-10-10 ENCOUNTER — PATIENT MESSAGE (OUTPATIENT)
Dept: PRIMARY CARE CLINIC | Facility: CLINIC | Age: 53
End: 2023-10-10
Payer: COMMERCIAL

## 2023-10-10 NOTE — TELEPHONE ENCOUNTER
Pt req refill on Lorazepam. States she doesn't like how  Buspirone makes her feel. Duplicate refill request. Two already forwarded to provider.

## 2023-10-11 ENCOUNTER — PATIENT MESSAGE (OUTPATIENT)
Dept: OBSTETRICS AND GYNECOLOGY | Facility: CLINIC | Age: 53
End: 2023-10-11

## 2023-10-11 ENCOUNTER — OFFICE VISIT (OUTPATIENT)
Dept: OBSTETRICS AND GYNECOLOGY | Facility: CLINIC | Age: 53
End: 2023-10-11
Attending: OBSTETRICS & GYNECOLOGY
Payer: COMMERCIAL

## 2023-10-11 ENCOUNTER — OFFICE VISIT (OUTPATIENT)
Dept: PSYCHIATRY | Facility: CLINIC | Age: 53
End: 2023-10-11
Payer: COMMERCIAL

## 2023-10-11 VITALS
DIASTOLIC BLOOD PRESSURE: 86 MMHG | BODY MASS INDEX: 25.44 KG/M2 | WEIGHT: 149 LBS | HEIGHT: 64 IN | SYSTOLIC BLOOD PRESSURE: 126 MMHG | HEART RATE: 76 BPM

## 2023-10-11 DIAGNOSIS — N95.1 MENOPAUSAL SYMPTOM: Primary | ICD-10-CM

## 2023-10-11 DIAGNOSIS — F43.22 ADJUSTMENT DISORDER WITH ANXIETY: Primary | ICD-10-CM

## 2023-10-11 PROCEDURE — 99999 PR PBB SHADOW E&M-EST. PATIENT-LVL III: CPT | Mod: PBBFAC,,, | Performed by: OBSTETRICS & GYNECOLOGY

## 2023-10-11 PROCEDURE — 99999 PR PBB SHADOW E&M-EST. PATIENT-LVL I: ICD-10-PCS | Mod: PBBFAC,,, | Performed by: SOCIAL WORKER

## 2023-10-11 PROCEDURE — 90834 PSYTX W PT 45 MINUTES: CPT | Mod: S$GLB,,, | Performed by: SOCIAL WORKER

## 2023-10-11 PROCEDURE — 3074F PR MOST RECENT SYSTOLIC BLOOD PRESSURE < 130 MM HG: ICD-10-PCS | Mod: CPTII,S$GLB,, | Performed by: OBSTETRICS & GYNECOLOGY

## 2023-10-11 PROCEDURE — 1159F PR MEDICATION LIST DOCUMENTED IN MEDICAL RECORD: ICD-10-PCS | Mod: CPTII,S$GLB,, | Performed by: OBSTETRICS & GYNECOLOGY

## 2023-10-11 PROCEDURE — 3008F PR BODY MASS INDEX (BMI) DOCUMENTED: ICD-10-PCS | Mod: CPTII,S$GLB,, | Performed by: OBSTETRICS & GYNECOLOGY

## 2023-10-11 PROCEDURE — 3044F PR MOST RECENT HEMOGLOBIN A1C LEVEL <7.0%: ICD-10-PCS | Mod: CPTII,S$GLB,, | Performed by: SOCIAL WORKER

## 2023-10-11 PROCEDURE — 3079F DIAST BP 80-89 MM HG: CPT | Mod: CPTII,S$GLB,, | Performed by: OBSTETRICS & GYNECOLOGY

## 2023-10-11 PROCEDURE — 3044F PR MOST RECENT HEMOGLOBIN A1C LEVEL <7.0%: ICD-10-PCS | Mod: CPTII,S$GLB,, | Performed by: OBSTETRICS & GYNECOLOGY

## 2023-10-11 PROCEDURE — 3079F PR MOST RECENT DIASTOLIC BLOOD PRESSURE 80-89 MM HG: ICD-10-PCS | Mod: CPTII,S$GLB,, | Performed by: OBSTETRICS & GYNECOLOGY

## 2023-10-11 PROCEDURE — 3008F BODY MASS INDEX DOCD: CPT | Mod: CPTII,S$GLB,, | Performed by: OBSTETRICS & GYNECOLOGY

## 2023-10-11 PROCEDURE — 3074F SYST BP LT 130 MM HG: CPT | Mod: CPTII,S$GLB,, | Performed by: OBSTETRICS & GYNECOLOGY

## 2023-10-11 PROCEDURE — 1159F MED LIST DOCD IN RCRD: CPT | Mod: CPTII,S$GLB,, | Performed by: OBSTETRICS & GYNECOLOGY

## 2023-10-11 PROCEDURE — 90834 PR PSYCHOTHERAPY W/PATIENT, 45 MIN: ICD-10-PCS | Mod: S$GLB,,, | Performed by: SOCIAL WORKER

## 2023-10-11 PROCEDURE — 3044F HG A1C LEVEL LT 7.0%: CPT | Mod: CPTII,S$GLB,, | Performed by: SOCIAL WORKER

## 2023-10-11 PROCEDURE — 99214 OFFICE O/P EST MOD 30 MIN: CPT | Mod: S$GLB,,, | Performed by: OBSTETRICS & GYNECOLOGY

## 2023-10-11 PROCEDURE — 99214 PR OFFICE/OUTPT VISIT, EST, LEVL IV, 30-39 MIN: ICD-10-PCS | Mod: S$GLB,,, | Performed by: OBSTETRICS & GYNECOLOGY

## 2023-10-11 PROCEDURE — 99999 PR PBB SHADOW E&M-EST. PATIENT-LVL III: ICD-10-PCS | Mod: PBBFAC,,, | Performed by: OBSTETRICS & GYNECOLOGY

## 2023-10-11 PROCEDURE — 99999 PR PBB SHADOW E&M-EST. PATIENT-LVL I: CPT | Mod: PBBFAC,,, | Performed by: SOCIAL WORKER

## 2023-10-11 PROCEDURE — 3044F HG A1C LEVEL LT 7.0%: CPT | Mod: CPTII,S$GLB,, | Performed by: OBSTETRICS & GYNECOLOGY

## 2023-10-11 RX ORDER — ESTRADIOL 0.25 MG/.25G
1 GEL TOPICAL DAILY
Qty: 30 PACKET | Refills: 11 | Status: SHIPPED | OUTPATIENT
Start: 2023-10-11 | End: 2024-10-10

## 2023-10-11 RX ORDER — PROGESTERONE 100 MG/1
100 CAPSULE ORAL NIGHTLY
Qty: 30 CAPSULE | Refills: 11 | Status: SHIPPED | OUTPATIENT
Start: 2023-10-11 | End: 2023-11-04 | Stop reason: SDUPTHER

## 2023-10-11 RX ORDER — ESTRADIOL 0.03 MG/D
1 FILM, EXTENDED RELEASE TRANSDERMAL
Qty: 8 PATCH | Refills: 11 | Status: SHIPPED | OUTPATIENT
Start: 2023-10-12 | End: 2023-10-11 | Stop reason: CLARIF

## 2023-10-11 NOTE — PROGRESS NOTES
"    Individual Psychotherapy (PhD/LCSW)    7/5/2023    Site: [place]    Therapeutic Intervention: Met with patient alone  Outpatient - Insight oriented psychotherapy 45 min - CPT code 05641    Chief complaint/reason for encounter: anxiety     Interval history and content of current session: Pt doing well. Discuss growing assertiveness, anxiety she experienced in standing up to mom's judgment of things she said to others recently. Discuss impact on relationship with W, who says he is "scared" of her. Discuss ways to continue to be "herself" within the relationship. Pt is thoughtful and receptive.     Treatment plan:  Target symptoms: anxiety   Why chosen therapy is appropriate versus another modality: relevant to diagnosis  Outcome monitoring methods: self-report  Therapeutic intervention type: insight oriented psychotherapy    Risk parameters:  Patient reports no suicidal ideation  Patient reports no homicidal ideation  Patient reports no self-injurious behavior  Patient reports no violent behavior    Verbal deficits: None    Patient's response to intervention:  The patient's response to intervention is motivated.    Progress toward goals and other mental status changes:  The patient's progress toward goals is good    Diagnosis:   Adjustment disorder with anxiety    Plan:  individual psychotherapy    Return to clinic: f/u as scheduled    Length of Service (minutes): 45                            "

## 2023-10-11 NOTE — PROGRESS NOTES
SUBJECTIVE:   53 y.o. female   we discussed menopausal symptoms Patient's last menstrual period was 2023 (approximate)..  She was doing well until she stopped birth control pills 3 months ago.  She reports since then she is having hot flashes and night sweats and her anxiety is worse.  She has life stressors currently but reports her anxiety is worse.  She tried BuSpar but it made her feel jittery and gave her a headache  She is sexually active with 1 partner  She is not had a periods since she stopped the birth control pills 3 months ago  She reports her sleep is poor but is improved when she takes Ativan  She does Pilates 1 time a week.  She does not like cardio.        Past Medical History:   Diagnosis Date    Anxiety     ASCUS of cervix     Autoimmune disorder: just features: mouth sores, butterfly rash 2012    PENNY III (cervical intraepithelial neoplasia III)     Fever blister     Hypothyroid     Parvovirus arthritis of multiple sites 2012     Past Surgical History:   Procedure Laterality Date    COLD KNIFE CONIZATION OF CERVIX      KJB    COLONOSCOPY N/A 2020    Procedure: COLONOSCOPY;  Surgeon: Thaddeus Blackburn MD;  Location: [facility] ([address] [place] [address]);  Service: Endoscopy;  Laterality: N/A;  family history malignant hyperthermia  covid test [facility] urgent care 9/15    CYSTOSCOPY N/A 3/25/2022    Procedure: CYSTOSCOPY;  Surgeon: Brian Babcock MD;  Location: [address];  Service: OB/GYN;  Laterality: N/A;    INSERTION OF MIDURETHRAL SLING N/A 3/25/2022    Procedure: SLING, MIDURETHRAL;  Surgeon: Brian Babcock MD;  Location: [address];  Service: OB/GYN;  Laterality: N/A;    TONSILLECTOMY       Social History     Socioeconomic History    Marital status:    Tobacco Use    Smoking status: Never    Smokeless tobacco: Never   Substance and Sexual Activity    Alcohol use: Yes     Alcohol/week: 2.0 standard drinks of alcohol     Types: 2 Glasses of wine per week      Comment: socially on weekends    Drug use: No    Sexual activity: Yes     Partners: Male     Birth control/protection: Other-see comments     Comment: Apri Birth Control Pills     Family History   Problem Relation Age of Onset    Parkinsonism Father     Rheum arthritis Mother     Arthritis Mother     Malignant hyperthermia Brother     Breast cancer Neg Hx     Colon cancer Neg Hx     Ovarian cancer Neg Hx     Melanoma Neg Hx     Cancer Neg Hx     Heart disease Neg Hx     Cervical cancer Neg Hx     Uterine cancer Neg Hx      OB History    Para Term  AB Living   2 2 0     2   SAB IAB Ectopic Multiple Live Births           2      # Outcome Date GA Lbr Alexis/2nd Weight Sex Delivery Anes PTL Lv   2 Para      Vag-Spont      1 Para      Vag-Spont              Current Outpatient Medications   Medication Sig Dispense Refill    acyclovir (ZOVIRAX) 400 MG tablet Take 1 tablet (400 mg total) by mouth 2 (two) times daily. Fever blisters 14 tablet 0    busPIRone (BUSPAR) 5 MG Tab Take 1 tablet (5 mg total) by mouth 2 (two) times daily. 60 tablet 0    calcium-vitamin D3 (OS-KRISTY 500 + D3) 500 mg-5 mcg (200 unit) per tablet Take 1 tablet by mouth 2 (two) times daily with meals.      cyanocobalamin (VITAMIN B-12) 1000 MCG tablet Take 1,000 mcg by mouth once daily.      esomeprazole (NEXIUM) 20 MG capsule Take 20 mg by mouth as needed. Takes 2-3 a year      estradioL (DIVIGEL) 0.25 mg/0.25 gram (0.1 %) GlPk Place 1 packet onto the skin once daily. 30 packet 11    estradioL (ESTRACE) 0.01 % (0.1 mg/gram) vaginal cream Use 0.5 gram of estrogen cream in vagina nightly x 1 weeks, then two nights a week thereafter. 42.5 g 3    hydrOXYzine HCL (ATARAX) 10 MG Tab Take 2 tablets (20 mg total) by mouth every evening. 180 tablet 1    levothyroxine (SYNTHROID) 75 MCG tablet Take 1 tablet (75 mcg total) by mouth before breakfast. 90 tablet 3    LORazepam (ATIVAN) 0.5 MG tablet Take 1 tablet (0.5 mg total) by mouth nightly as needed  for Anxiety. Use only infrequently, can be addictive 30 tablet 0    magnesium 30 mg Tab Take by mouth once.      paroxetine (PAXIL) 40 MG tablet Take 1 tablet (40 mg total) by mouth once daily. 90 tablet 3    progesterone (PROMETRIUM) 100 MG capsule Take 1 capsule (100 mg total) by mouth nightly. 30 capsule 11    triamcinolone acetonide 0.1% (KENALOG) 0.1 % cream AAA bid 454 g 3     No current facility-administered medications for this visit.     Allergies: Buspar [buspirone] and Mobic [meloxicam]     The 10-year ASCVD risk score (Kyle CARRASCO, et al., 2019) is: 1.2%    Values used to calculate the score:      Age: 53 years      Sex: Female      Is Non- : No      Diabetic: No      Tobacco smoker: No      Systolic Blood Pressure: 126 mmHg      Is BP treated: No      HDL Cholesterol: 89 mg/dL      Total Cholesterol: 236 mg/dL      ROS:  Constitutional: no weight loss, weight gain, fever, fatigue  Eyes:  No vision changes, glasses/contacts  ENT/Mouth: No ulcers, sinus problems, ears ringing, headache  Cardiovascular: No inability to lie flat, chest pain, exercise intolerance, swelling, heart palpitations  Respiratory: No wheezing, coughing blood, shortness of breath, or cough  Gastrointestinal: No diarrhea, bloody stool, nausea/vomiting, constipation, gas, hemorrhoids  Genitourinary: No blood in urine, painful urination, urgency of urination, frequency of urination, incomplete emptying, incontinence, abnormal bleeding, painful periods, heavy periods, vaginal discharge, vaginal odor, painful intercourse, sexual problems, bleeding after intercourse.  Musculoskeletal: No muscle weakness  Skin/Breast: No painful breasts, nipple discharge, masses, rash, ulcers  Neurological: No passing out, seizures, numbness, headache  Endocrine: No diabetes, hypothyroid, hyperthyroid, +hot flashes, hair loss, abnormal hair growth, acne  Psychiatric: No depression,+ crying, +anxiety  Hematologic: No bruises, bleeding,  swollen lymph nodes, anemia.      Physical Exam:   Constitutional: She is oriented to person, place, and time. She appears well-developed and well-nourished.      Neck: No tracheal deviation present. No thyromegaly present.    Cardiovascular:       Exam reveals no edema.        Pulmonary/Chest: Effort normal. She exhibits no mass, no tenderness, no deformity and no retraction. Right breast exhibits no inverted nipple, no mass, no nipple discharge, no skin change, no tenderness, presence, no bleeding and no swelling. Left breast exhibits no inverted nipple, no mass, no nipple discharge, no skin change, no tenderness, presence, no bleeding and no swelling. Breasts are symmetrical.        Abdominal: Soft. She exhibits no distension and no mass. There is no abdominal tenderness. There is no rebound and no guarding. No hernia. Hernia confirmed negative in the left inguinal area.     Genitourinary:    Vagina and uterus normal.   Rectum:      No external hemorrhoid.   There is no rash, tenderness or lesion on the right labia. There is no rash, tenderness or lesion on the left labia. Cervix is normal. No no adexnal prolapse. Right adnexum displays no mass, no tenderness and no fullness. Left adnexum displays no mass, no tenderness and no fullness. No  no vaginal discharge, tenderness, bleeding, rectocele, cystocele or unspecified prolapse of vaginal walls in the vagina. Cervix exhibits no motion tenderness, no discharge and no friability. Uterus is not deviated.           Musculoskeletal: Normal range of motion and moves all extremeties. No edema.       Neurological: She is alert and oriented to person, place, and time.    Skin: No rash noted. No erythema. No pallor.    Psychiatric: She has a normal mood and affect. Her behavior is normal. Judgment and thought content normal.         ASSESSMENT:   1. Menopausal symptom        2. Anxiety      PLAN:   Total time 45 minutes.  Face-to-face, review of medical record and  arranging follow-up  Counseled her on risks benefits and possible side effects of transdermal estrogen.  Discuss Vivelle-Dot versus Divigel.  She prefers to use a gel daily  Counseled her on the importance of using Prometrium nightly  Continue Synthroid  Continue other meds as prescribed  Follow up in 2-3 months for well-woman exam

## 2023-10-18 ENCOUNTER — OFFICE VISIT (OUTPATIENT)
Dept: PSYCHIATRY | Facility: CLINIC | Age: 53
End: 2023-10-18
Payer: COMMERCIAL

## 2023-10-18 DIAGNOSIS — F43.22 ADJUSTMENT DISORDER WITH ANXIETY: Primary | ICD-10-CM

## 2023-10-18 PROCEDURE — 3044F HG A1C LEVEL LT 7.0%: CPT | Mod: CPTII,S$GLB,, | Performed by: SOCIAL WORKER

## 2023-10-18 PROCEDURE — 90834 PR PSYCHOTHERAPY W/PATIENT, 45 MIN: ICD-10-PCS | Mod: S$GLB,,, | Performed by: SOCIAL WORKER

## 2023-10-18 PROCEDURE — 3044F PR MOST RECENT HEMOGLOBIN A1C LEVEL <7.0%: ICD-10-PCS | Mod: CPTII,S$GLB,, | Performed by: SOCIAL WORKER

## 2023-10-18 PROCEDURE — 90834 PSYTX W PT 45 MINUTES: CPT | Mod: S$GLB,,, | Performed by: SOCIAL WORKER

## 2023-10-18 NOTE — PROGRESS NOTES
Individual Psychotherapy (PhD/LCSW)    7/5/2023    Site: [place]    Therapeutic Intervention: Met with patient alone  Outpatient - Insight oriented psychotherapy 45 min - CPT code 82165    Chief complaint/reason for encounter: anxiety     Interval history and content of current session: Pt doing well. She saw Dr. Abdul and started hormones which she feels have moderated her anxiety. Discuss progress in court proceedings. Pt and W communicating well and relationship is smooth.     Treatment plan:  Target symptoms: anxiety   Why chosen therapy is appropriate versus another modality: relevant to diagnosis  Outcome monitoring methods: self-report  Therapeutic intervention type: insight oriented psychotherapy    Risk parameters:  Patient reports no suicidal ideation  Patient reports no homicidal ideation  Patient reports no self-injurious behavior  Patient reports no violent behavior    Verbal deficits: None    Patient's response to intervention:  The patient's response to intervention is motivated.    Progress toward goals and other mental status changes:  The patient's progress toward goals is good    Diagnosis:   Adjustment disorder with anxiety    Plan:  individual psychotherapy    Return to clinic: f/u as scheduled    Length of Service (minutes): 45

## 2023-10-23 ENCOUNTER — PATIENT MESSAGE (OUTPATIENT)
Dept: OBSTETRICS AND GYNECOLOGY | Facility: CLINIC | Age: 53
End: 2023-10-23
Payer: COMMERCIAL

## 2023-10-25 ENCOUNTER — OFFICE VISIT (OUTPATIENT)
Dept: PSYCHIATRY | Facility: CLINIC | Age: 53
End: 2023-10-25
Payer: COMMERCIAL

## 2023-10-25 ENCOUNTER — OFFICE VISIT (OUTPATIENT)
Dept: URGENT CARE | Facility: CLINIC | Age: 53
End: 2023-10-25
Payer: COMMERCIAL

## 2023-10-25 VITALS
HEIGHT: 64 IN | BODY MASS INDEX: 25.45 KG/M2 | OXYGEN SATURATION: 98 % | WEIGHT: 149.06 LBS | RESPIRATION RATE: 16 BRPM | DIASTOLIC BLOOD PRESSURE: 88 MMHG | HEART RATE: 84 BPM | SYSTOLIC BLOOD PRESSURE: 124 MMHG | TEMPERATURE: 99 F

## 2023-10-25 DIAGNOSIS — N39.0 URINARY TRACT INFECTION WITH HEMATURIA, SITE UNSPECIFIED: Primary | ICD-10-CM

## 2023-10-25 DIAGNOSIS — R31.9 URINARY TRACT INFECTION WITH HEMATURIA, SITE UNSPECIFIED: Primary | ICD-10-CM

## 2023-10-25 DIAGNOSIS — F43.22 ADJUSTMENT DISORDER WITH ANXIETY: Primary | ICD-10-CM

## 2023-10-25 LAB
BILIRUB UR QL STRIP: NEGATIVE
GLUCOSE UR QL STRIP: NEGATIVE
KETONES UR QL STRIP: NEGATIVE
LEUKOCYTE ESTERASE UR QL STRIP: POSITIVE
PH, POC UA: 5 (ref 5–8)
POC BLOOD, URINE: POSITIVE
POC NITRATES, URINE: NEGATIVE
PROT UR QL STRIP: NEGATIVE
SP GR UR STRIP: 1.01 (ref 1–1.03)
UROBILINOGEN UR STRIP-ACNC: ABNORMAL (ref 0.1–1.1)

## 2023-10-25 PROCEDURE — 81003 URINALYSIS AUTO W/O SCOPE: CPT | Mod: QW,S$GLB,, | Performed by: FAMILY MEDICINE

## 2023-10-25 PROCEDURE — 99213 OFFICE O/P EST LOW 20 MIN: CPT | Mod: S$GLB,,, | Performed by: FAMILY MEDICINE

## 2023-10-25 PROCEDURE — 90834 PR PSYCHOTHERAPY W/PATIENT, 45 MIN: ICD-10-PCS | Mod: S$GLB,,, | Performed by: SOCIAL WORKER

## 2023-10-25 PROCEDURE — 3044F PR MOST RECENT HEMOGLOBIN A1C LEVEL <7.0%: ICD-10-PCS | Mod: CPTII,S$GLB,, | Performed by: SOCIAL WORKER

## 2023-10-25 PROCEDURE — 81003 POCT URINALYSIS, DIPSTICK, AUTOMATED, W/O SCOPE: ICD-10-PCS | Mod: QW,S$GLB,, | Performed by: FAMILY MEDICINE

## 2023-10-25 PROCEDURE — 3044F HG A1C LEVEL LT 7.0%: CPT | Mod: CPTII,S$GLB,, | Performed by: SOCIAL WORKER

## 2023-10-25 PROCEDURE — 99213 PR OFFICE/OUTPT VISIT, EST, LEVL III, 20-29 MIN: ICD-10-PCS | Mod: S$GLB,,, | Performed by: FAMILY MEDICINE

## 2023-10-25 PROCEDURE — 90834 PSYTX W PT 45 MINUTES: CPT | Mod: S$GLB,,, | Performed by: SOCIAL WORKER

## 2023-10-25 RX ORDER — CIPROFLOXACIN 500 MG/1
500 TABLET ORAL 2 TIMES DAILY
Qty: 14 TABLET | Refills: 0 | Status: SHIPPED | OUTPATIENT
Start: 2023-10-25 | End: 2023-11-01

## 2023-10-25 RX ORDER — PHENAZOPYRIDINE HYDROCHLORIDE 100 MG/1
100 TABLET, FILM COATED ORAL 3 TIMES DAILY PRN
Qty: 9 TABLET | Refills: 0 | Status: SHIPPED | OUTPATIENT
Start: 2023-10-25 | End: 2023-11-04

## 2023-10-25 NOTE — PROGRESS NOTES
"    Individual Psychotherapy (PhD/LCSW)    7/5/2023    Site: [place]    Therapeutic Intervention: Met with patient alone  Outpatient - Insight oriented psychotherapy 45 min - CPT code 77655    Chief complaint/reason for encounter: anxiety     Interval history and content of current session: Pt tearful, broke up with W, feeling he is not doing his therapy work and not standing up for her with his sons. W feels "micro-managed" in the relationship. Pt now triggered, feeling abandoned, feeling like a "bad person, mean". Point out that the conflict with W has triggered feelings from her marriage when she felt criticized and belittled by M and her daughters, and from her [place] where she was also ridiculed for speaking out. Offer reassurance and support, encourage pt to reach out to friends for support. Pt panicking about being alone again, remind pt she managed this in recent past.     Treatment plan:  Target symptoms: anxiety   Why chosen therapy is appropriate versus another modality: relevant to diagnosis  Outcome monitoring methods: self-report  Therapeutic intervention type: insight oriented psychotherapy    Risk parameters:  Patient reports no suicidal ideation  Patient reports no homicidal ideation  Patient reports no self-injurious behavior  Patient reports no violent behavior    Verbal deficits: None    Patient's response to intervention:  The patient's response to intervention is tearful, anxious    Progress toward goals and other mental status changes:  The patient's progress toward goals is limited    Diagnosis:   Adjustment disorder with anxiety    Plan:  individual psychotherapy    Return to clinic: f/u as scheduled    Length of Service (minutes): 45                                "

## 2023-10-25 NOTE — PROGRESS NOTES
"Subjective:      Patient ID: Becky Cade is a 53 y.o. female.    Vitals:  height is 5' 4" (1.626 m) and weight is 67.6 kg (149 lb 0.5 oz). Her oral temperature is 98.5 °F (36.9 °C). Her blood pressure is 124/88 and her pulse is 84. Her respiration is 16 and oxygen saturation is 98%.     Chief Complaint: Dysuria    Pt presents painful urination and frequency. Sx began today.     Dysuria   Associated symptoms include frequency. Pertinent negatives include no discharge. She has tried nothing for the symptoms. The treatment provided no relief. Her past medical history is significant for a urological procedure (kidney lift summer 2022).       Genitourinary:  Positive for dysuria and frequency.      Objective:     Physical Exam   Constitutional: She does not appear ill. No distress. normal  HENT:   Head: Normocephalic and atraumatic.   Cardiovascular: Normal rate, regular rhythm, normal heart sounds and normal pulses.   Pulmonary/Chest: Effort normal and breath sounds normal.   Abdominal: Normal appearance and bowel sounds are normal. There is no left CVA tenderness and no right CVA tenderness.   Neurological: She is alert.   Nursing note and vitals reviewed.    Results for orders placed or performed in visit on 10/25/23   POCT Urinalysis, Dipstick, Automated, W/O Scope   Result Value Ref Range    POC Blood, Urine Positive (A) Negative    POC Bilirubin, Urine Negative Negative    POC Urobilinogen, Urine norm 0.1 - 1.1    POC Ketones, Urine Negative Negative    POC Protein, Urine Negative Negative    POC Nitrates, Urine Negative Negative    POC Glucose, Urine Negative Negative    pH, UA 5.0 5 - 8    POC Specific Gravity, Urine 1.015 1.003 - 1.029    POC Leukocytes, Urine Positive (A) Negative      Assessment:     1. Urinary tract infection with hematuria, site unspecified        Plan:       Urinary tract infection with hematuria, site unspecified  -     POCT Urinalysis, Dipstick, Automated, W/O Scope  -     " ciprofloxacin HCl (CIPRO) 500 MG tablet; Take 1 tablet (500 mg total) by mouth 2 (two) times daily. for 7 days  Dispense: 14 tablet; Refill: 0  -     phenazopyridine (PYRIDIUM) 100 MG tablet; Take 1 tablet (100 mg total) by mouth 3 (three) times daily as needed for Pain.  Dispense: 9 tablet; Refill: 0    Increased fluids. RTC prn worsening symptoms

## 2023-10-27 ENCOUNTER — PATIENT MESSAGE (OUTPATIENT)
Dept: OBSTETRICS AND GYNECOLOGY | Facility: CLINIC | Age: 53
End: 2023-10-27
Payer: COMMERCIAL

## 2023-10-30 NOTE — PROGRESS NOTES
Individual Psychotherapy (PhD/LCSW)    2/2/2022    Site:  [place]    Therapeutic Intervention: Met with patient.  Outpatient - Insight oriented psychotherapy 45 min - CPT code 46544    Chief complaint/reason for encounter: anxiety     Interval history and content of current session: Pt doing well. She invited Linda to dinner but received an angry text in return. She received a warm loving text from Niya and was deeply touched and thrilled, but not surprised, feels this is consistent with her prior relationship with Niya. Pt working on resume and is moving forward with effort to get better paying job.  She is seeing friends and family. Discuss loneliness on Sunday afternoons and options for managing this.     Treatment plan:  · Target symptoms: anxiety   · Why chosen therapy is appropriate versus another modality: relevant to diagnosis  · Outcome monitoring methods: self-report  · Therapeutic intervention type: insight oriented psychotherapy    Risk parameters:  Patient reports no suicidal ideation  Patient reports no homicidal ideation  Patient reports no self-injurious behavior  Patient reports no violent behavior    Verbal deficits: None    Patient's response to intervention:  The patient's response to intervention is motivated.    Progress toward goals and other mental status changes:  The patient's progress toward goals is good    Diagnosis:   Adjustment disorder with anxiety    Plan:  individual psychotherapy    Return to clinic: pt will schedule further appointments    Length of Service (minutes): 45        
Detail Level: Detailed
Quality 431: Preventive Care And Screening: Unhealthy Alcohol Use - Screening: Patient identified as an unhealthy alcohol user when screened for unhealthy alcohol use using a systematic screening method and received brief counseling

## 2023-11-01 ENCOUNTER — OFFICE VISIT (OUTPATIENT)
Dept: PSYCHIATRY | Facility: CLINIC | Age: 53
End: 2023-11-01
Payer: COMMERCIAL

## 2023-11-01 DIAGNOSIS — F43.22 ADJUSTMENT DISORDER WITH ANXIETY: Primary | ICD-10-CM

## 2023-11-01 PROCEDURE — 3044F HG A1C LEVEL LT 7.0%: CPT | Mod: CPTII,S$GLB,, | Performed by: SOCIAL WORKER

## 2023-11-01 PROCEDURE — 99999 PR PBB SHADOW E&M-EST. PATIENT-LVL I: CPT | Mod: PBBFAC,,, | Performed by: SOCIAL WORKER

## 2023-11-01 PROCEDURE — 99999 PR PBB SHADOW E&M-EST. PATIENT-LVL I: ICD-10-PCS | Mod: PBBFAC,,, | Performed by: SOCIAL WORKER

## 2023-11-01 PROCEDURE — 90834 PR PSYCHOTHERAPY W/PATIENT, 45 MIN: ICD-10-PCS | Mod: S$GLB,,, | Performed by: SOCIAL WORKER

## 2023-11-01 PROCEDURE — 90834 PSYTX W PT 45 MINUTES: CPT | Mod: S$GLB,,, | Performed by: SOCIAL WORKER

## 2023-11-01 PROCEDURE — 3044F PR MOST RECENT HEMOGLOBIN A1C LEVEL <7.0%: ICD-10-PCS | Mod: CPTII,S$GLB,, | Performed by: SOCIAL WORKER

## 2023-11-01 NOTE — PROGRESS NOTES
Individual Psychotherapy (PhD/LCSW)    7/5/2023    Site: [place]    Therapeutic Intervention: Met with patient alone  Outpatient - Insight oriented psychotherapy 45 min - CPT code 32507    Chief complaint/reason for encounter: anxiety     Interval history and content of current session: Pt doing well, surprised at her new strength, unexpectedly cross-examined by Elbert in court last week, was able to stay calm and assertive and see how others reacted to him. This is major step forward for pt in affirming her personal growth. W reached out and wants to continue the relationship despite kids' therapist saying she did not think the boys could ulises accept her. Pt aware W says what someone else wants to hear, and that there is likely no real change in his thinking or behavior. He has invited her to see Dr. Villasenor with him next week.     Treatment plan:  Target symptoms: anxiety   Why chosen therapy is appropriate versus another modality: relevant to diagnosis  Outcome monitoring methods: self-report  Therapeutic intervention type: insight oriented psychotherapy    Risk parameters:  Patient reports no suicidal ideation  Patient reports no homicidal ideation  Patient reports no self-injurious behavior  Patient reports no violent behavior    Verbal deficits: None    Patient's response to intervention:  The patient's response to intervention is tearful, anxious    Progress toward goals and other mental status changes:  The patient's progress toward goals is good    Diagnosis:   Adjustment disorder with anxiety    Plan:  individual psychotherapy    Return to clinic: f/u as scheduled    Length of Service (minutes): 45

## 2023-11-03 ENCOUNTER — OFFICE VISIT (OUTPATIENT)
Dept: OBSTETRICS AND GYNECOLOGY | Facility: CLINIC | Age: 53
End: 2023-11-03
Payer: COMMERCIAL

## 2023-11-03 VITALS
WEIGHT: 144.81 LBS | BODY MASS INDEX: 24.72 KG/M2 | DIASTOLIC BLOOD PRESSURE: 78 MMHG | HEIGHT: 64 IN | SYSTOLIC BLOOD PRESSURE: 102 MMHG

## 2023-11-03 DIAGNOSIS — B37.31 YEAST VAGINITIS: ICD-10-CM

## 2023-11-03 DIAGNOSIS — Z87.440 HISTORY OF UTI: ICD-10-CM

## 2023-11-03 DIAGNOSIS — N89.8 VAGINAL ITCHING: Primary | ICD-10-CM

## 2023-11-03 PROCEDURE — 3078F DIAST BP <80 MM HG: CPT | Mod: CPTII,S$GLB,, | Performed by: FAMILY MEDICINE

## 2023-11-03 PROCEDURE — 3008F BODY MASS INDEX DOCD: CPT | Mod: CPTII,S$GLB,, | Performed by: FAMILY MEDICINE

## 2023-11-03 PROCEDURE — 1160F PR REVIEW ALL MEDS BY PRESCRIBER/CLIN PHARMACIST DOCUMENTED: ICD-10-PCS | Mod: CPTII,S$GLB,, | Performed by: FAMILY MEDICINE

## 2023-11-03 PROCEDURE — 1159F MED LIST DOCD IN RCRD: CPT | Mod: CPTII,S$GLB,, | Performed by: FAMILY MEDICINE

## 2023-11-03 PROCEDURE — 1159F PR MEDICATION LIST DOCUMENTED IN MEDICAL RECORD: ICD-10-PCS | Mod: CPTII,S$GLB,, | Performed by: FAMILY MEDICINE

## 2023-11-03 PROCEDURE — 3044F PR MOST RECENT HEMOGLOBIN A1C LEVEL <7.0%: ICD-10-PCS | Mod: CPTII,S$GLB,, | Performed by: FAMILY MEDICINE

## 2023-11-03 PROCEDURE — 1160F RVW MEDS BY RX/DR IN RCRD: CPT | Mod: CPTII,S$GLB,, | Performed by: FAMILY MEDICINE

## 2023-11-03 PROCEDURE — 99999 PR PBB SHADOW E&M-EST. PATIENT-LVL III: CPT | Mod: PBBFAC,,, | Performed by: FAMILY MEDICINE

## 2023-11-03 PROCEDURE — 3078F PR MOST RECENT DIASTOLIC BLOOD PRESSURE < 80 MM HG: ICD-10-PCS | Mod: CPTII,S$GLB,, | Performed by: FAMILY MEDICINE

## 2023-11-03 PROCEDURE — 99213 OFFICE O/P EST LOW 20 MIN: CPT | Mod: S$GLB,,, | Performed by: FAMILY MEDICINE

## 2023-11-03 PROCEDURE — 3008F PR BODY MASS INDEX (BMI) DOCUMENTED: ICD-10-PCS | Mod: CPTII,S$GLB,, | Performed by: FAMILY MEDICINE

## 2023-11-03 PROCEDURE — 99213 PR OFFICE/OUTPT VISIT, EST, LEVL III, 20-29 MIN: ICD-10-PCS | Mod: S$GLB,,, | Performed by: FAMILY MEDICINE

## 2023-11-03 PROCEDURE — 99999 PR PBB SHADOW E&M-EST. PATIENT-LVL III: ICD-10-PCS | Mod: PBBFAC,,, | Performed by: FAMILY MEDICINE

## 2023-11-03 PROCEDURE — 87086 URINE CULTURE/COLONY COUNT: CPT | Performed by: FAMILY MEDICINE

## 2023-11-03 PROCEDURE — 3074F PR MOST RECENT SYSTOLIC BLOOD PRESSURE < 130 MM HG: ICD-10-PCS | Mod: CPTII,S$GLB,, | Performed by: FAMILY MEDICINE

## 2023-11-03 PROCEDURE — 3074F SYST BP LT 130 MM HG: CPT | Mod: CPTII,S$GLB,, | Performed by: FAMILY MEDICINE

## 2023-11-03 PROCEDURE — 3044F HG A1C LEVEL LT 7.0%: CPT | Mod: CPTII,S$GLB,, | Performed by: FAMILY MEDICINE

## 2023-11-03 RX ORDER — FLUCONAZOLE 150 MG/1
150 TABLET ORAL
Qty: 2 TABLET | Refills: 0 | Status: SHIPPED | OUTPATIENT
Start: 2023-11-03

## 2023-11-03 NOTE — PROGRESS NOTES
CC: Vaginitis  HPI: Patient presents for evaluation of an abnormal vaginal discharge. Symptoms have been present for 1 day. Vaginal symptoms: local irritation and vulvar itching. Contraception: post menopausal status. She denies discharge and odor. Sexually transmitted infection risk: very low risk of STD exposure. SA male partner, does not use condoms. UTI last week, she did cipro. Just finished two days ago. Dysuria and hematuria resolved, frequency better. No fever. Used vaginsil cream last night. This is the extent of the patient's complaints at this time.     ROS:  GENERAL: No fever, chills, fatigability or weight loss.  VULVAR: No pain, no lesions and + itching.  VAGINAL: No relaxation, + itching, no discharge, no abnormal bleeding and no lesions.  URINARY: No incontinence, no nocturia, no frequency and no dysuria.     PHYSICAL EXAM:  Physical Exam:   Constitutional: She appears well-developed and well-nourished. She does not appear ill. No distress.               Genitourinary:    Uterus, right adnexa and left adnexa normal.      Pelvic exam was performed with patient supine.   The external female genitalia was normal.   Labial bartholins normal.There is rash on the right labia. There is no tenderness or lesion on the right labia. There is rash on the left labia. There is no tenderness or lesion on the left labia. Cervix is normal. Right adnexum displays no mass, no tenderness and no fullness. Left adnexum displays no mass, no tenderness and no fullness. There is vaginal discharge (very scant white) in the vagina. No tenderness, bleeding, rectocele, cystocele or unspecified prolapse of vaginal walls in the vagina.    No foreign body in the vagina.   Vaginal atrophy noted. Cervix exhibits no motion tenderness, no lesion, no discharge, no friability, no lesion and no polyp. Normal urethral meatus.Urethra findings: no urethral mass   Genitourinary Comments: Mild erythema labia minor bilaterally                  Neurological: She is alert. GCS eye subscore is 4. GCS verbal subscore is 5. GCS motor subscore is 6.           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       Past Surgical History:   Procedure Laterality Date    COLD KNIFE CONIZATION OF CERVIX  2009    [facility]    COLONOSCOPY N/A 9/18/2020    Procedure: COLONOSCOPY;  Surgeon: Thaddeus Blackburn MD;  Location: [facility] ([address] [place] [address]);  Service: Endoscopy;  Laterality: N/A;  family history malignant hyperthermia  covid test [facility] urgent care 9/15    CYSTOSCOPY N/A 3/25/2022    Procedure: CYSTOSCOPY;  Surgeon: Brian Babcock MD;  Location: [facility] OR [address] [place] [address];  Service: OB/GYN;  Laterality: N/A;    INSERTION OF MIDURETHRAL SLING N/A 3/25/2022    Procedure: SLING, MIDURETHRAL;  Surgeon: Brian Babcock MD;  Location: [facility] OR [address] [place] [address];  Service: OB/GYN;  Laterality: N/A;    TONSILLECTOMY  1993       Family History   Problem Relation Age of Onset    Parkinsonism Father     Rheum arthritis Mother     Arthritis Mother     Malignant hyperthermia Brother     Breast cancer Neg Hx     Colon cancer Neg Hx     Ovarian cancer Neg Hx     Melanoma Neg Hx     Cancer Neg Hx     Heart disease Neg Hx     Cervical cancer Neg Hx     Uterine cancer Neg Hx        Social History     Socioeconomic History    Marital status:    Tobacco Use    Smoking status: Never    Smokeless tobacco: Never   Substance and Sexual Activity    Alcohol use: Yes     Alcohol/week: 2.0 standard drinks of alcohol     Types: 2 Glasses of wine per week     Comment: socially on weekends    Drug use: No    Sexual activity: Yes     Partners: Male     Birth control/protection: Other-see comments     Comment: [employer]pri Birth Control Pills       Current Outpatient Medications    Medication Sig Dispense Refill    acyclovir (ZOVIRAX) 400 MG tablet Take 1 tablet (400 mg total) by mouth 2 (two) times daily. Fever blisters 14 tablet 0    calcium-vitamin D3 (OS-KRISTY 500 + D3) 500 mg-5 mcg (200 unit) per tablet Take 1 tablet by mouth 2 (two) times daily with meals.      cyanocobalamin (VITAMIN B-12) 1000 MCG tablet Take 1,000 mcg by mouth once daily.      esomeprazole (NEXIUM) 20 MG capsule Take 20 mg by mouth as needed. Takes 2-3 a year      estradioL (DIVIGEL) 0.25 mg/0.25 gram (0.1 %) GlPk Place 1 packet onto the skin once daily. 30 packet 11    estradioL (ESTRACE) 0.01 % (0.1 mg/gram) vaginal cream Use 0.5 gram of estrogen cream in vagina nightly x 1 weeks, then two nights a week thereafter. 42.5 g 3    hydrOXYzine HCL (ATARAX) 10 MG Tab Take 2 tablets (20 mg total) by mouth every evening. 180 tablet 1    levothyroxine (SYNTHROID) 75 MCG tablet Take 1 tablet (75 mcg total) by mouth before breakfast. 90 tablet 3    LORazepam (ATIVAN) 0.5 MG tablet Take 1 tablet (0.5 mg total) by mouth nightly as needed for Anxiety. Use only infrequently, can be addictive 30 tablet 0    magnesium 30 mg Tab Take by mouth once.      paroxetine (PAXIL) 40 MG tablet Take 1 tablet (40 mg total) by mouth once daily. 90 tablet 3    phenazopyridine (PYRIDIUM) 100 MG tablet Take 1 tablet (100 mg total) by mouth 3 (three) times daily as needed for Pain. 9 tablet 0    progesterone (PROMETRIUM) 100 MG capsule Take 1 capsule (100 mg total) by mouth nightly. 30 capsule 11    triamcinolone acetonide 0.1% (KENALOG) 0.1 % cream AAA bid 454 g 3    busPIRone (BUSPAR) 5 MG Tab Take 1 tablet (5 mg total) by mouth 2 (two) times daily. (Patient not taking: Reported on 11/3/2023) 60 tablet 0    fluconazole (DIFLUCAN) 150 MG Tab Take 1 tablet (150 mg total) by mouth every 72 hours as needed (vaginal itching/discharge). 2 tablet 0     No current facility-administered medications for this visit.       Review of patient's allergies  indicates:   Allergen Reactions    Buspar [buspirone] Other (See Comments)     dizziness    Mobic [meloxicam] Rash          OB History    Para Term  AB Living   2 2 0     2   SAB IAB Ectopic Multiple Live Births           2      # Outcome Date GA Lbr Alexis/2nd Weight Sex Delivery Anes PTL Lv   2 Para      Vag-Spont      1 Para      Vag-Spont             Assessment/Plan:    Vaginal itching    History of UTI  -     CULTURE, URINE    Yeast vaginitis  -     fluconazole (DIFLUCAN) 150 MG Tab; Take 1 tablet (150 mg total) by mouth every 72 hours as needed (vaginal itching/discharge).  Dispense: 2 tablet; Refill: 0    Discussed unsure of insurance coverage with vaginosis swab. Pt would like to defer      Patient was counseled today on vaginitis prevention including :  a. avoiding feminine products such as deoderant soaps, body wash, bubble bath, douches, scented toilet paper, deoderant tampons or pads, feminine wipes, chronic pad use, etc.  b. avoiding other vulvovaginal irritants such as long hot baths, humidity, tight, synthetic clothing, chlorine and sitting around in wet bathing suits  c. wearing cotton underwear, avoiding thong underwear and no underwear to bed  d. taking showers instead of baths and use a hair dryer on cool setting afterwards to dry  e. wearing cotton to exercise and shower immediately after exercise and change clothes  f. using polyurethane condoms without spermicide if sexually active and symptoms are triggered by intercourse     FOLLOW UP: PRN/lack of improvement.

## 2023-11-04 RX ORDER — PROGESTERONE 100 MG/1
100 CAPSULE ORAL NIGHTLY
Qty: 30 CAPSULE | Refills: 11 | Status: SHIPPED | OUTPATIENT
Start: 2023-11-04 | End: 2023-12-04 | Stop reason: SDUPTHER

## 2023-11-05 LAB — BACTERIA UR CULT: NORMAL

## 2023-11-13 ENCOUNTER — OFFICE VISIT (OUTPATIENT)
Dept: PSYCHIATRY | Facility: CLINIC | Age: 53
End: 2023-11-13
Payer: COMMERCIAL

## 2023-11-13 DIAGNOSIS — F43.22 ADJUSTMENT DISORDER WITH ANXIETY: Primary | ICD-10-CM

## 2023-11-13 PROCEDURE — 90834 PR PSYCHOTHERAPY W/PATIENT, 45 MIN: ICD-10-PCS | Mod: S$GLB,,, | Performed by: SOCIAL WORKER

## 2023-11-13 PROCEDURE — 3044F HG A1C LEVEL LT 7.0%: CPT | Mod: CPTII,S$GLB,, | Performed by: SOCIAL WORKER

## 2023-11-13 PROCEDURE — 3044F PR MOST RECENT HEMOGLOBIN A1C LEVEL <7.0%: ICD-10-PCS | Mod: CPTII,S$GLB,, | Performed by: SOCIAL WORKER

## 2023-11-13 PROCEDURE — 90834 PSYTX W PT 45 MINUTES: CPT | Mod: S$GLB,,, | Performed by: SOCIAL WORKER

## 2023-11-13 NOTE — PROGRESS NOTES
"    Individual Psychotherapy (PhD/LCSW)    7/5/2023    Site: [place]    Therapeutic Intervention: Met with patient alone  Outpatient - Insight oriented psychotherapy 45 min - CPT code 16069    Chief complaint/reason for encounter: anxiety     Interval history and content of current session: Pt requests extra appointment due to death of friend and to process meeting with W's therapist. Long-time friend had lifelong chronic mental health issues but had been in touch recently, pt grieving. Pt reports that Dr. Villasenor told her she is the "monster" to W's children and that W must devote himself to their needs for the next 5 years. He also told her that W has such a severe anxiety that currently he can only deal with adjusting meds and can not work on coping skills in therapy. Pt reports she was shedding silent tears and Dr. GRIJALVA said "can I have some of that sadness?", seeming to mock her. W has asked for 2 week break in the relationship. Pt feels she will need to end the relationship, feels sad, scared. Pt tearful in session but getting great support from friends and family. We will focus on coping skills on Wednesday..     Treatment plan:  Target symptoms: anxiety   Why chosen therapy is appropriate versus another modality: relevant to diagnosis  Outcome monitoring methods: self-report  Therapeutic intervention type: insight oriented psychotherapy    Risk parameters:  Patient reports no suicidal ideation  Patient reports no homicidal ideation  Patient reports no self-injurious behavior  Patient reports no violent behavior    Verbal deficits: None    Patient's response to intervention:  The patient's response to intervention is tearful, anxious    Progress toward goals and other mental status changes:  The patient's progress toward goals is good    Diagnosis:   Adjustment disorder with anxiety    Plan:  individual psychotherapy    Return to clinic: f/u as scheduled    Length of Service (minutes): " 45

## 2023-11-15 ENCOUNTER — OFFICE VISIT (OUTPATIENT)
Dept: PSYCHIATRY | Facility: CLINIC | Age: 53
End: 2023-11-15
Payer: COMMERCIAL

## 2023-11-15 DIAGNOSIS — F43.22 ADJUSTMENT DISORDER WITH ANXIETY: Primary | ICD-10-CM

## 2023-11-15 PROCEDURE — 90834 PR PSYCHOTHERAPY W/PATIENT, 45 MIN: ICD-10-PCS | Mod: S$GLB,,, | Performed by: SOCIAL WORKER

## 2023-11-15 PROCEDURE — 3044F PR MOST RECENT HEMOGLOBIN A1C LEVEL <7.0%: ICD-10-PCS | Mod: CPTII,S$GLB,, | Performed by: SOCIAL WORKER

## 2023-11-15 PROCEDURE — 99999 PR PBB SHADOW E&M-EST. PATIENT-LVL I: ICD-10-PCS | Mod: PBBFAC,,, | Performed by: SOCIAL WORKER

## 2023-11-15 PROCEDURE — 90834 PSYTX W PT 45 MINUTES: CPT | Mod: S$GLB,,, | Performed by: SOCIAL WORKER

## 2023-11-15 PROCEDURE — 99999 PR PBB SHADOW E&M-EST. PATIENT-LVL I: CPT | Mod: PBBFAC,,, | Performed by: SOCIAL WORKER

## 2023-11-15 PROCEDURE — 3044F HG A1C LEVEL LT 7.0%: CPT | Mod: CPTII,S$GLB,, | Performed by: SOCIAL WORKER

## 2023-11-15 NOTE — PROGRESS NOTES
Individual Psychotherapy (PhD/LCSW)    7/5/2023    Site: [place]    Therapeutic Intervention: Met with patient alone  Outpatient - Insight oriented psychotherapy 45 min - CPT code 35325    Chief complaint/reason for encounter: anxiety     Interval history and content of current session: Pt struggling with conflict between wanting to keep her relationship with W and difficulty accepting that she can not be part of his sons' lives. Discuss parallels to loss of her own children. Discuss W's anxiety and her questions about the Tx he is receiving. Pt will plan to bring him in at next session since he has asked for my feedback about his Tx. Pt understands I do not know his therapist and am not in a position to  but can discuss options for trauma Tx.     Treatment plan:  Target symptoms: anxiety   Why chosen therapy is appropriate versus another modality: relevant to diagnosis  Outcome monitoring methods: self-report  Therapeutic intervention type: insight oriented psychotherapy    Risk parameters:  Patient reports no suicidal ideation  Patient reports no homicidal ideation  Patient reports no self-injurious behavior  Patient reports no violent behavior    Verbal deficits: None    Patient's response to intervention:  The patient's response to intervention is tearful, anxious    Progress toward goals and other mental status changes:  The patient's progress toward goals is good    Diagnosis:   Adjustment disorder with anxiety    Plan:  individual psychotherapy    Return to clinic: f/u as scheduled    Length of Service (minutes): 45

## 2023-11-21 DIAGNOSIS — F41.1 GAD (GENERALIZED ANXIETY DISORDER): ICD-10-CM

## 2023-11-21 DIAGNOSIS — Z79.899 LONG-TERM CURRENT USE OF BENZODIAZEPINE: ICD-10-CM

## 2023-11-22 RX ORDER — LORAZEPAM 0.5 MG/1
0.5 TABLET ORAL NIGHTLY PRN
Qty: 30 TABLET | Refills: 0 | Status: SHIPPED | OUTPATIENT
Start: 2023-11-22 | End: 2024-02-13 | Stop reason: SDUPTHER

## 2023-11-22 NOTE — TELEPHONE ENCOUNTER
Care Due:                  Date            Visit Type   Department     Provider  --------------------------------------------------------------------------------                                ESTABLISHED                              PATIENT -    [facility]  Last Visit: 09-      [facility]      Care           Desiree Stephens  Next Visit: None Scheduled  None         None Found                                                            Last  Test          Frequency    Reason                     Performed    Due Date  --------------------------------------------------------------------------------    CBC.........  12 months..  acyclovir................  02- 02-    Cr..........  12 months..  acyclovir................  02- 02-    [facility] Embedded Care Due Messages. Reference number: [id number].   11/21/2023 10:46:49 PM CST

## 2023-11-29 ENCOUNTER — OFFICE VISIT (OUTPATIENT)
Dept: PSYCHIATRY | Facility: CLINIC | Age: 53
End: 2023-11-29
Payer: COMMERCIAL

## 2023-11-29 DIAGNOSIS — F41.9 ANXIETY: Primary | ICD-10-CM

## 2023-11-29 PROCEDURE — 90834 PSYTX W PT 45 MINUTES: CPT | Mod: S$GLB,,, | Performed by: SOCIAL WORKER

## 2023-11-29 PROCEDURE — 99999 PR PBB SHADOW E&M-EST. PATIENT-LVL I: ICD-10-PCS | Mod: PBBFAC,,, | Performed by: SOCIAL WORKER

## 2023-11-29 PROCEDURE — 99999 PR PBB SHADOW E&M-EST. PATIENT-LVL I: CPT | Mod: PBBFAC,,, | Performed by: SOCIAL WORKER

## 2023-11-29 PROCEDURE — 3044F HG A1C LEVEL LT 7.0%: CPT | Mod: CPTII,S$GLB,, | Performed by: SOCIAL WORKER

## 2023-11-29 PROCEDURE — 90834 PR PSYCHOTHERAPY W/PATIENT, 45 MIN: ICD-10-PCS | Mod: S$GLB,,, | Performed by: SOCIAL WORKER

## 2023-11-29 PROCEDURE — 3044F PR MOST RECENT HEMOGLOBIN A1C LEVEL <7.0%: ICD-10-PCS | Mod: CPTII,S$GLB,, | Performed by: SOCIAL WORKER

## 2023-11-29 NOTE — PROGRESS NOTES
"    Individual Psychotherapy (PhD/LCSW)    7/5/2023    Site: [place]    Therapeutic Intervention: Met with patient alone  Outpatient - Insight oriented psychotherapy 45 min - CPT code 49102    Chief complaint/reason for encounter: anxiety     Interval history and content of current session: W told pt his psychiatrist said she has borderline personality disorder. Pt has been experiencing very high level of anxiety, spent the night at mom's at one point and reached out to friends and family to manage feelings of despair and loneliness. Pt aware of multiple triggers contributing to the panic...loss of contact with her daughters, being told by W's therapist that he can't be available to her for at least 5 years, ways he spoke to her that mirrored Elbert's shaming/blaming. Pt briefly felt suicidal, no plan or intent, and used coping skills to calm herself, being with her dog, setting up [place] lights, being with other people. She and W are now talking and he has asked for referral to another psychiatrist and therapist, is weaning off meds he admits have made him numb to his feelings. Help pt focus on shifting from catastrophic "I can't handle this" thinking to reminding herself she can get through pain, and using her coping skills. Pt does see ways she is much stronger than when she started Tx.     Treatment plan:  Target symptoms: anxiety   Why chosen therapy is appropriate versus another modality: relevant to diagnosis  Outcome monitoring methods: self-report  Therapeutic intervention type: insight oriented psychotherapy    Risk parameters:  Patient reports no suicidal ideation  Patient reports no homicidal ideation  Patient reports no self-injurious behavior  Patient reports no violent behavior    Verbal deficits: None    Patient's response to intervention:  The patient's response to intervention is tearful, anxious    Progress toward goals and other mental status changes:  The patient's progress toward goals is " fair    Diagnosis:   Anxiety    Plan:  individual psychotherapy    Return to clinic: f/u as scheduled    Length of Service (minutes): 45

## 2023-12-06 ENCOUNTER — OFFICE VISIT (OUTPATIENT)
Dept: PSYCHIATRY | Facility: CLINIC | Age: 53
End: 2023-12-06
Payer: COMMERCIAL

## 2023-12-06 DIAGNOSIS — F41.9 ANXIETY: Primary | ICD-10-CM

## 2023-12-06 PROCEDURE — 90834 PR PSYCHOTHERAPY W/PATIENT, 45 MIN: ICD-10-PCS | Mod: S$GLB,,, | Performed by: SOCIAL WORKER

## 2023-12-06 PROCEDURE — 90834 PSYTX W PT 45 MINUTES: CPT | Mod: S$GLB,,, | Performed by: SOCIAL WORKER

## 2023-12-06 PROCEDURE — 3044F PR MOST RECENT HEMOGLOBIN A1C LEVEL <7.0%: ICD-10-PCS | Mod: CPTII,S$GLB,, | Performed by: SOCIAL WORKER

## 2023-12-06 PROCEDURE — 3044F HG A1C LEVEL LT 7.0%: CPT | Mod: CPTII,S$GLB,, | Performed by: SOCIAL WORKER

## 2023-12-06 RX ORDER — PROGESTERONE 100 MG/1
100 CAPSULE ORAL NIGHTLY
Qty: 30 CAPSULE | Refills: 1 | Status: SHIPPED | OUTPATIENT
Start: 2023-12-06

## 2023-12-06 NOTE — PROGRESS NOTES
Individual Psychotherapy (PhD/LCSW)    7/5/2023    Site: [place]    Therapeutic Intervention: Met with patient alone  Outpatient - Insight oriented psychotherapy 45 min - CPT code 42967    Chief complaint/reason for encounter: anxiety     Interval history and content of current session: Talk with pt about how to confront W effectively without triggering him. They are generally doing well. Discuss value of the relationship vs differences. Pt deeply grieving loss of daughters, as friends' kids come home for holidays.      Treatment plan:  Target symptoms: anxiety   Why chosen therapy is appropriate versus another modality: relevant to diagnosis  Outcome monitoring methods: self-report  Therapeutic intervention type: insight oriented psychotherapy    Risk parameters:  Patient reports no suicidal ideation  Patient reports no homicidal ideation  Patient reports no self-injurious behavior  Patient reports no violent behavior    Verbal deficits: None    Patient's response to intervention:  The patient's response to intervention is tearful, anxious    Progress toward goals and other mental status changes:  The patient's progress toward goals is fair    Diagnosis:   Anxiety    Plan:  individual psychotherapy    Return to clinic: f/u as scheduled    Length of Service (minutes): 45

## 2023-12-10 ENCOUNTER — PATIENT MESSAGE (OUTPATIENT)
Dept: PSYCHIATRY | Facility: CLINIC | Age: 53
End: 2023-12-10
Payer: COMMERCIAL

## 2023-12-11 ENCOUNTER — PATIENT MESSAGE (OUTPATIENT)
Dept: ADMINISTRATIVE | Facility: HOSPITAL | Age: 53
End: 2023-12-11
Payer: COMMERCIAL

## 2023-12-13 ENCOUNTER — OFFICE VISIT (OUTPATIENT)
Dept: PSYCHIATRY | Facility: CLINIC | Age: 53
End: 2023-12-13
Payer: COMMERCIAL

## 2023-12-13 DIAGNOSIS — F41.9 ANXIETY: Primary | ICD-10-CM

## 2023-12-13 PROCEDURE — 90847 PR FAMILY PSYCHOTHERAPY W/ PT, 50 MIN: ICD-10-PCS | Mod: S$GLB,,, | Performed by: SOCIAL WORKER

## 2023-12-13 PROCEDURE — 3044F PR MOST RECENT HEMOGLOBIN A1C LEVEL <7.0%: ICD-10-PCS | Mod: CPTII,S$GLB,, | Performed by: SOCIAL WORKER

## 2023-12-13 PROCEDURE — 90847 FAMILY PSYTX W/PT 50 MIN: CPT | Mod: S$GLB,,, | Performed by: SOCIAL WORKER

## 2023-12-13 PROCEDURE — 3044F HG A1C LEVEL LT 7.0%: CPT | Mod: CPTII,S$GLB,, | Performed by: SOCIAL WORKER

## 2023-12-13 NOTE — PROGRESS NOTES
"Family Psychotherapy (PhD/LCSW)    12/13/2023    Site: [place]    Length of service: 60    Therapeutic intervention: 90387-Family therapy with patient; needed because relationship conflict     Persons present: patient and significant other     Interval history: Pt and Harry seen. He had told her he  would see a new therapist, then admitted he did not intend to but was placating her. They "broke up" last night but was willing to come in today to discuss the relationship. Discuss conflicts over her needs for contact, honesty and reassurance vs his conflict avoidance and space. Suggest couples Tx if relationship is to continue.    Target symptoms: anxiety      Patient's interpersonal/verbal exchanges: 90507-Family therapy with patient:  active listening, frequent questions, and self-disclosure    Progress toward goals: progressing slowly    Diagnosis: anxiety    Plan: individual psychotherapy    Return to clinic: as scheduled    "

## 2023-12-15 ENCOUNTER — OFFICE VISIT (OUTPATIENT)
Dept: OPTOMETRY | Facility: CLINIC | Age: 53
End: 2023-12-15
Payer: COMMERCIAL

## 2023-12-15 DIAGNOSIS — H52.4 PRESBYOPIA: ICD-10-CM

## 2023-12-15 DIAGNOSIS — Z46.0 FITTING AND ADJUSTMENT OF SPECTACLES AND CONTACT LENSES: Primary | ICD-10-CM

## 2023-12-15 DIAGNOSIS — R76.8 POSITIVE ANA (ANTINUCLEAR ANTIBODY): ICD-10-CM

## 2023-12-15 DIAGNOSIS — H52.03 HYPEROPIA, BILATERAL: ICD-10-CM

## 2023-12-15 DIAGNOSIS — H04.123 DRY EYE SYNDROME, BILATERAL: Primary | ICD-10-CM

## 2023-12-15 DIAGNOSIS — Z46.0 FITTING AND ADJUSTMENT OF SPECTACLES AND CONTACT LENSES: ICD-10-CM

## 2023-12-15 PROCEDURE — 99999 PR PBB SHADOW E&M-EST. PATIENT-LVL II: ICD-10-PCS | Mod: PBBFAC,,, | Performed by: OPTOMETRIST

## 2023-12-15 PROCEDURE — 92015 PR REFRACTION: ICD-10-PCS | Mod: S$GLB,,, | Performed by: OPTOMETRIST

## 2023-12-15 PROCEDURE — 3044F HG A1C LEVEL LT 7.0%: CPT | Mod: CPTII,S$GLB,, | Performed by: OPTOMETRIST

## 2023-12-15 PROCEDURE — 92310 PR CONTACT LENS FITTING (NO CHANGE): ICD-10-PCS | Mod: CSM,S$GLB,, | Performed by: OPTOMETRIST

## 2023-12-15 PROCEDURE — 99999 PR PBB SHADOW E&M-EST. PATIENT-LVL II: CPT | Mod: PBBFAC,,, | Performed by: OPTOMETRIST

## 2023-12-15 PROCEDURE — 1159F MED LIST DOCD IN RCRD: CPT | Mod: CPTII,S$GLB,, | Performed by: OPTOMETRIST

## 2023-12-15 PROCEDURE — 3044F PR MOST RECENT HEMOGLOBIN A1C LEVEL <7.0%: ICD-10-PCS | Mod: CPTII,S$GLB,, | Performed by: OPTOMETRIST

## 2023-12-15 PROCEDURE — 92014 PR EYE EXAM, EST PATIENT,COMPREHESV: ICD-10-PCS | Mod: S$GLB,,, | Performed by: OPTOMETRIST

## 2023-12-15 PROCEDURE — 92310 CONTACT LENS FITTING OU: CPT | Mod: CSM,S$GLB,, | Performed by: OPTOMETRIST

## 2023-12-15 PROCEDURE — 1159F PR MEDICATION LIST DOCUMENTED IN MEDICAL RECORD: ICD-10-PCS | Mod: CPTII,S$GLB,, | Performed by: OPTOMETRIST

## 2023-12-15 PROCEDURE — 92014 COMPRE OPH EXAM EST PT 1/>: CPT | Mod: S$GLB,,, | Performed by: OPTOMETRIST

## 2023-12-15 PROCEDURE — 92015 DETERMINE REFRACTIVE STATE: CPT | Mod: S$GLB,,, | Performed by: OPTOMETRIST

## 2023-12-15 NOTE — PROGRESS NOTES
HPI    KIMBERLY:9/26/2022 - Dr. Pascual    CC: Pt is here today for a routine eye exam. Pt states that vision has   been stable since last exam.    (+)Dryness  (-)Burning  (-)Itchiness  (-)Tearing  (-)Flashes  (-)Floaters   (-)Photophobia  (-)Eye Pain      Diabetic: no    Contact Lens: yes                           Sleep in CL?: no                           Daily wear time: only wear on weekends                           Days worn before discarded: 1 day                           Solution: none    Eye Meds: Systane 3x a week      Last edited by Jasmin Moore MA on 12/15/2023  8:45 AM.            Assessment /Plan     For exam results, see Encounter Report.      Positive LIBRA (antinuclear antibody)    Dry eye syndrome, bilateral              Use systane or refresh QID x 1-2 weeks then BID/PRN     Presbyopia  Hyperopia, bilateral              Rx specs                Fitting and adjustment of spectacles and contact lenses   Cl fit today: dispensed trials of oasys MAX MF OU              Remove nightly, replace daily              Ok to order if happy with vision and comfort OU                 Call/ return if problems with vision        RTC 1 year, DFE next visit

## 2023-12-19 ENCOUNTER — PATIENT MESSAGE (OUTPATIENT)
Dept: PRIMARY CARE CLINIC | Facility: CLINIC | Age: 53
End: 2023-12-19
Payer: COMMERCIAL

## 2023-12-19 DIAGNOSIS — R68.84 JAW PAIN: Primary | ICD-10-CM

## 2023-12-19 NOTE — TELEPHONE ENCOUNTER
No care due was identified.  [facility] Embedded Care Due Messages. Reference number: [id number].   12/19/2023 12:15:54 PM CST

## 2023-12-19 NOTE — TELEPHONE ENCOUNTER
Pt requesting refill on Naproxen for jaw pain and inflammation. States she thinks menopause is affecting her joints.     RX pended below

## 2023-12-20 ENCOUNTER — OFFICE VISIT (OUTPATIENT)
Dept: PSYCHIATRY | Facility: CLINIC | Age: 53
End: 2023-12-20
Payer: COMMERCIAL

## 2023-12-20 DIAGNOSIS — F41.9 ANXIETY: Primary | ICD-10-CM

## 2023-12-20 PROCEDURE — 3044F PR MOST RECENT HEMOGLOBIN A1C LEVEL <7.0%: ICD-10-PCS | Mod: CPTII,S$GLB,, | Performed by: SOCIAL WORKER

## 2023-12-20 PROCEDURE — 3044F HG A1C LEVEL LT 7.0%: CPT | Mod: CPTII,S$GLB,, | Performed by: SOCIAL WORKER

## 2023-12-20 PROCEDURE — 99999 PR PBB SHADOW E&M-EST. PATIENT-LVL I: CPT | Mod: PBBFAC,,, | Performed by: SOCIAL WORKER

## 2023-12-20 PROCEDURE — 90834 PR PSYCHOTHERAPY W/PATIENT, 45 MIN: ICD-10-PCS | Mod: S$GLB,,, | Performed by: SOCIAL WORKER

## 2023-12-20 PROCEDURE — 99999 PR PBB SHADOW E&M-EST. PATIENT-LVL I: ICD-10-PCS | Mod: PBBFAC,,, | Performed by: SOCIAL WORKER

## 2023-12-20 PROCEDURE — 90834 PSYTX W PT 45 MINUTES: CPT | Mod: S$GLB,,, | Performed by: SOCIAL WORKER

## 2023-12-20 RX ORDER — NAPROXEN SODIUM 550 MG/1
550 TABLET ORAL
Qty: 30 TABLET | Refills: 2 | Status: SHIPPED | OUTPATIENT
Start: 2023-12-20

## 2023-12-20 NOTE — PROGRESS NOTES
Individual Psychotherapy (PhD/LCSW)    7/5/2023    Site: [place]    Therapeutic Intervention: Met with patient alone  Outpatient - Insight oriented psychotherapy 45 min - CPT code 27048    Chief complaint/reason for encounter: anxiety     Interval history and content of current session: Talk with pt about joint session with W last week. She states it has been a difficult but productive week. Both are committed to working on the relationship.  Pt working on being non-reactive, listening to Dance of intimacy, suggest sharing it with W to address his conflict-avoidance. Talk with pt about how to present her needs and feelings as requests, not commands. Pt has good insight into herself and W and the ways they  trigger each other. She struggles to let go of input about his sons, deeply missing her daughters. She is enjoying friends and working on communication with W.     Treatment plan:  Target symptoms: anxiety   Why chosen therapy is appropriate versus another modality: relevant to diagnosis  Outcome monitoring methods: self-report  Therapeutic intervention type: insight oriented psychotherapy    Risk parameters:  Patient reports no suicidal ideation  Patient reports no homicidal ideation  Patient reports no self-injurious behavior  Patient reports no violent behavior    Verbal deficits: None    Patient's response to intervention:  The patient's response to intervention is tearful, anxious    Progress toward goals and other mental status changes:  The patient's progress toward goals is good    Diagnosis:   Anxiety    Plan:  individual psychotherapy    Return to clinic: f/u as scheduled    Length of Service (minutes): 45

## 2023-12-27 ENCOUNTER — OFFICE VISIT (OUTPATIENT)
Dept: PSYCHIATRY | Facility: CLINIC | Age: 53
End: 2023-12-27
Payer: COMMERCIAL

## 2023-12-27 ENCOUNTER — PATIENT MESSAGE (OUTPATIENT)
Dept: OPTOMETRY | Facility: CLINIC | Age: 53
End: 2023-12-27
Payer: COMMERCIAL

## 2023-12-27 DIAGNOSIS — F41.9 ANXIETY: Primary | ICD-10-CM

## 2023-12-27 PROCEDURE — 90834 PSYTX W PT 45 MINUTES: CPT | Mod: S$GLB,,, | Performed by: SOCIAL WORKER

## 2023-12-27 PROCEDURE — 3044F HG A1C LEVEL LT 7.0%: CPT | Mod: CPTII,S$GLB,, | Performed by: SOCIAL WORKER

## 2023-12-27 PROCEDURE — 3044F PR MOST RECENT HEMOGLOBIN A1C LEVEL <7.0%: ICD-10-PCS | Mod: CPTII,S$GLB,, | Performed by: SOCIAL WORKER

## 2023-12-27 PROCEDURE — 99999 PR PBB SHADOW E&M-EST. PATIENT-LVL I: ICD-10-PCS | Mod: PBBFAC,,, | Performed by: SOCIAL WORKER

## 2023-12-27 PROCEDURE — 99999 PR PBB SHADOW E&M-EST. PATIENT-LVL I: CPT | Mod: PBBFAC,,, | Performed by: SOCIAL WORKER

## 2023-12-27 PROCEDURE — 90834 PR PSYCHOTHERAPY W/PATIENT, 45 MIN: ICD-10-PCS | Mod: S$GLB,,, | Performed by: SOCIAL WORKER

## 2023-12-27 NOTE — PROGRESS NOTES
"    Individual Psychotherapy (PhD/LCSW)    7/5/2023    Site: [place]    Therapeutic Intervention: Met with patient alone  Outpatient - Insight oriented psychotherapy 45 min - CPT code 92208    Chief complaint/reason for encounter: anxiety     Interval history and content of current session: Pt stable, she and W have worked out a comfortable relationship and he is more relaxed. Pt had asked mother and twin sister to make plans with her for Juani Rangel so she would not be alone, both agreed but then "forgot." Pt managed this well, and had great support from friends. Pt sent loving text to both daughters Juani Rangel, heard nothing from [employer]. Niya sent back a song from a Marco Vasco. Pt continues to struggle with cutoff by her daughters, gets news of them through others, hurt that other adults don't advocate for her with the girls.      Treatment plan:  Target symptoms: anxiety   Why chosen therapy is appropriate versus another modality: relevant to diagnosis  Outcome monitoring methods: self-report  Therapeutic intervention type: insight oriented psychotherapy    Risk parameters:  Patient reports no suicidal ideation  Patient reports no homicidal ideation  Patient reports no self-injurious behavior  Patient reports no violent behavior    Verbal deficits: None    Patient's response to intervention:  The patient's response to intervention is tearful, anxious    Progress toward goals and other mental status changes:  The patient's progress toward goals is good    Diagnosis:   Anxiety    Plan:  individual psychotherapy    Return to clinic: f/u as scheduled    Length of Service (minutes): 45              "

## 2023-12-29 DIAGNOSIS — B00.1 HERPES LABIALIS: ICD-10-CM

## 2023-12-30 NOTE — TELEPHONE ENCOUNTER
No care due was identified.  [facility] Embedded Care Due Messages. Reference number: [id number].   12/29/2023 8:29:23 PM CST

## 2023-12-31 RX ORDER — ACYCLOVIR 400 MG/1
400 TABLET ORAL 2 TIMES DAILY
Qty: 14 TABLET | Refills: 0 | Status: SHIPPED | OUTPATIENT
Start: 2023-12-31 | End: 2024-03-07 | Stop reason: SDUPTHER

## 2024-01-02 ENCOUNTER — PATIENT MESSAGE (OUTPATIENT)
Dept: OBSTETRICS AND GYNECOLOGY | Facility: CLINIC | Age: 54
End: 2024-01-02
Payer: COMMERCIAL

## 2024-01-03 ENCOUNTER — OFFICE VISIT (OUTPATIENT)
Dept: OBSTETRICS AND GYNECOLOGY | Facility: CLINIC | Age: 54
End: 2024-01-03
Attending: OBSTETRICS & GYNECOLOGY
Payer: COMMERCIAL

## 2024-01-03 ENCOUNTER — OFFICE VISIT (OUTPATIENT)
Dept: PSYCHIATRY | Facility: CLINIC | Age: 54
End: 2024-01-03
Payer: COMMERCIAL

## 2024-01-03 VITALS
DIASTOLIC BLOOD PRESSURE: 85 MMHG | HEART RATE: 77 BPM | BODY MASS INDEX: 24.14 KG/M2 | SYSTOLIC BLOOD PRESSURE: 135 MMHG | WEIGHT: 141.38 LBS | HEIGHT: 64 IN

## 2024-01-03 DIAGNOSIS — Z12.31 ENCOUNTER FOR SCREENING MAMMOGRAM FOR MALIGNANT NEOPLASM OF BREAST: Primary | ICD-10-CM

## 2024-01-03 DIAGNOSIS — F41.9 ANXIETY: Primary | ICD-10-CM

## 2024-01-03 DIAGNOSIS — Z01.419 ENCOUNTER FOR GYNECOLOGICAL EXAMINATION WITHOUT ABNORMAL FINDING: ICD-10-CM

## 2024-01-03 DIAGNOSIS — Z12.4 SCREENING FOR MALIGNANT NEOPLASM OF THE CERVIX: ICD-10-CM

## 2024-01-03 PROCEDURE — 88175 CYTOPATH C/V AUTO FLUID REDO: CPT | Performed by: PATHOLOGY

## 2024-01-03 PROCEDURE — 3079F DIAST BP 80-89 MM HG: CPT | Mod: CPTII,S$GLB,, | Performed by: OBSTETRICS & GYNECOLOGY

## 2024-01-03 PROCEDURE — 87624 HPV HI-RISK TYP POOLED RSLT: CPT | Performed by: OBSTETRICS & GYNECOLOGY

## 2024-01-03 PROCEDURE — 1159F MED LIST DOCD IN RCRD: CPT | Mod: CPTII,S$GLB,, | Performed by: OBSTETRICS & GYNECOLOGY

## 2024-01-03 PROCEDURE — 88141 CYTOPATH C/V INTERPRET: CPT | Mod: ,,, | Performed by: PATHOLOGY

## 2024-01-03 PROCEDURE — 99999 PR PBB SHADOW E&M-EST. PATIENT-LVL I: CPT | Mod: PBBFAC,,, | Performed by: SOCIAL WORKER

## 2024-01-03 PROCEDURE — 3075F SYST BP GE 130 - 139MM HG: CPT | Mod: CPTII,S$GLB,, | Performed by: OBSTETRICS & GYNECOLOGY

## 2024-01-03 PROCEDURE — 99999 PR PBB SHADOW E&M-EST. PATIENT-LVL IV: CPT | Mod: PBBFAC,,, | Performed by: OBSTETRICS & GYNECOLOGY

## 2024-01-03 PROCEDURE — 90834 PSYTX W PT 45 MINUTES: CPT | Mod: S$GLB,,, | Performed by: SOCIAL WORKER

## 2024-01-03 PROCEDURE — 3008F BODY MASS INDEX DOCD: CPT | Mod: CPTII,S$GLB,, | Performed by: OBSTETRICS & GYNECOLOGY

## 2024-01-03 PROCEDURE — 99396 PREV VISIT EST AGE 40-64: CPT | Mod: S$GLB,,, | Performed by: OBSTETRICS & GYNECOLOGY

## 2024-01-03 RX ORDER — TERCONAZOLE 4 MG/G
1 CREAM VAGINAL NIGHTLY
Qty: 7 G | Refills: 1 | Status: SHIPPED | OUTPATIENT
Start: 2024-01-03

## 2024-01-03 NOTE — PROGRESS NOTES
SUBJECTIVE:   53 y.o. female   for annual routine Pap and checkup. Patient's last menstrual period was 2023 (approximate)..  She ports doing well on HRT.  She reports hot flashes have stopped.  She is having vaginal dryness and some pain with intercourse.  She has not been using Estrace vaginal cream regularly.        Past Medical History:   Diagnosis Date    Anxiety     ASCUS of cervix     Autoimmune disorder: just features: mouth sores, butterfly rash 2012    PENNY III (cervical intraepithelial neoplasia III)     Fever blister     Hypothyroid     Parvovirus arthritis of multiple sites 2012     Past Surgical History:   Procedure Laterality Date    COLD KNIFE CONIZATION OF CERVIX      KJB    COLONOSCOPY N/A 2020    Procedure: COLONOSCOPY;  Surgeon: Thaddeus Blackburn MD;  Location: [address]);  Service: Endoscopy;  Laterality: N/A;  family history malignant hyperthermia  covid test [facility] urgent care 9/15    CYSTOSCOPY N/A 3/25/2022    Procedure: CYSTOSCOPY;  Surgeon: Brian Babcock MD;  Location: [facility] OR [address] [place] [address];  Service: OB/GYN;  Laterality: N/A;    INSERTION OF MIDURETHRAL SLING N/A 3/25/2022    Procedure: SLING, MIDURETHRAL;  Surgeon: Brian Babcock MD;  Location: [facility] OR [address] [place] [address];  Service: OB/GYN;  Laterality: N/A;    TONSILLECTOMY       Social History     Socioeconomic History    Marital status:    Tobacco Use    Smoking status: Never    Smokeless tobacco: Never   Substance and Sexual Activity    Alcohol use: Yes     Alcohol/week: 2.0 standard drinks of alcohol     Types: 2 Glasses of wine per week     Comment: socially on weekends    Drug use: No    Sexual activity: Yes     Partners: Male     Birth control/protection: Other-see comments     Comment: [employer] Birth Control Pills     Family History   Problem Relation Age of Onset    Parkinsonism Father     Rheum arthritis Mother     Arthritis Mother     Malignant hyperthermia Brother     Breast cancer Neg Hx      Colon cancer Neg Hx     Ovarian cancer Neg Hx     Melanoma Neg Hx     Cancer Neg Hx     Heart disease Neg Hx     Cervical cancer Neg Hx     Uterine cancer Neg Hx      OB History    Para Term  AB Living   2 2 0     2   SAB IAB Ectopic Multiple Live Births           2      # Outcome Date GA Lbr Alexis/2nd Weight Sex Delivery Anes PTL Lv   2 Para      Vag-Spont      1 Para      Vag-Spont              Current Outpatient Medications   Medication Sig Dispense Refill    acyclovir (ZOVIRAX) 400 MG tablet Take 1 tablet (400 mg total) by mouth 2 (two) times daily. Fever blisters 14 tablet 0    calcium-vitamin D3 (OS-KRISTY 500 + D3) 500 mg-5 mcg (200 unit) per tablet Take 1 tablet by mouth 2 (two) times daily with meals.      cyanocobalamin (VITAMIN B-12) 1000 MCG tablet Take 1,000 mcg by mouth once daily.      esomeprazole (NEXIUM) 20 MG capsule Take 20 mg by mouth as needed. Takes 2-3 a year      estradioL (DIVIGEL) 0.25 mg/0.25 gram (0.1 %) GlPk Place 1 packet onto the skin once daily. 30 packet 11    estradioL (ESTRACE) 0.01 % (0.1 mg/gram) vaginal cream Use 0.5 gram of estrogen cream in vagina nightly x 1 weeks, then two nights a week thereafter. 42.5 g 3    fluconazole (DIFLUCAN) 150 MG Tab Take 1 tablet (150 mg total) by mouth every 72 hours as needed (vaginal itching/discharge). 2 tablet 0    hydrOXYzine HCL (ATARAX) 10 MG Tab Take 2 tablets (20 mg total) by mouth every evening. 180 tablet 1    levothyroxine (SYNTHROID) 75 MCG tablet Take 1 tablet (75 mcg total) by mouth before breakfast. 90 tablet 3    magnesium 30 mg Tab Take by mouth once.      naproxen sodium (ANAPROX) 550 MG tablet Take 1 tablet (550 mg total) by mouth as needed (as needed for jaw pain). 30 tablet 2    paroxetine (PAXIL) 40 MG tablet Take 1 tablet (40 mg total) by mouth once daily. 90 tablet 3    progesterone (PROMETRIUM) 100 MG capsule Take 1 capsule (100 mg total) by mouth nightly. 30 capsule 1    triamcinolone acetonide 0.1%  (KENALOG) 0.1 % cream AAA bid 454 g 3    busPIRone (BUSPAR) 5 MG Tab Take 1 tablet (5 mg total) by mouth 2 (two) times daily. 60 tablet 0    LORazepam (ATIVAN) 0.5 MG tablet Take 1 tablet (0.5 mg total) by mouth nightly as needed for Anxiety. Use only infrequently, can be addictive 30 tablet 0    terconazole (TERAZOL 7) 0.4 % Crea Place 1 applicator vaginally every evening. 7 g 1     No current facility-administered medications for this visit.     Allergies: Buspar [buspirone] and Mobic [meloxicam]     The 10-year ASCVD risk score (Kyle CARRASCO, et al., 2019) is: 1.3%    Values used to calculate the score:      Age: 53 years      Sex: Female      Is Non- : No      Diabetic: No      Tobacco smoker: No      Systolic Blood Pressure: 135 mmHg      Is BP treated: No      HDL Cholesterol: 89 mg/dL      Total Cholesterol: 236 mg/dL      ROS:  Constitutional: no weight loss, weight gain, fever, fatigue  Eyes:  No vision changes, glasses/contacts  ENT/Mouth: No ulcers, sinus problems, ears ringing, headache  Cardiovascular: No inability to lie flat, chest pain, exercise intolerance, swelling, heart palpitations  Respiratory: No wheezing, coughing blood, shortness of breath, or cough  Gastrointestinal: No diarrhea, bloody stool, nausea/vomiting, constipation, gas, hemorrhoids  Genitourinary: No blood in urine, painful urination, urgency of urination, frequency of urination, incomplete emptying, incontinence, abnormal bleeding, painful periods, heavy periods, vaginal discharge, vaginal odor, painful intercourse, sexual problems, bleeding after intercourse.  Musculoskeletal: No muscle weakness  Skin/Breast: No painful breasts, nipple discharge, masses, rash, ulcers  Neurological: No passing out, seizures, numbness, headache  Endocrine: No diabetes, +hypothyroid, hyperthyroid, hot flashes, hair loss, abnormal hair growth, acne  Psychiatric: No depression, crying  Hematologic: No bruises, bleeding, swollen  lymph nodes, anemia.      Physical Exam:   Constitutional: She is oriented to person, place, and time. She appears well-developed and well-nourished.      Neck: No tracheal deviation present. No thyromegaly present.    Cardiovascular:       Exam reveals no edema.        Pulmonary/Chest: Effort normal. She exhibits no mass, no tenderness, no deformity and no retraction. Right breast exhibits no inverted nipple, no mass, no nipple discharge, no skin change, no tenderness, presence, no bleeding and no swelling. Left breast exhibits no inverted nipple, no mass, no nipple discharge, no skin change, no tenderness, presence, no bleeding and no swelling. Breasts are symmetrical.        Abdominal: Soft. She exhibits no distension and no mass. There is no abdominal tenderness. There is no rebound and no guarding. No hernia. Hernia confirmed negative in the left inguinal area.     Genitourinary:    Vagina and uterus normal.   Rectum:      No external hemorrhoid.   There is no rash, tenderness or lesion on the right labia. There is no rash, tenderness or lesion on the left labia. Cervix is normal. No no adexnal prolapse. Right adnexum displays no mass, no tenderness and no fullness. Left adnexum displays no mass, no tenderness and no fullness. No vaginal discharge, tenderness, bleeding, rectocele, cystocele or prolapse of vaginal walls in the vagina. Cervix exhibits no motion tenderness, no discharge and no friability. Uterus is not deviated.           Musculoskeletal: Normal range of motion and moves all extremeties. No edema.       Neurological: She is alert and oriented to person, place, and time.    Skin: No rash noted. No erythema. No pallor.    Psychiatric: She has a normal mood and affect. Her behavior is normal. Judgment and thought content normal.   +vaginal atrophy      ASSESSMENT:   well woman  Vaginal atrophy  PLAN:   mammogram  pap smear  Try intrarosa for 2 weeks. Gave her applicator to use for estrogen cream  which she should use after she finishes Intrarosa. She should message me regarding which one she likes  return annually or prn

## 2024-01-03 NOTE — PROGRESS NOTES
Individual Psychotherapy (PhD/LCSW)    7/5/2023    Site: [facility]    Therapeutic Intervention: Met with patient alone  Outpatient - Insight oriented psychotherapy 45 min - CPT code 56439    Chief complaint/reason for encounter: anxiety     Interval history and content of current session: Pt working on issues that trigger her anxiety, related to growing up in prominent family and not being seen for herself or allowed to be her authentic self. Look at how this triggers inner child with anger and determination to be heard, resulting in strong reactions toward others.  Pt is cognizant of this and having success in mitigating her responses. Relationship going well.      Treatment plan:  Target symptoms: anxiety   Why chosen therapy is appropriate versus another modality: relevant to diagnosis  Outcome monitoring methods: self-report  Therapeutic intervention type: insight oriented psychotherapy    Risk parameters:  Patient reports no suicidal ideation  Patient reports no homicidal ideation  Patient reports no self-injurious behavior  Patient reports no violent behavior    Verbal deficits: None    Patient's response to intervention:  The patient's response to intervention is tearful, anxious    Progress toward goals and other mental status changes:  The patient's progress toward goals is good    Diagnosis:   Anxiety    Plan:  individual psychotherapy    Return to clinic: f/u as scheduled    Length of Service (minutes): 45

## 2024-01-10 ENCOUNTER — OFFICE VISIT (OUTPATIENT)
Dept: PSYCHIATRY | Facility: CLINIC | Age: 54
End: 2024-01-10
Payer: COMMERCIAL

## 2024-01-10 DIAGNOSIS — F41.9 ANXIETY: Primary | ICD-10-CM

## 2024-01-10 PROCEDURE — 90834 PSYTX W PT 45 MINUTES: CPT | Mod: S$GLB,,, | Performed by: SOCIAL WORKER

## 2024-01-10 NOTE — PROGRESS NOTES
Individual Psychotherapy (PhD/LCSW)    7/5/2023    Site: [place]    Therapeutic Intervention: Met with patient alone  Outpatient - Insight oriented psychotherapy 45 min - CPT code 16402    Chief complaint/reason for encounter: anxiety     Interval history and content of current session: Pt notes tension in joints, discuss somaticizing of conflict about accepting W's separation of his life with his sons and his life with her. Link to pt's experience of parents' emotional distance vs 's loving presence.     Treatment plan:  Target symptoms: anxiety   Why chosen therapy is appropriate versus another modality: relevant to diagnosis  Outcome monitoring methods: self-report  Therapeutic intervention type: insight oriented psychotherapy    Risk parameters:  Patient reports no suicidal ideation  Patient reports no homicidal ideation  Patient reports no self-injurious behavior  Patient reports no violent behavior    Verbal deficits: None    Patient's response to intervention:  The patient's response to intervention is tearful, anxious    Progress toward goals and other mental status changes:  The patient's progress toward goals is mixed    Diagnosis:   Anxiety    Plan:  individual psychotherapy    Return to clinic: f/u as scheduled    Length of Service (minutes): 45

## 2024-01-17 ENCOUNTER — OFFICE VISIT (OUTPATIENT)
Dept: PSYCHIATRY | Facility: CLINIC | Age: 54
End: 2024-01-17
Payer: COMMERCIAL

## 2024-01-17 DIAGNOSIS — F41.9 ANXIETY: Primary | ICD-10-CM

## 2024-01-17 PROCEDURE — 90834 PSYTX W PT 45 MINUTES: CPT | Mod: 95,,, | Performed by: SOCIAL WORKER

## 2024-01-17 NOTE — PROGRESS NOTES
Individual Psychotherapy (PhD/LCSW)    7/5/2023    Site: [facility]    Therapeutic Intervention: Met with patient alone  Outpatient - Insight oriented psychotherapy 45 min - CPT code 32500    Chief complaint/reason for encounter: anxiety     Interval history and content of current session: Pt working out conflicts with W.  He avoids discussing options and making plans, agrees to things and then changes in midstream, which is triggering to pt. Pt working on staying grounded if W backs out of planned time together. Also talking with him about how to address complications proactively to avoid disappointment or hurt.     Treatment plan:  Target symptoms: anxiety   Why chosen therapy is appropriate versus another modality: relevant to diagnosis  Outcome monitoring methods: self-report  Therapeutic intervention type: insight oriented psychotherapy    Risk parameters:  Patient reports no suicidal ideation  Patient reports no homicidal ideation  Patient reports no self-injurious behavior  Patient reports no violent behavior    Verbal deficits: None    Patient's response to intervention:  The patient's response to intervention is tearful, anxious    Progress toward goals and other mental status changes:  The patient's progress toward goals is good    Diagnosis:   Anxiety    Plan:  individual psychotherapy    Return to clinic: f/u as scheduled    Length of Service (minutes): 45

## 2024-01-18 ENCOUNTER — PATIENT MESSAGE (OUTPATIENT)
Dept: OBSTETRICS AND GYNECOLOGY | Facility: CLINIC | Age: 54
End: 2024-01-18
Payer: COMMERCIAL

## 2024-01-18 LAB
FINAL PATHOLOGIC DIAGNOSIS: ABNORMAL
Lab: ABNORMAL

## 2024-01-24 ENCOUNTER — PATIENT MESSAGE (OUTPATIENT)
Dept: OBSTETRICS AND GYNECOLOGY | Facility: CLINIC | Age: 54
End: 2024-01-24
Payer: COMMERCIAL

## 2024-01-24 ENCOUNTER — OFFICE VISIT (OUTPATIENT)
Dept: PSYCHIATRY | Facility: CLINIC | Age: 54
End: 2024-01-24
Payer: COMMERCIAL

## 2024-01-24 DIAGNOSIS — F41.9 ANXIETY: Primary | ICD-10-CM

## 2024-01-24 PROCEDURE — 99999 PR PBB SHADOW E&M-EST. PATIENT-LVL I: CPT | Mod: PBBFAC,,, | Performed by: SOCIAL WORKER

## 2024-01-24 PROCEDURE — 90834 PSYTX W PT 45 MINUTES: CPT | Mod: S$GLB,,, | Performed by: SOCIAL WORKER

## 2024-01-24 NOTE — PROGRESS NOTES
"    Individual Psychotherapy (PhD/LCSW)    7/5/2023    Site: [facility]    Therapeutic Intervention: Met with patient alone  Outpatient - Insight oriented psychotherapy 45 min - CPT code 35715    Chief complaint/reason for encounter: anxiety     Interval history and content of current session: Pt has been regularly sending e-mails to her daughters without response. Thursday she got a call from Niya. Discuss hurt and frustration with E giving her bland answers (everything is "great") and insisting she has no idea about details for her graduation this spring. E instructed pt to e-mail her with any questions. Discuss possible next steps and particularly addressing the fact that E has been unwilling to see her for over 2 years.     Treatment plan:  Target symptoms: anxiety   Why chosen therapy is appropriate versus another modality: relevant to diagnosis  Outcome monitoring methods: self-report  Therapeutic intervention type: insight oriented psychotherapy    Risk parameters:  Patient reports no suicidal ideation  Patient reports no homicidal ideation  Patient reports no self-injurious behavior  Patient reports no violent behavior    Verbal deficits: None    Patient's response to intervention:  The patient's response to intervention is tearful, anxious    Progress toward goals and other mental status changes:  The patient's progress toward goals is good    Diagnosis:   Anxiety    Plan:  individual psychotherapy    Return to clinic: f/u as scheduled    Length of Service (minutes): 45                      "

## 2024-01-28 ENCOUNTER — PATIENT MESSAGE (OUTPATIENT)
Dept: PRIMARY CARE CLINIC | Facility: CLINIC | Age: 54
End: 2024-01-28
Payer: COMMERCIAL

## 2024-01-29 ENCOUNTER — TELEPHONE (OUTPATIENT)
Dept: PRIMARY CARE CLINIC | Facility: CLINIC | Age: 54
End: 2024-01-29
Payer: COMMERCIAL

## 2024-01-29 NOTE — TELEPHONE ENCOUNTER
----- Message from Rose Marcos sent at 1/29/2024  8:02 AM CST -----  Contact: Patient @ [phone]  [email or web address]     Patient is calling for Medical Advice regarding:Patient message Dr via portal and she would like Dr to check portal and call her back     How long has patient had these symptoms:    Pharmacy name and phone#:    Would like response via [facility]:  call     Comments:

## 2024-01-29 NOTE — TELEPHONE ENCOUNTER
Pt has a virtual appointment scheduled for this Wednesday, will discuss her concerns with the provider @ her visit. Pt did not have any urgent concerns at this time.

## 2024-01-29 NOTE — TELEPHONE ENCOUNTER
Spoke with pt via telephone. Scheduled virtually Weds @ 1120am, Pt aware an expresses understanding.

## 2024-01-31 ENCOUNTER — OFFICE VISIT (OUTPATIENT)
Dept: PRIMARY CARE CLINIC | Facility: CLINIC | Age: 54
End: 2024-01-31
Payer: COMMERCIAL

## 2024-01-31 ENCOUNTER — OFFICE VISIT (OUTPATIENT)
Dept: PSYCHIATRY | Facility: CLINIC | Age: 54
End: 2024-01-31
Payer: COMMERCIAL

## 2024-01-31 VITALS — BODY MASS INDEX: 21.8 KG/M2 | WEIGHT: 127 LBS

## 2024-01-31 DIAGNOSIS — R76.8 POSITIVE ANA (ANTINUCLEAR ANTIBODY): ICD-10-CM

## 2024-01-31 DIAGNOSIS — F41.9 ANXIETY: Primary | ICD-10-CM

## 2024-01-31 DIAGNOSIS — M25.60 JOINT STIFFNESS: ICD-10-CM

## 2024-01-31 DIAGNOSIS — Z82.61 FAMILY HISTORY OF RHEUMATOID ARTHRITIS: ICD-10-CM

## 2024-01-31 DIAGNOSIS — M47.22 OSTEOARTHRITIS OF SPINE WITH RADICULOPATHY, CERVICAL REGION: ICD-10-CM

## 2024-01-31 DIAGNOSIS — M54.12 CERVICAL RADICULOPATHY: Primary | ICD-10-CM

## 2024-01-31 DIAGNOSIS — M25.50 ARTHRALGIA, UNSPECIFIED JOINT: ICD-10-CM

## 2024-01-31 PROCEDURE — 90834 PSYTX W PT 45 MINUTES: CPT | Mod: S$GLB,,, | Performed by: SOCIAL WORKER

## 2024-01-31 PROCEDURE — 99214 OFFICE O/P EST MOD 30 MIN: CPT | Mod: 95,,, | Performed by: FAMILY MEDICINE

## 2024-01-31 PROCEDURE — 1160F RVW MEDS BY RX/DR IN RCRD: CPT | Mod: CPTII,95,, | Performed by: FAMILY MEDICINE

## 2024-01-31 PROCEDURE — 99999 PR PBB SHADOW E&M-EST. PATIENT-LVL I: CPT | Mod: PBBFAC,,, | Performed by: SOCIAL WORKER

## 2024-01-31 PROCEDURE — 1159F MED LIST DOCD IN RCRD: CPT | Mod: CPTII,95,, | Performed by: FAMILY MEDICINE

## 2024-01-31 PROCEDURE — 3008F BODY MASS INDEX DOCD: CPT | Mod: CPTII,95,, | Performed by: FAMILY MEDICINE

## 2024-01-31 RX ORDER — PREDNISONE 20 MG/1
40 TABLET ORAL DAILY
Qty: 10 TABLET | Refills: 0 | Status: SHIPPED | OUTPATIENT
Start: 2024-01-31 | End: 2024-02-05

## 2024-01-31 NOTE — PROGRESS NOTES
"Subjective:       Patient ID: Becky Cade is a 53 y.o. female.    Chief Complaint: joint pain (Joint pain /X December 2023/States that its worse when she wakes up in the morning/Right side mostly/Also tingling and numbness /Pt has tried naproxen but it didn't work )    Back Pain  This is a new problem. The current episode started 1 to 4 weeks ago. The problem occurs daily. The problem is unchanged. The pain is present in the sacro-iliac. The quality of the pain is described as aching. The pain radiates to the right knee. The pain is at a severity of 6/10. The pain is moderate. The pain is Worse during the night. The symptoms are aggravated by sitting. Stiffness is present In the morning. Associated symptoms include perianal numbness, tingling, weakness and weight loss. Pertinent negatives include no abdominal pain, bladder incontinence, bowel incontinence, chest pain, dysuria, fever, headaches, leg pain, numbness, paresis, paresthesias or pelvic pain. The treatment provided no relief.     52 y/o female with hypothyroidism, cervical djd, ANAM, herpes labialis, mixed incontinence, positive LIBRA, family hx of RA, history of COVID here to discuss joint pain.    She has migrating joint pains off and on since October, the joint pain alternates between wrist, elbows and knees, pain is tight and burning at times, 7/10, she wakes up in the morning and feels stiff for 30 min, she denies joint swelling, she tried topical voltaren, oral naproxen which has not helped, taking a hot bath helps.     She has R sided arm "pins and needles" sensation that spreads from neck to fingertips, started about 4 weeks ago. She has R hand weakness, she is R handed. She has know DJD cervical spine with foraminal narrowing. MRI 3/2022. Never had CAROL. Oral steroids helped in the past. She tried Gabapentin that did not help.     She denies fall or injury. She is not doing and new activity, she is active and takes stairs throughout the day, " she is doing Pilates weekly.  She denies f/n/v, occasional heartburn, relieved with victor manuel seltzer gummies, she denies d/constipation/cp/sob/urinary sx.      Hx of covid 1/2022 mild outpatient symptoms  ANAM:  Counseling, paxil 40 mg daily, hydroxyzine 20 mg at night, Ativan 0.5 mg nightly  Hypothyroidism:never had procedure or biopsy, levothyroxine 75 mcg  Herpes labialis: acyclovir prn, 2-3 times a year  Positive LIBRA/family RA: following with rheumatology  Mixed incontinence/cystocele: following with Urogyn  GYN: following with Dr. Parson, no longer having cycles, pelvic utd, Divigel, mmg 2/2023  Colonoscopy 9/2020 repeat 10 years  Eye exam utd followed by Dr. Pascual  Dental utd  OTC: b12, vit D3, magnesium     Review of Systems   Constitutional:  Positive for weight loss. Negative for fever.   Cardiovascular:  Negative for chest pain.   Gastrointestinal:  Negative for abdominal pain and bowel incontinence.   Genitourinary:  Negative for bladder incontinence, dysuria, hematuria and pelvic pain.   Musculoskeletal:  Positive for back pain.   Neurological:  Positive for tingling and weakness. Negative for numbness, headaches and paresthesias.     see HPI  Objective:      Wt 57.6 kg (127 lb)   LMP 02/07/2023 (Approximate)   BMI 21.80 kg/m²   Physical Exam  Constitutional:       General: She is not in acute distress.     Appearance: She is well-developed. She is not diaphoretic.   HENT:      Head: Normocephalic and atraumatic.   Pulmonary:      Effort: No respiratory distress.   Neurological:      Mental Status: She is alert and oriented to person, place, and time.         Assessment:       1. Cervical radiculopathy    2. Osteoarthritis of spine with radiculopathy, cervical region    3. Arthralgia, unspecified joint    4. Joint stiffness    5. Family history of rheumatoid arthritis    6. Positive LIBRA (antinuclear antibody)        Plan:   Becky was seen today for joint pain.    Diagnoses and all orders for this  visit:    Cervical radiculopathy  -     Ambulatory referral/consult to Pain Clinic; Future  -     MRI Cervical Spine Without Contrast; Future  -     predniSONE (DELTASONE) 20 MG tablet; Take 2 tablets (40 mg total) by mouth once daily. for 5 days    Osteoarthritis of spine with radiculopathy, cervical region  -     Ambulatory referral/consult to Pain Clinic; Future  -     MRI Cervical Spine Without Contrast; Future  -     predniSONE (DELTASONE) 20 MG tablet; Take 2 tablets (40 mg total) by mouth once daily. for 5 days    Arthralgia, unspecified joint    Joint stiffness    Family history of rheumatoid arthritis    Positive LIBRA (antinuclear antibody)      The patient location is: [place]  The chief complaint leading to consultation is: cervical radicular pain, migrating joint pain    Visit type: audiovisual    Face to Face time with patient: 25 minutes of total time spent on the encounter, which includes face to face time and non-face to face time preparing to see the patient (eg, review of tests), Obtaining and/or reviewing separately obtained history, Documenting clinical information in the electronic or other health record, Independently interpreting results (not separately reported) and communicating results to the patient/family/caregiver, or Care coordination (not separately reported).         Each patient to whom he or she provides medical services by telemedicine is:  (1) informed of the relationship between the physician and patient and the respective role of any other health care provider with respect to management of the patient; and (2) notified that he or she may decline to receive medical services by telemedicine and may withdraw from such care at any time.    Notes:     Answers submitted by the patient for this visit:  Back Pain Questionnaire (Submitted on 1/31/2024)  Chief Complaint: Back pain  genital pain: No

## 2024-01-31 NOTE — PROGRESS NOTES
"    Individual Psychotherapy (PhD/LCSW)    7/5/2023    Site: [facility]    Therapeutic Intervention: Met with patient alone  Outpatient - Insight oriented psychotherapy 45 min - CPT code 08834    Chief complaint/reason for encounter: anxiety     Interval history and content of current session: Pt experiencing somatic discomfort which she attributes to emotional issues, but she also has appointment with a doctor about this. Reports stress at work where this is her busy time the next 2 weeks. Trying to be an "observer" with W, struggles to accept that his accomodation of his ex during her busy time is not a betrayal of pt. Brother Bernard's wife left him and he is calling family weeping, seeing a therapist daily, discuss appropriate boundaries while still being supportive.      Treatment plan:  Target symptoms: anxiety   Why chosen therapy is appropriate versus another modality: relevant to diagnosis  Outcome monitoring methods: self-report  Therapeutic intervention type: insight oriented psychotherapy    Risk parameters:  Patient reports no suicidal ideation  Patient reports no homicidal ideation  Patient reports no self-injurious behavior  Patient reports no violent behavior    Verbal deficits: None    Patient's response to intervention:  The patient's response to intervention is cooperative    Progress toward goals and other mental status changes:  The patient's progress toward goals is good    Diagnosis:   Anxiety    Plan:  individual psychotherapy    Return to clinic: f/u as scheduled    Length of Service (minutes): 45                        "

## 2024-02-01 ENCOUNTER — OFFICE VISIT (OUTPATIENT)
Dept: PAIN MEDICINE | Facility: CLINIC | Age: 54
End: 2024-02-01
Payer: COMMERCIAL

## 2024-02-01 ENCOUNTER — LAB VISIT (OUTPATIENT)
Dept: LAB | Facility: HOSPITAL | Age: 54
End: 2024-02-01
Attending: INTERNAL MEDICINE
Payer: COMMERCIAL

## 2024-02-01 ENCOUNTER — PATIENT MESSAGE (OUTPATIENT)
Dept: RHEUMATOLOGY | Facility: CLINIC | Age: 54
End: 2024-02-01
Payer: COMMERCIAL

## 2024-02-01 VITALS
HEART RATE: 84 BPM | HEIGHT: 64 IN | DIASTOLIC BLOOD PRESSURE: 70 MMHG | SYSTOLIC BLOOD PRESSURE: 99 MMHG | BODY MASS INDEX: 23.68 KG/M2 | WEIGHT: 138.69 LBS

## 2024-02-01 DIAGNOSIS — M54.2 MYOFASCIAL NECK PAIN: ICD-10-CM

## 2024-02-01 DIAGNOSIS — R76.0 ANTI-CARDIOLIPIN ANTIBODY POSITIVE: ICD-10-CM

## 2024-02-01 DIAGNOSIS — M54.12 CERVICAL RADICULOPATHY: Primary | ICD-10-CM

## 2024-02-01 DIAGNOSIS — M47.22 OSTEOARTHRITIS OF SPINE WITH RADICULOPATHY, CERVICAL REGION: ICD-10-CM

## 2024-02-01 DIAGNOSIS — R76.8 POSITIVE ANA (ANTINUCLEAR ANTIBODY): ICD-10-CM

## 2024-02-01 DIAGNOSIS — M47.812 CERVICAL SPONDYLOSIS: ICD-10-CM

## 2024-02-01 DIAGNOSIS — M50.30 DDD (DEGENERATIVE DISC DISEASE), CERVICAL: ICD-10-CM

## 2024-02-01 LAB
ALBUMIN SERPL BCP-MCNC: 4 G/DL (ref 3.5–5.2)
ALP SERPL-CCNC: 86 U/L (ref 55–135)
ALT SERPL W/O P-5'-P-CCNC: 20 U/L (ref 10–44)
ANION GAP SERPL CALC-SCNC: 9 MMOL/L (ref 8–16)
AST SERPL-CCNC: 23 U/L (ref 10–40)
BASOPHILS # BLD AUTO: 0.08 K/UL (ref 0–0.2)
BASOPHILS NFR BLD: 1.5 % (ref 0–1.9)
BILIRUB SERPL-MCNC: 0.5 MG/DL (ref 0.1–1)
BILIRUB UR QL STRIP: NEGATIVE
BUN SERPL-MCNC: 18 MG/DL (ref 6–20)
C3 SERPL-MCNC: 145 MG/DL (ref 50–180)
C4 SERPL-MCNC: 22 MG/DL (ref 11–44)
CALCIUM SERPL-MCNC: 10.2 MG/DL (ref 8.7–10.5)
CHLORIDE SERPL-SCNC: 103 MMOL/L (ref 95–110)
CLARITY UR: CLEAR
CO2 SERPL-SCNC: 26 MMOL/L (ref 23–29)
COLOR UR: YELLOW
CREAT SERPL-MCNC: 0.8 MG/DL (ref 0.5–1.4)
CREAT UR-MCNC: 110.3 MG/DL (ref 15–325)
CRP SERPL-MCNC: 5 MG/L (ref 0–8.2)
DIFFERENTIAL METHOD BLD: ABNORMAL
EOSINOPHIL # BLD AUTO: 0.2 K/UL (ref 0–0.5)
EOSINOPHIL NFR BLD: 3.3 % (ref 0–8)
ERYTHROCYTE [DISTWIDTH] IN BLOOD BY AUTOMATED COUNT: 11.8 % (ref 11.5–14.5)
ERYTHROCYTE [SEDIMENTATION RATE] IN BLOOD BY PHOTOMETRIC METHOD: 8 MM/HR (ref 0–36)
EST. GFR  (NO RACE VARIABLE): >60 ML/MIN/1.73 M^2
GLUCOSE SERPL-MCNC: 108 MG/DL (ref 70–110)
GLUCOSE UR QL STRIP: NEGATIVE
HCT VFR BLD AUTO: 42.7 % (ref 37–48.5)
HGB BLD-MCNC: 14.3 G/DL (ref 12–16)
HGB UR QL STRIP: NEGATIVE
IMM GRANULOCYTES # BLD AUTO: 0.02 K/UL (ref 0–0.04)
IMM GRANULOCYTES NFR BLD AUTO: 0.4 % (ref 0–0.5)
KETONES UR QL STRIP: NEGATIVE
LEUKOCYTE ESTERASE UR QL STRIP: NEGATIVE
LYMPHOCYTES # BLD AUTO: 1.7 K/UL (ref 1–4.8)
LYMPHOCYTES NFR BLD: 30.6 % (ref 18–48)
MCH RBC QN AUTO: 31.9 PG (ref 27–31)
MCHC RBC AUTO-ENTMCNC: 33.5 G/DL (ref 32–36)
MCV RBC AUTO: 95 FL (ref 82–98)
MONOCYTES # BLD AUTO: 0.5 K/UL (ref 0.3–1)
MONOCYTES NFR BLD: 9.3 % (ref 4–15)
NEUTROPHILS # BLD AUTO: 3 K/UL (ref 1.8–7.7)
NEUTROPHILS NFR BLD: 54.9 % (ref 38–73)
NITRITE UR QL STRIP: NEGATIVE
NRBC BLD-RTO: 0 /100 WBC
PH UR STRIP: 8 [PH] (ref 5–8)
PLATELET # BLD AUTO: 310 K/UL (ref 150–450)
PMV BLD AUTO: 9.8 FL (ref 9.2–12.9)
POTASSIUM SERPL-SCNC: 4.9 MMOL/L (ref 3.5–5.1)
PROT SERPL-MCNC: 8 G/DL (ref 6–8.4)
PROT UR QL STRIP: ABNORMAL
PROT UR-MCNC: 9 MG/DL (ref 0–15)
PROT/CREAT UR: 0.08 MG/G{CREAT} (ref 0–0.2)
RBC # BLD AUTO: 4.48 M/UL (ref 4–5.4)
SODIUM SERPL-SCNC: 138 MMOL/L (ref 136–145)
SP GR UR STRIP: 1.02 (ref 1–1.03)
URN SPEC COLLECT METH UR: ABNORMAL
UROBILINOGEN UR STRIP-ACNC: NEGATIVE EU/DL
WBC # BLD AUTO: 5.46 K/UL (ref 3.9–12.7)

## 2024-02-01 PROCEDURE — 36415 COLL VENOUS BLD VENIPUNCTURE: CPT | Performed by: INTERNAL MEDICINE

## 2024-02-01 PROCEDURE — 85652 RBC SED RATE AUTOMATED: CPT | Performed by: INTERNAL MEDICINE

## 2024-02-01 PROCEDURE — 85025 COMPLETE CBC W/AUTO DIFF WBC: CPT | Performed by: INTERNAL MEDICINE

## 2024-02-01 PROCEDURE — 80053 COMPREHEN METABOLIC PANEL: CPT | Performed by: INTERNAL MEDICINE

## 2024-02-01 PROCEDURE — 3008F BODY MASS INDEX DOCD: CPT | Mod: CPTII,S$GLB,, | Performed by: STUDENT IN AN ORGANIZED HEALTH CARE EDUCATION/TRAINING PROGRAM

## 2024-02-01 PROCEDURE — 86146 BETA-2 GLYCOPROTEIN ANTIBODY: CPT | Performed by: INTERNAL MEDICINE

## 2024-02-01 PROCEDURE — 86160 COMPLEMENT ANTIGEN: CPT | Performed by: INTERNAL MEDICINE

## 2024-02-01 PROCEDURE — 99999 PR PBB SHADOW E&M-EST. PATIENT-LVL IV: CPT | Mod: PBBFAC,,, | Performed by: STUDENT IN AN ORGANIZED HEALTH CARE EDUCATION/TRAINING PROGRAM

## 2024-02-01 PROCEDURE — 81003 URINALYSIS AUTO W/O SCOPE: CPT | Performed by: INTERNAL MEDICINE

## 2024-02-01 PROCEDURE — 84156 ASSAY OF PROTEIN URINE: CPT | Performed by: INTERNAL MEDICINE

## 2024-02-01 PROCEDURE — 86140 C-REACTIVE PROTEIN: CPT | Performed by: INTERNAL MEDICINE

## 2024-02-01 PROCEDURE — 3074F SYST BP LT 130 MM HG: CPT | Mod: CPTII,S$GLB,, | Performed by: STUDENT IN AN ORGANIZED HEALTH CARE EDUCATION/TRAINING PROGRAM

## 2024-02-01 PROCEDURE — 86225 DNA ANTIBODY NATIVE: CPT | Performed by: INTERNAL MEDICINE

## 2024-02-01 PROCEDURE — 99204 OFFICE O/P NEW MOD 45 MIN: CPT | Mod: S$GLB,,, | Performed by: STUDENT IN AN ORGANIZED HEALTH CARE EDUCATION/TRAINING PROGRAM

## 2024-02-01 PROCEDURE — 3078F DIAST BP <80 MM HG: CPT | Mod: CPTII,S$GLB,, | Performed by: STUDENT IN AN ORGANIZED HEALTH CARE EDUCATION/TRAINING PROGRAM

## 2024-02-01 PROCEDURE — 1159F MED LIST DOCD IN RCRD: CPT | Mod: CPTII,S$GLB,, | Performed by: STUDENT IN AN ORGANIZED HEALTH CARE EDUCATION/TRAINING PROGRAM

## 2024-02-01 PROCEDURE — 86160 COMPLEMENT ANTIGEN: CPT | Mod: 59 | Performed by: INTERNAL MEDICINE

## 2024-02-01 NOTE — PROGRESS NOTES
[facility] - New Patient Evaluation    Referred by: Dr. Desiree Stephens   Reason for referral: Cervical radiculopathy  Osteoarthritis of spine with radiculopathy, cervical region     CC:   Chief Complaint   Patient presents with    Arm Pain         2/1/2024     8:25 AM   Last 3 PDI Scores   Pain Disability Index (PDI) 42     Subjective 02/01/24  Becky Cade is a 53 y.o. female who presents complaining of right side arm paion, numbness and tingling x 2 months.  She previously experienced a similar episode in 2022 and her symptoms responded well to prednisone. MRI cervical spine from 2022 was significant for a prominent right paracentral disc osteophyte complex at C5-C6 resulting  in moderate narrowing of the right lateral recess and moderate to severe narrowing of the right neural foramen, as well as facet arthropathy in the lower c-spine. No myelopathic symptoms.     Location: right side   Onset: 2 months  Current Pain Score: 7/10  Daily Pain of Range: 8-9/10  Quality: Tingling and Numb  Radiation: down legs  Worsened by:  Activity  Improved by: rest    Patient denies night fever/night sweats, urinary incontinence, bowel incontinence, significant weight loss, significant motor weakness, and loss of sensations.    Previous Interventions:  - None    Previous Therapies:  PT/OT: yes 2022  Chiropractor:   HEP:   Relevant Surgery: no   Previous Medications:   - NSAIDS:  Naproxen  - Muscle Relaxants:    - TCAs:   - SNRIs:   - Topicals:    - Anticonvulsants:    - Opioids:   - Adjuvants: Prednisone    Current Pain Medications:  Naproxen    Review of Systems:  ROS    GENERAL:  No weight loss, malaise or fevers.  HEENT:   No recent changes in vision or hearing  NECK:  No difficulty with swallowing. No stridor.   RESPIRATORY:  Negative for cough, wheezing or shortness of breath, patient denies any recent URI.  CARDIOVASCULAR:  Negative for chest pain, leg swelling or palpitations.  GI:  Negative  for abdominal discomfort, blood in stools or black stools or change in bowel habits.  MUSCULOSKELETAL:  See HPI.  SKIN:  Negative for lesions, rash, and itching.  PSYCH:  No mood disorder or recent psychosocial stressors.    HEMATOLOGY/LYMPHOLOGY:  Negative for prolonged bleeding, bruising easily or swollen nodes.  Patient is not currently taking any anti-coagulants  NEURO:   No history of headaches, syncope, paralysis, seizures or tremors.  All other reviewed and negative other than HPI.    History:  Current medications, allergies, medical history, surgical history,   family history, and social history were reviewed in the chart as marked.    Full Medication List:    Current Outpatient Medications:     acyclovir (ZOVIRAX) 400 MG tablet, Take 1 tablet (400 mg total) by mouth 2 (two) times daily. Fever blisters, Disp: 14 tablet, Rfl: 0    calcium-vitamin D3 (OS-KRISTY 500 + D3) 500 mg-5 mcg (200 unit) per tablet, Take 1 tablet by mouth 2 (two) times daily with meals., Disp: , Rfl:     cyanocobalamin (VITAMIN B-12) 1000 MCG tablet, Take 1,000 mcg by mouth once daily., Disp: , Rfl:     esomeprazole (NEXIUM) 20 MG capsule, Take 20 mg by mouth as needed. Takes 2-3 a year, Disp: , Rfl:     estradioL (DIVIGEL) 0.25 mg/0.25 gram (0.1 %) GlPk, Place 1 packet onto the skin once daily., Disp: 30 packet, Rfl: 11    estradioL (ESTRACE) 0.01 % (0.1 mg/gram) vaginal cream, Use 0.5 gram of estrogen cream in vagina nightly x 1 weeks, then two nights a week thereafter., Disp: 42.5 g, Rfl: 3    fluconazole (DIFLUCAN) 150 MG Tab, Take 1 tablet (150 mg total) by mouth every 72 hours as needed (vaginal itching/discharge)., Disp: 2 tablet, Rfl: 0    hydrOXYzine HCL (ATARAX) 10 MG Tab, Take 2 tablets (20 mg total) by mouth every evening., Disp: 180 tablet, Rfl: 1    levothyroxine (SYNTHROID) 75 MCG tablet, Take 1 tablet (75 mcg total) by mouth before breakfast., Disp: 90 tablet, Rfl: 3    magnesium 30 mg Tab, Take by mouth once., Disp: , Rfl:      naproxen sodium (ANAPROX) 550 MG tablet, Take 1 tablet (550 mg total) by mouth as needed (as needed for jaw pain)., Disp: 30 tablet, Rfl: 2    paroxetine (PAXIL) 40 MG tablet, Take 1 tablet (40 mg total) by mouth once daily., Disp: 90 tablet, Rfl: 3    predniSONE (DELTASONE) 20 MG tablet, Take 2 tablets (40 mg total) by mouth once daily. for 5 days, Disp: 10 tablet, Rfl: 0    progesterone (PROMETRIUM) 100 MG capsule, Take 1 capsule (100 mg total) by mouth nightly., Disp: 30 capsule, Rfl: 1    terconazole (TERAZOL 7) 0.4 % Crea, Place 1 applicator vaginally every evening., Disp: 7 g, Rfl: 1    triamcinolone acetonide 0.1% (KENALOG) 0.1 % cream, AAA bid, Disp: 454 g, Rfl: 3    LORazepam (ATIVAN) 0.5 MG tablet, Take 1 tablet (0.5 mg total) by mouth nightly as needed for Anxiety. 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    Surgical History:   has a past surgical history that includes Tonsillectomy (1993); Cold knife conization of cervix (2009); Colonoscopy (N/A, 9/18/2020); Insertion of midurethral sling (N/A, 3/25/2022); and Cystoscopy (N/A, 3/25/2022).    Family History:  family history includes Arthritis in her mother; Malignant hyperthermia in her brother; Parkinsonism in her father; Rheum arthritis in her mother.    Social History:   reports that she has never smoked. She has never used smokeless tobacco. She reports current alcohol use of about 2.0 standard drinks of alcohol per week. She reports that she does not use drugs.    Physical Exam:  Vitals:    02/01/24 0820   BP: 99/70   Pulse: 84   Weight: 62.9 kg (138 lb 10.7 oz)  "  Height: 5' 4" (1.626 m)   PainSc:   7   PainLoc: Arm       GENERAL: Well appearing, in no acute distress, alert and oriented x3.  PSYCH:  Mood and affect appropriate.  SKIN: Skin color, texture, turgor normal, no rashes or lesions.  HEAD/FACE:  Normocephalic, atraumatic. Cranial nerves grossly intact.  NECK: Normal ROM. Supple.  + pain to palpation over the cervical paraspinous muscles. Spurling Positive on the Right. +pain with cervical facet loading. Tenderness over the b/l trapezius, right > left.   CV: RRR with palpation of the radial artery.  PULM: No evidence of respiratory difficulty, symmetric chest rise.  GI:  Soft and non-distended.  MSK: Peripheral joint ROM is full and pain free without obvious instability or laxity in all four extremities with ambulation. No obvious deformities, edema, or skin discoloration.  No atrophy or tone abnormalities are noted.   NEURO: Bilateral upper and lower extremity coordination and strength is symmetric.  No loss of sensation is noted.  MENTAL STATUS: A x O x 3, good concentration, speech is fluent and goal directed  MOTOR: 5/5 in b/l UE muscle groups  GAIT: Normal. Ambulates unassisted.    Imaging:  MRI CERVICAL SPINE WITHOUT CONTRAST     CLINICAL HISTORY:  right sided neck pain, cervical radiculopathy; Radiculopathy, cervical region     TECHNIQUE:  Multiplanar, multisequence MR images of the cervical spine were performed without the administration of contrast.     COMPARISON:  03/31/2022     FINDINGS:  C1-C2: Dens is intact.  Pre dens space is maintained.     Alignment: Normal.     Vertebrae: Degenerative endplate changes in the lower cervical spine.  No fracture or marrow infiltrative process.     Discs: Moderate disc height loss at C5-C7.  No evidence for discitis.     Cord: Normal.     Skull base and craniocervical junction: Normal.     Degenerative findings:     C2-C3: No spinal canal stenosis or neural foraminal narrowing.     C3-C4: No spinal canal stenosis or " neural foraminal narrowing.     C4-C5: No spinal canal stenosis or neural foraminal narrowing.     C5-C6: Right paracentral disc osteophyte complex results in moderate narrowing of the right lateral recess and moderate to severe narrowing of the right neural foramen.     C6-C7: Posterior disc osteophyte complex and uncovertebral spurring result in mild left neural foraminal narrowing.     C7-T1: Facet arthropathy results in mild bilateral neural foraminal narrowing.     Paraspinal muscles & soft tissues: Unremarkable.     Impression:     1. Degenerative changes of the lower cervical spine detailed above.  Prominent right paracentral disc osteophyte complex at C5-C6 results in moderate narrowing of the right lateral recess and moderate to severe narrowing of the right neural foramen.        Electronically signed by: Fletcher Velasco MD  Date:                                            05/06/2022    Labs:  BMP  Lab Results   Component Value Date     (L) 02/07/2023    K 3.8 02/07/2023     02/07/2023    CO2 23 02/07/2023    BUN 12 02/07/2023    CREATININE 0.7 02/07/2023    CALCIUM 9.6 02/07/2023    ANIONGAP 12 02/07/2023    EGFRNORACEVR >60.0 02/07/2023     Lab Results   Component Value Date    ALT 8 (L) 02/07/2023    AST 16 02/07/2023    ALKPHOS 53 (L) 02/07/2023    BILITOT 0.4 02/07/2023     Lab Results   Component Value Date    WBC 7.36 02/07/2023    HGB 13.3 02/07/2023    HCT 41.0 02/07/2023    MCV 97 02/07/2023     02/07/2023         Assessment:  Problem List Items Addressed This Visit    None  Visit Diagnoses       Cervical radiculopathy    -  Primary    Relevant Orders    Ambulatory referral/consult to Physical/Occupational Therapy    Osteoarthritis of spine with radiculopathy, cervical region        Relevant Orders    Ambulatory referral/consult to Physical/Occupational Therapy    DDD (degenerative disc disease), cervical        Relevant Orders    Ambulatory referral/consult to  Physical/Occupational Therapy    Myofascial neck pain        Cervical spondylosis              02/01/2024 - Becky Cade is a 53 y.o. 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  By history and examination this patient has chronic neck pain with right sided radiculopathy.  The underlying cause is facet arthritis, degenerative disc disease, foraminal stenosis, and muscle dysfunction.  Pathology is confirmed by imaging.  MRI cervical spine from 2022 was significant for a prominent right paracentral disc osteophyte complex at C5-C6 resulting  in moderate narrowing of the right lateral recess and moderate to severe narrowing of the right neural foramen, as well as facet arthropathy in the lower c-spine.  We discussed the underlying diagnoses and multiple treatment options including non-opioid medications, interventional procedures, physical therapy, and home exercise.  She will start a prednisone course today.  I have referred her to physical therapy and provided with a home exercise program.  We will follow up in 6 weeks to assess for trigger point injections and/or cervical epidural steroid injection.  The risks and benefits of each treatment option were discussed and all questions were answered.        Treatment Plan:   Procedures: None at this time. Consider cervical/trapezius TPIs and Cervical CAROL if pain persists despite conservative treatments and PT.   PT/OT/HEP: I have stressed the importance of physical activity and a home exercise plan to help with pain and improve health.  Referral placed to physical therapy.  I have also supplied the patient with the spine conditioning program and cervical spine strengthening and stretching exercises.  Medications:    -  patient will start prednisone course today  - p.r.n. Tylenol and NSAID.  Discussed appropriate dosing.   -  Reviewed and consistent with medication use as prescribed.  Imaging:  MRI cervical spine was reviewed and discussed with the patient using spine models.  Follow Up: RTC 6 weeks    Elisha Avilez DO   Interventional Pain Medicine / Anesthesiology    Disclaimer: This note was partly generated using dictation software which may occasionally result in transcription errors.

## 2024-02-02 ENCOUNTER — PATIENT MESSAGE (OUTPATIENT)
Dept: PRIMARY CARE CLINIC | Facility: CLINIC | Age: 54
End: 2024-02-02
Payer: COMMERCIAL

## 2024-02-02 ENCOUNTER — PATIENT MESSAGE (OUTPATIENT)
Dept: PAIN MEDICINE | Facility: CLINIC | Age: 54
End: 2024-02-02
Payer: COMMERCIAL

## 2024-02-02 LAB — DSDNA AB SER-ACNC: NORMAL [IU]/ML

## 2024-02-05 LAB
B2 GLYCOPROT1 IGA SER QL: 2.5 U/ML
B2 GLYCOPROT1 IGG SER QL: 2 U/ML
B2 GLYCOPROT1 IGM SER QL: 4.7 U/ML

## 2024-02-07 ENCOUNTER — OFFICE VISIT (OUTPATIENT)
Dept: PSYCHIATRY | Facility: CLINIC | Age: 54
End: 2024-02-07
Payer: COMMERCIAL

## 2024-02-07 DIAGNOSIS — F41.9 ANXIETY: Primary | ICD-10-CM

## 2024-02-07 PROCEDURE — 90834 PSYTX W PT 45 MINUTES: CPT | Mod: S$GLB,,, | Performed by: SOCIAL WORKER

## 2024-02-07 PROCEDURE — 99999 PR PBB SHADOW E&M-EST. PATIENT-LVL I: CPT | Mod: PBBFAC,,, | Performed by: SOCIAL WORKER

## 2024-02-07 NOTE — PROGRESS NOTES
Individual Psychotherapy (PhD/LCSW)    7/5/2023    Site: [place]    Therapeutic Intervention: Met with patient alone  Outpatient - Insight oriented psychotherapy 45 min - CPT code 13672    Chief complaint/reason for encounter: anxiety     Interval history and content of current session: Pt doing well, had several challenging situations this week which she handled well. Got response text from Linda to birthday wishes, first time she has heard from her in over 2 years, expressing love. Confronted W about handling of social situation and felt heard and validated. Handled work conflict professionally and got good support.     Treatment plan:  Target symptoms: anxiety   Why chosen therapy is appropriate versus another modality: relevant to diagnosis  Outcome monitoring methods: self-report  Therapeutic intervention type: insight oriented psychotherapy    Risk parameters:  Patient reports no suicidal ideation  Patient reports no homicidal ideation  Patient reports no self-injurious behavior  Patient reports no violent behavior    Verbal deficits: None    Patient's response to intervention:  The patient's response to intervention is cooperative    Progress toward goals and other mental status changes:  The patient's progress toward goals is good    Diagnosis:   Anxiety    Plan:  individual psychotherapy    Return to clinic: f/u as scheduled    Length of Service (minutes): 45

## 2024-02-14 ENCOUNTER — OFFICE VISIT (OUTPATIENT)
Dept: PSYCHIATRY | Facility: CLINIC | Age: 54
End: 2024-02-14
Payer: COMMERCIAL

## 2024-02-14 DIAGNOSIS — F41.9 ANXIETY: Primary | ICD-10-CM

## 2024-02-14 PROCEDURE — 90834 PSYTX W PT 45 MINUTES: CPT | Mod: S$GLB,,, | Performed by: SOCIAL WORKER

## 2024-02-14 NOTE — PROGRESS NOTES
Individual Psychotherapy (PhD/LCSW)    7/5/2023    Site: [place]    Therapeutic Intervention: Met with patient alone  Outpatient - Insight oriented psychotherapy 45 min - CPT code 56437    Chief complaint/reason for encounter: anxiety     Interval history and content of current session: Pt doing well, was able to handle frustrations with W calmly while holding him accountable, feeling emotionally stronger and more grounded in herself and in the relationship. Discuss issues with his children.     Treatment plan:  Target symptoms: anxiety   Why chosen therapy is appropriate versus another modality: relevant to diagnosis  Outcome monitoring methods: self-report  Therapeutic intervention type: insight oriented psychotherapy    Risk parameters:  Patient reports no suicidal ideation  Patient reports no homicidal ideation  Patient reports no self-injurious behavior  Patient reports no violent behavior    Verbal deficits: None    Patient's response to intervention:  The patient's response to intervention is cooperative    Progress toward goals and other mental status changes:  The patient's progress toward goals is good    Diagnosis:   Anxiety    Plan:  individual psychotherapy    Return to clinic: f/u as scheduled    Length of Service (minutes): 45

## 2024-02-21 ENCOUNTER — OFFICE VISIT (OUTPATIENT)
Dept: PSYCHIATRY | Facility: CLINIC | Age: 54
End: 2024-02-21
Payer: COMMERCIAL

## 2024-02-21 DIAGNOSIS — F41.9 ANXIETY: Primary | ICD-10-CM

## 2024-02-21 PROCEDURE — 90834 PSYTX W PT 45 MINUTES: CPT | Mod: S$GLB,,, | Performed by: SOCIAL WORKER

## 2024-02-21 NOTE — PROGRESS NOTES
Individual Psychotherapy (PhD/LCSW)    7/5/2023    Site: [place]    Therapeutic Intervention: Met with patient alone  Outpatient - Insight oriented psychotherapy 45 min - CPT code 96319    Chief complaint/reason for encounter: anxiety     Interval history and content of current session: Pt tearful, hurt and angry, sees W pulling back, making no effort in the relationship and not appreciating her efforts. Discuss over-functioning and under-functioning. Pt feels she can't be herself in the relationship, not comfortable pulling back on her own efforts, but aware that pointing out his deficiencies is not increasing his motivation. Discuss likelihood that she will need to end the relationship. Encourage pt to pull back, and focus more on plans with friends.     Treatment plan:  Target symptoms: anxiety   Why chosen therapy is appropriate versus another modality: relevant to diagnosis  Outcome monitoring methods: self-report  Therapeutic intervention type: insight oriented psychotherapy    Risk parameters:  Patient reports no suicidal ideation  Patient reports no homicidal ideation  Patient reports no self-injurious behavior  Patient reports no violent behavior    Verbal deficits: None    Patient's response to intervention:  The patient's response to intervention is cooperative    Progress toward goals and other mental status changes:  The patient's progress toward goals is good    Diagnosis:   Anxiety    Plan:  individual psychotherapy    Return to clinic: f/u as scheduled    Length of Service (minutes): 45

## 2024-02-23 ENCOUNTER — PATIENT MESSAGE (OUTPATIENT)
Dept: OBSTETRICS AND GYNECOLOGY | Facility: CLINIC | Age: 54
End: 2024-02-23
Payer: COMMERCIAL

## 2024-02-28 ENCOUNTER — OFFICE VISIT (OUTPATIENT)
Dept: PSYCHIATRY | Facility: CLINIC | Age: 54
End: 2024-02-28
Payer: COMMERCIAL

## 2024-02-28 DIAGNOSIS — F41.9 ANXIETY: Primary | ICD-10-CM

## 2024-02-28 PROCEDURE — 90834 PSYTX W PT 45 MINUTES: CPT | Mod: S$GLB,,, | Performed by: SOCIAL WORKER

## 2024-02-28 NOTE — PROGRESS NOTES
Individual Psychotherapy (PhD/LCSW)    7/5/2023    Site: [place]    Therapeutic Intervention: Met with patient alone  Outpatient - Insight oriented psychotherapy 45 min - CPT code 20044    Chief complaint/reason for encounter: anxiety     Interval history and content of current session: Pt vented to employer and to W, no longer has pain in her body, sleeping well, wants to stay in relationship with W. Discuss focusing on ways he can make her feel loved vs trying to manage his interactions with sons and ex-wife. Pt aware she needs to accept that his ex is a part of his life and that she can not be in the boys' lives at this point. Pt working on self-soothing to manage hurt and anger she experiences.     Treatment plan:  Target symptoms: anxiety   Why chosen therapy is appropriate versus another modality: relevant to diagnosis  Outcome monitoring methods: self-report  Therapeutic intervention type: insight oriented psychotherapy    Risk parameters:  Patient reports no suicidal ideation  Patient reports no homicidal ideation  Patient reports no self-injurious behavior  Patient reports no violent behavior    Verbal deficits: None    Patient's response to intervention:  The patient's response to intervention is cooperative    Progress toward goals and other mental status changes:  The patient's progress toward goals is good    Diagnosis:   Anxiety    Plan:  individual psychotherapy    Return to clinic: f/u as scheduled    Length of Service (minutes): 45

## 2024-02-29 ENCOUNTER — HOSPITAL ENCOUNTER (OUTPATIENT)
Dept: RADIOLOGY | Facility: HOSPITAL | Age: 54
Discharge: HOME | End: 2024-02-29
Attending: OBSTETRICS & GYNECOLOGY
Payer: COMMERCIAL

## 2024-02-29 DIAGNOSIS — Z12.31 ENCOUNTER FOR SCREENING MAMMOGRAM FOR MALIGNANT NEOPLASM OF BREAST: ICD-10-CM

## 2024-02-29 PROCEDURE — 77067 SCR MAMMO BI INCL CAD: CPT | Mod: 26,,, | Performed by: RADIOLOGY

## 2024-02-29 PROCEDURE — 77067 SCR MAMMO BI INCL CAD: CPT | Mod: TC

## 2024-02-29 PROCEDURE — 77063 BREAST TOMOSYNTHESIS BI: CPT | Mod: 26,,, | Performed by: RADIOLOGY

## 2024-03-01 ENCOUNTER — PATIENT MESSAGE (OUTPATIENT)
Dept: OBSTETRICS AND GYNECOLOGY | Facility: CLINIC | Age: 54
End: 2024-03-01
Payer: COMMERCIAL

## 2024-03-01 DIAGNOSIS — N89.8 VAGINAL DISCHARGE: ICD-10-CM

## 2024-03-01 DIAGNOSIS — R30.0 DYSURIA: ICD-10-CM

## 2024-03-01 DIAGNOSIS — N95.2 VAGINAL ATROPHY: ICD-10-CM

## 2024-03-01 RX ORDER — ESTRADIOL 0.1 MG/G
CREAM VAGINAL
Qty: 42.5 G | Refills: 3 | Status: SHIPPED | OUTPATIENT
Start: 2024-03-01 | End: 2024-05-13 | Stop reason: SDUPTHER

## 2024-03-01 NOTE — TELEPHONE ENCOUNTER
Pt called likes the estradiol vaginal cream better then the intrarosa. Pt needs refill on the estradiol cream

## 2024-03-06 ENCOUNTER — OFFICE VISIT (OUTPATIENT)
Dept: PSYCHIATRY | Facility: CLINIC | Age: 54
End: 2024-03-06
Payer: COMMERCIAL

## 2024-03-06 DIAGNOSIS — F41.9 ANXIETY: Primary | ICD-10-CM

## 2024-03-06 PROCEDURE — 99999 PR PBB SHADOW E&M-EST. PATIENT-LVL I: CPT | Mod: PBBFAC,,, | Performed by: SOCIAL WORKER

## 2024-03-06 PROCEDURE — 90834 PSYTX W PT 45 MINUTES: CPT | Mod: S$GLB,,, | Performed by: SOCIAL WORKER

## 2024-03-06 NOTE — PROGRESS NOTES
"    Individual Psychotherapy (PhD/LCSW)    7/5/2023    Site: [place]    Therapeutic Intervention: Met with patient alone  Outpatient - Insight oriented psychotherapy 45 min - CPT code 59965    Chief complaint/reason for encounter: anxiety     Interval history and content of current session: Pt processing events of past week, "went off" on brother, his on again off again wife, Harry and his ex, and her mom,  and people at work, in various ways. Pt feeling compelled to "speak my truth", aware she may blur the line between assertive and aggressive at some points, but overall feels this is a step toward health. Pt feels no pain in body but may feel numb.  Discuss ways to manage her strong feelings going forward to allow continued self-empowerment and inner safety.     Treatment plan:  Target symptoms: anxiety   Why chosen therapy is appropriate versus another modality: relevant to diagnosis  Outcome monitoring methods: self-report  Therapeutic intervention type: insight oriented psychotherapy    Risk parameters:  Patient reports no suicidal ideation  Patient reports no homicidal ideation  Patient reports no self-injurious behavior  Patient reports no violent behavior    Verbal deficits: None    Patient's response to intervention:  The patient's response to intervention is cooperative    Progress toward goals and other mental status changes:  The patient's progress toward goals is good    Diagnosis:   Anxiety    Plan:  individual psychotherapy    Return to clinic: f/u as scheduled    Length of Service (minutes): 45          "

## 2024-03-07 DIAGNOSIS — B00.1 HERPES LABIALIS: ICD-10-CM

## 2024-03-07 RX ORDER — ACYCLOVIR 400 MG/1
400 TABLET ORAL 2 TIMES DAILY
Qty: 14 TABLET | Refills: 0 | Status: SHIPPED | OUTPATIENT
Start: 2024-03-07 | End: 2024-04-05 | Stop reason: SDUPTHER

## 2024-03-11 ENCOUNTER — PATIENT MESSAGE (OUTPATIENT)
Dept: PAIN MEDICINE | Facility: CLINIC | Age: 54
End: 2024-03-11
Payer: COMMERCIAL

## 2024-03-11 ENCOUNTER — OFFICE VISIT (OUTPATIENT)
Dept: PSYCHIATRY | Facility: CLINIC | Age: 54
End: 2024-03-11
Payer: COMMERCIAL

## 2024-03-11 DIAGNOSIS — F41.9 ANXIETY: Primary | ICD-10-CM

## 2024-03-11 PROCEDURE — 90834 PSYTX W PT 45 MINUTES: CPT | Mod: S$GLB,,, | Performed by: SOCIAL WORKER

## 2024-03-11 NOTE — PROGRESS NOTES
Individual Psychotherapy (PhD/LCSW)    7/5/2023    Site: [facility]    Therapeutic Intervention: Met with patient alone  Outpatient - Insight oriented psychotherapy 45 min - CPT code 19772    Chief complaint/reason for encounter: anxiety     Interval history and content of current session: Pt broke up with boyfriend, comes in to process, being hard on herself, but actually has no regrets about limits she set with boyfriend, brother, mother, people at work. Discuss finding her voice, validate her decisions, offer reassurance.Pt plans to stay busy, see friends, do needlepoint. She is sleeping ok with Ativan at night. Pt got e-mail from daughter Niya asking her to watch graduation on [employer], offering to see her in June when she is at a meeting in . Pt skeptical that this will happen.     Treatment plan:  Target symptoms: anxiety   Why chosen therapy is appropriate versus another modality: relevant to diagnosis  Outcome monitoring methods: self-report  Therapeutic intervention type: insight oriented psychotherapy    Risk parameters:  Patient reports no suicidal ideation  Patient reports no homicidal ideation  Patient reports no self-injurious behavior  Patient reports no violent behavior    Verbal deficits: None    Patient's response to intervention:  The patient's response to intervention is cooperative    Progress toward goals and other mental status changes:  The patient's progress toward goals is fair    Diagnosis:   Anxiety    Plan:  individual psychotherapy    Return to clinic: f/u as scheduled    Length of Service (minutes): 45

## 2024-03-13 ENCOUNTER — OFFICE VISIT (OUTPATIENT)
Dept: PSYCHIATRY | Facility: CLINIC | Age: 54
End: 2024-03-13
Payer: COMMERCIAL

## 2024-03-13 DIAGNOSIS — F41.9 ANXIETY: Primary | ICD-10-CM

## 2024-03-13 PROCEDURE — 90834 PSYTX W PT 45 MINUTES: CPT | Mod: S$GLB,,, | Performed by: SOCIAL WORKER

## 2024-03-13 PROCEDURE — 99999 PR PBB SHADOW E&M-EST. PATIENT-LVL I: CPT | Mod: PBBFAC,,, | Performed by: SOCIAL WORKER

## 2024-03-13 NOTE — PROGRESS NOTES
"    Individual Psychotherapy (PhD/LCSW)    7/5/2023    Site: [facility]    Therapeutic Intervention: Met with patient alone  Outpatient - Insight oriented psychotherapy 45 min - CPT code 68723    Chief complaint/reason for encounter: anxiety     Interval history and content of current session: Pt panicky about being alone since breakup. Discuss lifelong "need" to have "someone there". Pt is a twin, would go in sister's room or in her bed at times, struggled when sister went to boarding school for high school, pt began having boyfriends, would talk to them on the phone at night until she could sleep. Remind pt she made the adjustment to being alone after leaving [facility], discuss ways to ground and calm herself. Pt asks for second session next week.     Treatment plan:  Target symptoms: anxiety   Why chosen therapy is appropriate versus another modality: relevant to diagnosis  Outcome monitoring methods: self-report  Therapeutic intervention type: insight oriented psychotherapy    Risk parameters:  Patient reports no suicidal ideation  Patient reports no homicidal ideation  Patient reports no self-injurious behavior  Patient reports no violent behavior    Verbal deficits: None    Patient's response to intervention:  The patient's response to intervention is cooperative    Progress toward goals and other mental status changes:  The patient's progress toward goals is limited    Diagnosis:   Anxiety    Plan:  individual psychotherapy    Return to clinic: f/u as scheduled    Length of Service (minutes): 45              "

## 2024-03-17 DIAGNOSIS — F41.1 GAD (GENERALIZED ANXIETY DISORDER): ICD-10-CM

## 2024-03-17 DIAGNOSIS — Z79.899 LONG-TERM CURRENT USE OF BENZODIAZEPINE: ICD-10-CM

## 2024-03-17 NOTE — TELEPHONE ENCOUNTER
No care due was identified.  [facility] Embedded Care Due Messages. Reference number: [id number].   3/17/2024 9:50:47 AM CDT

## 2024-03-18 ENCOUNTER — OFFICE VISIT (OUTPATIENT)
Dept: PSYCHIATRY | Facility: CLINIC | Age: 54
End: 2024-03-18
Payer: COMMERCIAL

## 2024-03-18 DIAGNOSIS — F41.9 ANXIETY: Primary | ICD-10-CM

## 2024-03-18 PROCEDURE — 90834 PSYTX W PT 45 MINUTES: CPT | Mod: S$GLB,,, | Performed by: SOCIAL WORKER

## 2024-03-18 PROCEDURE — 99999 PR PBB SHADOW E&M-EST. PATIENT-LVL I: CPT | Mod: PBBFAC,,, | Performed by: SOCIAL WORKER

## 2024-03-18 RX ORDER — LORAZEPAM 0.5 MG/1
0.5 TABLET ORAL NIGHTLY PRN
Qty: 15 TABLET | Refills: 0 | Status: SHIPPED | OUTPATIENT
Start: 2024-03-18 | End: 2024-04-01 | Stop reason: SDUPTHER

## 2024-03-18 NOTE — PROGRESS NOTES
Individual Psychotherapy (PhD/LCSW)    7/5/2023    Site: [facility]    Therapeutic Intervention: Met with patient alone  Outpatient - Insight oriented psychotherapy 45 min - CPT code 25416    Chief complaint/reason for encounter: anxiety     Interval history and content of current session: Pt reports intense panic, tight chest, SOB, frequent tears, has called W a few times, panicked about being alone. Pt recalls this somatic experience as a child, when she was crying and feeling dismissed, brushed aside, being called a crybaby, and desperately wanting to be hugged. Discuss self-soothing. Pt has hypnosis recording I made her for sleep, likes the Calm sisi and aromatherapy, will use these and order a weighted blanket, learned some breathing techniques, to help herself soothe the panic response.     Treatment plan:  Target symptoms: anxiety   Why chosen therapy is appropriate versus another modality: relevant to diagnosis  Outcome monitoring methods: self-report  Therapeutic intervention type: insight oriented psychotherapy    Risk parameters:  Patient reports no suicidal ideation  Patient reports no homicidal ideation  Patient reports no self-injurious behavior  Patient reports no violent behavior    Verbal deficits: None    Patient's response to intervention:  The patient's response to intervention is cooperative    Progress toward goals and other mental status changes:  The patient's progress toward goals is limited    Diagnosis:   Anxiety    Plan:  individual psychotherapy    Return to clinic: f/u as scheduled    Length of Service (minutes): 45

## 2024-03-18 NOTE — TELEPHONE ENCOUNTER
Please have her set up an appointment with a psychiatrist for medication management, MD or NP, she is taking Ativan and this not something I am comfortable prescribing for her long term, there may be a better option.

## 2024-03-20 ENCOUNTER — OFFICE VISIT (OUTPATIENT)
Dept: PSYCHIATRY | Facility: CLINIC | Age: 54
End: 2024-03-20
Payer: COMMERCIAL

## 2024-03-20 DIAGNOSIS — F41.9 ANXIETY: Primary | ICD-10-CM

## 2024-03-20 PROCEDURE — 99999 PR PBB SHADOW E&M-EST. PATIENT-LVL I: CPT | Mod: PBBFAC,,, | Performed by: SOCIAL WORKER

## 2024-03-20 PROCEDURE — 90834 PSYTX W PT 45 MINUTES: CPT | Mod: S$GLB,,, | Performed by: SOCIAL WORKER

## 2024-03-20 NOTE — PROGRESS NOTES
"    Individual Psychotherapy (PhD/LCSW)    7/5/2023    Site: [facility]    Therapeutic Intervention: Met with patient alone  Outpatient - Insight oriented psychotherapy 45 min - CPT code 64625    Chief complaint/reason for encounter: anxiety     Interval history and content of current session: Pt stabilizing, still has tight chest, ran out of Ativan and picking up refill today, but bought aromatherapy supplies, using hypnosis recording, had good visit with mother where she talked about her abandonment feelings and mom reminded her of many nights she held pt and rubbed her back. Suggest pt focus on these images of mom's presence in childhood. W called as agreed on Monday night, made benign conversation and made no reference to relationship issues they were to discuss. Pt talked with I'm about what is lacking in the relationship and suggested he not call again. Pt resonates with concept of love "addiction" and sees this in brother as well.      Treatment plan:  Target symptoms: anxiety   Why chosen therapy is appropriate versus another modality: relevant to diagnosis  Outcome monitoring methods: self-report  Therapeutic intervention type: insight oriented psychotherapy    Risk parameters:  Patient reports no suicidal ideation  Patient reports no homicidal ideation  Patient reports no self-injurious behavior  Patient reports no violent behavior    Verbal deficits: None    Patient's response to intervention:  The patient's response to intervention is cooperative    Progress toward goals and other mental status changes:  The patient's progress toward goals is good    Diagnosis:   Anxiety    Plan:  individual psychotherapy    Return to clinic: f/u as scheduled    Length of Service (minutes): 45                  "

## 2024-03-27 ENCOUNTER — OFFICE VISIT (OUTPATIENT)
Dept: PSYCHIATRY | Facility: CLINIC | Age: 54
End: 2024-03-27
Payer: COMMERCIAL

## 2024-03-27 DIAGNOSIS — F41.9 ANXIETY: Primary | ICD-10-CM

## 2024-03-27 PROCEDURE — 90834 PSYTX W PT 45 MINUTES: CPT | Mod: S$GLB,,, | Performed by: SOCIAL WORKER

## 2024-03-27 NOTE — PROGRESS NOTES
Individual Psychotherapy (PhD/LCSW)    7/5/2023    Site: [facility]    Therapeutic Intervention: Met with patient alone  Outpatient - Insight oriented psychotherapy 45 min - CPT code 39835    Chief complaint/reason for encounter: anxiety     Interval history and content of current session: Pt did call W last night, has felt tight in her body, particularly her chest, strong need for a hug, fear of being alone forever, rejected. Pt states they had a good talk and are both interested in working out their differences. Talk with pt about pattern of positive self-talk and then shifting to panic and self-shaming. Encourage visualizing middle school self and building compassion and offering comfort and security.     Treatment plan:  Target symptoms: anxiety   Why chosen therapy is appropriate versus another modality: relevant to diagnosis  Outcome monitoring methods: self-report  Therapeutic intervention type: insight oriented psychotherapy    Risk parameters:  Patient reports no suicidal ideation  Patient reports no homicidal ideation  Patient reports no self-injurious behavior  Patient reports no violent behavior    Verbal deficits: None    Patient's response to intervention:  The patient's response to intervention is cooperative    Progress toward goals and other mental status changes:  The patient's progress toward goals is fair    Diagnosis:   Anxiety    Plan:  individual psychotherapy    Return to clinic: f/u as scheduled    Length of Service (minutes): 45

## 2024-03-28 DIAGNOSIS — E03.9 HYPOTHYROIDISM, UNSPECIFIED TYPE: ICD-10-CM

## 2024-03-28 RX ORDER — LEVOTHYROXINE SODIUM 75 UG/1
75 TABLET ORAL
Qty: 90 TABLET | Refills: 1 | Status: SHIPPED | OUTPATIENT
Start: 2024-03-28

## 2024-03-28 NOTE — TELEPHONE ENCOUNTER
No care due was identified.  [facility] Embedded Care Due Messages. Reference number: [id number].   3/28/2024 8:08:31 AM CDT

## 2024-03-29 NOTE — TELEPHONE ENCOUNTER
Refill Decision Note   Becky Cade  is requesting a refill authorization.  Brief Assessment and Rationale for Refill:  Approve     Medication Therapy Plan:        Comments:     Note composed:7:04 PM 03/28/2024

## 2024-04-01 DIAGNOSIS — Z79.899 LONG-TERM CURRENT USE OF BENZODIAZEPINE: ICD-10-CM

## 2024-04-01 DIAGNOSIS — F41.1 GAD (GENERALIZED ANXIETY DISORDER): ICD-10-CM

## 2024-04-01 NOTE — TELEPHONE ENCOUNTER
No care due was identified.  [facility] Embedded Care Due Messages. Reference number: [id number].   4/01/2024 7:52:06 AM CDT

## 2024-04-01 NOTE — TELEPHONE ENCOUNTER
----- Message from Teto Thao sent at 4/1/2024  1:02 PM CDT -----  Contact: Pt  [phone]  Consult    She wants to know if you'll fill the LORazepam (ATIVAN) 0.5 MG tablet one more time because she hasn't found a psyciatrist yet who can do it.    Thank you

## 2024-04-01 NOTE — TELEPHONE ENCOUNTER
Pt would like to know if her ativan could get refill one more time due to her not being able to fins a physiatrist.    Medication has been pended below if applicable

## 2024-04-01 NOTE — TELEPHONE ENCOUNTER
----- Message from Teto Thao sent at 4/1/2024  1:02 PM CDT -----  Contact: Pt  [phone]  Consult    She wants to know if you'll fill the LORazepam (ATIVAN) 0.5 MG tablet one more time because she hasn't found a psyciatrist yet who can do it.    Thank you

## 2024-04-02 RX ORDER — LORAZEPAM 0.5 MG/1
0.5 TABLET ORAL NIGHTLY PRN
Qty: 15 TABLET | Refills: 0 | Status: SHIPPED | OUTPATIENT
Start: 2024-04-02 | End: 2024-05-02

## 2024-04-02 RX ORDER — PAROXETINE HYDROCHLORIDE 40 MG/1
40 TABLET, FILM COATED ORAL DAILY
Qty: 90 TABLET | Refills: 3 | Status: SHIPPED | OUTPATIENT
Start: 2024-04-02 | End: 2024-05-22

## 2024-04-03 ENCOUNTER — OFFICE VISIT (OUTPATIENT)
Dept: PSYCHIATRY | Facility: CLINIC | Age: 54
End: 2024-04-03
Payer: COMMERCIAL

## 2024-04-03 DIAGNOSIS — F34.1 DYSTHYMIA: ICD-10-CM

## 2024-04-03 DIAGNOSIS — F41.9 ANXIETY: Primary | ICD-10-CM

## 2024-04-03 PROCEDURE — 99999 PR PBB SHADOW E&M-EST. PATIENT-LVL I: CPT | Mod: PBBFAC,,, | Performed by: SOCIAL WORKER

## 2024-04-03 PROCEDURE — 90834 PSYTX W PT 45 MINUTES: CPT | Mod: S$GLB,,, | Performed by: SOCIAL WORKER

## 2024-04-03 NOTE — PROGRESS NOTES
"    Individual Psychotherapy (PhD/LCSW)    7/5/2023    Site: [facility]    Therapeutic Intervention: Met with patient alone  Outpatient - Insight oriented psychotherapy 45 min - CPT code 76938    Chief complaint/reason for encounter: anxiety     Interval history and content of current session: Pt less anxious after seeing W but aware of an underlying depression related to feeling alone/abandoned that causes her to "spiral" on and off, since childhood. Pt's PCP prefers not to continue managing her psych meds, talk with pt about referral.     Treatment plan:  Target symptoms: anxiety   Why chosen therapy is appropriate versus another modality: relevant to diagnosis  Outcome monitoring methods: self-report  Therapeutic intervention type: insight oriented psychotherapy    Risk parameters:  Patient reports no suicidal ideation  Patient reports no homicidal ideation  Patient reports no self-injurious behavior  Patient reports no violent behavior    Verbal deficits: None    Patient's response to intervention:  The patient's response to intervention is cooperative    Progress toward goals and other mental status changes:  The patient's progress toward goals is fair    Diagnosis:   Anxiety, dysthymia    Plan:  individual psychotherapy    Return to clinic: f/u as scheduled    Length of Service (minutes): 45                      "

## 2024-04-05 DIAGNOSIS — B00.1 HERPES LABIALIS: ICD-10-CM

## 2024-04-05 RX ORDER — ACYCLOVIR 400 MG/1
400 TABLET ORAL 2 TIMES DAILY
Qty: 14 TABLET | Refills: 0 | Status: SHIPPED | OUTPATIENT
Start: 2024-04-05

## 2024-04-05 NOTE — TELEPHONE ENCOUNTER
No care due was identified.  [facility] Embedded Care Due Messages. Reference number: [id number].   4/05/2024 6:56:45 AM CDT

## 2024-04-10 ENCOUNTER — OFFICE VISIT (OUTPATIENT)
Dept: PSYCHIATRY | Facility: CLINIC | Age: 54
End: 2024-04-10
Payer: COMMERCIAL

## 2024-04-10 DIAGNOSIS — F34.1 DYSTHYMIA: ICD-10-CM

## 2024-04-10 DIAGNOSIS — F41.9 ANXIETY: Primary | ICD-10-CM

## 2024-04-10 PROCEDURE — 90834 PSYTX W PT 45 MINUTES: CPT | Mod: S$GLB,,, | Performed by: SOCIAL WORKER

## 2024-04-10 PROCEDURE — 99999 PR PBB SHADOW E&M-EST. PATIENT-LVL I: CPT | Mod: PBBFAC,,, | Performed by: SOCIAL WORKER

## 2024-04-10 NOTE — PROGRESS NOTES
Individual Psychotherapy (PhD/LCSW)    7/5/2023    Site: [place]    Therapeutic Intervention: Met with patient alone  Outpatient - Insight oriented psychotherapy 45 min - CPT code 18508    Chief complaint/reason for encounter: anxiety, dysthymia     Interval history and content of current session: Pt confronted W about gaslighting and not doing his Tx work, suggested they stop seeing each other until he is able to change. He expresses motivation and apology, pt waiting for behavior change, aware her separation anxiety is likely to recur. She has an appointment with Dr. Brooks for [place]. Court scheduled for tomorrow with M.     Treatment plan:  Target symptoms: anxiety   Why chosen therapy is appropriate versus another modality: relevant to diagnosis  Outcome monitoring methods: self-report  Therapeutic intervention type: insight oriented psychotherapy    Risk parameters:  Patient reports no suicidal ideation  Patient reports no homicidal ideation  Patient reports no self-injurious behavior  Patient reports no violent behavior    Verbal deficits: None    Patient's response to intervention:  The patient's response to intervention is cooperative    Progress toward goals and other mental status changes:  The patient's progress toward goals is fair    Diagnosis:   Anxiety, dysthymia    Plan:  individual psychotherapy    Return to clinic: f/u as scheduled    Length of Service (minutes): 45

## 2024-04-16 ENCOUNTER — OFFICE VISIT (OUTPATIENT)
Dept: RHEUMATOLOGY | Facility: CLINIC | Age: 54
End: 2024-04-16
Payer: COMMERCIAL

## 2024-04-16 DIAGNOSIS — R76.8 POSITIVE ANA (ANTINUCLEAR ANTIBODY): Primary | ICD-10-CM

## 2024-04-16 DIAGNOSIS — M13.0 POLYARTHRITIS: ICD-10-CM

## 2024-04-16 PROCEDURE — 99999 PR PBB SHADOW E&M-EST. PATIENT-LVL II: CPT | Mod: PBBFAC,,, | Performed by: INTERNAL MEDICINE

## 2024-04-16 PROCEDURE — 99213 OFFICE O/P EST LOW 20 MIN: CPT | Mod: S$GLB,,, | Performed by: INTERNAL MEDICINE

## 2024-04-16 ASSESSMENT — SYSTEMIC LUPUS ERYTHEMATOSUS DISEASE ACTIVITY INDEX (SLEDAI): TOTAL_SCORE: 0

## 2024-04-16 NOTE — PROGRESS NOTES
Subjective:      Patient ID: Becky Cade is a 53 y.o. female.    Chief Complaint:  migratory arthralgia pips mcps and elbows; + LIBRA    HPI  having neck pain. Also arthralgias small finger joints and elbows, without definite swelling,. No fever, hair fall, +lifelong h/o oral ulcers, currently on hard palate. No Raynaud's but feet cold at night. No rash except with meloxicam  No serositis/chest pain   Review of Systems   Constitutional:  Negative for appetite change, fatigue, fever and unexpected weight change.   HENT:  Negative for mouth sores.    Eyes:  Negative for visual disturbance.   Respiratory:  Negative for cough, shortness of breath and wheezing.    Cardiovascular:  Negative for chest pain and palpitations.   Gastrointestinal:  Negative for abdominal pain, anal bleeding, blood in stool, constipation, diarrhea, nausea and vomiting.   Genitourinary:  Negative for dysuria, frequency and urgency.   Musculoskeletal:  Negative for arthralgias, back pain, gait problem, joint swelling, myalgias, neck pain and neck stiffness.   Skin:  Negative for rash.   Neurological:  Negative for weakness, numbness and headaches.   Hematological:  Negative for adenopathy. Does not bruise/bleed easily.   Psychiatric/Behavioral:  Negative for sleep disturbance. The patient is not nervous/anxious.         Objective:   LMP 02/07/2023 (Approximate)   Physical Exam      2/7/2023   Tender (JOVEL-28) 1 / 28    Swollen (JOVEL-28) 0 / 28    Provider Global 0 mm   Patient Global 0 mm   ESR 14 mm/hr   CRP 10.5 mg/L   JOVEL-28 (ESR) 2.41 (Remission)   JOVEL-28 (CRP) 2.4 (Remission)   CDAI Score 1          Latest Reference Range & Units 02/01/24 09:21 02/29/24 09:58   WBC 3.90 - 12.70 K/uL 5.46    RBC 4.00 - 5.40 M/uL 4.48    Hemoglobin 12.0 - 16.0 g/dL 14.3    Hematocrit 37.0 - 48.5 % 42.7    MCV 82 - 98 fL 95    MCH 27.0 - 31.0 pg 31.9 (H)    MCHC 32.0 - 36.0 g/dL 33.5    RDW 11.5 - 14.5 % 11.8    Platelet Count 150 - 450 K/uL 310    MPV 9.2  - 12.9 fL 9.8    Gran % 38.0 - 73.0 % 54.9    Lymph % 18.0 - 48.0 % 30.6    Mono % 4.0 - 15.0 % 9.3    Eos % 0.0 - 8.0 % 3.3    Basophil % 0.0 - 1.9 % 1.5    Immature Granulocytes 0.0 - 0.5 % 0.4    Gran # (ANC) 1.8 - 7.7 K/uL 3.0    Lymph # 1.0 - 4.8 K/uL 1.7    Mono # 0.3 - 1.0 K/uL 0.5    Eos # 0.0 - 0.5 K/uL 0.2    Baso # 0.00 - 0.20 K/uL 0.08    Immature Grans (Abs) 0.00 - 0.04 K/uL 0.02    nRBC 0 /100 WBC 0    Differential Method  Automated    Sed Rate 0 - 36 mm/Hr 8    Beta-2 Glyco 1 IgA <7.0 U/mL 2.5    Beta-2 Glyco 1 IgG <7.0 U/mL 2.0    Beta-2 Glyco 1 IgM <7.0 U/mL 4.7    Sodium 136 - 145 mmol/L 138    Potassium 3.5 - 5.1 mmol/L 4.9    Chloride 95 - 110 mmol/L 103    CO2 23 - 29 mmol/L 26    Anion Gap 8 - 16 mmol/L 9    BUN 6 - 20 mg/dL 18    Creatinine 0.5 - 1.4 mg/dL 0.8    eGFR >60 mL/min/1.73 m^2 >60    Glucose 70 - 110 mg/dL 108    Calcium 8.7 - 10.5 mg/dL 10.2    ALP 55 - 135 U/L 86    PROTEIN TOTAL 6.0 - 8.4 g/dL 8.0    Albumin 3.5 - 5.2 g/dL 4.0    BILIRUBIN TOTAL 0.1 - 1.0 mg/dL 0.5    AST 10 - 40 U/L 23    ALT 10 - 44 U/L 20    CRP 0.0 - 8.2 mg/L 5.0    ds DNA Ab Negative 1:10  Negative 1:10    Complement (C-3) 50 - 180 mg/dL 145    Complement (C-4) 11 - 44 mg/dL 22    MAMMO DIGITAL SCREENING BILAT WITH INOCENCIA   Rpt   (H): Data is abnormally high  Rpt: View report in Results Review for more information   Latest Reference Range & Units 02/01/24 09:17   Specimen UA  Urine, Clean Catch   Color, UA Yellow, Straw, Tabitha  Yellow   Appearance, UA Clear  Clear   Specific Gravity, UA 1.005 - 1.030  1.025   pH, UA 5.0 - 8.0  8.0   Protein, UA Negative  Trace !   Glucose, UA Negative  Negative   Ketones, UA Negative  Negative   Blood, UA Negative  Negative   NITRITE UA Negative  Negative   UROBILINOGEN UA <2.0 EU/dL Negative   Bilirubin (UA) Negative  Negative   Leukocyte Esterase, UA Negative  Negative   Urine Creatinine 15.0 - 325.0 mg/dL 110.3   Urine Protein 0 - 15 mg/dL 9   Prot/Creat Ratio, Urine 0.00 -  0.20  0.08   !: Data is abnormal   Latest Reference Range & Units 06/15/12 13:40 03/14/16 10:07 02/07/23 14:38   Rheumatoid Factor 0.0 - 15.0 IU/mL < 10.0 11.0 <13.0      Latest Reference Range & Units 05/27/13 15:20 03/14/16 10:07 02/07/23 14:38   CCP Antibodies <5.0 U/mL 0.5 0.6 0.7      Latest Reference Range & Units 06/15/12 13:40 03/14/16 10:07 02/07/23 14:38 02/01/24 09:21   LIBRA Neg <1:160  Pos, Titer to follow !      LIBRA Screen Negative <1:80   Positive ! Positive !    LIBRA HEP-2 Titer Neg <1:160 titer Pos 1:160 ! Positive 1:160 Speckled     LIBRA Titer 1    1:160    LIBRA PATTERN 1    Speckled    ds DNA Ab Negative 1:10  Negative Negative 1:10 Negative 1:10  Negative 1:10 Negative 1:10   Anti-SSA Antibody 0.00 - 0.99 Ratio 1.78 1.07 0.08    Anti-SSA Interpretation Negative  Negative Negative Negative    Anti-SSB Antibody 0.00 - 0.99 Ratio 0.66 0.60 0.08    Anti-SSB Interpretation Negative  Negative Negative Negative    Anti Sm Antibody 0.00 - 0.99 Ratio 1.99 1.25 0.13    Anti-Sm Interpretation Negative  Negative Negative Negative    Anti Sm/RNP Antibody 0.00 - 0.99 Ratio 0.44 0.30 0.11    Anti-Sm/RNP Interpretation Negative  Negative Negative Negative    LIBRA Hep-2 Pattern  Speckled !      DFS- 70 Antibodies    See result image under hyperlink    !: Data is abnormal     Latest Reference Range & Units 05/27/13 15:20 03/14/16 10:07 09/02/16 10:08 02/07/23 14:38 02/01/24 09:21   Sed Rate 0 - 36 mm/Hr 20 9 10 14 8      Latest Reference Range & Units 06/15/12 13:40 05/27/13 15:20 09/02/16 10:08 02/07/23 14:38 02/01/24 09:21   CRP 0.0 - 8.2 mg/L 9.5 (H) 5.6 5.5 10.5 (H) 5.0   (H): Data is abnormally high      EXAMINATION:  XR HAND COMPLETE 3 VIEWS BILATERAL     CLINICAL HISTORY:  . Polyarthritis, unspecified     TECHNIQUE:  PA, lateral, and oblique views of both hands were performed.     COMPARISON:  Two thousand twenty     FINDINGS:  Bone joint soft tissues normal.     Impression:     No new abnormality or arthritis  "change.        Electronically signed by:Maicol Messer MD  Date:                                            02/07/2023  Time:                                           16:57    Assessment:     Pt I  originally saw 7/20/12:  Onset 2nd wk June R middle pip stiff then all the other pips, and MCP's. and swollen(mild) then ankles, knees and shoulders without much swelling but limited ROM. Severe AM stiffness. Saw Dr. Fleming 6/15: labs with BMP with calcium 8.4 but alb. 3. The CBC nl. TSH nl. RF -, LIBRA 1:160 speckled - profile. RX: Aleve- worsened. ED, Vicodin, Anaprox.  Still no better. ED "shot". Dr. Fleming took prednisone taper for one week. Helped after 5 days. All symptoms resolved over 2 weeks. No fever, appetitie fine, no wgt. + fatigue. No hair fall or rash. She freq gets sores in mouth since child. Does have lifelong malar and chin erythema with sun exposure, and with wine. No pleurisy/pericarditis. No raynaud's. No history of seizures.No sicca syndrome. 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    Resolved acute parvovirus B19 due to school exposure to children with fifth disease. Mild + LIBRA with - profile. Lifelong history of recurrent oral ulcers, wine or sun-induced malar erythema, but transient and not fixed more suggestive of vascular hypereremia/rosacea than true butterfly rash.  She is well by symptoms except mild residual fatigue and normal physical exam      LIBRA+ 1:160 speckled no symptoms or signs of SLE profile neg DFS-70 neg  SLEDAI 2(palatal erythema)  H/o acute parvovirus B 19 2012  AM stiffness hands 15 min  no swollen joints so cannot apply ACR/EULAR criteria   TJ 0 SJ 2(?)Pt global 0  ESR,  CRP  DAS28   [id number]    CDAI  Mother patient of mine with RA  Minimal OA several DIPs and knees  S/p right cervical radiculopathy    Plan:   MRI hands w/wo Gd  AVISE-SLE  Anti-CarP and 14-3-3 eta  RTC 3 months      "

## 2024-04-17 ENCOUNTER — OFFICE VISIT (OUTPATIENT)
Dept: PSYCHIATRY | Facility: CLINIC | Age: 54
End: 2024-04-17
Payer: COMMERCIAL

## 2024-04-17 ENCOUNTER — LAB VISIT (OUTPATIENT)
Dept: LAB | Facility: HOSPITAL | Age: 54
End: 2024-04-17
Attending: INTERNAL MEDICINE
Payer: COMMERCIAL

## 2024-04-17 DIAGNOSIS — R76.8 POSITIVE ANA (ANTINUCLEAR ANTIBODY): ICD-10-CM

## 2024-04-17 DIAGNOSIS — F41.9 ANXIETY: Primary | ICD-10-CM

## 2024-04-17 DIAGNOSIS — M13.0 POLYARTHRITIS: ICD-10-CM

## 2024-04-17 DIAGNOSIS — F34.1 DYSTHYMIA: ICD-10-CM

## 2024-04-17 PROCEDURE — 83520 IMMUNOASSAY QUANT NOS NONAB: CPT | Performed by: INTERNAL MEDICINE

## 2024-04-17 PROCEDURE — 99999 PR PBB SHADOW E&M-EST. PATIENT-LVL I: CPT | Mod: PBBFAC,,, | Performed by: SOCIAL WORKER

## 2024-04-17 PROCEDURE — 90834 PSYTX W PT 45 MINUTES: CPT | Mod: S$GLB,,, | Performed by: SOCIAL WORKER

## 2024-04-17 PROCEDURE — 36415 COLL VENOUS BLD VENIPUNCTURE: CPT | Performed by: INTERNAL MEDICINE

## 2024-04-17 NOTE — PROGRESS NOTES
"    Individual Psychotherapy (PhD/LCSW)    7/5/2023    Site: [place]    Therapeutic Intervention: Met with patient alone  Outpatient - Insight oriented psychotherapy 45 min - CPT code 37349    Chief complaint/reason for encounter: anxiety, dysthymia     Interval history and content of current session: Pt agitated, anxious. Had long day in court last week dealing with Elbert, special master has been re-instated but no real decisions yet. Pt continues frustrated by W's conflict avoidance, hiding plans he makes or things he does to avoid argument, agrees she needs to end the relationship. Friend talked to her about "love addiction", encourage her to read on this. Pt feels ready to end things with W. Ask her to stop re-hashing the relationship with friends and focus on being ok with her support system.     Treatment plan:  Target symptoms: anxiety   Why chosen therapy is appropriate versus another modality: relevant to diagnosis  Outcome monitoring methods: self-report  Therapeutic intervention type: insight oriented psychotherapy    Risk parameters:  Patient reports no suicidal ideation  Patient reports no homicidal ideation  Patient reports no self-injurious behavior  Patient reports no violent behavior    Verbal deficits: None    Patient's response to intervention:  The patient's response to intervention is cooperative    Progress toward goals and other mental status changes:  The patient's progress toward goals is fair    Diagnosis:   Anxiety, dysthymia    Plan:  individual psychotherapy    Return to clinic: f/u as scheduled    Length of Service (minutes): 45                          "

## 2024-04-18 ENCOUNTER — CLINICAL SUPPORT (OUTPATIENT)
Dept: REHABILITATION | Facility: HOSPITAL | Age: 54
End: 2024-04-18
Attending: STUDENT IN AN ORGANIZED HEALTH CARE EDUCATION/TRAINING PROGRAM
Payer: COMMERCIAL

## 2024-04-18 DIAGNOSIS — M50.30 DDD (DEGENERATIVE DISC DISEASE), CERVICAL: ICD-10-CM

## 2024-04-18 DIAGNOSIS — M47.22 OSTEOARTHRITIS OF SPINE WITH RADICULOPATHY, CERVICAL REGION: ICD-10-CM

## 2024-04-18 DIAGNOSIS — M54.12 CERVICAL RADICULOPATHY: ICD-10-CM

## 2024-04-18 PROBLEM — R27.9 LACK OF COORDINATION: Status: RESOLVED | Noted: 2022-07-01 | Resolved: 2024-04-18

## 2024-04-18 PROCEDURE — 97161 PT EVAL LOW COMPLEX 20 MIN: CPT | Mod: PO

## 2024-04-18 PROCEDURE — 97110 THERAPEUTIC EXERCISES: CPT | Mod: PO

## 2024-04-18 NOTE — PLAN OF CARE
[facility]   Physical Therapy Initial Evaluation      Name: Becky Cade  Clinic Number: [id number]    Therapy Diagnosis:   Encounter Diagnoses   Name Primary?    Cervical radiculopathy     Osteoarthritis of spine with radiculopathy, cervical region     DDD (degenerative disc disease), cervical         Physician: Elisha Avilez DO    Physician Orders: PT Eval and Treat   Medical Diagnosis from Referral: Cervical radiculopathy [M54.12], Osteoarthritis of spine with radiculopathy, cervical region [M47.22], DDD (degenerative disc disease), cervical [M50.30]   Evaluation Date: 4/18/2024  Authorization Period Expiration: 12/31/24  Plan of Care Expiration: 7/18/24  Progress Note Due: 5/18/24  Date of Surgery: N/A  Visit # / Visits authorized: 1/ 1   FOTO: 1/ 3    Precautions: Standard     Time In: 8:00  Time Out: 8:45  Total Billable Time: 45 minutes    Subjective     Date of onset: 9 moths ago    History of current condition - Becky reports: currently having right sided neck pain beginning around 9 months ago with no mechanism of injury. She reports she has been to PT in the past for the same thing and it was helpful. She does have tingling and numbness in the arm and hand at times but this is improved right now. She did not have to take tylenol this morning.     Falls: no    Imaging: see chart    Prior Therapy: yes with positive outcomes  Social History: N/A  Occupation: [employer] - requires sitting, standing, lifting, etc  Prior Level of Function: independent  Current Level of Function: able to perform required activities. Does have some trouble with turning the head when the pain flairs up.     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    Medications:   Becky has a current medication list which includes the following prescription(s): acyclovir, calcium-vitamin d3, cyanocobalamin, esomeprazole, estradiol, estradiol, fluconazole, hydroxyzine hcl, levothyroxine, lorazepam, magnesium, naproxen sodium, paroxetine, paroxetine, progesterone, terconazole, and triamcinolone acetonide 0.1%.    Allergies:   Review of patient's allergies indicates:   Allergen Reactions    Buspar [buspirone] Other (See Comments)     dizziness    Mobic [meloxicam] Rash        Objective      Observation: forward head, rounded shoulders, mildly flattened thoracic kyphosis in prone     Posture: see above     Cervical Range of Motion:    Degrees(%) Pain   Flexion 60 no     Extension 65 Yes, right sided     Right Rotation 54 tightness     Left Rotation 60 tightness     Right Side Bending 35 tightness   Left Side Bending 35 tightness      Shoulder Range of Motion:  WFL but has pain referral on the right side with shoulder flexion    Upper Extremity Strength  (R) UE  (L) UE    Shoulder flexion: 4/5 Shoulder flexion: 5/5   Shoulder Abduction: 4/5 Shoulder abduction: 5/5   Shoulder ER 5/5 Shoulder ER 5/5   Shoulder IR 5/5 Shoulder IR 5/5   Elbow flexion: 5/5 Elbow flexion: 5/5   Elbow extension: 5/5 Elbow extension: 5/5   Wrist flexion: 5/5 Wrist flexion: 5/5   Wrist extension: 5/5 Wrist extension: 5/5       Special Tests:  Distraction No change in  "symptoms   Spurlings Positive on the right    Compression -     Cervical joint mobility: mild hypomobility at lower cervical spine. Pt reporting dull pain with CPA at mid cervical levels. No tenderness or limitation noted with side glides.       Thoracic mobility: limited thoracic mobility throughout    Palpation: mild tenderness and hypertonicity in right upper trap      Sensation: NT    Flexibility: limitations in pec and lat bilaterally     Neural tension:   -ULTT Median: -  -ULTT Ulnar: increase in tingling  -ULTT Radial: -    Intake Outcome Measure for FOTO cervical spine Survey    Therapist reviewed FOTO scores for Becky Cade on 4/18/2024.   FOTO report - see Media section or FOTO account episode details.    Intake Score: 63%         Treatment     Total Treatment time (time-based codes) separate from Evaluation: 10 minutes     Becky received the treatments listed below:      therapeutic exercises to develop strength, endurance, ROM, flexibility, posture, and core stabilization for 10 minutes including:  Seated scap squeezes 20x3"  Shoulder rows with green band 20x3"  Shoulder extensions with red band 20x3"       manual therapy techniques: Joint mobilizations, Manual traction, and Soft tissue Mobilization were applied for 0 minutes, including:      neuromuscular re-education activities to improve: Coordination, Kinesthetic, Sense, Proprioception, and Posture for 0 minutes. The following activities were included:      therapeutic activities to improve functional performance for 0  minutes, including:        Patient Education and Home Exercises     Education provided:   - education on condition  -home exercise program    Written Home Exercises Provided: yes. Exercises were reviewed and Becky was able to demonstrate them prior to the end of the session.  Becky demonstrated good  understanding of the education provided. See EMR under Patient Instructions for exercises provided during therapy " sessions.    Assessment     Becky is a 53 y.o. female referred to outpatient Physical Therapy with a medical diagnosis of Cervical radiculopathy [M54.12], Osteoarthritis of spine with radiculopathy, cervical region [M47.22], DDD (degenerative disc disease), cervical [M50.30] . Patient presents with chronic history of right sided neck pain with worsening over the past 9 months. She presents today with limitations in cervical and thoracic spine mobility, cervical range of motion, and mild limitations in upper extremity strength. She will benefit from short course of PT to establish a home program that she can complete independently to reduce future risk for flare ups in neck pain.     Patient prognosis is Good.   Patient will benefit from skilled outpatient Physical Therapy to address the deficits stated above and in the chart below, provide patient /family education, and to maximize patientt's level of independence.     Plan of care discussed with patient: Yes  Patient's spiritual, cultural and educational needs considered and patient is agreeable to the plan of care and goals as stated below:     Anticipated Barriers for therapy: none    Medical Necessity is demonstrated by the following  History  Co-morbidities and personal factors that may impact the plan of care [x] LOW: no personal factors / co-morbidities  [] MODERATE: 1-2 personal factors / co-morbidities  [] HIGH: 3+ personal factors / co-morbidities    Moderate / High Support Documentation:   Co-morbidities affecting plan of care:     Personal Factors:   no deficits     Examination  Body Structures and Functions, activity limitations and participation restrictions that may impact the plan of care [x] LOW: addressing 1-2 elements  [] MODERATE: 3+ elements  [] HIGH: 4+ elements (please support below)    Moderate / High Support Documentation:      Clinical Presentation [x] LOW: stable  [] MODERATE: Evolving  [] HIGH: Unstable     Decision Making/ Complexity  Score: low         GOALS: ( not met, progressing unless otherwise specified)  Short Term Goals: 3 weeks  1.Report decreased neck pain  <   / =  4  /10 at worst to increase tolerance for working  2. Increase cervical ROM by 5-10 degrees in order to perform ADLs with decreased difficulty.  3. Increase strength in B shoulders/scapular stabilizers by 1/3 MMT grade to increase tolerance for ADL and work activities.  4. Pt to tolerate HEP to improve ROM and independence with ADL's    Long Term Goals: 6 weeks  1.Report decreased neck pain  <   / =  2  /10  to increase tolerance for turning while driving  2. Increase UE/neck flexibility in order to improve posture.    3.Increased strength in B shoulders/scapular stabilizers to >/= 4/5 MMT grade to increase tolerance for ADL and work activities.  4.  Pt will report at CJ level (20-40% impaired) on FOTO neck score for neck pain disability to demonstrate decrease in disability and improvement in neck pain.   Plan     Plan of care Certification: 4/18/2024 to 7/18/24.    Outpatient Physical Therapy 1 times weekly for 4 weeks to include the following interventions: Aquatic Therapy, Cervical/Lumbar Traction, Electrical Stimulation  , Gait Training, Manual Therapy, Moist Heat/ Ice, Neuromuscular Re-ed, Patient Education, Therapeutic Activities, Therapeutic Exercise, and Ultrasound.     Quiana Mcclelland, PT        Physician's Signature: _________________________________________ Date: ________________

## 2024-04-24 ENCOUNTER — OFFICE VISIT (OUTPATIENT)
Dept: PSYCHIATRY | Facility: CLINIC | Age: 54
End: 2024-04-24
Payer: COMMERCIAL

## 2024-04-24 DIAGNOSIS — F41.9 ANXIETY: Primary | ICD-10-CM

## 2024-04-24 PROCEDURE — 99999 PR PBB SHADOW E&M-EST. PATIENT-LVL I: CPT | Mod: PBBFAC,,, | Performed by: SOCIAL WORKER

## 2024-04-24 PROCEDURE — 90834 PSYTX W PT 45 MINUTES: CPT | Mod: S$GLB,,, | Performed by: SOCIAL WORKER

## 2024-04-24 NOTE — PROGRESS NOTES
Individual Psychotherapy (PhD/LCSW)    7/5/2023    Site: [place]    Therapeutic Intervention: Met with patient alone  Outpatient - Insight oriented psychotherapy 45 min - CPT code 54966    Chief complaint/reason for encounter: anxiety, dysthymia     Interval history and content of current session: Pt much calmer, feels the Paxil XR is helping, no longer taking Ativan during the day. She ran into W in a coffee shop and was pleased that mood stayed stable and she was able to talk with him without urgency or agitation. Pt struggles at night waking up with grief about estranged daughters, particularly approach of Niya's college graduation, to which she is not invited. Pt continues to want a partner, and she and W plan to talk in mid-May as he indicates he is working to change his behavior, but she is more confident in herself as a single person.     Treatment plan:  Target symptoms: anxiety   Why chosen therapy is appropriate versus another modality: relevant to diagnosis  Outcome monitoring methods: self-report  Therapeutic intervention type: insight oriented psychotherapy    Risk parameters:  Patient reports no suicidal ideation  Patient reports no homicidal ideation  Patient reports no self-injurious behavior  Patient reports no violent behavior    Verbal deficits: None    Patient's response to intervention:  The patient's response to intervention is cooperative    Progress toward goals and other mental status changes:  The patient's progress toward goals is fair    Diagnosis:   Anxiety    Plan:  individual psychotherapy    Return to clinic: f/u as scheduled    Length of Service (minutes): 45

## 2024-05-01 ENCOUNTER — OFFICE VISIT (OUTPATIENT)
Dept: PSYCHIATRY | Facility: CLINIC | Age: 54
End: 2024-05-01
Payer: COMMERCIAL

## 2024-05-01 ENCOUNTER — PATIENT MESSAGE (OUTPATIENT)
Dept: PSYCHIATRY | Facility: CLINIC | Age: 54
End: 2024-05-01
Payer: COMMERCIAL

## 2024-05-01 DIAGNOSIS — F41.9 ANXIETY: Primary | ICD-10-CM

## 2024-05-01 PROCEDURE — 99999 PR PBB SHADOW E&M-EST. PATIENT-LVL I: CPT | Mod: PBBFAC,,, | Performed by: SOCIAL WORKER

## 2024-05-01 PROCEDURE — 90834 PSYTX W PT 45 MINUTES: CPT | Mod: S$GLB,,, | Performed by: SOCIAL WORKER

## 2024-05-01 NOTE — PROGRESS NOTES
Individual Psychotherapy (PhD/LCSW)    7/5/2023    Site: [place]    Therapeutic Intervention: Met with patient alone  Outpatient - Insight oriented psychotherapy 45 min - CPT code 40664    Chief complaint/reason for encounter: anxiety, dysthymia     Interval history and content of current session: Pt found herself deeply hurt by W's [employer] plans and gave him an ultimatum to change them or end the relationship, now has broken it off but feels inner fink between standing up for herself and fearing she has done something wrong. Point out pt's lack of trust in herself, shaped in childhood. Ask pt to interrupt thoughts of shame and doubt and look at areas of her life where she does stand up strongly for herself. After interrupting these negative thoughts pt is to offer support and comfort to herself and stop trying to seek validation from others.     Treatment plan:  Target symptoms: anxiety   Why chosen therapy is appropriate versus another modality: relevant to diagnosis  Outcome monitoring methods: self-report  Therapeutic intervention type: insight oriented psychotherapy    Risk parameters:  Patient reports no suicidal ideation  Patient reports no homicidal ideation  Patient reports no self-injurious behavior  Patient reports no violent behavior    Verbal deficits: None    Patient's response to intervention:  The patient's response to intervention is cooperative    Progress toward goals and other mental status changes:  The patient's progress toward goals is fair    Diagnosis:   Anxiety    Plan:  individual psychotherapy    Return to clinic: f/u as scheduled    Length of Service (minutes): 45

## 2024-05-03 ENCOUNTER — PATIENT MESSAGE (OUTPATIENT)
Dept: DERMATOLOGY | Facility: CLINIC | Age: 54
End: 2024-05-03
Payer: COMMERCIAL

## 2024-05-05 DIAGNOSIS — Z72.820 POOR SLEEP: ICD-10-CM

## 2024-05-05 DIAGNOSIS — F41.1 GAD (GENERALIZED ANXIETY DISORDER): ICD-10-CM

## 2024-05-06 RX ORDER — HYDROXYZINE HYDROCHLORIDE 10 MG/1
20 TABLET, FILM COATED ORAL NIGHTLY
Qty: 180 TABLET | Refills: 1 | Status: SHIPPED | OUTPATIENT
Start: 2024-05-06

## 2024-05-07 NOTE — PROGRESS NOTES
"[facility]   Physical Therapy Treatment Note      Name: Becky Verduzco [facility]  Clinic Number: [id number]    Therapy Diagnosis:   Encounter Diagnoses   Name Primary?    Cervical radiculopathy Yes    Muscle weakness of extremity      Physician: Elisha Avilez DO    Visit Date: 5/9/2024    Physician Orders: PT Eval and Treat   Medical Diagnosis from Referral: Cervical radiculopathy [M54.12], Osteoarthritis of spine with radiculopathy, cervical region [M47.22], DDD (degenerative disc disease), cervical [M50.30]   Evaluation Date: 4/18/2024  Authorization Period Expiration: 12/31/24  Plan of Care Expiration: 7/18/24  Progress Note Due: 5/18/24  Date of Surgery: N/A  Visit # / Visits authorized: 1/20   FOTO: 1/ 3     Precautions: Standard      Time In: 8:00  Time Out: 8:40  Total Billable Time: 40 minutes    PTA Visit #: 0/5       Subjective     Patient reports: feeling very stiff today.  She was compliant with home exercise program.  Response to previous treatment: initial eval  Functional change: ongoing    Pain: 2/10  Location: right cervical spine, arm      Objective      Objective Measures updated at progress report unless specified.     Treatment     Becky received the treatments listed below:      therapeutic exercises to develop strength, endurance, ROM, flexibility, posture, and core stabilization for 30 minutes including:  Supine pec stetch over towel roll x3 min  Supine thoracic extension stretch over towel roll x3 min  Supine horizontal abduction with red band 2x10  Seated cat cow x10  Seated upper trunk rotations 10x3" B  Seated scap squeezes 20x3"  Shoulder rows with green band 20x3"  Shoulder extensions with red band 20x3"   Seated thoracic extension stretch 10x3"        manual therapy techniques: Joint mobilizations, Manual traction, and Soft tissue Mobilization were applied for 10 minutes, including:  Upper trap STM  Cervical CPA grades 3-4  Thoracic CPA grades 3-4   "   neuromuscular re-education activities to improve: Coordination, Kinesthetic, Sense, Proprioception, and Posture for 0 minutes. The following activities were included:        therapeutic activities to improve functional performance for 0  minutes, including:          Patient Education and Home Exercises       Education provided:   - education on condition  -home exercise program     Written Home Exercises Provided: yes. Exercises were reviewed and Becky was able to demonstrate them prior to the end of the session.  Becky demonstrated good  understanding of the education provided. See EMR under Patient Instructions for exercises provided during therapy sessions.    Assessment     Becky presents today with reports of anterior soreness and tenderness at start of session. Despite this, she was able to perform exercises as noted above. She is still reporting soreness by end of session, but no increase in pain. Discussed normal muscle soreness. Continue to progress.     Becky Is progressing well towards her goals.   Patient prognosis is Good.     Patient will continue to benefit from skilled outpatient physical therapy to address the deficits listed in the problem list box on initial evaluation, provide pt/family education and to maximize pt's level of independence in the home and community environment.     Patient's spiritual, cultural and educational needs considered and pt agreeable to plan of care and goals.     Anticipated barriers to physical therapy: none    Goals:  ( not met, progressing unless otherwise specified)  Short Term Goals: 3 weeks  1.Report decreased neck pain  <   / =  4  /10 at worst to increase tolerance for working  2. Increase cervical ROM by 5-10 degrees in order to perform ADLs with decreased difficulty.  3. Increase strength in B shoulders/scapular stabilizers by 1/3 MMT grade to increase tolerance for ADL and work activities.  4. Pt to tolerate HEP to improve ROM and  independence with ADL's     Long Term Goals: 6 weeks  1.Report decreased neck pain  <   / =  2  /10  to increase tolerance for turning while driving  2. Increase UE/neck flexibility in order to improve posture.    3.Increased strength in B shoulders/scapular stabilizers to >/= 4/5 MMT grade to increase tolerance for ADL and work activities.  4.  Pt will report at CJ level (20-40% impaired) on FOTO neck score for neck pain disability to demonstrate decrease in disability and improvement in neck pain.     Plan     Plan of care Certification: 4/18/2024 to 7/18/24.     Continue plan of care    Quiana Mcclelland, PT

## 2024-05-08 ENCOUNTER — OFFICE VISIT (OUTPATIENT)
Dept: PSYCHIATRY | Facility: CLINIC | Age: 54
End: 2024-05-08
Payer: COMMERCIAL

## 2024-05-08 DIAGNOSIS — F41.9 ANXIETY: Primary | ICD-10-CM

## 2024-05-08 PROCEDURE — 90834 PSYTX W PT 45 MINUTES: CPT | Mod: S$GLB,,, | Performed by: SOCIAL WORKER

## 2024-05-08 NOTE — PROGRESS NOTES
"    Individual Psychotherapy (PhD/LCSW)    7/5/2023    Site: [place]    Therapeutic Intervention: Met with patient alone  Outpatient - Insight oriented psychotherapy 45 min - CPT code 46271    Chief complaint/reason for encounter: anxiety     Interval history and content of current session: Pt has chosen to resume contact with W, who is expressing desire to learn to be more tuned in and thoughtful, and expresses love for her. Pt feeling need for connection as this week is Niya's graduation and Mother's Day and pt continues to reach out to E and gets no response.  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 Pt doing well on Paxil, working with Damon Brooks on regulating sleep meds.     Treatment plan:  Target symptoms: anxiety   Why chosen therapy is appropriate versus another modality: relevant to diagnosis  Outcome monitoring methods: self-report  Therapeutic intervention type: insight oriented psychotherapy    Risk parameters:  Patient reports no suicidal ideation  Patient reports no homicidal ideation  Patient reports no self-injurious behavior  Patient reports no violent behavior    Verbal deficits: None    Patient's response to intervention:  The patient's response to intervention is motivated    Progress toward goals and other mental status changes:  The patient's progress toward goals is good    Diagnosis:   Anxiety    Plan:  individual psychotherapy    Return to clinic: f/u as scheduled    Length of Service (minutes): 45      "

## 2024-05-09 ENCOUNTER — CLINICAL SUPPORT (OUTPATIENT)
Dept: REHABILITATION | Facility: HOSPITAL | Age: 54
End: 2024-05-09
Payer: COMMERCIAL

## 2024-05-09 ENCOUNTER — OFFICE VISIT (OUTPATIENT)
Dept: DERMATOLOGY | Facility: CLINIC | Age: 54
End: 2024-05-09
Payer: COMMERCIAL

## 2024-05-09 ENCOUNTER — PATIENT MESSAGE (OUTPATIENT)
Dept: DERMATOLOGY | Facility: CLINIC | Age: 54
End: 2024-05-09

## 2024-05-09 VITALS — BODY MASS INDEX: 23.69 KG/M2 | WEIGHT: 138 LBS

## 2024-05-09 DIAGNOSIS — L71.0 PERIORAL DERMATITIS: Primary | ICD-10-CM

## 2024-05-09 DIAGNOSIS — M54.12 CERVICAL RADICULOPATHY: Primary | ICD-10-CM

## 2024-05-09 DIAGNOSIS — M62.81 MUSCLE WEAKNESS OF EXTREMITY: ICD-10-CM

## 2024-05-09 DIAGNOSIS — L73.9 FOLLICULITIS: ICD-10-CM

## 2024-05-09 PROCEDURE — 1160F RVW MEDS BY RX/DR IN RCRD: CPT | Mod: CPTII,S$GLB,, | Performed by: PHYSICIAN ASSISTANT

## 2024-05-09 PROCEDURE — 3008F BODY MASS INDEX DOCD: CPT | Mod: CPTII,S$GLB,, | Performed by: PHYSICIAN ASSISTANT

## 2024-05-09 PROCEDURE — 1159F MED LIST DOCD IN RCRD: CPT | Mod: CPTII,S$GLB,, | Performed by: PHYSICIAN ASSISTANT

## 2024-05-09 PROCEDURE — 97110 THERAPEUTIC EXERCISES: CPT | Mod: PO

## 2024-05-09 PROCEDURE — 99999 PR PBB SHADOW E&M-EST. PATIENT-LVL IV: CPT | Mod: PBBFAC,,, | Performed by: PHYSICIAN ASSISTANT

## 2024-05-09 PROCEDURE — 97140 MANUAL THERAPY 1/> REGIONS: CPT | Mod: PO

## 2024-05-09 PROCEDURE — 99214 OFFICE O/P EST MOD 30 MIN: CPT | Mod: S$GLB,,, | Performed by: PHYSICIAN ASSISTANT

## 2024-05-09 RX ORDER — ERYTHROMYCIN 20 MG/G
GEL TOPICAL
Qty: 30 G | Refills: 1 | Status: SHIPPED | OUTPATIENT
Start: 2024-05-09

## 2024-05-09 RX ORDER — DOXYCYCLINE HYCLATE 100 MG
TABLET ORAL
Qty: 30 TABLET | Refills: 1 | Status: SHIPPED | OUTPATIENT
Start: 2024-05-09

## 2024-05-09 NOTE — PATIENT INSTRUCTIONS
Treatment plan:  -Stop using steroid creams and fluoride toothpaste  -Discussed not adding any products into her regimen and to use very basic/mild skin care products while we get her skin to settle down  -Avoid fluoride toothpaste -- recommend Thanh's fluoride free toothpaste    Start:  -     doxycycline (VIBRA-TABS) 100 MG tablet; Take 1 po qday with food and not within 1 hour prior to lying down  Dispense: 30 tablet; Refill: 1  -     erythromycin with ethanoL (EMGEL) 2 % gel; Apply to the affected areas of face bid.  Dispense: 30 g; Refill: 1    Discussed benefits and risks of doxycyline therapy including but not limited to GI discomfort, esophageal irritation/ulceration, and increased sun sensitivity. Patient was counseled to take medicine with meals and at least 1 hour before lying down.           Vitamin C recommendations (wait to start one of these until we get skin to settle down):  Vitamin C serum over face- Revision (lotion-consistency), Skin Medica, or OTC Cerave or LaRoche Posay Vitamin C serum

## 2024-05-09 NOTE — PROGRESS NOTES
Subjective:      Patient ID:  Becky Cade is a 53 y.o. female who presents for   Chief Complaint   Patient presents with    Skin Discoloration     Itching, rash like  on the face, several months      Skin Discoloration - Initial  Affected locations: face  Duration: 2 months  Signs / symptoms: itching, irritated, peeling and dryness  Aggravated by: nothing    She reports that her rash has been going on for months. She does report increased stress as of lately.   She reports that she has tried TAC cream 0.1%, hydrocortisone cream, and OTC things with no relief.     She also reports having bumps at the back of her scalp and one on the top of her scalp that started two weeks ago.    Review of Systems   Constitutional:  Negative for fever, chills, weight loss, weight gain, fatigue, night sweats and malaise.   Skin:  Positive for itching, rash, dry skin, activity-related sunscreen use and wears hat. Negative for daily sunscreen use.   Hematologic/Lymphatic: Does not bruise/bleed easily.       Objective:   Physical Exam   Constitutional: She appears well-developed and well-nourished. No distress.   Neurological: She is alert and oriented to person, place, and time. She is not disoriented.   Psychiatric: She has a normal mood and affect.   Skin:   Areas Examined (abnormalities noted in diagram):   Scalp / Hair Palpated and Inspected  Head / Face Inspection Performed            Posterior scalp    Frontal scalp              Diagram Legend     Erythematous scaling macule/papule c/w actinic keratosis       Vascular papule c/w angioma      Pigmented verrucoid papule/plaque c/w seborrheic keratosis      Yellow umbilicated papule c/w sebaceous hyperplasia      Irregularly shaped tan macule c/w lentigo     1-2 mm smooth white papules consistent with Milia      Movable subcutaneous cyst with punctum c/w epidermal inclusion cyst      Subcutaneous movable cyst c/w pilar cyst      Firm pink to brown papule c/w dermatofibroma       Pedunculated fleshy papule(s) c/w skin tag(s)      Evenly pigmented macule c/w junctional nevus     Mildly variegated pigmented, slightly irregular-bordered macule c/w mildly atypical nevus      Flesh colored to evenly pigmented papule c/w intradermal nevus       Pink pearly papule/plaque c/w basal cell carcinoma      Erythematous hyperkeratotic cursted plaque c/w SCC      Surgical scar with no sign of skin cancer recurrence      Open and closed comedones      Inflammatory papules and pustules      Verrucoid papule consistent consistent with wart     Erythematous eczematous patches and plaques     Dystrophic onycholytic nail with subungual debris c/w onychomycosis     Umbilicated papule    Erythematous-base heme-crusted tan verrucoid plaque consistent with inflamed seborrheic keratosis     Erythematous Silvery Scaling Plaque c/w Psoriasis     See annotation      Assessment / Plan:        Perioral dermatitis  -     doxycycline (VIBRA-TABS) 100 MG tablet; Take 1 po qday with food and not within 1 hour prior to lying down  Dispense: 30 tablet; Refill: 1  -     erythromycin with ethanoL (EMGEL) 2 % gel; Apply to the affected areas of face bid.  Dispense: 30 g; Refill: 1  -Stop using steroid creams and fluoride toothpaste  -Discussed not adding any products into her regimen and to use very basic/mild skin care products while we get her skin to settle down  -Avoid fluoride toothpaste -- recommend Thanh's fluoride free toothpaste    Discussed benefits and risks of doxycyline therapy including but not limited to GI discomfort, esophageal irritation/ulceration, and increased sun sensitivity. Patient was counseled to take medicine with meals and at least 1 hour before lying down.       Folliculitis- scalp; seems like it's resolving based on physical exam today  -Discussed to avoid scratching  -We will continue to monitor                Follow up in about 2 months (around 7/9/2024) for perioral dermatitis.

## 2024-05-14 ENCOUNTER — HOSPITAL ENCOUNTER (OUTPATIENT)
Dept: RADIOLOGY | Facility: HOSPITAL | Age: 54
Discharge: HOME | End: 2024-05-14
Attending: INTERNAL MEDICINE
Payer: COMMERCIAL

## 2024-05-14 ENCOUNTER — PATIENT MESSAGE (OUTPATIENT)
Dept: RHEUMATOLOGY | Facility: CLINIC | Age: 54
End: 2024-05-14
Payer: COMMERCIAL

## 2024-05-14 DIAGNOSIS — M13.0 POLYARTHRITIS: ICD-10-CM

## 2024-05-14 DIAGNOSIS — R76.8 POSITIVE ANA (ANTINUCLEAR ANTIBODY): ICD-10-CM

## 2024-05-14 LAB
MISCELLANEOUS TEST NAME: NORMAL
REFERENCE LAB: NORMAL
SPECIMEN TYPE: NORMAL
TEST RESULT: NORMAL

## 2024-05-14 PROCEDURE — 25500020 PHARM REV CODE 255: Performed by: INTERNAL MEDICINE

## 2024-05-14 PROCEDURE — A9585 GADOBUTROL INJECTION: HCPCS | Performed by: INTERNAL MEDICINE

## 2024-05-14 PROCEDURE — 73223 MRI JOINT UPR EXTR W/O&W/DYE: CPT | Mod: TC,50

## 2024-05-14 PROCEDURE — 73223 MRI JOINT UPR EXTR W/O&W/DYE: CPT | Mod: 26,50,, | Performed by: RADIOLOGY

## 2024-05-14 RX ORDER — GADOBUTROL 604.72 MG/ML
5 INJECTION INTRAVENOUS
Status: COMPLETED | OUTPATIENT
Start: 2024-05-14 | End: 2024-05-14

## 2024-05-14 RX ADMIN — GADOBUTROL 5 ML: 604.72 INJECTION INTRAVENOUS at 09:05

## 2024-05-15 ENCOUNTER — OFFICE VISIT (OUTPATIENT)
Dept: PSYCHIATRY | Facility: CLINIC | Age: 54
End: 2024-05-15
Payer: COMMERCIAL

## 2024-05-15 DIAGNOSIS — F41.9 ANXIETY: Primary | ICD-10-CM

## 2024-05-15 PROCEDURE — 90834 PSYTX W PT 45 MINUTES: CPT | Mod: S$GLB,,, | Performed by: SOCIAL WORKER

## 2024-05-15 PROCEDURE — 99999 PR PBB SHADOW E&M-EST. PATIENT-LVL I: CPT | Mod: PBBFAC,,, | Performed by: SOCIAL WORKER

## 2024-05-15 NOTE — PROGRESS NOTES
"    Individual Psychotherapy (PhD/LCSW)    7/5/2023    Site: [facility]    Therapeutic Intervention: Met with patient alone  Outpatient - Insight oriented psychotherapy 45 min - CPT code 03218    Chief complaint/reason for encounter: anxiety     Interval history and content of current session: Pt has been seeing W, sees him making a real effort to be more forthcoming and attentive. Pt shares recent incident at sister's home where sister shamed pt for "crawling back" to W while mom looked on smiling. Discuss how Kelsea is re-enacting pt's childhood experiences, making her feel unacceptable for having different values and emotional needs. Pt's mother has requested to come in with her next week, pt agreeable, discuss agenda. Pt's daughter Niya graduated from college this weekend, excluded pt from attending. Daughter Linda sent text saying "Happy Mother's Day from both of us" without any acknowledgement of years of estrangement. Pt is stable over all, did feel deeply upset after confrontation by sister on Mother's Day.     Treatment plan:  Target symptoms: anxiety   Why chosen therapy is appropriate versus another modality: relevant to diagnosis  Outcome monitoring methods: self-report  Therapeutic intervention type: insight oriented psychotherapy    Risk parameters:  Patient reports no suicidal ideation  Patient reports no homicidal ideation  Patient reports no self-injurious behavior  Patient reports no violent behavior    Verbal deficits: None    Patient's response to intervention:  The patient's response to intervention is motivated    Progress toward goals and other mental status changes:  The patient's progress toward goals is good    Diagnosis:   Anxiety    Plan:  individual psychotherapy    Return to clinic: f/u as scheduled    Length of Service (minutes): 45        "

## 2024-05-15 NOTE — PROGRESS NOTES
"[facility]   Physical Therapy Treatment Note      Name: Becky Cade  Clinic Number: [id number]    Therapy Diagnosis:   Encounter Diagnoses   Name Primary?    Cervical radiculopathy Yes    Muscle weakness of extremity        Physician: Elisha Avilez DO    Visit Date: 5/16/2024    Physician Orders: PT Eval and Treat   Medical Diagnosis from Referral: Cervical radiculopathy [M54.12], Osteoarthritis of spine with radiculopathy, cervical region [M47.22], DDD (degenerative disc disease), cervical [M50.30]   Evaluation Date: 4/18/2024  Authorization Period Expiration: 12/31/24  Plan of Care Expiration: 7/18/24  Progress Note Due: 5/18/24  Date of Surgery: N/A  Visit # / Visits authorized: 2/20   FOTO: 1/ 3     Precautions: Standard      Time In: 8:00  Time Out: 8:51  Total Billable Time: 51 minutes    PTA Visit #: 0/5       Subjective     Patient reports: feeling very stiff today.  She was compliant with home exercise program.  Response to previous treatment: initial eval  Functional change: ongoing    Pain: 2/10  Location: right cervical spine, arm      Objective      Objective Measures updated at progress report unless specified.     Treatment     Bold=performed    Becky received the treatments listed below:      therapeutic exercises to develop strength, endurance, ROM, flexibility, posture, and core stabilization for 41 minutes including:  Supine pec stetch over towel roll x3 min  Supine thoracic extension stretch over towel roll x3 min  Supine horizontal abduction with red band 2x10  Seated cat cow x10  Seated upper trunk rotations 10x3" B  Seated scap squeezes 20x3"  Shoulder rows with green band 2x10  Shoulder extensions with red band 2x10   Seated thoracic extension stretch 10x3"  +seated bilateral shoulder external rotation with red band 2x10  +single arm row 3# 2x10 B  +serratus slides on wall 2x10         manual therapy techniques: Joint mobilizations, Manual traction, " and Soft tissue Mobilization were applied for 10 minutes, including:  Upper trap STM  Cervical CPA grades 3-4  Thoracic CPA grades 3-4     neuromuscular re-education activities to improve: Coordination, Kinesthetic, Sense, Proprioception, and Posture for 0 minutes. The following activities were included:        therapeutic activities to improve functional performance for 0  minutes, including:          Patient Education and Home Exercises       Education provided:   - education on condition  -home exercise program     Written Home Exercises Provided: yes. Exercises were reviewed and Becky was able to demonstrate them prior to the end of the session.  Becky demonstrated good  understanding of the education provided. See EMR under Patient Instructions for exercises provided during therapy sessions.    Assessment     Becky reports minimal pain in the neck and posterior shoulder today but continues to experience tightness and soreness in the anterior shoulder and chest musculature. She is able to perform exercises well with no increases in pain and will continue to progress as tolerated.     Becky Is progressing well towards her goals.   Patient prognosis is Good.     Patient will continue to benefit from skilled outpatient physical therapy to address the deficits listed in the problem list box on initial evaluation, provide pt/family education and to maximize pt's level of independence in the home and community environment.     Patient's spiritual, cultural and educational needs considered and pt agreeable to plan of care and goals.     Anticipated barriers to physical therapy: none    Goals:  ( not met, progressing unless otherwise specified)  Short Term Goals: 3 weeks  1.Report decreased neck pain  <   / =  4  /10 at worst to increase tolerance for working  2. Increase cervical ROM by 5-10 degrees in order to perform ADLs with decreased difficulty.  3. Increase strength in B shoulders/scapular  stabilizers by 1/3 MMT grade to increase tolerance for ADL and work activities.  4. Pt to tolerate HEP to improve ROM and independence with ADL's     Long Term Goals: 6 weeks  1.Report decreased neck pain  <   / =  2  /10  to increase tolerance for turning while driving  2. Increase UE/neck flexibility in order to improve posture.    3.Increased strength in B shoulders/scapular stabilizers to >/= 4/5 MMT grade to increase tolerance for ADL and work activities.  4.  Pt will report at CJ level (20-40% impaired) on FOTO neck score for neck pain disability to demonstrate decrease in disability and improvement in neck pain.     Plan     Plan of care Certification: 4/18/2024 to 7/18/24.     Continue plan of care    Quiana Mcclelland, PT

## 2024-05-16 ENCOUNTER — CLINICAL SUPPORT (OUTPATIENT)
Dept: REHABILITATION | Facility: HOSPITAL | Age: 54
End: 2024-05-16
Payer: COMMERCIAL

## 2024-05-16 DIAGNOSIS — M62.81 MUSCLE WEAKNESS OF EXTREMITY: ICD-10-CM

## 2024-05-16 DIAGNOSIS — M54.12 CERVICAL RADICULOPATHY: Primary | ICD-10-CM

## 2024-05-16 PROCEDURE — 97112 NEUROMUSCULAR REEDUCATION: CPT | Mod: PO

## 2024-05-16 PROCEDURE — 97110 THERAPEUTIC EXERCISES: CPT | Mod: PO

## 2024-05-17 ENCOUNTER — TELEPHONE (OUTPATIENT)
Dept: OBSTETRICS AND GYNECOLOGY | Facility: CLINIC | Age: 54
End: 2024-05-17
Payer: COMMERCIAL

## 2024-05-17 ENCOUNTER — PATIENT MESSAGE (OUTPATIENT)
Dept: OBSTETRICS AND GYNECOLOGY | Facility: CLINIC | Age: 54
End: 2024-05-17
Payer: COMMERCIAL

## 2024-05-17 NOTE — TELEPHONE ENCOUNTER
Pt called wanting to know what else she could use. Pt states at the opening of the vagina she is very irritated and dry after intercourse. Pt states she uses KY gel lubricant and vagisil on the area also and it burns. Advised pt not to use the vagisil, to try putting the estradiol vaginal cream on that area and to try the good clean love lubricant. Pt states Dr Abdul has seen her when she has been like this and states the area is just red and inflamed. Pt will try and let us know hpw she is doing

## 2024-05-22 ENCOUNTER — OFFICE VISIT (OUTPATIENT)
Dept: PSYCHIATRY | Facility: CLINIC | Age: 54
End: 2024-05-22
Payer: COMMERCIAL

## 2024-05-22 ENCOUNTER — OFFICE VISIT (OUTPATIENT)
Dept: OBSTETRICS AND GYNECOLOGY | Facility: CLINIC | Age: 54
End: 2024-05-22
Attending: OBSTETRICS & GYNECOLOGY
Payer: COMMERCIAL

## 2024-05-22 VITALS
SYSTOLIC BLOOD PRESSURE: 114 MMHG | WEIGHT: 138 LBS | DIASTOLIC BLOOD PRESSURE: 76 MMHG | HEIGHT: 64 IN | BODY MASS INDEX: 23.56 KG/M2 | HEART RATE: 80 BPM

## 2024-05-22 DIAGNOSIS — N76.0 ACUTE VAGINITIS: ICD-10-CM

## 2024-05-22 DIAGNOSIS — F41.9 ANXIETY: Primary | ICD-10-CM

## 2024-05-22 DIAGNOSIS — N89.8 VAGINAL IRRITATION: Primary | ICD-10-CM

## 2024-05-22 PROCEDURE — 99214 OFFICE O/P EST MOD 30 MIN: CPT | Mod: S$GLB,,, | Performed by: OBSTETRICS & GYNECOLOGY

## 2024-05-22 PROCEDURE — 3074F SYST BP LT 130 MM HG: CPT | Mod: CPTII,S$GLB,, | Performed by: OBSTETRICS & GYNECOLOGY

## 2024-05-22 PROCEDURE — 3008F BODY MASS INDEX DOCD: CPT | Mod: CPTII,S$GLB,, | Performed by: OBSTETRICS & GYNECOLOGY

## 2024-05-22 PROCEDURE — 99999 PR PBB SHADOW E&M-EST. PATIENT-LVL I: CPT | Mod: PBBFAC,,, | Performed by: SOCIAL WORKER

## 2024-05-22 PROCEDURE — 3078F DIAST BP <80 MM HG: CPT | Mod: CPTII,S$GLB,, | Performed by: OBSTETRICS & GYNECOLOGY

## 2024-05-22 PROCEDURE — 90847 FAMILY PSYTX W/PT 50 MIN: CPT | Mod: S$GLB,,, | Performed by: SOCIAL WORKER

## 2024-05-22 PROCEDURE — 99999 PR PBB SHADOW E&M-EST. PATIENT-LVL III: CPT | Mod: PBBFAC,,, | Performed by: OBSTETRICS & GYNECOLOGY

## 2024-05-22 RX ORDER — DOXEPIN HYDROCHLORIDE 10 MG/1
10 CAPSULE ORAL NIGHTLY
COMMUNITY
Start: 2024-05-08

## 2024-05-22 RX ORDER — PAROXETINE HYDROCHLORIDE HEMIHYDRATE 25 MG/1
50 TABLET, FILM COATED, EXTENDED RELEASE ORAL
COMMUNITY
Start: 2024-05-07

## 2024-05-22 RX ORDER — FLUCONAZOLE 150 MG/1
150 TABLET ORAL ONCE
Qty: 1 TABLET | Refills: 1 | Status: SHIPPED | OUTPATIENT
Start: 2024-05-22 | End: 2024-05-22

## 2024-05-22 RX ORDER — CLOTRIMAZOLE AND BETAMETHASONE DIPROPIONATE 10; .64 MG/G; MG/G
CREAM TOPICAL 2 TIMES DAILY
Qty: 45 G | Refills: 1 | Status: SHIPPED | OUTPATIENT
Start: 2024-05-22

## 2024-05-22 NOTE — PROGRESS NOTES
SUBJECTIVE:   53 y.o. female   presents today complaining of vaginal irritation.  Patient's last menstrual period was 2023 (approximate)..  She reports using Vagisil and had extreme burning.  She reports burning with insertion and intercourse has continued.  She also reports itching.  She has been using KY for lubrication but recently order the Good clean Love lubricant.        Past Medical History:   Diagnosis Date    Anxiety     ASCUS of cervix     Autoimmune disorder: just features: mouth sores, butterfly rash 2012    PENNY III (cervical intraepithelial neoplasia III)     Fever blister     Hypothyroid     Parvovirus arthritis of multiple sites 2012     Past Surgical History:   Procedure Laterality Date    COLD KNIFE CONIZATION OF CERVIX      KJB    COLONOSCOPY N/A 2020    Procedure: COLONOSCOPY;  Surgeon: Thaddeus Blackburn MD;  Location: [facility] ([address] [place] [address]);  Service: Endoscopy;  Laterality: N/A;  family history malignant hyperthermia  covid test [facility] urgent care 9/15    CYSTOSCOPY N/A 3/25/2022    Procedure: CYSTOSCOPY;  Surgeon: Brian Babcock MD;  Location: [facility] OR [address] [place] [address];  Service: OB/GYN;  Laterality: N/A;    INSERTION OF MIDURETHRAL SLING N/A 3/25/2022    Procedure: SLING, MIDURETHRAL;  Surgeon: Brian Babcock MD;  Location: [facility] OR [address] [place] [address];  Service: OB/GYN;  Laterality: N/A;    TONSILLECTOMY       Social History     Socioeconomic History    Marital status:    Tobacco Use    Smoking status: Never    Smokeless tobacco: Never   Substance and Sexual Activity    Alcohol use: Yes     Alcohol/week: 2.0 standard drinks of alcohol     Types: 2 Glasses of wine per week     Comment: socially on weekends    Drug use: No    Sexual activity: Yes     Partners: Male     Birth control/protection: Other-see comments     Comment: Apri Birth Control Pills     Social Determinants of Health     Physical Activity: Unknown (2024)    Exercise Vital Sign     Days of Exercise  per Week: 1 day   Stress: Stress Concern Present (2024)    [facility] Occupational [facility] - Occupational Stress Questionnaire     Feeling of Stress : To some extent   Housing Stability: Unknown (2024)    Housing Stability Vital Sign     Unable to Pay for Housing in the Last Year: No     Unstable Housing in the Last Year: No     Family History   Problem Relation Name Age of Onset    Parkinsonism Father      Rheum arthritis Mother      Arthritis Mother      Malignant hyperthermia Brother      Breast cancer Neg Hx      Colon cancer Neg Hx      Ovarian cancer Neg Hx      Melanoma Neg Hx      Cancer Neg Hx      Heart disease Neg Hx      Cervical cancer Neg Hx      Uterine cancer Neg Hx       OB History    Para Term  AB Living   2 2 0     2   SAB IAB Ectopic Multiple Live Births           2      # Outcome Date GA Lbr Alexis/2nd Weight Sex Type Anes PTL Lv   2 Para      Vag-Spont      1 Para      Vag-Spont              Current Outpatient Medications   Medication Sig Dispense Refill    doxepin (SINEQUAN) 10 MG capsule Take 10 mg by mouth every evening.      paroxetine (PAXIL-CR) 25 MG 24 hr tablet Take 50 mg by mouth.      acyclovir (ZOVIRAX) 400 MG tablet Take 1 tablet (400 mg total) by mouth 2 (two) times daily. Fever blisters 14 tablet 0    calcium-vitamin D3 (OS-KRISTY 500 + D3) 500 mg-5 mcg (200 unit) per tablet Take 1 tablet by mouth 2 (two) times daily with meals.      clotrimazole-betamethasone 1-0.05% (LOTRISONE) cream Apply topically 2 (two) times daily. 45 g 1    cyanocobalamin (VITAMIN B-12) 1000 MCG tablet Take 1,000 mcg by mouth once daily.      doxycycline (VIBRA-TABS) 100 MG tablet Take 1 po qday with food and not within 1 hour prior to lying down 30 tablet 1    erythromycin with ethanoL (EMGEL) 2 % gel Apply to the affected areas of face bid. 30 g 1    esomeprazole (NEXIUM) 20 MG capsule Take 20 mg by mouth as needed. Takes 2-3 a year      estradioL (DIVIGEL) 0.25 mg/0.25 gram  (0.1 %) GlPk Place 1 packet onto the skin once daily. 30 packet 11    estradioL (ESTRACE) 0.01 % (0.1 mg/gram) vaginal cream Use 0.5 gram of estrogen cream in vagina twice weekly 42.5 g 3    fluconazole (DIFLUCAN) 150 MG Tab Take 1 tablet (150 mg total) by mouth every 72 hours as needed (vaginal itching/discharge). (Patient not taking: Reported on 5/9/2024) 2 tablet 0    fluconazole (DIFLUCAN) 150 MG Tab Take 1 tablet (150 mg total) by mouth once. REFILL AND RE-DOSE IF SYMPTOMS RECUR for 1 dose 1 tablet 1    hydrOXYzine HCL (ATARAX) 10 MG Tab Take 2 tablets (20 mg total) by mouth every evening. 180 tablet 1    levothyroxine (SYNTHROID) 75 MCG tablet TAKE 1 TABLET(75 MCG) BY MOUTH BEFORE BREAKFAST 90 tablet 1    LORazepam (ATIVAN) 0.5 MG tablet Take 1 tablet (0.5 mg total) by mouth nightly as needed for Anxiety. Use only infrequently, can be addictive 15 tablet 0    magnesium 30 mg Tab Take by mouth once.      naproxen sodium (ANAPROX) 550 MG tablet Take 1 tablet (550 mg total) by mouth as needed (as needed for jaw pain). 30 tablet 2    progesterone (PROMETRIUM) 100 MG capsule Take 1 capsule (100 mg total) by mouth nightly. 30 capsule 1    terconazole (TERAZOL 7) 0.4 % Crea Place 1 applicator vaginally every evening. 7 g 1    triamcinolone acetonide 0.1% (KENALOG) 0.1 % cream AAA bid 454 g 3     No current facility-administered medications for this visit.     Allergies: Buspar [buspirone] and Mobic [meloxicam]     The 10-year ASCVD risk score (Kyle DK, et al., 2019) is: 0.9%    Values used to calculate the score:      Age: 53 years      Sex: Female      Is Non- : No      Diabetic: No      Tobacco smoker: No      Systolic Blood Pressure: 114 mmHg      Is BP treated: No      HDL Cholesterol: 89 mg/dL      Total Cholesterol: 236 mg/dL      ROS:  Constitutional: no weight loss, weight gain, fever, fatigue    Gastrointestinal: No diarrhea, bloody stool, nausea/vomiting, constipation, gas,  hemorrhoids  Genitourinary: No blood in urine, painful urination, urgency of urination, frequency of urination, incomplete emptying, incontinence, abnormal bleeding, painful periods, heavy periods, vaginal discharge, vaginal odor, painful intercourse, sexual problems, bleeding after intercourse, +vaginal itching, +vaginal irritation.  Musculoskeletal: No muscle weakness  Skin/Breast: No painful breasts, nipple discharge, masses, rash, ulcers  Neurological: No passing out, seizures, numbness, headache  Endocrine: No diabetes, hypothyroid, hyperthyroid, hot flashes, hair loss, abnormal hair growth, acne  Psychiatric: No depression, crying  Hematologic: No bruises, bleeding, swollen lymph nodes, anemia.      Physical Exam    General- well developed, well nourished  Vulva- no masses, no lesions, +erythema  Vagina-  no masses, no lesions, +erythema    ASSESSMENT:   Vaginal irritation  Vaginitis    PLAN:   Counseled her to not use Vagisil any longer.  Counseled her to use Good clean Love moisturizer on the outside of the vagina as well as at the vaginal opening at least 5 times per week.  Switch from KY to Good clean Love lubricant.  Continue using vaginal estrogen 1/2-1 g twice weekly.  We will also call in Lotrisone to be used twice daily for 1 week as well as Diflucan 150 mg to treat acute vaginitis.  Counseled her to try not to use the undergarments that she uses because they create an environment that promotes yeast

## 2024-05-22 NOTE — PROGRESS NOTES
Family Psychotherapy (PhD/LCSW)    5/22/2024    Site: [place]    Length of service: 60    Therapeutic intervention: 90517-Family therapy with patient; needed because mother requested to participate in a session, pt agreable    Persons present: patient and mother     Interval history: Pt seen with mother Rosemary Cade at mom's request. Mom concerned at the level of distress pt displays at times and the appearance that she is unable to end relationship with Harry. Help mom to understand that pt is working through her own relationship issues and working with W in a healthy way. Pt confronts mom's failure to support her in dispute with sister on Mother's Day.    Target symptoms: anxiety      Patient's interpersonal/verbal exchanges: 90927-Family therapy with patient:  active listening, frequent questions, and self-disclosure    Progress toward goals: progressing well    Diagnosis: anxiety    Plan: individual psychotherapy  family psychotherapy    Return to clinic: as scheduled

## 2024-05-22 NOTE — PROGRESS NOTES
[facility]   Physical Therapy Treatment Note / Discharge Visit     Name: Becky Cade  Clinic Number: [id number]    Therapy Diagnosis:   No diagnosis found.      Physician: Elisha Avilez DO    Visit Date: 5/23/2024    Physician Orders: PT Eval and Treat   Medical Diagnosis from Referral: Cervical radiculopathy [M54.12], Osteoarthritis of spine with radiculopathy, cervical region [M47.22], DDD (degenerative disc disease), cervical [M50.30]   Evaluation Date: 4/18/2024  Authorization Period Expiration: 12/31/24  Plan of Care Expiration: 7/18/24  Progress Note Due: due next visit!  Date of Surgery: N/A  Visit # / Visits authorized: 3/20   FOTO: 1/ 3     Precautions: Standard      Time In: 8:00  Time Out: 8:50  Total Billable Time: 50 minutes    PTA Visit #: 0/5       Subjective     Patient reports: feeling very stiff today.  She was compliant with home exercise program.  Response to previous treatment: initial eval  Functional change: ongoing    Pain: 2/10  Location: right cervical spine, arm      Objective      Observation: forward head, rounded shoulders, mildly flattened thoracic kyphosis in prone      Posture: see above      Cervical Range of Motion:     Degrees(%) Pain   Flexion 60 no      Extension 65 Yes, right sided      Right Rotation 54 tightness      Left Rotation 60 tightness      Right Side Bending 35 tightness   Left Side Bending 35 tightness      Shoulder Range of Motion:  WFL but has pain referral on the right side with shoulder flexion     Upper Extremity Strength  (R) UE   (L) UE     Shoulder flexion: 4/5 Shoulder flexion: 5/5   Shoulder Abduction: 4/5 Shoulder abduction: 5/5   Shoulder ER 5/5 Shoulder ER 5/5   Shoulder IR 5/5 Shoulder IR 5/5   Elbow flexion: 5/5 Elbow flexion: 5/5   Elbow extension: 5/5 Elbow extension: 5/5   Wrist flexion: 5/5 Wrist flexion: 5/5   Wrist extension: 5/5 Wrist extension: 5/5         Special Tests:  Distraction No change in  "symptoms   Spurlings Positive on the right    Compression -      Cervical joint mobility: mild hypomobility at lower cervical spine. Pt reporting dull pain with CPA at mid cervical levels. No tenderness or limitation noted with side glides.        Thoracic mobility: limited thoracic mobility throughout     Palpation: mild tenderness and hypertonicity in right upper trap       Sensation: NT     Flexibility: limitations in pec and lat bilaterally      Neural tension:   -ULTT Median: -  -ULTT Ulnar: increase in tingling  -ULTT Radial: -     Intake Outcome Measure for FOTO cervical spine Survey     Therapist reviewed FOTO scores for Becky Cade on 4/18/2024.   FOTO report - see Media section or FOTO account episode details.     Intake Score: 63%              Treatment     Bold=performed    Becky received the treatments listed below:      therapeutic exercises to develop strength, endurance, ROM, flexibility, posture, and core stabilization for 40 minutes including:  Supine pec stetch over towel roll x3 min  Supine thoracic extension stretch over towel roll x3 min  Supine horizontal abduction with red band 2x10  Seated cat cow x10  Seated upper trunk rotations 10x3" B  Seated scap squeezes 20x3"  Shoulder rows with green band 2x10  Shoulder extensions with red band 2x10   Seated thoracic extension stretch 10x3"  seated bilateral shoulder external rotation with red band 2x10  single arm row 3# 2x10 B  serratus slides on wall 2x10         manual therapy techniques: Joint mobilizations, Manual traction, and Soft tissue Mobilization were applied for 10 minutes, including:  Upper trap STM  Cervical CPA grades 3-4  Cervical side glides grades 3-4   Thoracic CPA grades 3-4     neuromuscular re-education activities to improve: Coordination, Kinesthetic, Sense, Proprioception, and Posture for 0 minutes. The following activities were included:        therapeutic activities to improve functional performance for 0  " minutes, including:          Patient Education and Home Exercises       Education provided:   - education on condition  -home exercise program     Written Home Exercises Provided: yes. Exercises were reviewed and Becky was able to demonstrate them prior to the end of the session.  Becky demonstrated good  understanding of the education provided. See EMR under Patient Instructions for exercises provided during therapy sessions.    Assessment     Becky presents today with soreness along the medial border of the right scapula consistent with facet joint dysfunction along with continued pain in the anterior shoulder/pectoralis region. We discussed that this tenderness may be related to chest size and posture which she is actively trying to address within her daily life. She does report partial relief along the right scapula following manual therapy today and is able to perform exercises with no increases in pain. Will review HEP and discharge at next visit.     Becky Is progressing well towards her goals.   Patient prognosis is Good.     Patient will continue to benefit from skilled outpatient physical therapy to address the deficits listed in the problem list box on initial evaluation, provide pt/family education and to maximize pt's level of independence in the home and community environment.     Patient's spiritual, cultural and educational needs considered and pt agreeable to plan of care and goals.     Anticipated barriers to physical therapy: none    Goals:  ( not met, progressing unless otherwise specified)  Short Term Goals: 3 weeks   1.Report decreased neck pain  <   / =  4  /10 at worst to increase tolerance for working  2. Increase cervical ROM by 5-10 degrees in order to perform ADLs with decreased difficulty.  3. Increase strength in B shoulders/scapular stabilizers by 1/3 MMT grade to increase tolerance for ADL and work activities.  4. Pt to tolerate HEP to improve ROM and independence with  ADL's     Long Term Goals: 6 weeks  1.Report decreased neck pain  <   / =  2  /10  to increase tolerance for turning while driving  2. Increase UE/neck flexibility in order to improve posture.    3.Increased strength in B shoulders/scapular stabilizers to >/= 4/5 MMT grade to increase tolerance for ADL and work activities.  4.  Pt will report at CJ level (20-40% impaired) on FOTO neck score for neck pain disability to demonstrate decrease in disability and improvement in neck pain.     Plan     Plan of care Certification: 4/18/2024 to 7/18/24.     Continue plan of care    Quiana Mcclelland, PT

## 2024-05-23 ENCOUNTER — CLINICAL SUPPORT (OUTPATIENT)
Dept: REHABILITATION | Facility: HOSPITAL | Age: 54
End: 2024-05-23
Payer: COMMERCIAL

## 2024-05-23 DIAGNOSIS — M54.2 PAINFUL CERVICAL ROM: ICD-10-CM

## 2024-05-23 DIAGNOSIS — M54.2 NECK PAIN: Primary | ICD-10-CM

## 2024-05-23 PROCEDURE — 97140 MANUAL THERAPY 1/> REGIONS: CPT | Mod: PO

## 2024-05-23 PROCEDURE — 97110 THERAPEUTIC EXERCISES: CPT | Mod: PO

## 2024-05-27 ENCOUNTER — TELEPHONE (OUTPATIENT)
Dept: RHEUMATOLOGY | Facility: CLINIC | Age: 54
End: 2024-05-27
Payer: COMMERCIAL

## 2024-05-29 ENCOUNTER — TELEPHONE (OUTPATIENT)
Dept: RHEUMATOLOGY | Facility: CLINIC | Age: 54
End: 2024-05-29
Payer: COMMERCIAL

## 2024-05-29 ENCOUNTER — OFFICE VISIT (OUTPATIENT)
Dept: PSYCHIATRY | Facility: CLINIC | Age: 54
End: 2024-05-29
Payer: COMMERCIAL

## 2024-05-29 DIAGNOSIS — F41.9 ANXIETY: Primary | ICD-10-CM

## 2024-05-29 PROCEDURE — 99999 PR PBB SHADOW E&M-EST. PATIENT-LVL I: CPT | Mod: PBBFAC,,, | Performed by: SOCIAL WORKER

## 2024-05-29 PROCEDURE — 90834 PSYTX W PT 45 MINUTES: CPT | Mod: S$GLB,,, | Performed by: SOCIAL WORKER

## 2024-05-29 NOTE — PROGRESS NOTES
[facility]   Physical Therapy Treatment Note / Discharge Visit     Name: Becky Cade  Clinic Number: [id number]    Therapy Diagnosis:   Encounter Diagnoses   Name Primary?    Cervical radiculopathy Yes    Muscle weakness of extremity          Physician: Elisha Avilez DO    Visit Date: 5/30/2024    Physician Orders: PT Eval and Treat   Medical Diagnosis from Referral: Cervical radiculopathy [M54.12], Osteoarthritis of spine with radiculopathy, cervical region [M47.22], DDD (degenerative disc disease), cervical [M50.30]   Evaluation Date: 4/18/2024  Authorization Period Expiration: 12/31/24  Plan of Care Expiration: 7/18/24  Progress Note Due: 6/29/24  Date of Surgery: N/A  Visit # / Visits authorized: 4/20   FOTO: 3/ 3 CLOSED     Precautions: Standard      Time In: 8:00  Time Out: 8:55  Total Billable Time: 55 minutes    PTA Visit #: 0/5       Subjective     Patient reports: feeling very stiff today.  She was compliant with home exercise program.  Response to previous treatment: initial eval  Functional change: ongoing    Pain: 2/10  Location: right cervical spine, arm      Objective      Observation: forward head, rounded shoulders, mildly flattened thoracic kyphosis in prone      Posture: see above      Cervical Range of Motion:     Degrees(%) Pain   Flexion 60 no      Extension 75 no      Right Rotation 54 tightness      Left Rotation 60 tightness      Right Side Bending 50 no   Left Side Bending 35 no      Shoulder Range of Motion:  WFL but has pain referral on the right side with shoulder flexion     Upper Extremity Strength  (R) UE   (L) UE     Shoulder flexion: 5/5 Shoulder flexion: 5/5   Shoulder Abduction: 5/5 Shoulder abduction: 5/5   Shoulder ER 5/5 Shoulder ER 5/5   Shoulder IR 5/5 Shoulder IR 5/5   Elbow flexion: 5/5 Elbow flexion: 5/5   Elbow extension: 5/5 Elbow extension: 5/5   Wrist flexion: 5/5 Wrist flexion: 5/5   Wrist extension: 5/5 Wrist extension: 5/5     "     Special Tests:  Distraction No change in symptoms   Spurlings Positive on the right    Compression -      Cervical joint mobility: mild hypomobility at lower cervical spine. Pt reporting dull pain with CPA at mid cervical levels. No tenderness or limitation noted with side glides.        Thoracic mobility: limited thoracic mobility throughout     Palpation: mild tenderness and hypertonicity in right upper trap       Sensation: NT     Flexibility: limitations in pec and lat bilaterally      Neural tension:   -ULTT Median: -  -ULTT Ulnar: increase in tingling  -ULTT Radial: -     Intake Outcome Measure for FOTO cervical spine Survey     Therapist reviewed FOTO scores for Becky Cade on 5/30/2024.   FOTO report - see Media section or FOTO account episode details.     Intake Score: 72%              Treatment     Bold=performed    Becky received the treatments listed below:      therapeutic exercises to develop strength, endurance, ROM, flexibility, posture, and core stabilization for 25 minutes including:  Supine pec stetch over towel roll x3 min  Supine thoracic extension stretch over towel roll x3 min  Supine horizontal abduction with red band 2x10  Seated cat cow x10  Seated upper trunk rotations 10x3" B  Seated scap squeezes 20x3"  Shoulder rows with green band 2x10  Shoulder extensions with red band 2x10   Seated thoracic extension stretch 10x3"  seated bilateral shoulder external rotation with red band 2x10  single arm row 3# 2x10 B  serratus slides on wall 2x10         manual therapy techniques: Joint mobilizations, Manual traction, and Soft tissue Mobilization were applied for 30 minutes, including:  Upper trap STM  Cervical CPA grades 3-4  Cervical side glides grades 3-4   Thoracic CPA grades 3-4  Massage gun periscapular musculature     neuromuscular re-education activities to improve: Coordination, Kinesthetic, Sense, Proprioception, and Posture for 0 minutes. The following activities were " included:        therapeutic activities to improve functional performance for 0  minutes, including:          Patient Education and Home Exercises       Education provided:   - education on condition  -home exercise program     Written Home Exercises Provided: yes. Exercises were reviewed and Becky was able to demonstrate them prior to the end of the session.  Becky demonstrated good  understanding of the education provided. See EMR under Patient Instructions for exercises provided during therapy sessions.    Assessment     Becky tolerated session well today with positive response to manual therapy and no increases in pain following exercises. She demonstrates good recall and form with exercises and is ready for discharge to home program as her PT goals have been met.     Becky Is progressing well towards her goals.   Patient prognosis is Good.     Patient will continue to benefit from skilled outpatient physical therapy to address the deficits listed in the problem list box on initial evaluation, provide pt/family education and to maximize pt's level of independence in the home and community environment.     Patient's spiritual, cultural and educational needs considered and pt agreeable to plan of care and goals.     Anticipated barriers to physical therapy: none    Goals:  ( not met, progressing unless otherwise specified)  Short Term Goals: 3 weeks   1.Report decreased neck pain  <   / =  4  /10 at worst to increase tolerance for working MET  2. Increase cervical ROM by 5-10 degrees in order to perform ADLs with decreased difficulty. MET  3. Increase strength in B shoulders/scapular stabilizers by 1/3 MMT grade to increase tolerance for ADL and work activities. MET  4. Pt to tolerate HEP to improve ROM and independence with ADL's MET     Long Term Goals: 6 weeks  1.Report decreased neck pain  <   / =  2  /10  to increase tolerance for turning while driving MET  2. Increase UE/neck flexibility in  order to improve posture.  MET   3.Increased strength in B shoulders/scapular stabilizers to >/= 4/5 MMT grade to increase tolerance for ADL and work activities. MET  4.  Pt will report at CJ level (20-40% impaired) on FOTO neck score for neck pain disability to demonstrate decrease in disability and improvement in neck pain.  MET    Plan     Plan of care Certification: 4/18/2024 to 7/18/24.     Patient is discharged from skilled PT    Quiana Mcclelland, PT

## 2024-05-29 NOTE — PROGRESS NOTES
Individual Psychotherapy (PhD/LCSW)    7/5/2023    Site: [facility]    Therapeutic Intervention: Met with patient alone  Outpatient - Insight oriented psychotherapy 45 min - CPT code 31732    Chief complaint/reason for encounter: anxiety     Interval history and content of current session: Review joint session with mom last week, which pt felt was helpful. Discuss mom's background and aversion to avoiding dealing with some difficult realities in family dynamics, and how this affects pt. Pt has been tentatively dx'd with lupus, feels an actual diagnosis will help her to take better care of herself. Discuss mixed feelings about bringing W to [place].     Treatment plan:  Target symptoms: anxiety   Why chosen therapy is appropriate versus another modality: relevant to diagnosis  Outcome monitoring methods: self-report  Therapeutic intervention type: insight oriented psychotherapy    Risk parameters:  Patient reports no suicidal ideation  Patient reports no homicidal ideation  Patient reports no self-injurious behavior  Patient reports no violent behavior    Verbal deficits: None    Patient's response to intervention:  The patient's response to intervention is motivated    Progress toward goals and other mental status changes:  The patient's progress toward goals is good    Diagnosis:   Anxiety    Plan:  individual psychotherapy    Return to clinic: f/u as scheduled    Length of Service (minutes): 45

## 2024-05-29 NOTE — TELEPHONE ENCOUNTER
Esha, this test was ordered 4/17/24 and still no result. Please call lab and find out what's going on. Thank you VIRGEN        Misc Sendout Test, Blood AVISE-SLE,  to [facility]  Order: [id number]  Status: Preliminary result       Visible to patient: No (not released)       Next appt: 05/30/2024 at 08:00 AM in Outpatient Rehab (Quiana Mcclelland, PT)       Dx: Polyarthritis; Positive LIBRA (antinucl...    1 Result Note   important suggestion  Newer results are available. Click to view them now.         Component 1 mo ago   Miscellaneous Test Name AVISE-SLE,  to [facility]   Resulting Agency [facility]              Narrative  Performed by: [facility]  HARRISSLE,  to [facility]  Release to patient->Immediate      Specimen Collected: 04/17/24 10:03 CDT

## 2024-05-30 ENCOUNTER — CLINICAL SUPPORT (OUTPATIENT)
Dept: REHABILITATION | Facility: HOSPITAL | Age: 54
End: 2024-05-30
Payer: COMMERCIAL

## 2024-05-30 DIAGNOSIS — M62.81 MUSCLE WEAKNESS OF EXTREMITY: ICD-10-CM

## 2024-05-30 DIAGNOSIS — M54.12 CERVICAL RADICULOPATHY: Primary | ICD-10-CM

## 2024-05-30 PROCEDURE — 97140 MANUAL THERAPY 1/> REGIONS: CPT | Mod: PO

## 2024-05-30 PROCEDURE — 97110 THERAPEUTIC EXERCISES: CPT | Mod: PO

## 2024-06-06 ENCOUNTER — TELEPHONE (OUTPATIENT)
Dept: RHEUMATOLOGY | Facility: CLINIC | Age: 54
End: 2024-06-06

## 2024-06-06 ENCOUNTER — OFFICE VISIT (OUTPATIENT)
Dept: RHEUMATOLOGY | Facility: CLINIC | Age: 54
End: 2024-06-06
Payer: COMMERCIAL

## 2024-06-06 ENCOUNTER — LAB VISIT (OUTPATIENT)
Dept: LAB | Facility: HOSPITAL | Age: 54
End: 2024-06-06
Attending: INTERNAL MEDICINE
Payer: COMMERCIAL

## 2024-06-06 VITALS
BODY MASS INDEX: 23.71 KG/M2 | HEART RATE: 85 BPM | WEIGHT: 138.88 LBS | SYSTOLIC BLOOD PRESSURE: 119 MMHG | HEIGHT: 64 IN | DIASTOLIC BLOOD PRESSURE: 79 MMHG

## 2024-06-06 DIAGNOSIS — M32.8 OTHER FORMS OF SYSTEMIC LUPUS ERYTHEMATOSUS, UNSPECIFIED ORGAN INVOLVEMENT STATUS: ICD-10-CM

## 2024-06-06 DIAGNOSIS — R76.8 POSITIVE ANA (ANTINUCLEAR ANTIBODY): ICD-10-CM

## 2024-06-06 DIAGNOSIS — M13.0 POLYARTHRITIS: ICD-10-CM

## 2024-06-06 DIAGNOSIS — M32.9 SYSTEMIC LUPUS ERYTHEMATOSUS, UNSPECIFIED SLE TYPE, UNSPECIFIED ORGAN INVOLVEMENT STATUS: ICD-10-CM

## 2024-06-06 DIAGNOSIS — M13.0 POLYARTHRITIS: Primary | ICD-10-CM

## 2024-06-06 LAB
BILIRUB UR QL STRIP: NEGATIVE
BILIRUB UR QL STRIP: NEGATIVE
CLARITY UR REFRACT.AUTO: CLEAR
CLARITY UR REFRACT.AUTO: CLEAR
COLOR UR AUTO: YELLOW
COLOR UR AUTO: YELLOW
CREAT UR-MCNC: 62 MG/DL (ref 15–325)
GLUCOSE UR QL STRIP: NEGATIVE
GLUCOSE UR QL STRIP: NEGATIVE
HGB UR QL STRIP: NEGATIVE
HGB UR QL STRIP: NEGATIVE
KETONES UR QL STRIP: NEGATIVE
KETONES UR QL STRIP: NEGATIVE
LEUKOCYTE ESTERASE UR QL STRIP: NEGATIVE
LEUKOCYTE ESTERASE UR QL STRIP: NEGATIVE
MISCELLANEOUS TEST NAME: NORMAL
NITRITE UR QL STRIP: NEGATIVE
NITRITE UR QL STRIP: NEGATIVE
PH UR STRIP: 5 [PH] (ref 5–8)
PH UR STRIP: 5 [PH] (ref 5–8)
PROT UR QL STRIP: NEGATIVE
PROT UR QL STRIP: NEGATIVE
PROT UR-MCNC: 8 MG/DL (ref 0–15)
PROT/CREAT UR: 0.13 MG/G{CREAT} (ref 0–0.2)
REFERENCE LAB: NORMAL
SP GR UR STRIP: 1.01 (ref 1–1.03)
SP GR UR STRIP: 1.01 (ref 1–1.03)
SPECIMEN TYPE: NORMAL
TEST RESULT: NORMAL
URN SPEC COLLECT METH UR: NORMAL
URN SPEC COLLECT METH UR: NORMAL

## 2024-06-06 PROCEDURE — 99214 OFFICE O/P EST MOD 30 MIN: CPT | Mod: S$GLB,,, | Performed by: INTERNAL MEDICINE

## 2024-06-06 PROCEDURE — 3008F BODY MASS INDEX DOCD: CPT | Mod: CPTII,S$GLB,, | Performed by: INTERNAL MEDICINE

## 2024-06-06 PROCEDURE — 3078F DIAST BP <80 MM HG: CPT | Mod: CPTII,S$GLB,, | Performed by: INTERNAL MEDICINE

## 2024-06-06 PROCEDURE — 3074F SYST BP LT 130 MM HG: CPT | Mod: CPTII,S$GLB,, | Performed by: INTERNAL MEDICINE

## 2024-06-06 PROCEDURE — 84156 ASSAY OF PROTEIN URINE: CPT | Performed by: INTERNAL MEDICINE

## 2024-06-06 PROCEDURE — 99999 PR PBB SHADOW E&M-EST. PATIENT-LVL IV: CPT | Mod: PBBFAC,,, | Performed by: INTERNAL MEDICINE

## 2024-06-06 PROCEDURE — 1159F MED LIST DOCD IN RCRD: CPT | Mod: CPTII,S$GLB,, | Performed by: INTERNAL MEDICINE

## 2024-06-06 PROCEDURE — 81003 URINALYSIS AUTO W/O SCOPE: CPT | Performed by: INTERNAL MEDICINE

## 2024-06-06 RX ORDER — HYDROXYCHLOROQUINE SULFATE 200 MG/1
300 TABLET, FILM COATED ORAL DAILY
Qty: 45 TABLET | Refills: 2 | Status: SHIPPED | OUTPATIENT
Start: 2024-06-06

## 2024-06-06 ASSESSMENT — SYSTEMIC LUPUS ERYTHEMATOSUS DISEASE ACTIVITY INDEX (SLEDAI): TOTAL_SCORE: 4

## 2024-06-06 NOTE — TELEPHONE ENCOUNTER
Please cancel the anti CarP antibody send out to [facility], and cancel antiphospholipid, anti B2 GP1 and lupus anticoagulant tests. Thank you ALYSHA

## 2024-06-06 NOTE — PROGRESS NOTES
Patient ID:  Becky Cade    YOB: 1970     MRN:  [id number]     Subjective:     Chief Complaint:  Disease Management     History of Present Illness:  Pt is a 53 y.o. female with migratory arthralgia pips mcps and elbows, + LIBRA who presents today for f/u. LCV was 4/16/24. At that time she reported arthralgias in small finger joints and elbows and oral ulcers, as well as neck pain. Anti-CarP and 14-3-3 eta was negative but AVISE-SLE still pending (collected 4/17).     Today: Since LCV, patient reports continued migratory arthralgias (primarily affecting the elbows, wrists, shoulders, and knees), morning stiffness, oral ulcers, generalized fatigue, and butterfly rash that's worse with sunlight. Taking Tylenol extra strength BID with relief of arthralgias. She reports bilateral hand  weakness secondary to wrist pain. Also having chest pain consistent with costochondritis (tender to palpation, worse with coughing or changes in position). She underwent MRI of the bilateral wrists after LCV which showed mild edema about the right ulnar styloid process. She has continued doing piliates once weekly and adhering to mediterranean diet.     Denies hair loss, vision changes, dry mouth, trouble swallowing, joint swelling, or GI disturbances.      Review of Systems   Review of Systems   Constitutional:  Positive for fatigue. Negative for fever and unexpected weight change.   HENT:  Positive for mouth sores. Negative for trouble swallowing.    Eyes:  Negative for pain.   Respiratory:  Negative for cough and shortness of breath.    Cardiovascular:  Positive for chest pain (superficial, tender to touch).   Gastrointestinal:  Positive for nausea. Negative for constipation, diarrhea and vomiting.   Musculoskeletal:  Positive for arthralgias and neck pain. Negative for back pain, gait problem, joint swelling and myalgias.   Skin:  Positive for rash.   Neurological:  Positive for dizziness. Negative for seizures,  syncope, weakness and headaches.   Hematological:  Negative for adenopathy. Bruises/bleeds easily.   Psychiatric/Behavioral:  Negative for sleep disturbance.         Current Medications:    Current Outpatient Medications:     acyclovir (ZOVIRAX) 400 MG tablet, Take 1 tablet (400 mg total) by mouth 2 (two) times daily. Fever blisters, Disp: 14 tablet, Rfl: 0    calcium-vitamin D3 (OS-KRISTY 500 + D3) 500 mg-5 mcg (200 unit) per tablet, Take 1 tablet by mouth 2 (two) times daily with meals., Disp: , Rfl:     cyanocobalamin (VITAMIN B-12) 1000 MCG tablet, Take 1,000 mcg by mouth once daily., Disp: , Rfl:     doxepin (SINEQUAN) 10 MG capsule, Take 10 mg by mouth every evening., Disp: , Rfl:     doxycycline (VIBRA-TABS) 100 MG tablet, Take 1 po qday with food and not within 1 hour prior to lying down, Disp: 30 tablet, Rfl: 1    erythromycin with ethanoL (EMGEL) 2 % gel, Apply to the affected areas of face bid., Disp: 30 g, Rfl: 1    esomeprazole (NEXIUM) 20 MG capsule, Take 20 mg by mouth as needed. Takes 2-3 a year, Disp: , Rfl:     estradioL (DIVIGEL) 0.25 mg/0.25 gram (0.1 %) GlPk, Place 1 packet onto the skin once daily., Disp: 30 packet, Rfl: 11    estradioL (ESTRACE) 0.01 % (0.1 mg/gram) vaginal cream, Use 0.5 gram of estrogen cream in vagina twice weekly, Disp: 42.5 g, Rfl: 3    hydrOXYzine HCL (ATARAX) 10 MG Tab, Take 2 tablets (20 mg total) by mouth every evening., Disp: 180 tablet, Rfl: 1    levothyroxine (SYNTHROID) 75 MCG tablet, TAKE 1 TABLET(75 MCG) BY MOUTH BEFORE BREAKFAST, Disp: 90 tablet, Rfl: 1    magnesium 30 mg Tab, Take by mouth once., Disp: , Rfl:     naproxen sodium (ANAPROX) 550 MG tablet, Take 1 tablet (550 mg total) by mouth as needed (as needed for jaw pain)., Disp: 30 tablet, Rfl: 2    paroxetine (PAXIL-CR) 25 MG 24 hr tablet, Take 50 mg by mouth., Disp: , Rfl:     progesterone (PROMETRIUM) 100 MG capsule, Take 1 capsule (100 mg total) by mouth nightly., Disp: 30 capsule, Rfl: 1    triamcinolone  acetonide 0.1% (KENALOG) 0.1 % cream, AAA bid, Disp: 454 g, Rfl: 3    clotrimazole-betamethasone 1-0.05% (LOTRISONE) cream, Apply topically 2 (two) times daily. (Patient not taking: Reported on 6/6/2024), Disp: 45 g, Rfl: 1    fluconazole (DIFLUCAN) 150 MG Tab, Take 1 tablet (150 mg total) by mouth every 72 hours as needed (vaginal itching/discharge). (Patient not taking: Reported on 6/6/2024), Disp: 2 tablet, Rfl: 0    hydroxychloroquine (PLAQUENIL) 200 mg tablet, Take 1.5 tablets (300 mg total) by mouth once daily., Disp: 45 tablet, Rfl: 2    LORazepam (ATIVAN) 0.5 MG tablet, Take 1 tablet (0.5 mg total) by mouth nightly as needed for Anxiety. Use only infrequently, can be addictive, Disp: 15 tablet, Rfl: 0    terconazole (TERAZOL 7) 0.4 % Crea, Place 1 applicator vaginally every evening. (Patient not taking: Reported on 6/6/2024), Disp: 7 g, Rfl: 1     Objective:     Vitals:    06/06/24 1318   BP: 119/79   Pulse: 85      Body mass index is 23.84 kg/m².     Physical Exam   Constitutional: She is oriented to person, place, and time. normal appearance.   HENT:   Head: Normocephalic and atraumatic.   Nose: Nose normal. No nasal congestion.   Mouth/Throat: Mucous membranes are moist.   Eyes: Pupils are equal, round, and reactive to light. Conjunctivae are normal.   Cardiovascular: Normal rate, regular rhythm, normal heart sounds and normal pulses. Exam reveals no friction rub.   Pulmonary/Chest: Effort normal and breath sounds normal. She exhibits tenderness (bilateral about multiple costosternal joints).   Abdominal: Soft. She exhibits no distension. There is no abdominal tenderness.   Musculoskeletal:         General: Tenderness present. Normal range of motion.   Lymphadenopathy:     She has no cervical adenopathy.   Neurological: She is alert and oriented to person, place, and time. She displays no weakness. No sensory deficit. Gait normal.   Skin: Skin is warm and dry. Capillary refill takes less than 2 seconds.    Psychiatric: Her behavior is normal. Mood normal.   Vitals reviewed.      Right Side Rheumatological Exam     Shoulder Exam   Sensation: normal    Knee Exam   Sensation: normal    Hip Exam   Sensation: normal    Elbow/Wrist Exam   Sensation: normal    Muscle Strength (0-5 scale):  Deltoid:  5  Biceps: 5/5   Triceps:  5  : 4.8/5   Iliopsoas: 4.8  Quadriceps:  5     Left Side Rheumatological Exam     Shoulder Exam   Sensation: normal    Knee Exam   Sensation: normal    Hip Exam   Sensation: normal    Elbow/Wrist Exam   Sensation: normal    Muscle Strength (0-5 scale):  Deltoid:  5  Biceps: 5/5   Triceps:  5  :  4.8/5   Iliopsoas: 4.8  Quadriceps:  5              2/7/2023 4/16/2024 6/6/2024   Tender (JOVEL-28) 1 / 28 0 / 28 5 / 28    Swollen (JOVEL-28) 0 / 28 4 / 28 0 / 28    Provider Global 0 mm -- --   Patient Global 0 mm -- --   ESR 14 mm/hr -- --   CRP 10.5 mg/L -- --   JOVEL-28 (ESR) 2.41 (Remission) -- --   JOVEL-28 (CRP) 2.4 (Remission) -- --   CDAI Score 1  -- --               Latest Reference Range & Units 02/01/24 09:21   WBC 3.90 - 12.70 K/uL 5.46   RBC 4.00 - 5.40 M/uL 4.48   Hemoglobin 12.0 - 16.0 g/dL 14.3   Hematocrit 37.0 - 48.5 % 42.7   MCV 82 - 98 fL 95   MCH 27.0 - 31.0 pg 31.9 (H)   MCHC 32.0 - 36.0 g/dL 33.5   RDW 11.5 - 14.5 % 11.8   Platelet Count 150 - 450 K/uL 310   MPV 9.2 - 12.9 fL 9.8   Gran % 38.0 - 73.0 % 54.9   Lymph % 18.0 - 48.0 % 30.6   Mono % 4.0 - 15.0 % 9.3   Eos % 0.0 - 8.0 % 3.3   Basophil % 0.0 - 1.9 % 1.5   Immature Granulocytes 0.0 - 0.5 % 0.4   Gran # (ANC) 1.8 - 7.7 K/uL 3.0   Lymph # 1.0 - 4.8 K/uL 1.7   Mono # 0.3 - 1.0 K/uL 0.5   Eos # 0.0 - 0.5 K/uL 0.2   Baso # 0.00 - 0.20 K/uL 0.08   Immature Grans (Abs) 0.00 - 0.04 K/uL 0.02   nRBC 0 /100 WBC 0   Differential Method  Automated   Sed Rate 0 - 36 mm/Hr 8   Beta-2 Glyco 1 IgA <7.0 U/mL 2.5   Beta-2 Glyco 1 IgG <7.0 U/mL 2.0   Beta-2 Glyco 1 IgM <7.0 U/mL 4.7   Sodium 136 - 145 mmol/L 138   Potassium 3.5 - 5.1 mmol/L  4.9   Chloride 95 - 110 mmol/L 103   CO2 23 - 29 mmol/L 26   Anion Gap 8 - 16 mmol/L 9   BUN 6 - 20 mg/dL 18   Creatinine 0.5 - 1.4 mg/dL 0.8   eGFR >60 mL/min/1.73 m^2 >60   Glucose 70 - 110 mg/dL 108   Calcium 8.7 - 10.5 mg/dL 10.2   ALP 55 - 135 U/L 86   PROTEIN TOTAL 6.0 - 8.4 g/dL 8.0   Albumin 3.5 - 5.2 g/dL 4.0   BILIRUBIN TOTAL 0.1 - 1.0 mg/dL 0.5   AST 10 - 40 U/L 23   ALT 10 - 44 U/L 20   CRP 0.0 - 8.2 mg/L 5.0   ds DNA Ab Negative 1:10  Negative 1:10   Complement (C-3) 50 - 180 mg/dL 145   Complement (C-4) 11 - 44 mg/dL 22   (H): Data is abnormally high    Bilateral Wrist-Hand MRI w/wo   FINDINGS:  There is no subcutaneous edema.  There is no muscle edema.  There is no significant superficial or deep fascial fluid.  There is no large drainable fluid collection .  There is no focal bone marrow edema.  There is no large joint effusion.  Joint involved: Both hands  Alignment: Normal  Fluid: Normal  Synovial thickening/synovitis: Absent  Bony productive changes: Absent  Tenosynovitis: Absent  Joint space/cartilage: Normal  Erosion/cystic change: Absent  Bone marrow edema: Absent  Ankylosis: Absent  Incidental findings: Mild soft tissue edema at the level of the RIGHT ulnar styloid without evidence for ulnar styloid erosion, nonspecific.  No other definite findings to suggest rheumatoid arthritis otherwise to increased specificity of this finding.    Impression: No definite imaging findings suggestive of inflammatory arthritis.    Assessment:     LIBRA+ 1:160 speckled no symptoms or signs of SLE profile neg DFS-70 neg  SLEDAI 4(malar rash, palatal erythema)  H/o acute parvovirus B 19 2012  AM stiffness hands 15 min  no swollen joints so cannot apply ACR/EULAR criteria   TJ 5 SJ 0 Pt global 0  ESR,  CRP  DAS28   [id number]    CDAI  Mother patient of Dr. Schmitt's with RA  Minimal OA several DIPs and knees  S/p right cervical radiculopathy    1. Polyarthritis    2. Positive LIBRA (antinuclear antibody)    3. Systemic  lupus erythematosus, unspecified SLE type, unspecified organ involvement status          Plan:      Problem List Items Addressed This Visit          Immunology/Multi System    Positive ILBRA (antinuclear antibody)    Relevant Orders    Aldolase    CK    Urinalysis    C3 Complement    Anti-DNA Ab, Double-Stranded    Comprehensive Metabolic Panel    CBC Auto Differential    Protein/Creatinine Ratio, Urine    C4 Complement    Sedimentation rate    C-Reactive Protein    DRVVT    Cardiolipin antibody    Beta-2 Glycoprotein Abs (IgA, IgG, IgM)    Urinalysis     Other Visit Diagnoses       Polyarthritis    -  Primary    Relevant Orders    Misc Sendout Test, Blood anti carbamylated protein( anti-CarP) to [facility]    Aldolase    CK    Urinalysis    C3 Complement    Anti-DNA Ab, Double-Stranded    Comprehensive Metabolic Panel    CBC Auto Differential    Protein/Creatinine Ratio, Urine    C4 Complement    Sedimentation rate    C-Reactive Protein    DRVVT    Cardiolipin antibody    Beta-2 Glycoprotein Abs (IgA, IgG, IgM)    Systemic lupus erythematosus, unspecified SLE type, unspecified organ involvement status        Relevant Medications    hydroxychloroquine (PLAQUENIL) 200 mg tablet              Check with [facility] about AVISE-SLE anti-CarP  results, not reported, drawn 4/17/24  CBC, CMP, ESR, CRP anti CarP to [facility], repeat  APLS panel CK aldolase  UA UPCR  Start hydroxychloroquine 300mg daily  ACR fact sheet provided, risks and benefits discussed  Follow up with ophthalmologist for baseline plaquenil eye exam  Continue piliates, increase exercise frequency including yoga, piliates, [employer], can be found on YouTube  Continue mediterranean diet  Follow up in 3 months with standing labs     Andrea Dc M.D.  PGY-1  [employer] [facility]

## 2024-06-06 NOTE — PROGRESS NOTES
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, 2022  Malar rash is photosensitive  Has had recurrent oral ulcers, one documented currently  Migratory arthritis: elbows, knees shoulders sushma  Palatal erythema        Latest Reference Range & Units 02/01/24 09:21 02/29/24 09:58   WBC 3.90 - 12.70 K/uL 5.46     RBC 4.00 - 5.40 M/uL 4.48     Hemoglobin 12.0 - 16.0 g/dL 14.3     Hematocrit 37.0 - 48.5 % 42.7     MCV 82 - 98 fL 95     MCH 27.0 - 31.0 pg 31.9 (H)     MCHC 32.0 - 36.0 g/dL 33.5     RDW 11.5 - 14.5 % 11.8     Platelet Count 150 - 450 K/uL 310     MPV 9.2 - 12.9 fL 9.8     Gran % 38.0 - 73.0 % 54.9     Lymph % 18.0 - 48.0 % 30.6     Mono % 4.0 - 15.0 % 9.3     Eos % 0.0 - 8.0 % 3.3     Basophil % 0.0 - 1.9 % 1.5     Immature Granulocytes 0.0 - 0.5 % 0.4     Gran # (ANC) 1.8 - 7.7 K/uL 3.0     Lymph # 1.0 - 4.8 K/uL 1.7     Mono # 0.3 - 1.0 K/uL 0.5     Eos # 0.0 - 0.5 K/uL 0.2     Baso # 0.00 - 0.20 K/uL 0.08     Immature Grans (Abs) 0.00 - 0.04 K/uL 0.02     nRBC 0 /100 WBC 0     Differential Method   Automated     Sed Rate 0 - 36 mm/Hr 8     Beta-2 Glyco 1 IgA <7.0 U/mL 2.5     Beta-2 Glyco 1 IgG <7.0 U/mL 2.0     Beta-2 Glyco 1 IgM <7.0 U/mL 4.7     Sodium 136 - 145 mmol/L 138     Potassium 3.5 - 5.1 mmol/L 4.9     Chloride 95 - 110 mmol/L 103     CO2 23 - 29 mmol/L 26     Anion Gap 8 - 16 mmol/L 9     BUN 6 - 20 mg/dL 18     Creatinine 0.5 - 1.4 mg/dL 0.8     eGFR >60 mL/min/1.73 m^2 >60     Glucose 70 - 110 mg/dL 108     Calcium 8.7 - 10.5 mg/dL 10.2     ALP 55 - 135 U/L 86     PROTEIN TOTAL 6.0 - 8.4 g/dL 8.0     Albumin 3.5 - 5.2 g/dL 4.0     BILIRUBIN TOTAL 0.1 - 1.0 mg/dL 0.5     AST 10 - 40 U/L 23     ALT 10 - 44 U/L 20     CRP 0.0 - 8.2 mg/L 5.0     ds DNA Ab Negative 1:10  Negative 1:10     Complement (C-3) 50 - 180 mg/dL 145      Complement (C-4) 11 - 44 mg/dL 22     MAMMO DIGITAL SCREENING BILAT WITH INOCENCIA     Rpt   (H): Data is abnormally high  Rpt: View report in Results Review for more information    Latest Reference Range & Units 02/01/24 09:17   Specimen UA   Urine, Clean Catch   Color, UA Yellow, Straw, Tabitha  Yellow   Appearance, UA Clear  Clear   Specific Gravity, UA 1.005 - 1.030  1.025   pH, UA 5.0 - 8.0  8.0   Protein, UA Negative  Trace !   Glucose, UA Negative  Negative   Ketones, UA Negative  Negative   Blood, UA Negative  Negative   NITRITE UA Negative  Negative   UROBILINOGEN UA <2.0 EU/dL Negative   Bilirubin (UA) Negative  Negative   Leukocyte Esterase, UA Negative  Negative   Urine Creatinine 15.0 - 325.0 mg/dL 110.3   Urine Protein 0 - 15 mg/dL 9   Prot/Creat Ratio, Urine 0.00 - 0.20  0.08   !: Data is abnormal    Latest Reference Range & Units 06/15/12 13:40 03/14/16 10:07 02/07/23 14:38   Rheumatoid Factor 0.0 - 15.0 IU/mL < 10.0 11.0 <13.0        Latest Reference Range & Units 05/27/13 15:20 03/14/16 10:07 02/07/23 14:38   CCP Antibodies <5.0 U/mL 0.5 0.6 0.7        Latest Reference Range & Units 06/15/12 13:40 03/14/16 10:07 02/07/23 14:38 02/01/24 09:21   LIBRA Neg <1:160  Pos, Titer to follow !         LIBRA Screen Negative <1:80    Positive ! Positive !     LIBRA HEP-2 Titer Neg <1:160 titer Pos 1:160 ! Positive 1:160 Speckled       LIBRA Titer 1       1:160     LIBRA PATTERN 1       Speckled     ds DNA Ab Negative 1:10  Negative Negative 1:10 Negative 1:10  Negative 1:10 Negative 1:10   Anti-SSA Antibody 0.00 - 0.99 Ratio 1.78 1.07 0.08     Anti-SSA Interpretation Negative  Negative Negative Negative     Anti-SSB Antibody 0.00 - 0.99 Ratio 0.66 0.60 0.08     Anti-SSB Interpretation Negative  Negative Negative Negative     Anti Sm Antibody 0.00 - 0.99 Ratio 1.99 1.25 0.13     Anti-Sm Interpretation Negative  Negative Negative Negative     Anti Sm/RNP Antibody 0.00 - 0.99 Ratio 0.44 0.30 0.11     Anti-Sm/RNP Interpretation  Negative  Negative Negative Negative     LIBRA Hep-2 Pattern   Speckled !         DFS- 70 Antibodies       See result image under hyperlink     !: Data is abnormal       Latest Reference Range & Units 05/27/13 15:20 03/14/16 10:07 09/02/16 10:08 02/07/23 14:38 02/01/24 09:21   Sed Rate 0 - 36 mm/Hr 20 9 10 14 8        Latest Reference Range & Units 06/15/12 13:40 05/27/13 15:20 09/02/16 10:08 02/07/23 14:38 02/01/24 09:21   CRP 0.0 - 8.2 mg/L 9.5 (H) 5.6 5.5 10.5 (H) 5.0   (H): Data is abnormally high        14-3-3 eta negative  4/17/24    Anti CarP negative    AVISE SLE Prognostic:    Anti-C1q, ribosomal P negative  Phosphatidylserine/prothrombin IgM 37.58(30) positive  IgG negative  Cardiolipin IgG IgM, IgA  negative  B2 GP1 IgG IgM Ig A negative   RF negative       Latest Reference Range & Units 02/01/24 09:17   Specimen UA  Urine, Clean Catch   Color, UA Yellow, Straw, Tabitha  Yellow   Appearance, UA Clear  Clear   Spec Grav UA 1.005 - 1.030  1.025   pH, UA 5.0 - 8.0  8.0   Protein, UA Negative  Trace !   Glucose, UA Negative  Negative   Ketones, UA Negative  Negative   Blood, UA Negative  Negative   NITRITE UA Negative  Negative   UROBILINOGEN UA <2.0 EU/dL Negative   Bilirubin (UA) Negative  Negative   Leukocyte Esterase, UA Negative  Negative   Urine Creatinine 15.0 - 325.0 mg/dL 110.3   Urine Protein 0 - 15 mg/dL 9   Urine Protein/Creatinine Ratio 0.00 - 0.20  0.08   !: Data is abnormal        EXAMINATION:  XR HAND COMPLETE 3 VIEWS BILATERAL     CLINICAL HISTORY:  . Polyarthritis, unspecified     TECHNIQUE:  PA, lateral, and oblique views of both hands were performed.     COMPARISON:  Two thousand twenty     FINDINGS:  Bone joint soft tissues normal.     Impression:     No new abnormality or arthritis change.        Electronically signed by:Maicol Messer MD  Date:                                            02/07/2023  Time:                                           16:57          EXAMINATION:  MRI  "HAND_WRIST W WO BILATERAL_RHEUMATOID ARTHRITIS     CLINICAL HISTORY:  Other specified abnormal immunological findings in serum     TECHNIQUE:  Multiplanar multisequence MRI examination of the bilateral wrists performed     COMPARISON:  Bilateral hand radiograph 02/07/2024.     FINDINGS:  There is no subcutaneous edema.  There is no muscle edema.  There is no significant superficial or deep fascial fluid.  There is no large drainable fluid collection .  There is no focal bone marrow edema.  There is no large joint effusion.     Joint involved: Both hands     Alignment: Normal     Fluid: Normal     Synovial thickening/synovitis: Absent     Bony productive changes: Absent     Tenosynovitis: Absent     Joint space/cartilage: Normal     Erosion/cystic change: Absent     Bone marrow edema: Absent     Ankylosis: Absent     Incidental findings: Mild soft tissue edema at the level of the RIGHT ulnar styloid without evidence for ulnar styloid erosion, nonspecific.  No other definite findings to suggest rheumatoid arthritis otherwise to increased specificity of this finding.     Impression:     No definite imaging findings suggestive of inflammatory arthritis.     Electronically signed by resident: Joy Sanchez  Date:                                            05/14/2024  Time:                                           10:13     Electronically signed by:Cruzito Phillips MD  Date:                                            05/14/2024  Time:                                           10:32        ASSESSMENT:      Pt I  originally saw 7/20/12:  Onset 2nd wk June R middle pip stiff then all the other pips, and MCP's. and swollen(mild) then ankles, knees and shoulders without much swelling but limited ROM. Severe AM stiffness. Saw Dr. Fleming 6/15: labs with BMP with calcium 8.4 but alb. 3. The CBC nl. TSH nl. RF -, LIBRA 1:160 speckled - profile. RX: Aleve- worsened. ED, Vicodin, Anaprox.  Still no better. ED "shot". Dr. Fleming " took prednisone taper for one week. Helped after 5 days. All symptoms resolved over 2 weeks. No fever, appetitie fine, no wgt. + fatigue. No hair fall or rash. She freq gets sores in mouth since child. Does have lifelong malar and chin erythema with sun exposure, and with wine. No pleurisy/pericarditis. No raynaud's. No history of seizures.No sicca syndrome. 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    Resolved acute parvovirus B19 due to school exposure to children with fifth disease. Mild + LIBRA with - profile. Lifelong history of recurrent oral ulcers, wine or sun-induced malar erythema, but transient and not fixed more suggestive of vascular hypereremia/rosacea than true butterfly rash.  She is well by symptoms except mild residual fatigue and normal physical exam        SLE meets ACR/EULAR classification criteria (12)  AVISE-SLE prognosis was done, rather than AVISE-CTD  Right ulnar styloid soft tissue swelling on MRI  H/o acute parvovirus B 19 2012  AM stiffness hands 15-30 min  no swollen joints so cannot apply ACR/EULAR criteria   TJ 0 SJ 0)Pt global 0  ESR,  CRP  DAS28   [id number]    CDAI  Mother patient of mine with RA  Minimal OA several DIPs and knees  S/p right cervical radiculopathy        PLAN:     CBC, CMP, ESR, CRP anti CarP to Exagen, repeat  APLS panel CK aldolase  UA UPCR  Start hydroxychloroquine 300mg daily. Risks and benefits discussed. ACR Handout provided. Baseline Optometry exam with Dr. Pascual  Regular 30 min aerobic exercise, Pilates, yoga, [facility] 5/7 days  Mediterranean diet   RTC 3 months with standing lupus labs.

## 2024-06-07 ENCOUNTER — TELEPHONE (OUTPATIENT)
Dept: RHEUMATOLOGY | Facility: CLINIC | Age: 54
End: 2024-06-07
Payer: COMMERCIAL

## 2024-06-07 DIAGNOSIS — Z79.899 LONG-TERM USE OF PLAQUENIL: ICD-10-CM

## 2024-06-07 DIAGNOSIS — R76.8 POSITIVE ANA (ANTINUCLEAR ANTIBODY): Primary | ICD-10-CM

## 2024-06-09 ENCOUNTER — TELEPHONE (OUTPATIENT)
Dept: RHEUMATOLOGY | Facility: CLINIC | Age: 54
End: 2024-06-09
Payer: COMMERCIAL

## 2024-06-09 DIAGNOSIS — N89.8 VAGINAL DISCHARGE: ICD-10-CM

## 2024-06-09 DIAGNOSIS — N95.2 VAGINAL ATROPHY: ICD-10-CM

## 2024-06-09 DIAGNOSIS — Z79.899 ENCOUNTER FOR MONITORING OF HYDROXYCHLOROQUINE THERAPY: Primary | ICD-10-CM

## 2024-06-09 DIAGNOSIS — Z51.81 ENCOUNTER FOR MONITORING OF HYDROXYCHLOROQUINE THERAPY: Primary | ICD-10-CM

## 2024-06-09 DIAGNOSIS — R30.0 DYSURIA: ICD-10-CM

## 2024-06-09 NOTE — TELEPHONE ENCOUNTER
Please schedule EKG in 2 wks to monitor hydroxychloroquine as she also takes paroxetine and doxepin which can sometimes interact with hydroxychloroquie.Thank you  ALYSHA

## 2024-06-10 ENCOUNTER — CLINICAL SUPPORT (OUTPATIENT)
Dept: OPHTHALMOLOGY | Facility: CLINIC | Age: 54
End: 2024-06-10
Payer: COMMERCIAL

## 2024-06-10 ENCOUNTER — TELEPHONE (OUTPATIENT)
Dept: OPTOMETRY | Facility: CLINIC | Age: 54
End: 2024-06-10
Payer: COMMERCIAL

## 2024-06-10 DIAGNOSIS — R76.8 POSITIVE ANA (ANTINUCLEAR ANTIBODY): ICD-10-CM

## 2024-06-10 DIAGNOSIS — Z79.899 LONG-TERM USE OF PLAQUENIL: ICD-10-CM

## 2024-06-10 RX ORDER — ESTRADIOL 0.1 MG/G
CREAM VAGINAL
Qty: 42.5 G | Refills: 3 | Status: SHIPPED | OUTPATIENT
Start: 2024-06-10

## 2024-06-10 RX ORDER — ESTRADIOL 0.25 MG/.25G
1 GEL TOPICAL DAILY
Qty: 30 PACKET | Refills: 11 | Status: SHIPPED | OUTPATIENT
Start: 2024-06-10 | End: 2025-06-10

## 2024-06-10 RX ORDER — PROGESTERONE 100 MG/1
100 CAPSULE ORAL NIGHTLY
Qty: 30 CAPSULE | Refills: 1 | Status: SHIPPED | OUTPATIENT
Start: 2024-06-10

## 2024-06-10 NOTE — PROGRESS NOTES
Visual field test done.  Patient stated no latex allergies used coverlet         Glasses Prescription     Sphere Cylinder Axis Dist VA Add   Right +2.50   20/20 +2.50   Left +1.50 +0.75 180 20/20 +2.50   Expiration Date: 12/15/2024

## 2024-06-10 NOTE — TELEPHONE ENCOUNTER
Spoke to pt and informed her that her tests were normal and she doesn't need to come if for her exam until her annual.       ----- Message from Fadumo Amin sent at 6/10/2024  9:29 AM CDT -----  Regarding: patient rescheduling  Pt had  HVF and f/u with Dr. Pascual at 9AM - patient showed up for the HVF but cancelled the f/u on [facility] and needs the cancelled appointment rescheduled.      Pt can be reached at [phone]

## 2024-06-11 ENCOUNTER — PATIENT MESSAGE (OUTPATIENT)
Dept: RHEUMATOLOGY | Facility: CLINIC | Age: 54
End: 2024-06-11
Payer: COMMERCIAL

## 2024-06-12 ENCOUNTER — OFFICE VISIT (OUTPATIENT)
Dept: PSYCHIATRY | Facility: CLINIC | Age: 54
End: 2024-06-12
Payer: COMMERCIAL

## 2024-06-12 DIAGNOSIS — F41.9 ANXIETY: Primary | ICD-10-CM

## 2024-06-12 PROCEDURE — 99999 PR PBB SHADOW E&M-EST. PATIENT-LVL I: CPT | Mod: PBBFAC,,, | Performed by: SOCIAL WORKER

## 2024-06-12 PROCEDURE — 90834 PSYTX W PT 45 MINUTES: CPT | Mod: S$GLB,,, | Performed by: SOCIAL WORKER

## 2024-06-12 NOTE — PROGRESS NOTES
Individual Psychotherapy (PhD/LCSW)    7/5/2023    Site: [place]    Therapeutic Intervention: Met with patient alone  Outpatient - Insight oriented psychotherapy 45 min - CPT code 15592    Chief complaint/reason for encounter: anxiety     Interval history and content of current session: Pt doing well, sleeping well, off Ativan, started meds for lupus per Dr. Schmitt. Relationship with W going well, pt seeing positive changes in his awareness of her needs and changes in his behavior. Daughter Niya has indicated she will visit with pt in person on 6/28 while in town for a fellowship. Pt trying to prepare herself and keep expectations low, aware visit may not take place, has not seen either daughter in 2 years. Pt finding she has better boundaries with mother, less vulnerable to feeling hurt by mom's comments.     Treatment plan:  Target symptoms: anxiety   Why chosen therapy is appropriate versus another modality: relevant to diagnosis  Outcome monitoring methods: self-report  Therapeutic intervention type: insight oriented psychotherapy    Risk parameters:  Patient reports no suicidal ideation  Patient reports no homicidal ideation  Patient reports no self-injurious behavior  Patient reports no violent behavior    Verbal deficits: None    Patient's response to intervention:  The patient's response to intervention is motivated    Progress toward goals and other mental status changes:  The patient's progress toward goals is good    Diagnosis:   Anxiety    Plan:  individual psychotherapy    Return to clinic: f/u as scheduled    Length of Service (minutes): 45

## 2024-06-13 ENCOUNTER — PATIENT MESSAGE (OUTPATIENT)
Dept: RHEUMATOLOGY | Facility: CLINIC | Age: 54
End: 2024-06-13
Payer: COMMERCIAL

## 2024-06-21 ENCOUNTER — HOSPITAL ENCOUNTER (OUTPATIENT)
Dept: CARDIOLOGY | Facility: CLINIC | Age: 54
Discharge: HOME | End: 2024-06-21
Payer: COMMERCIAL

## 2024-06-21 DIAGNOSIS — Z51.81 ENCOUNTER FOR MONITORING OF HYDROXYCHLOROQUINE THERAPY: ICD-10-CM

## 2024-06-21 DIAGNOSIS — Z79.899 ENCOUNTER FOR MONITORING OF HYDROXYCHLOROQUINE THERAPY: ICD-10-CM

## 2024-06-21 LAB
OHS QRS DURATION: 80 MS
OHS QTC CALCULATION: 431 MS

## 2024-06-21 PROCEDURE — 93005 ELECTROCARDIOGRAM TRACING: CPT | Mod: S$GLB,,, | Performed by: INTERNAL MEDICINE

## 2024-06-21 PROCEDURE — 93010 ELECTROCARDIOGRAM REPORT: CPT | Mod: S$GLB,,, | Performed by: INTERNAL MEDICINE

## 2024-06-26 ENCOUNTER — OFFICE VISIT (OUTPATIENT)
Dept: PSYCHIATRY | Facility: CLINIC | Age: 54
End: 2024-06-26
Payer: COMMERCIAL

## 2024-06-26 DIAGNOSIS — F41.9 ANXIETY: Primary | ICD-10-CM

## 2024-06-26 PROCEDURE — 99999 PR PBB SHADOW E&M-EST. PATIENT-LVL I: CPT | Mod: PBBFAC,,, | Performed by: SOCIAL WORKER

## 2024-06-26 PROCEDURE — 90834 PSYTX W PT 45 MINUTES: CPT | Mod: S$GLB,,, | Performed by: SOCIAL WORKER

## 2024-06-26 NOTE — PROGRESS NOTES
"    Individual Psychotherapy (PhD/LCSW)    7/5/2023    Site: [facility]    Therapeutic Intervention: Met with patient alone  Outpatient - Insight oriented psychotherapy 45 min - CPT code 10688    Chief complaint/reason for encounter: anxiety     Interval history and content of current session: Pt prepares to see daughter Niya for the first time in 2 years this afternoon. Pt had invited Niya to dinner, learned she invited her old  and spouse to join them.and changed the plans. Discuss pt's hurt and anger, discuss options for holding her boundaries and keeping control of her side othe meeting. Clarify goal of either being "real" in discussing their relationship or ending the meeting.     Treatment plan:  Target symptoms: anxiety   Why chosen therapy is appropriate versus another modality: relevant to diagnosis  Outcome monitoring methods: self-report  Therapeutic intervention type: insight oriented psychotherapy    Risk parameters:  Patient reports no suicidal ideation  Patient reports no homicidal ideation  Patient reports no self-injurious behavior  Patient reports no violent behavior    Verbal deficits: None    Patient's response to intervention:  The patient's response to intervention is motivated    Progress toward goals and other mental status changes:  The patient's progress toward goals is good    Diagnosis:   Anxiety    Plan:  individual psychotherapy    Return to clinic: f/u as scheduled    Length of Service (minutes): 45              "

## 2024-06-29 DIAGNOSIS — B00.1 HERPES LABIALIS: ICD-10-CM

## 2024-06-29 DIAGNOSIS — E03.9 HYPOTHYROIDISM, UNSPECIFIED TYPE: ICD-10-CM

## 2024-06-29 NOTE — TELEPHONE ENCOUNTER
Care Due:                  Date            Visit Type   Department     Provider  --------------------------------------------------------------------------------                                ESTABLISHED                              PATIENT -    [facility]  Last Visit: 01-      [facility]      Care           Desiree Stephens  Next Visit: None Scheduled  None         None Found                                                            Last  Test          Frequency    Reason                     Performed    Due Date  --------------------------------------------------------------------------------    TSH.........  12 months..  levothyroxine............  09-   09-    [facility] Embedded Care Due Messages. Reference number: [id number].   6/29/2024 10:12:39 AM CDT

## 2024-06-29 NOTE — TELEPHONE ENCOUNTER
No care due was identified.  [facility] Embedded Care Due Messages. Reference number: [id number].   6/29/2024 10:13:39 AM CDT

## 2024-06-30 ENCOUNTER — PATIENT MESSAGE (OUTPATIENT)
Dept: RHEUMATOLOGY | Facility: CLINIC | Age: 54
End: 2024-06-30
Payer: COMMERCIAL

## 2024-06-30 RX ORDER — LEVOTHYROXINE SODIUM 75 UG/1
75 TABLET ORAL
Qty: 90 TABLET | Refills: 1 | Status: SHIPPED | OUTPATIENT
Start: 2024-06-30

## 2024-06-30 RX ORDER — ACYCLOVIR 400 MG/1
400 TABLET ORAL 2 TIMES DAILY
Qty: 14 TABLET | Refills: 0 | Status: SHIPPED | OUTPATIENT
Start: 2024-06-30

## 2024-07-02 DIAGNOSIS — M32.9 SYSTEMIC LUPUS ERYTHEMATOSUS, UNSPECIFIED SLE TYPE, UNSPECIFIED ORGAN INVOLVEMENT STATUS: ICD-10-CM

## 2024-07-02 RX ORDER — HYDROXYCHLOROQUINE SULFATE 200 MG/1
300 TABLET, FILM COATED ORAL DAILY
Qty: 45 TABLET | Refills: 2 | Status: SHIPPED | OUTPATIENT
Start: 2024-07-02

## 2024-07-02 RX ORDER — ESTRADIOL 0.25 MG/.25G
1 GEL TOPICAL DAILY
Qty: 30 PACKET | Refills: 6 | Status: SHIPPED | OUTPATIENT
Start: 2024-07-02 | End: 2025-07-02

## 2024-07-03 ENCOUNTER — OFFICE VISIT (OUTPATIENT)
Dept: PSYCHIATRY | Facility: CLINIC | Age: 54
End: 2024-07-03
Payer: COMMERCIAL

## 2024-07-03 DIAGNOSIS — F41.9 ANXIETY: Primary | ICD-10-CM

## 2024-07-03 PROCEDURE — 99999 PR PBB SHADOW E&M-EST. PATIENT-LVL I: CPT | Mod: PBBFAC,,, | Performed by: SOCIAL WORKER

## 2024-07-03 PROCEDURE — 90834 PSYTX W PT 45 MINUTES: CPT | Mod: S$GLB,,, | Performed by: SOCIAL WORKER

## 2024-07-03 NOTE — PROGRESS NOTES
"    Individual Psychotherapy (PhD/LCSW)    7/5/2023    Site: [place]    Therapeutic Intervention: Met with patient alone  Outpatient - Insight oriented psychotherapy 45 min - CPT code 16973    Chief complaint/reason for encounter: anxiety     Interval history and content of current session: Pt did see Niya, confronted her when Niya chattered like nothing had happened, asked to talk "heart to heart", was told E is not open to this, "so many layers of the onion" and declined to clarify for pt why Niya has distanced. E also refused to unblock pt on social media. E later invited pt to join her and couple she was staying with for a dinner. Pt declined, offered any available time for the 2 of them to talk. Pt feels clear that trying to be "real" with ROXANA is the right path for her, feels rejected by her children and experiences this as heaviness in her chest. Pt also struggling with Harry's reluctance to be firm with his sons re their misbehavior. However she sees growth and willingness to change on his part which are meaningful to her. Encourage pt to stop feeling something is wrong with her and validate her growth. Pt and W are going to [place] together. Overall pt is doing very well.     Treatment plan:  Target symptoms: anxiety   Why chosen therapy is appropriate versus another modality: relevant to diagnosis  Outcome monitoring methods: self-report  Therapeutic intervention type: insight oriented psychotherapy    Risk parameters:  Patient reports no suicidal ideation  Patient reports no homicidal ideation  Patient reports no self-injurious behavior  Patient reports no violent behavior    Verbal deficits: None    Patient's response to intervention:  The patient's response to intervention is motivated    Progress toward goals and other mental status changes:  The patient's progress toward goals is good    Diagnosis:   Anxiety    Plan:  individual psychotherapy    Return to clinic: f/u as scheduled    Length of Service " (minutes): 45

## 2024-07-06 RX ORDER — PROGESTERONE 100 MG/1
100 CAPSULE ORAL NIGHTLY
Qty: 30 CAPSULE | Refills: 1 | Status: SHIPPED | OUTPATIENT
Start: 2024-07-06

## 2024-07-08 RX ORDER — ESTRADIOL 0.25 MG/.25G
1 GEL TOPICAL DAILY
Qty: 30 PACKET | Refills: 6 | Status: SHIPPED | OUTPATIENT
Start: 2024-07-08 | End: 2025-07-08

## 2024-07-16 ENCOUNTER — OFFICE VISIT (OUTPATIENT)
Dept: DERMATOLOGY | Facility: CLINIC | Age: 54
End: 2024-07-16
Payer: COMMERCIAL

## 2024-07-16 DIAGNOSIS — L71.0 PERIORAL DERMATITIS: Primary | ICD-10-CM

## 2024-07-16 DIAGNOSIS — L85.3 XEROSIS CUTIS: ICD-10-CM

## 2024-07-16 PROCEDURE — 1159F MED LIST DOCD IN RCRD: CPT | Mod: CPTII,S$GLB,, | Performed by: PHYSICIAN ASSISTANT

## 2024-07-16 PROCEDURE — 1160F RVW MEDS BY RX/DR IN RCRD: CPT | Mod: CPTII,S$GLB,, | Performed by: PHYSICIAN ASSISTANT

## 2024-07-16 PROCEDURE — 99213 OFFICE O/P EST LOW 20 MIN: CPT | Mod: S$GLB,,, | Performed by: PHYSICIAN ASSISTANT

## 2024-07-16 PROCEDURE — 99999 PR PBB SHADOW E&M-EST. PATIENT-LVL IV: CPT | Mod: PBBFAC,,, | Performed by: PHYSICIAN ASSISTANT

## 2024-07-16 NOTE — PROGRESS NOTES
Subjective:      Patient ID:  Becky Cade is a 54 y.o. female who presents for   Chief Complaint   Patient presents with    Follow-up     Rash on face     History of Present Illness: The patient presents for follow up of face rash.    The patient was last seen on: 05/09/2024- for skin discoloration , itching and rash on face- which has improved.     Other skin complaints: left hand blister-type spot that is going away       Review of Systems   Constitutional:  Negative for fever, chills, weight loss, weight gain, fatigue, night sweats and malaise.   Skin:  Positive for itching, rash, dry skin, activity-related sunscreen use and wears hat. Negative for daily sunscreen use.   Hematologic/Lymphatic: Does not bruise/bleed easily.       Objective:   Physical Exam   Constitutional: She appears well-developed and well-nourished. No distress.   Neurological: She is alert and oriented to person, place, and time. She is not disoriented.   Psychiatric: She has a normal mood and affect.   Skin:   Areas Examined (abnormalities noted in diagram):   Head / Face Inspection Performed  LUE Inspection Performed                                   Diagram Legend     Erythematous scaling macule/papule c/w actinic keratosis       Vascular papule c/w angioma      Pigmented verrucoid papule/plaque c/w seborrheic keratosis      Yellow umbilicated papule c/w sebaceous hyperplasia      Irregularly shaped tan macule c/w lentigo     1-2 mm smooth white papules consistent with Milia      Movable subcutaneous cyst with punctum c/w epidermal inclusion cyst      Subcutaneous movable cyst c/w pilar cyst      Firm pink to brown papule c/w dermatofibroma      Pedunculated fleshy papule(s) c/w skin tag(s)      Evenly pigmented macule c/w junctional nevus     Mildly variegated pigmented, slightly irregular-bordered macule c/w mildly atypical nevus      Flesh colored to evenly pigmented papule c/w intradermal nevus       Pink pearly  [facility] - [facility] ([facility])  [facility]  Operative Note    Date: 04/12/2024    Pre-Op Diagnosis:  Bladder cancer    Post-Op Diagnosis:  Same    Procedure(s) Performed:   1. TURBT of large (>5.0 cm) sized bladder tumor  2. Biopsy and fulguration of bladder tumors  3. Urethral Dilation    Specimen(s):  Right lateral wall random bladder biopsy  Posterior wall random bladder biopsy  Left lateral wall random bladder biopsy  Posterior wall patch flat tumor biopsy  Anterior wall bladder tumor TURBT    Staff Surgeon: Wellington Story MD    Assistant Surgeon: Maxwell Cordon MD    Circulator: Mai Ga RN  Nurse: Raven Guillermo RN     Anesthesia: General endotracheal anesthesia    Indications: Cliff Magaña is a 88 y.o. male with a bladder tumor. He presents today for surgical resection.      Operative Findings:   Random bladder biopsy of right lateral wall, posterior wall and left lateral wall sent as 3 separate specimens  Biopsy of posterior wall flat patch, sent as separate specimen  TURBT of anterior wall tumor, approximately 5 cm  Fulguration of all biopsy and resection sites  Urethral dilation from 18 Fr to 28 Fr with [place] sounds  6.   Bimanual exam post-TURBT showed no palpable disease.    Estimated Blood Loss: Minimal    Drains: 18 Fr Jernigan , 10 cc in the balloon    Procedure in detail:  After risks, benefits, and possible complications were explained, the patient elected to undergo the procedure and informed consent was obtained. All questions were answered in the mitzi-operative area. The patient was transferred to the cystoscopy suite and placed in the supine position. SCDs were applied and working. Anesthesia was administered. The patient was placed in the dorsal lithotomy position and prepped and draped in the usual sterile fashion. Time out was performed, and mitzi-procedural antibiotics were confirmed.     The urethral meatus was initially unable to accommodate the resectoscope due  papule/plaque c/w basal cell carcinoma      Erythematous hyperkeratotic cursted plaque c/w SCC      Surgical scar with no sign of skin cancer recurrence      Open and closed comedones      Inflammatory papules and pustules      Verrucoid papule consistent consistent with wart     Erythematous eczematous patches and plaques     Dystrophic onycholytic nail with subungual debris c/w onychomycosis     Umbilicated papule    Erythematous-base heme-crusted tan verrucoid plaque consistent with inflamed seborrheic keratosis     Erythematous Silvery Scaling Plaque c/w Psoriasis     See annotation      Assessment / Plan:        Perioral dermatitis- resolved   -Finished course of doxycycline (100 mg daily for two months) and Emgel (BID)  -Discussed continuing fluoride free toothpaste and avoiding steroid use on face   -Discussed using Emgel as needed for flare-ups     Xerosis cutis  -Discussed continuing to monitor area on left hand and if it gets bigger or doesn't go away, she should let us know as additional treatment/biopsy may be needed           Follow up if symptoms worsen or fail to improve.     to meatal stenosis. The meatus and distal urethra was sequentially dilated with lubricated urethral sounds from 18 to 28 Fr. The meatus was subsequently able to allow passage of the resectoscope.    The resectoscope was then assembled within a 26 Fr sheath and introduced into the bladder per urethra. This passed easily. The entire urethra was visualized which showed no masses or strictures. The right and left ureteral orifices were identified in the normal anatomic position. Formal cystoscopy was performed with a 30 degree lens which revealed the anterior large tumor and a separate posterior wall patch. There were severe 3+ trabeculations, no bladder stones, or bladder diverticula.    Attention was turned to the posterior wall patch/flat lesion. This was biopsied with cold cup biopsy forceps and passed off as a specimen. Additionally we took random biopsies of normal appearing bladder mucosa at the right lateral wall, posterior wall and right lateral wall. These four specimens were passed off the field and sent as separate pathologic specimens.     The visual obturator was then exchanged for the resecting mechanism. The anterior wall tumor was then resected superficially until the base was identified. Specimens were then removed and passed off the field for pathologic analysis. The bladder was drained and hemostasis was achieved at the previous biopsy sites and resection site. The resectoscope was removed from the patient.    An 18 Fr Jernigan catheter was placed with 10 mL of sterile water in the balloon. The bladder was then hand irrigated and the urine was draining clear yellow.    Post-resection bimanual exam revealed no palpable disease. The patient tolerated the procedure well and was transferred to recovery in stable condition.    Disposition:  The patient will be discharged home from PACU will plans for voiding trial in 3 days.    Maxwell Cordon MD

## 2024-07-16 NOTE — PATIENT INSTRUCTIONS
Perioral dermatitis- resolved   -Finished course of doxycycline (100 mg daily for two months) and Emgel (BID)  -Discussed continuing fluoride free toothpaste and avoiding steroid use on face   -Discussed using Emgel as needed for flare-ups     Xerosis cutis  -Discussed continuing to monitor area on left hand and if it gets bigger or doesn't go away, she should let us know as additional treatment/biopsy may be needed           Follow up if symptoms worsen or fail to improve.

## 2024-07-17 ENCOUNTER — OFFICE VISIT (OUTPATIENT)
Dept: PSYCHIATRY | Facility: CLINIC | Age: 54
End: 2024-07-17
Payer: COMMERCIAL

## 2024-07-17 ENCOUNTER — TELEPHONE (OUTPATIENT)
Dept: RHEUMATOLOGY | Facility: CLINIC | Age: 54
End: 2024-07-17
Payer: COMMERCIAL

## 2024-07-17 DIAGNOSIS — F41.9 ANXIETY: Primary | ICD-10-CM

## 2024-07-17 PROCEDURE — 90834 PSYTX W PT 45 MINUTES: CPT | Mod: S$GLB,,, | Performed by: SOCIAL WORKER

## 2024-07-17 PROCEDURE — 99999 PR PBB SHADOW E&M-EST. PATIENT-LVL I: CPT | Mod: PBBFAC,,, | Performed by: SOCIAL WORKER

## 2024-07-17 NOTE — PROGRESS NOTES
Individual Psychotherapy (PhD/LCSW)    7/5/2023    Site: [facility]    Therapeutic Intervention: Met with patient alone  Outpatient - Insight oriented psychotherapy 45 min - CPT code 19188    Chief complaint/reason for encounter: anxiety     Interval history and content of current session: Pt spends session catching me up on near break-up with W, conflict with her attorneys, and conflict with co-worker. She saw Dr. Brooks before she went to [place] and he suggested she do a 1 week intensive to address her panic Sx around fear of being alone. Agree this could be helpful. Suggest we shift from talking about frustrations with others in her life to focusing on managing her reactions. Pt endorses this. Ask pt to listen again to Dance of Anger.     Treatment plan:  Target symptoms: anxiety   Why chosen therapy is appropriate versus another modality: relevant to diagnosis  Outcome monitoring methods: self-report  Therapeutic intervention type: insight oriented psychotherapy    Risk parameters:  Patient reports no suicidal ideation  Patient reports no homicidal ideation  Patient reports no self-injurious behavior  Patient reports no violent behavior    Verbal deficits: None    Patient's response to intervention:  The patient's response to intervention is motivated    Progress toward goals and other mental status changes:  The patient's progress toward goals is mixed    Diagnosis:   Anxiety    Plan:  individual psychotherapy    Return to clinic: f/u as scheduled    Length of Service (minutes): 45

## 2024-07-17 NOTE — TELEPHONE ENCOUNTER
T/C to patient in regards to message left for lab work before appointment on 7/23/24.  No answer.  Left VM informing patient that she can get labs done at time of appointment. Last labs done on 6/6/24.

## 2024-07-23 ENCOUNTER — OFFICE VISIT (OUTPATIENT)
Dept: RHEUMATOLOGY | Facility: CLINIC | Age: 54
End: 2024-07-23
Payer: COMMERCIAL

## 2024-07-23 ENCOUNTER — LAB VISIT (OUTPATIENT)
Dept: LAB | Facility: HOSPITAL | Age: 54
End: 2024-07-23
Attending: INTERNAL MEDICINE
Payer: COMMERCIAL

## 2024-07-23 VITALS
WEIGHT: 141.13 LBS | BODY MASS INDEX: 24.1 KG/M2 | OXYGEN SATURATION: 99 % | HEIGHT: 64 IN | DIASTOLIC BLOOD PRESSURE: 71 MMHG | SYSTOLIC BLOOD PRESSURE: 111 MMHG | RESPIRATION RATE: 18 BRPM | HEART RATE: 84 BPM

## 2024-07-23 DIAGNOSIS — M32.9 SYSTEMIC LUPUS ERYTHEMATOSUS, UNSPECIFIED SLE TYPE, UNSPECIFIED ORGAN INVOLVEMENT STATUS: ICD-10-CM

## 2024-07-23 LAB — MISCELLANEOUS TEST NAME: NORMAL

## 2024-07-23 PROCEDURE — 3074F SYST BP LT 130 MM HG: CPT | Mod: CPTII,S$GLB,, | Performed by: INTERNAL MEDICINE

## 2024-07-23 PROCEDURE — 3008F BODY MASS INDEX DOCD: CPT | Mod: CPTII,S$GLB,, | Performed by: INTERNAL MEDICINE

## 2024-07-23 PROCEDURE — 99214 OFFICE O/P EST MOD 30 MIN: CPT | Mod: S$GLB,,, | Performed by: INTERNAL MEDICINE

## 2024-07-23 PROCEDURE — 99999 PR PBB SHADOW E&M-EST. PATIENT-LVL IV: CPT | Mod: PBBFAC,,, | Performed by: INTERNAL MEDICINE

## 2024-07-23 PROCEDURE — 1159F MED LIST DOCD IN RCRD: CPT | Mod: CPTII,S$GLB,, | Performed by: INTERNAL MEDICINE

## 2024-07-23 PROCEDURE — 3078F DIAST BP <80 MM HG: CPT | Mod: CPTII,S$GLB,, | Performed by: INTERNAL MEDICINE

## 2024-07-23 PROCEDURE — 36415 COLL VENOUS BLD VENIPUNCTURE: CPT | Performed by: INTERNAL MEDICINE

## 2024-07-23 RX ORDER — HYDROXYCHLOROQUINE SULFATE 200 MG/1
300 TABLET, FILM COATED ORAL DAILY
Qty: 135 TABLET | Refills: 2 | Status: SHIPPED | OUTPATIENT
Start: 2024-07-23

## 2024-07-23 ASSESSMENT — SYSTEMIC LUPUS ERYTHEMATOSUS DISEASE ACTIVITY INDEX (SLEDAI): TOTAL_SCORE: 0

## 2024-07-23 NOTE — PROGRESS NOTES
Subjective:      Patient ID: Becky Cade is a 54 y.o. female.    Chief Complaint: SLE  HPI feeling better. No more rash or oral ulcers. No hair fall. No serositis . She is being mentally harrassed by former  including this morning and feels tension neck and upper arms. Migratory arthralgia sushma elbows  Review of Systems   Constitutional:  Negative for appetite change, fatigue, fever and unexpected weight change.   HENT:  Negative for mouth sores.    Eyes:  Negative for visual disturbance.   Respiratory:  Negative for cough, shortness of breath and wheezing.    Cardiovascular:  Negative for chest pain and palpitations.   Gastrointestinal:  Negative for abdominal pain, anal bleeding, blood in stool, constipation, diarrhea, nausea and vomiting.   Genitourinary:  Negative for dysuria, frequency and urgency.   Musculoskeletal:  Negative for arthralgias, back pain, gait problem, joint swelling, myalgias, neck pain and neck stiffness.   Skin:  Negative for rash.   Neurological:  Negative for weakness, numbness and headaches.   Hematological:  Negative for adenopathy. Does not bruise/bleed easily.   Psychiatric/Behavioral:  Negative for sleep disturbance. The patient is not nervous/anxious.         Objective:   LMP 02/07/2023 (Approximate)   Physical Exam   Constitutional: She is oriented to person, place, and time.   HENT:   Head: Normocephalic and atraumatic.   Mouth/Throat: Oropharynx is clear and moist.   Eyes: Conjunctivae are normal.   Neck: No thyromegaly present.   Cardiovascular: Normal rate, regular rhythm and normal heart sounds. Exam reveals no gallop and no friction rub.   No murmur heard.  Pulmonary/Chest: Breath sounds normal. She has no wheezes. She has no rales. She exhibits no tenderness.   Musculoskeletal:      Cervical back: Normal range of motion and neck supple.   Lymphadenopathy:     She has no cervical adenopathy.   Neurological: She is alert and oriented to person, place, and time.  She displays normal reflexes. Gait normal.   Nl motor strength UE and LE bilateral        2/7/2023 4/16/2024 6/6/2024   Tender (JOVEL-28) 1 / 28  0 / 28 5 / 28    Swollen (JOVEL-28) 0 / 28 4 / 28  0 / 28    Provider Global 0 mm -- --   Patient Global 0 mm -- --   ESR 14 mm/hr -- --   CRP 10.5 mg/L -- --   JOVEL-28 (ESR) 2.41 (Remission) -- --   JOVEL-28 (CRP) 2.4 (Remission) -- --   CDAI Score 1  -- --      Latest Reference Range & Units 06/06/24 14:26   WBC 3.90 - 12.70 K/uL 7.51   RBC 4.00 - 5.40 M/uL 4.58   Hemoglobin 12.0 - 16.0 g/dL 14.4   Hematocrit 37.0 - 48.5 % 44.2   MCV 82 - 98 fL 97   MCH 27.0 - 31.0 pg 31.4 (H)   MCHC 32.0 - 36.0 g/dL 32.6   RDW 11.5 - 14.5 % 11.9   Platelet Count 150 - 450 K/uL 324   MPV 9.2 - 12.9 fL 9.6   Gran % 38.0 - 73.0 % 58.8   Lymph % 18.0 - 48.0 % 28.2   Mono % 4.0 - 15.0 % 8.4   Eos % 0.0 - 8.0 % 2.9   Basophil % 0.0 - 1.9 % 1.2   Immature Granulocytes 0.0 - 0.5 % 0.5   Gran # (ANC) 1.8 - 7.7 K/uL 4.4   Lymph # 1.0 - 4.8 K/uL 2.1   Mono # 0.3 - 1.0 K/uL 0.6   Eos # 0.0 - 0.5 K/uL 0.2   Baso # 0.00 - 0.20 K/uL 0.09   Immature Grans (Abs) 0.00 - 0.04 K/uL 0.04   nRBC 0 /100 WBC 0   Differential Method  Automated   Sed Rate 0 - 36 mm/Hr 6   APA Isotype IgG 0.00 - 14.99 GPL <9.40   APA Isotype IgM 0.00 - 12.49 MPL <9.40   Beta-2 Glyco 1 IgA <7.0 U/mL 2.5   Beta-2 Glyco 1 IgG <7.0 U/mL 1.7   Beta-2 Glyco 1 IgM <7.0 U/mL 4.3   dRVVT Confirm <=1.20  Not Performed   DRVVT Screen <=1.20  0.95   Hex Phosph Neut Test <=7.9 s Not Performed   Thrombin Time <=19.5 s Not Performed   Lupus Anticoagulant Interpretation  See Note   PT Lupus Anticoagulant 12.0 - 15.5 s 12.4   PTT Heparin Neutralized <=1.20  Not Performed   Sodium 136 - 145 mmol/L 135 (L)   Potassium 3.5 - 5.1 mmol/L 4.1   Chloride 95 - 110 mmol/L 99   CO2 23 - 29 mmol/L 28   Anion Gap 8 - 16 mmol/L 8   BUN 6 - 20 mg/dL 22 (H)   Creatinine 0.5 - 1.4 mg/dL 0.8   eGFR >60 mL/min/1.73 m^2 >60.0   Glucose 70 - 110 mg/dL 95   Calcium 8.7 -  10.5 mg/dL 10.0   ALP 55 - 135 U/L 103   PROTEIN TOTAL 6.0 - 8.4 g/dL 8.1   Albumin 3.5 - 5.2 g/dL 4.1   BILIRUBIN TOTAL 0.1 - 1.0 mg/dL 0.5   AST 10 - 40 U/L 23   ALT 10 - 44 U/L 19   CRP 0.0 - 8.2 mg/L 3.2   CPK 20 - 180 U/L 59   Vitamin D 30 - 96 ng/mL 34   ds DNA Ab Negative 1:10  Negative 1:10   Complement (C-3) 50 - 180 mg/dL 141   Complement (C-4) 11 - 44 mg/dL 20   Aldolase 1.2 - 7.6 U/L 4.6   Anticoagulant Medication Neutralization Not Performed  Not Performed   Anti-Xa Qualitative Interpretation Not Present  Not Performed   DRVVT 1:1 Mix <=1.20  Not Performed   Neutralized dRVVT Screen Ratio <=1.20  Not Performed   PTT-LA RATIO <=1.20  0.89   (H): Data is abnormally high  (L): Data is abnormally low     Latest Reference Range & Units 06/06/24 14:26   APA Isotype IgG 0.00 - 14.99 GPL <9.40   APA Isotype IgM 0.00 - 12.49 MPL <9.40   Beta-2 Glyco 1 IgA <7.0 U/mL 2.5   Beta-2 Glyco 1 IgG <7.0 U/mL 1.7   Beta-2 Glyco 1 IgM <7.0 U/mL 4.3   dRVVT Confirm <=1.20  Not Performed   DRVVT Screen <=1.20  0.95   Hex Phosph Neut Test <=7.9 s Not Performed   Thrombin Time <=19.5 s Not Performed   Lupus Anticoagulant Interpretation  See Note   PT Lupus Anticoagulant 12.0 - 15.5 s 12.4   PTT Heparin Neutralized <=1.20  Not Performed        14-3-3 eta negative  4/17/24     Anti CarP negative     AVISE SLE Prognostic:     Anti-C1q, ribosomal P negative  Phosphatidylserine/prothrombin IgM 37.58(30) positive  IgG negative  Cardiolipin IgG IgM, IgA  negative  B2 GP1 IgG IgM Ig A negative   RF negative       Latest Reference Range & Units 06/06/24 14:22   Specimen UA  Urine, Clean Catch  Urine, Clean Catch   Color, UA Yellow, Straw, Tabitha   Yellow, Straw, Tabitha  Yellow  Yellow   Appearance, UA Clear   Clear  Clear  Clear   Spec Grav UA 1.005 - 1.030   1.005 - 1.030  1.015  1.015   pH, UA 5.0 - 8.0   5.0 - 8.0  5.0  5.0   Protein, UA Negative   Negative  Negative  Negative   Glucose, UA Negative   Negative  Negative  Negative  "  Ketones, UA Negative   Negative  Negative  Negative   Blood, UA Negative   Negative  Negative  Negative   NITRITE UA Negative   Negative  Negative  Negative   Bilirubin (UA) Negative   Negative  Negative  Negative   Leukocyte Esterase, UA Negative   Negative  Negative  Negative   Urine Creatinine 15.0 - 325.0 mg/dL 62.0   Urine Protein 0 - 15 mg/dL 8   Urine Protein/Creatinine Ratio 0.00 - 0.20  0.13       Assessment:   Reviewed cell photo of 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 and swollen(mild) then ankles, knees and shoulders without much swelling but limited ROM. Severe AM stiffness. Saw Dr. Fleming 6/15: labs with BMP with calcium 8.4 but alb. 3. The CBC nl. TSH nl. RF -, LIBRA 1:160 speckled - profile. RX: Aleve- worsened. ED, Vicodin, Anaprox.  Still no better. ED "shot". Dr. Fleming took prednisone taper for one week. Helped after 5 days. All symptoms resolved over 2 weeks. No fever, appetitie fine, no wgt. + fatigue. No hair fall or rash. She freq gets sores in mouth since child. Does have lifelong malar and chin erythema with sun exposure, and with wine. No pleurisy/pericarditis. No raynaud's. No history of seizures.No sicca syndrome. 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    Resolved acute parvovirus B19 due to school exposure to children with fifth disease. Mild + LIBRA with - profile. Lifelong history of recurrent oral ulcers, wine or sun-induced malar erythema, but transient and not fixed more suggestive of vascular hypereremia/rosacea than true butterfly rash.  She is well by symptoms except mild residual fatigue and normal physical exam        SLE meets " ACR/EULAR classification criteria (12)  AVISE-SLE prognosis was done, rather than AVISE-CTD hydroxychloroquine 300mg daily started  6/6/24  SLEDAI 0  Right ulnar styloid soft tissue swelling on MRI  H/o acute parvovirus B 19 2012  AM stiffness hands 15-30 min  no swollen joints so cannot apply ACR/EULAR RA criteria   TJ 0 SJ 0)Pt global 0  ESR,  CRP  DAS28   [id number]    CDAI  Mother patient of mine with RA  Minimal OA several DIPs and knees  S/p right cervical radiculopathy      Plan:   AVISE-CTD today  Continue hydroxychloroquine 300mg daily.  hadBaseline Optometry exam with Dr. Pascual  Regular 30 min aerobic exercise, Pilates, yoga, [employer]  5/7 days  Mediterranean diet   RTC 3 months with standing lupus labs.

## 2024-07-24 ENCOUNTER — OFFICE VISIT (OUTPATIENT)
Dept: PSYCHIATRY | Facility: CLINIC | Age: 54
End: 2024-07-24
Payer: COMMERCIAL

## 2024-07-24 DIAGNOSIS — F41.9 ANXIETY: Primary | ICD-10-CM

## 2024-07-24 PROCEDURE — 90834 PSYTX W PT 45 MINUTES: CPT | Mod: S$GLB,,, | Performed by: SOCIAL WORKER

## 2024-07-24 PROCEDURE — 99999 PR PBB SHADOW E&M-EST. PATIENT-LVL I: CPT | Mod: PBBFAC,,, | Performed by: SOCIAL WORKER

## 2024-07-24 NOTE — PROGRESS NOTES
"    Individual Psychotherapy (PhD/LCSW)    7/5/2023    Site: [facility]    Therapeutic Intervention: Met with patient alone  Outpatient - Insight oriented psychotherapy 45 min - CPT code 36848    Chief complaint/reason for encounter: anxiety     Interval history and content of current session: Pt reports Elbert has begun sending new letters harrassing her mother. Pt e-mailed both daughters informing them of this badgering of her family and asking them to stop him. Both replied  with indifference and a desire to stay uninvolved. Pt is recognizing that her daughters have set a distant boundary and are unavailable to her. She and W are doing well. Talk with pt about her strong emotional reactions that come out as anger when she feels hurt, and discuss ways to "cool down" for the sake of her health and her relationships.     Treatment plan:  Target symptoms: anxiety   Why chosen therapy is appropriate versus another modality: relevant to diagnosis  Outcome monitoring methods: self-report  Therapeutic intervention type: insight oriented psychotherapy    Risk parameters:  Patient reports no suicidal ideation  Patient reports no homicidal ideation  Patient reports no self-injurious behavior  Patient reports no violent behavior    Verbal deficits: None    Patient's response to intervention:  The patient's response to intervention is motivated    Progress toward goals and other mental status changes:  The patient's progress toward goals is good    Diagnosis:   Anxiety    Plan:  individual psychotherapy    Return to clinic: f/u as scheduled    Length of Service (minutes): 45                    "

## 2024-07-30 ENCOUNTER — TELEPHONE (OUTPATIENT)
Dept: RHEUMATOLOGY | Facility: CLINIC | Age: 54
End: 2024-07-30
Payer: COMMERCIAL

## 2024-07-30 NOTE — TELEPHONE ENCOUNTER
Please tell patient:    Received report on AVISE=CTD collected 7/23/24:  AVISE-Lupus Result: negative index -1.9, which is against SLE by this zaid; but would continue hydroxychloroquine  Only positives: anti-thyroglobulins 85(< 40) and anti-Thyroid Peroxidase IgG 423(<25)  Both these tests  diagnosis Hashimoto's thyroiditis for which she takes levothyroxine, most recent thyroid function tests normal. Should f/u with Dr. Stephens as she is doing for this. ALYSHA

## 2024-08-01 DIAGNOSIS — Z72.820 POOR SLEEP: ICD-10-CM

## 2024-08-01 DIAGNOSIS — F41.1 GAD (GENERALIZED ANXIETY DISORDER): ICD-10-CM

## 2024-08-01 RX ORDER — HYDROXYZINE HYDROCHLORIDE 10 MG/1
20 TABLET, FILM COATED ORAL NIGHTLY
Qty: 180 TABLET | Refills: 1 | Status: SHIPPED | OUTPATIENT
Start: 2024-08-01

## 2024-08-01 NOTE — TELEPHONE ENCOUNTER
T/C to patient and advised of the following per MD note:    Please tell patient:    Received report on AVISE=CTD collected 7/23/24:  AVISE-Lupus Result: negative index -1.9, which is against SLE by this zaid; but would continue hydroxychloroquine  Only positives: anti-thyroglobulins 85(< 40) and anti-Thyroid Peroxidase IgG 423(<25)  Both these tests  diagnosis Hashimoto's thyroiditis for which she takes levothyroxine, most recent thyroid function tests normal. Should f/u with Dr. Stephens as she is doing for this. RJQ       Patient verbalizes understanding.

## 2024-08-06 ENCOUNTER — PATIENT MESSAGE (OUTPATIENT)
Dept: DERMATOLOGY | Facility: CLINIC | Age: 54
End: 2024-08-06
Payer: COMMERCIAL

## 2024-08-07 ENCOUNTER — OFFICE VISIT (OUTPATIENT)
Dept: PSYCHIATRY | Facility: CLINIC | Age: 54
End: 2024-08-07
Payer: COMMERCIAL

## 2024-08-07 DIAGNOSIS — F41.9 ANXIETY: Primary | ICD-10-CM

## 2024-08-07 PROCEDURE — 90834 PSYTX W PT 45 MINUTES: CPT | Mod: S$GLB,,, | Performed by: SOCIAL WORKER

## 2024-08-07 PROCEDURE — 99999 PR PBB SHADOW E&M-EST. PATIENT-LVL I: CPT | Mod: PBBFAC,,, | Performed by: SOCIAL WORKER

## 2024-08-08 ENCOUNTER — OFFICE VISIT (OUTPATIENT)
Dept: DERMATOLOGY | Facility: CLINIC | Age: 54
End: 2024-08-08
Payer: COMMERCIAL

## 2024-08-08 DIAGNOSIS — L84 CORN: Primary | ICD-10-CM

## 2024-08-08 PROCEDURE — 1159F MED LIST DOCD IN RCRD: CPT | Mod: CPTII,S$GLB,, | Performed by: PHYSICIAN ASSISTANT

## 2024-08-08 PROCEDURE — 99999 PR PBB SHADOW E&M-EST. PATIENT-LVL IV: CPT | Mod: PBBFAC,,, | Performed by: PHYSICIAN ASSISTANT

## 2024-08-08 PROCEDURE — 99213 OFFICE O/P EST LOW 20 MIN: CPT | Mod: S$GLB,,, | Performed by: PHYSICIAN ASSISTANT

## 2024-08-08 PROCEDURE — 1160F RVW MEDS BY RX/DR IN RCRD: CPT | Mod: CPTII,S$GLB,, | Performed by: PHYSICIAN ASSISTANT

## 2024-08-14 ENCOUNTER — OFFICE VISIT (OUTPATIENT)
Dept: PSYCHIATRY | Facility: CLINIC | Age: 54
End: 2024-08-14
Payer: COMMERCIAL

## 2024-08-14 DIAGNOSIS — F41.9 ANXIETY: Primary | ICD-10-CM

## 2024-08-14 PROCEDURE — 99999 PR PBB SHADOW E&M-EST. PATIENT-LVL I: CPT | Mod: PBBFAC,,, | Performed by: SOCIAL WORKER

## 2024-08-14 PROCEDURE — 90834 PSYTX W PT 45 MINUTES: CPT | Mod: S$GLB,,, | Performed by: SOCIAL WORKER

## 2024-08-14 NOTE — PROGRESS NOTES
"    Individual Psychotherapy (PhD/LCSW)    7/5/2023    Site: [facility]    Therapeutic Intervention: Met with patient alone  Outpatient - Insight oriented psychotherapy 45 min - CPT code 32395    Chief complaint/reason for encounter: anxiety     Interval history and content of current session: Work with pt to increase acceptance that W and his ex will continue to have more contact than she would like, discuss ways to comfort inner child feeling "like I don't belong." Pt agrees a large part of her reaction is the pain of,alienation of her two daughters, leaving her wanting to be part of W's sons' lives.     Treatment plan:  Target symptoms: anxiety   Why chosen therapy is appropriate versus another modality: relevant to diagnosis  Outcome monitoring methods: self-report  Therapeutic intervention type: insight oriented psychotherapy    Risk parameters:  Patient reports no suicidal ideation  Patient reports no homicidal ideation  Patient reports no self-injurious behavior  Patient reports no violent behavior    Verbal deficits: None    Patient's response to intervention:  The patient's response to intervention is motivated    Progress toward goals and other mental status changes:  The patient's progress toward goals is good    Diagnosis:   Anxiety    Plan:  individual psychotherapy    Return to clinic: f/u as scheduled    Length of Service (minutes): 45    "

## 2024-08-21 ENCOUNTER — PATIENT MESSAGE (OUTPATIENT)
Dept: PSYCHIATRY | Facility: CLINIC | Age: 54
End: 2024-08-21
Payer: COMMERCIAL

## 2024-08-21 ENCOUNTER — OFFICE VISIT (OUTPATIENT)
Dept: PSYCHIATRY | Facility: CLINIC | Age: 54
End: 2024-08-21
Payer: COMMERCIAL

## 2024-08-21 DIAGNOSIS — F41.9 ANXIETY: Primary | ICD-10-CM

## 2024-08-21 PROCEDURE — 99999 PR PBB SHADOW E&M-EST. PATIENT-LVL I: CPT | Mod: PBBFAC,,, | Performed by: SOCIAL WORKER

## 2024-08-21 PROCEDURE — 90834 PSYTX W PT 45 MINUTES: CPT | Mod: S$GLB,,, | Performed by: SOCIAL WORKER

## 2024-08-21 NOTE — PROGRESS NOTES
Individual Psychotherapy (PhD/LCSW)    7/5/2023    Site: [place]    Therapeutic Intervention: Met with patient alone  Outpatient - Insight oriented psychotherapy 45 min - CPT code 53419    Chief complaint/reason for encounter: anxiety     Interval history and content of current session: Pt went to W''s house and had confrontation with his ex after realizing he was denying that ex was coming over. Pt was calm and firm with ex, but W remains angry. Discuss pattern of ex becoming hysterical and W placating her, and both avoiding helping their sons mature. Talk to pt about reality that if W can't support her efforts to build transparency in these relationships, they will likely break up. Pt panicky about idea of being alone but clear she can't back down from her expectations of him as a partner.     Treatment plan:  Target symptoms: anxiety   Why chosen therapy is appropriate versus another modality: relevant to diagnosis  Outcome monitoring methods: self-report  Therapeutic intervention type: insight oriented psychotherapy    Risk parameters:  Patient reports no suicidal ideation  Patient reports no homicidal ideation  Patient reports no self-injurious behavior  Patient reports no violent behavior    Verbal deficits: None    Patient's response to intervention:  The patient's response to intervention is motivated    Progress toward goals and other mental status changes:  The patient's progress toward goals is good    Diagnosis:   Anxiety    Plan:  individual psychotherapy    Return to clinic: f/u as scheduled    Length of Service (minutes): 45

## 2024-08-28 ENCOUNTER — OFFICE VISIT (OUTPATIENT)
Dept: PSYCHIATRY | Facility: CLINIC | Age: 54
End: 2024-08-28
Payer: COMMERCIAL

## 2024-08-28 DIAGNOSIS — F41.9 ANXIETY: Primary | ICD-10-CM

## 2024-08-28 PROCEDURE — 99999 PR PBB SHADOW E&M-EST. PATIENT-LVL I: CPT | Mod: PBBFAC,,, | Performed by: SOCIAL WORKER

## 2024-08-28 PROCEDURE — 90834 PSYTX W PT 45 MINUTES: CPT | Mod: S$GLB,,, | Performed by: SOCIAL WORKER

## 2024-08-28 NOTE — PROGRESS NOTES
Individual Psychotherapy (PhD/LCSW)    7/5/2023    Site: [facility]    Therapeutic Intervention: Met with patient alone  Outpatient - Insight oriented psychotherapy 45 min - CPT code 56521    Chief complaint/reason for encounter: anxiety     Interval history and content of current session: Pt continues to try to correct W's parenting, sees him passively resisting her contact with the boys. Pt's reaction is to feel rejected and hurt. Work with pt to stop focusing on W and others who reject her corrective feedback and move on, seeking out people who love and respect her for who she is. Pt has deep fear of being alone, agrees needs to build confidence that she is ok.     Treatment plan:  Target symptoms: anxiety   Why chosen therapy is appropriate versus another modality: relevant to diagnosis  Outcome monitoring methods: self-report  Therapeutic intervention type: insight oriented psychotherapy    Risk parameters:  Patient reports no suicidal ideation  Patient reports no homicidal ideation  Patient reports no self-injurious behavior  Patient reports no violent behavior    Verbal deficits: None    Patient's response to intervention:  The patient's response to intervention is motivated    Progress toward goals and other mental status changes:  The patient's progress toward goals is fair    Diagnosis:   Anxiety    Plan:  individual psychotherapy    Return to clinic: f/u as scheduled    Length of Service (minutes): 45

## 2024-08-31 DIAGNOSIS — M32.9 SYSTEMIC LUPUS ERYTHEMATOSUS, UNSPECIFIED SLE TYPE, UNSPECIFIED ORGAN INVOLVEMENT STATUS: ICD-10-CM

## 2024-08-31 RX ORDER — PROGESTERONE 100 MG/1
100 CAPSULE ORAL NIGHTLY
Qty: 90 CAPSULE | Refills: 2 | Status: SHIPPED | OUTPATIENT
Start: 2024-08-31

## 2024-08-31 NOTE — TELEPHONE ENCOUNTER
Refill Decision Note   Becky Rayo  is requesting a refill authorization.  Brief Assessment and Rationale for Refill:  Approve     Medication Therapy Plan:       Medication Reconciliation Completed: No   Comments:     No Care Gaps recommended.     Note composed:12:09 PM 08/31/2024

## 2024-09-04 ENCOUNTER — OFFICE VISIT (OUTPATIENT)
Dept: PSYCHIATRY | Facility: CLINIC | Age: 54
End: 2024-09-04
Payer: COMMERCIAL

## 2024-09-04 DIAGNOSIS — F41.9 ANXIETY: Primary | ICD-10-CM

## 2024-09-04 PROCEDURE — 90847 FAMILY PSYTX W/PT 50 MIN: CPT | Mod: S$GLB,,, | Performed by: SOCIAL WORKER

## 2024-09-04 RX ORDER — HYDROXYCHLOROQUINE SULFATE 200 MG/1
300 TABLET, FILM COATED ORAL DAILY
Qty: 135 TABLET | Refills: 2 | Status: SHIPPED | OUTPATIENT
Start: 2024-09-04

## 2024-09-04 NOTE — PROGRESS NOTES
Family Psychotherapy (PhD/LCSW)    9/4/2024    Site: [facility]    Length of service: 60    Therapeutic intervention: 71106-Family therapy with patient; needed because relationship issues    Persons present: patient and significant other     Interval history: Pt asks to bring Harry to tx today to discuss impasse about her contact with his sons. W feels it is harmful to his relationship with the boys for E to be around frequently because they still want parents to reunite, although he has been  x 3 years. Pt feels abandoned when W sets this boundary, feels unloved, panicky. Pt is aware this stems from childhood issues and from loss of contact with her daughters, debates whether to break up with W.    Target symptoms: anxiety      Patient's interpersonal/verbal exchanges: 15991-Family therapy with patient:  active listening, frequent questions, and self-disclosure    Progress toward goals: progressing slowly    Diagnosis: anxiety    Plan: individual psychotherapy    Return to clinic: as scheduled

## 2024-09-10 ENCOUNTER — NURSE TRIAGE (OUTPATIENT)
Dept: ADMINISTRATIVE | Facility: CLINIC | Age: 54
End: 2024-09-10
Payer: COMMERCIAL

## 2024-09-10 ENCOUNTER — E-VISIT (OUTPATIENT)
Dept: PRIMARY CARE CLINIC | Facility: CLINIC | Age: 54
End: 2024-09-10
Payer: COMMERCIAL

## 2024-09-10 ENCOUNTER — PATIENT MESSAGE (OUTPATIENT)
Dept: RHEUMATOLOGY | Facility: CLINIC | Age: 54
End: 2024-09-10
Payer: COMMERCIAL

## 2024-09-10 ENCOUNTER — TELEPHONE (OUTPATIENT)
Dept: OBSTETRICS AND GYNECOLOGY | Facility: CLINIC | Age: 54
End: 2024-09-10
Payer: COMMERCIAL

## 2024-09-10 ENCOUNTER — PATIENT MESSAGE (OUTPATIENT)
Dept: PRIMARY CARE CLINIC | Facility: CLINIC | Age: 54
End: 2024-09-10
Payer: COMMERCIAL

## 2024-09-10 ENCOUNTER — TELEPHONE (OUTPATIENT)
Dept: PRIMARY CARE CLINIC | Facility: CLINIC | Age: 54
End: 2024-09-10

## 2024-09-10 DIAGNOSIS — R05.9 COUGH, UNSPECIFIED TYPE: ICD-10-CM

## 2024-09-10 DIAGNOSIS — U07.1 COVID-19: Primary | ICD-10-CM

## 2024-09-10 RX ORDER — NIRMATRELVIR AND RITONAVIR 300-100 MG
KIT ORAL
Qty: 30 TABLET | Refills: 0 | Status: SHIPPED | OUTPATIENT
Start: 2024-09-10 | End: 2024-09-15

## 2024-09-10 RX ORDER — BENZONATATE 200 MG/1
200 CAPSULE ORAL 3 TIMES DAILY PRN
Qty: 30 CAPSULE | Refills: 0 | Status: SHIPPED | OUTPATIENT
Start: 2024-09-10 | End: 2024-09-20

## 2024-09-10 NOTE — TELEPHONE ENCOUNTER
Pt called to see if Dr Parson could call in medication for covid. Advised pt Dr Abdul is not in the office and it is recommended she get that from her PCP. Pt verbalized understanding

## 2024-09-10 NOTE — TELEPHONE ENCOUNTER
----- Message from George Castro sent at 9/10/2024 11:48 AM CDT -----  Regarding: Self [phone]  Type: Patient Call Back    Who called: Self     What is the request in detail: pt called stating that she tested positive for covid and was finding out if they could call in some medication for it to the pharmacy listed below.       [id number] [address] & [address] [address]  Phone: [phone] Fax: [phone]       Can the clinic reply by [facility]? No     Would the patient rather a call back or a response via [facility]? Call back     Best call back number: [phone]     Additional Information:    Thank you.

## 2024-09-10 NOTE — PROGRESS NOTES
Subjective:       Patient ID: Becky Cade is a 54 y.o. female.    Chief Complaint: General Illness (Entered automatically based on patient selection in [facility].)    HPI Patient ID: Becky Cade is a 54 y.o. female.    Chief Complaint: General Illness (Entered automatically based on patient selection in [facility].)    The patient initiated a request through [employer] on 9/10/2024 for evaluation and management with a chief complaint of General Illness (Entered automatically based on patient selection in [employer].)     I evaluated the questionnaire submission on 9/10/24.    [facility] Evisit [facility]-Cough And Cold    9/10/2024  9:19 AM CDT - Filed by Patient   What do you need help with? Covid 19   Do you agree to participate in an E-Visit? Yes   If you have any of the following symptoms, go to your local emergency room or call 911: I acknowledge   Are you pregnant, could you be pregnant, or are you breast feeding? None of the above   What is the main issue you would like addressed today? Covid   Do you think you might have COVID or the Flu? Yes COVID   Have you tested positive for COVID or Flu? Yes COVID   What symptoms do you have? Chills;  Cough;  Diarrhea;  Fever;  Headache;  Nasal congestion;  Muscle or body aches;  Runny nose;  Sore throat   When did your symptoms first appear? 9/9/2024   List what you have done or taken to help your symptoms. Tylenol   Provide any additional information you feel is important.    Please attach any relevant images or files    Are you able to take your vital signs? No         Encounter Diagnoses   Name Primary?    COVID-19 Yes    Cough, unspecified type         No orders of the defined types were placed in this encounter.     Medications Ordered This Encounter   Medications    benzonatate (TESSALON) 200 MG capsule     Sig: Take 1 capsule (200 mg total) by mouth 3 (three) times daily as needed.     Dispense:  30 capsule     Refill:  0    nirmatrelvir-ritonavir (PAXLOVID)  300 mg (150 mg x 2)-100 mg copackaged tablets (EUA)     Sig: Take 3 tablets by mouth 2 (two) times daily. Each dose contains 2 nirmatrelvir (pink tablets) and 1 ritonavir (white tablet). Take all 3 tablets together     Dispense:  30 tablet     Refill:  0        No follow-ups on file.      E-Visit Time Tracking:                        Review of Systems    Objective:      LMP 02/07/2023 (Approximate)   Physical Exam    Assessment:       1. COVID-19    2. Cough, unspecified type        Plan:   Becky was seen today for general illness.    Diagnoses and all orders for this visit:    COVID-19  -     nirmatrelvir-ritonavir (PAXLOVID) 300 mg (150 mg x 2)-100 mg copackaged tablets (EUA); Take 3 tablets by mouth 2 (two) times daily. Each dose contains 2 nirmatrelvir (pink tablets) and 1 ritonavir (white tablet). Take all 3 tablets together  -     benzonatate (TESSALON) 200 MG capsule; Take 1 capsule (200 mg total) by mouth 3 (three) times daily as needed.    Cough, unspecified type  -     benzonatate (TESSALON) 200 MG capsule; Take 1 capsule (200 mg total) by mouth 3 (three) times daily as needed.

## 2024-09-10 NOTE — TELEPHONE ENCOUNTER
----- Message from Alecia Dykes sent at 9/10/2024  9:00 AM CDT -----  Contact: Self/[phone]  1MEDICALADVICE     Patient is calling for Medical Advice regarding:fever/body aches /cold symptoms     How long has patient had these symptoms: overnight     Pharmacy name and phone#: [id number] - [place], [place]  [facility] AT [facility] & [address] [address]  Phone: [phone] Fax: [phone]       Patient wants a call back or thru [facility]: call back     Comments:pt stated that she tested positive for Covid with her at home test , pt is asking if she can have a rx for Paxlovid sent to the pharmacy     Please advise patient replies from provider may take up to 48 hours.

## 2024-09-11 NOTE — TELEPHONE ENCOUNTER
Pt woke up with fever last night. Temps between 101-102. Tested positive for covid. Rx called in for paxlovid and benzine cough drops. Pt instructed by pcp to alternate tylenol and ibuprofen. Most recent temp 102.1. temp does decrease between doses of fever reducing medications. Pt is anxious because she is not used to having fever.    Dispo- home care. Reassurance provided. Care advice given. Call back reasons discussed. Pt VU.  Reason for Disposition   [1] COVID-19 diagnosed by doctor (or NP/PA) AND [2] mild symptoms (e.g., cough, fever, others) AND [3] no complications or SOB    Additional Information   Negative: SEVERE difficulty breathing (e.g., struggling for each breath, speaks in single words)   Negative: Difficult to awaken or acting confused (e.g., disoriented, slurred speech)   Negative: Bluish (or gray) lips or face now   Negative: Shock suspected (e.g., cold/pale/clammy skin, too weak to stand, low BP, rapid pulse)   Negative: Sounds like a life-threatening emergency to the triager   Negative: SEVERE or constant chest pain or pressure  (Exception: Mild central chest pain, present only when coughing.)   Negative: MODERATE difficulty breathing (e.g., speaks in phrases, SOB even at rest, pulse 100-120)   Negative: [1] Headache AND [2] stiff neck (can't touch chin to chest)   Negative: Oxygen level (e.g., pulse oximetry) 90 percent or lower   Negative: Chest pain or pressure  (Exception: MILD central chest pain, present only when coughing.)   Negative: [1] Drinking very little AND [2] dehydration suspected (e.g., no urine > 12 hours, very dry mouth, very lightheaded)   Negative: Patient sounds very sick or weak to the triager   Negative: MILD difficulty breathing (e.g., minimal/no SOB at rest, SOB with walking, pulse <100)   Negative: Fever > 103 F (39.4 C)   Negative: [1] Fever > 101 F (38.3 C) AND [2] age > 60 years   Negative: [1] Fever > 100.0 F (37.8 C) AND [2] bedridden (e.g., CVA, chronic illness,  recovering from surgery)   Negative: Oxygen level (e.g., pulse oximetry) 91 to 94 percent   Negative: [1] HIGH RISK patient (e.g., weak immune system, age > 64 years, obesity with BMI 30 or higher, pregnant, chronic lung disease or other chronic medical condition) AND [2] COVID symptoms (e.g., cough, fever)  (Exceptions: Already seen by PCP and no new or worsening symptoms.)   Negative: [1] HIGH RISK patient AND [2] influenza is widespread in the community AND [3] ONE OR MORE respiratory symptoms: cough, sore throat, runny or stuffy nose   Negative: [1] HIGH RISK patient AND [2] influenza exposure within the last 7 days AND [3] ONE OR MORE respiratory symptoms: cough, sore throat, runny or stuffy nose   Negative: Fever present > 3 days (72 hours)   Negative: [1] Fever returns after gone for over 24 hours AND [2] symptoms worse or not improved   Negative: [1] Continuous (nonstop) coughing interferes with work or school AND [2] no improvement using cough treatment per Care Advice   Negative: [1] COVID-19 infection suspected by caller or triager AND [2] mild symptoms (cough, fever, or others) AND [3] negative COVID-19 rapid test   Negative: Cough present > 3 weeks    Protocols used: Coronavirus (COVID-19) Diagnosed or [id number]

## 2024-09-12 ENCOUNTER — TELEPHONE (OUTPATIENT)
Dept: PRIMARY CARE CLINIC | Facility: CLINIC | Age: 54
End: 2024-09-12
Payer: COMMERCIAL

## 2024-09-12 ENCOUNTER — OFFICE VISIT (OUTPATIENT)
Dept: PRIMARY CARE CLINIC | Facility: CLINIC | Age: 54
End: 2024-09-12
Payer: COMMERCIAL

## 2024-09-12 VITALS
WEIGHT: 142.44 LBS | SYSTOLIC BLOOD PRESSURE: 128 MMHG | OXYGEN SATURATION: 98 % | BODY MASS INDEX: 24.32 KG/M2 | DIASTOLIC BLOOD PRESSURE: 82 MMHG | HEART RATE: 85 BPM | HEIGHT: 64 IN | RESPIRATION RATE: 16 BRPM

## 2024-09-12 DIAGNOSIS — U07.1 COVID-19: Primary | ICD-10-CM

## 2024-09-12 DIAGNOSIS — Z79.899 LONG-TERM USE OF PLAQUENIL: ICD-10-CM

## 2024-09-12 DIAGNOSIS — E03.9 HYPOTHYROIDISM, UNSPECIFIED TYPE: ICD-10-CM

## 2024-09-12 DIAGNOSIS — M32.9 SYSTEMIC LUPUS ERYTHEMATOSUS, UNSPECIFIED SLE TYPE, UNSPECIFIED ORGAN INVOLVEMENT STATUS: ICD-10-CM

## 2024-09-12 PROCEDURE — 3079F DIAST BP 80-89 MM HG: CPT | Mod: CPTII,S$GLB,, | Performed by: FAMILY MEDICINE

## 2024-09-12 PROCEDURE — 1159F MED LIST DOCD IN RCRD: CPT | Mod: CPTII,S$GLB,, | Performed by: FAMILY MEDICINE

## 2024-09-12 PROCEDURE — 1160F RVW MEDS BY RX/DR IN RCRD: CPT | Mod: CPTII,S$GLB,, | Performed by: FAMILY MEDICINE

## 2024-09-12 PROCEDURE — 3074F SYST BP LT 130 MM HG: CPT | Mod: CPTII,S$GLB,, | Performed by: FAMILY MEDICINE

## 2024-09-12 PROCEDURE — 99214 OFFICE O/P EST MOD 30 MIN: CPT | Mod: S$GLB,,, | Performed by: FAMILY MEDICINE

## 2024-09-12 PROCEDURE — 99999 PR PBB SHADOW E&M-EST. PATIENT-LVL IV: CPT | Mod: PBBFAC,,, | Performed by: FAMILY MEDICINE

## 2024-09-12 PROCEDURE — 3008F BODY MASS INDEX DOCD: CPT | Mod: CPTII,S$GLB,, | Performed by: FAMILY MEDICINE

## 2024-09-12 RX ORDER — LEVOTHYROXINE SODIUM 75 UG/1
75 TABLET ORAL
Qty: 90 TABLET | Refills: 1 | Status: SHIPPED | OUTPATIENT
Start: 2024-09-12

## 2024-09-12 NOTE — TELEPHONE ENCOUNTER
----- Message from Alecia Dykes sent at 9/12/2024  8:55 AM CDT -----  Contact: Self/ [phone]    Reason for the appointment:  pt tested  positive for Covid on Tuesday and would like for Dr Stephens to check her lungs     Patient preference of timeframe to be scheduled:  today     Would the patient like a call back, or a response through their [facility] portal?:   call back     Comments:

## 2024-09-12 NOTE — PROGRESS NOTES
"Subjective:       Patient ID: Becky Cade is a 54 y.o. female.    Chief Complaint: COVID-19 Concerns    HPI 55 y/o female with hypothyroidism, cervical djd, ANAM, herpes labialis, mixed incontinence, positive LIBRA, family hx of RA, history of COVID here for Covid follow up.    She tested positive for covid on 9/10/24, she is currently on Paxlovid, she is feeling better overall, she is feeling "dizzy" when she gets up and moves around, she has some nausea, she denies f, she has had body aches, she denies v, she has cough productive of yellow sputum, sinus pressure, ear pressure, pnd, she had been having diarrhea, was initially watery now has some formed elements, she denies abdominal pain/cp/sob/urinary sx. Appetite ok. Sleeping ok. She is using nasal saline, tylenol that helps.    Since last visit she has been diagnosed with SLE and she has been on hydroxychloroquine for about 3 months it is on hold for now, but since starting her joints pains have improved, rash resolved was doing better overall      Hx of covid 1/2022 mild outpatient symptoms  ANAM:  following with Dr. Abhijeet Brooks, doing counseling weekly, paxil 40 mg daily, hydroxyzine 20 mg at night, Ativan 0.5 mg nightly  Hypothyroidism:never had procedure or biopsy, levothyroxine 75 mcg  Herpes labialis: acyclovir prn, 2-3 times a year  Positive LIBRA/SLE/family RA: following with rheumatology, hydroxychloroquine 300 mg daily  Mixed incontinence/cystocele: following with Urogyn  GYN: following with Dr. Parson, no longer having cycles, pelvic utd, Divigel, mmg 2/2024  Colonoscopy 9/2020 repeat 10 years  Eye exam utd followed by Dr. Pascual  Dental utd  OTC: b12, vit D3, magnesium     Review of Systems  see HPI  Objective:      /82 (BP Location: Left arm, Patient Position: Sitting, BP Method: Medium (Manual))   Pulse 85   Resp 16   Ht 5' 4" (1.626 m)   Wt 64.6 kg (142 lb 6.7 oz)   LMP 02/07/2023 (Approximate)   SpO2 98%   BMI 24.45 kg/m² "   Physical Exam  Vitals and nursing note reviewed.   Constitutional:       Appearance: She is well-developed.   HENT:      Head: Normocephalic and atraumatic.      Right Ear: There is impacted cerumen.      Left Ear: Tympanic membrane normal.      Mouth/Throat:      Pharynx: No oropharyngeal exudate or posterior oropharyngeal erythema.   Neck:      Thyroid: No thyromegaly.   Cardiovascular:      Rate and Rhythm: Normal rate and regular rhythm.      Heart sounds: Normal heart sounds.   Pulmonary:      Effort: Pulmonary effort is normal. No respiratory distress.      Breath sounds: Normal breath sounds.   Abdominal:      General: Bowel sounds are normal. There is no distension.      Palpations: Abdomen is soft. There is no mass.      Tenderness: There is no abdominal tenderness.   Musculoskeletal:      Cervical back: Normal range of motion and neck supple.      Right lower leg: No edema.      Left lower leg: No edema.   Lymphadenopathy:      Cervical: No cervical adenopathy.   Skin:     General: Skin is warm and dry.   Neurological:      Mental Status: She is alert.         Assessment:       1. COVID-19    2. Hypothyroidism, unspecified type    3. Long-term use of Plaquenil    4. Systemic lupus erythematosus, unspecified SLE type, unspecified organ involvement status        Plan:   Becky was seen today for covid-19 concerns.    Diagnoses and all orders for this visit:    COVID-19    Hypothyroidism, unspecified type  -     levothyroxine (SYNTHROID) 75 MCG tablet; Take 1 tablet (75 mcg total) by mouth before breakfast.    Long-term use of Plaquenil    Systemic lupus erythematosus, unspecified SLE type, unspecified organ involvement status    Discussed doing a 3 days trial of Prednisone 40 mg daily if her symptoms do not continue to improve

## 2024-09-12 NOTE — LETTER
September 12, 2024      [facility]  [address]  [place] [address]  Phone: [phone]       Patient: Becky Cade   YOB: 1970  Date of Visit: 09/12/2024    To Whom It May Concern:    Yadira Cade  was at [facility] on 09/12/2024. The patient may return to work when she is fever free for 24 hours without the use of fever reducing medications and symptoms improving.  If you have any questions or concerns, or if I can be of further assistance, please do not hesitate to contact me.    Sincerely,    Desiree Stephens MD

## 2024-09-12 NOTE — TELEPHONE ENCOUNTER
Spoke to patient, and scheduled her to come in today for 10:0am to have her lungs checked by .    Pt verbalized understanding and all questions were answered.

## 2024-09-16 ENCOUNTER — OFFICE VISIT (OUTPATIENT)
Dept: PSYCHIATRY | Facility: CLINIC | Age: 54
End: 2024-09-16
Payer: COMMERCIAL

## 2024-09-16 DIAGNOSIS — F41.9 ANXIETY: Primary | ICD-10-CM

## 2024-09-16 PROCEDURE — 90834 PSYTX W PT 45 MINUTES: CPT | Mod: S$GLB,,, | Performed by: SOCIAL WORKER

## 2024-09-16 NOTE — PROGRESS NOTES
Individual Psychotherapy (PhD/LCSW)    7/5/2023    Site: [facility]    Therapeutic Intervention: Met with patient alone  Outpatient - Insight oriented psychotherapy 45 min - CPT code 84585    Chief complaint/reason for encounter: anxiety     Interval history and content of current session: Pt has ended relationship with Harry, now tearful and anxious, feels she is at fault in the failure of the relationship and the loss of relationships with her daughters. Pt's mother asks to speak with me during the session by phone. Both pt and mom feel pt needs more than weekly Tx right now. Agree pt will enter the IOP and then continue Tx with me after the program.  Pt is not suicidal or homicidal, has been able to work and functions well at home but feels panicky and overwhelmed.     Treatment plan:  Target symptoms: anxiety   Why chosen therapy is appropriate versus another modality: relevant to diagnosis  Outcome monitoring methods: self-report  Therapeutic intervention type: insight oriented psychotherapy    Risk parameters:  Patient reports no suicidal ideation  Patient reports no homicidal ideation  Patient reports no self-injurious behavior  Patient reports no violent behavior    Verbal deficits: None    Patient's response to intervention:  The patient's response to intervention is motivated    Progress toward goals and other mental status changes:  The patient's progress toward goals is limited    Diagnosis:   Anxiety    Plan:  individual psychotherapy    Return to clinic: f/u as scheduled    Length of Service (minutes): 45

## 2024-09-18 ENCOUNTER — HOSPITAL ENCOUNTER (OUTPATIENT)
Dept: PSYCHIATRY | Facility: HOSPITAL | Age: 54
Discharge: HOME | End: 2024-09-18
Attending: STUDENT IN AN ORGANIZED HEALTH CARE EDUCATION/TRAINING PROGRAM
Payer: COMMERCIAL

## 2024-09-18 VITALS
DIASTOLIC BLOOD PRESSURE: 80 MMHG | RESPIRATION RATE: 18 BRPM | TEMPERATURE: 97 F | SYSTOLIC BLOOD PRESSURE: 117 MMHG | HEART RATE: 86 BPM

## 2024-09-18 DIAGNOSIS — F33.1 MODERATE EPISODE OF RECURRENT MAJOR DEPRESSIVE DISORDER: ICD-10-CM

## 2024-09-18 DIAGNOSIS — F41.9 ANXIETY: Primary | ICD-10-CM

## 2024-09-18 PROCEDURE — 90853 GROUP PSYCHOTHERAPY: CPT | Mod: ,,, | Performed by: PSYCHOLOGIST

## 2024-09-18 PROCEDURE — 90853 GROUP PSYCHOTHERAPY: CPT | Mod: ,,,

## 2024-09-18 NOTE — TREATMENT PLAN
"[facility]  MENTAL WELLNESS PROGRAM  INTERDISCIPLINARY TREATMENT PLAN  INTENSIVE OUTPATIENT     INTERDISCIPLINARY  TREATMENT TEAM:    Zac Brink M.D., Psychiatrist    Lisa Celestin M.D., Psychiatrist      Neda Riddle, Ph.D., Clinical Psychologist    Geovanny Winter LPN, Licensed Practical Nurse    Suki Rubio, [facility], Licensed Master Social Worker    Sujata Woodruff RSW, Registered           Resident: Kevin Beach MD    (Signatures scanned into record separately).      ESTIMATED LOS:  2 weeks      The patient has reviewed the treatment plan with staff and has signed the "Patient Responsibilities" form.    (Patient signature scanned into record separately).       TREATMENT PLAN    DIAGNOSIS:      Anxiety, unspecified    Major depressive disorder, moderate      Patient Education Needs/Barriers to Learning (i.e., Language, Reading, Comprehension): None         Support/Advocacy Services/Needs (i.e., Financial, Transportation, Medications): None         Community Resources (i.e., Alcoholics Anonymous, Al Anon): None     Patients Identified Goals:  Confidence in myself   2.  Medication       Coping Skills:  Take a deep breath   2.   Medication       Strengths:  Happy  2.   Love people      Limitations:  I want to work on my inner thoughts   2.   I am very hard on myself      Goals and Objectives:  1. Goal: Attend and participate in all groups   Objective measure: Progress notes indicating active listening,    self-disclosure, feedback   Time frame to reach goal: Each day    2. Goal: Medication management   Objective measure: Physician progress note indicating improved medication status   Time frame to reach goal: By discharge    3. Goal: Reduce depression   Objective measure: Physician progress note indicating depression is improved   Time frame to reach goal: By discharge    4.  Goal: Reduce anxiety   Objective measure: Physician progress note indicating anxiety is improved   Time " frame to reach goal: By discharge    5. Goal: Develop stress coping skills   Objective measure: Patient self-report of improved coping   Time frame to reach goal: By discharge        Group Interventions:    Psychodynamic Group Psychotherapy - 1 hour, 5 times per week  Goals: 1. Utilize group empathy and support for problem solving; 2. Apply stress management, communication, and assertiveness skills to personal issues; 3. Discuss mental health symptoms and explore strategies for coping; 4. Discuss ways to change lifestyle to maintain better emotional health.    Communication Skills - 1 hour, 2 times per week  Goals: 1. Learn rules of effective communication; 2. Improve listening skills; 3. Practice clear communication.    Nutrition and Health - 1 hour, 1 time per week  Goals: 1. Explore impact that nutrition has on health; 2. Identify positive nutritional choices; 3. Explore how nutrition relates to stress tolerance.    Personal Growth - 1 hour, 2 times per week  Goals: 1. Discuss the development of self; 2. Create personal awareness and insight; 3. Explore a variety of psycho-educational techniques.    Promoting Healthy Lifestyles - 1 hour, 1 time per week  Goals: 1. Understand the Biopsychosocial Model of Health; 2. Develop insight into how mental health can impact other dimensions of health; 3. Develop appropriate health promotion strategies.    Acceptance and Commitment Therapy (ACT)- 1 hour, 1 time per week  Goals: 1. Learn an action-oriented psychotherapy called ACT; 2. Focus on identifying, challenging, and clarifying values systems and beliefs; 3. Explore how values oriented actions can lead to emotional well-being.    Relationship Dynamics - 1 hour, 1 time per week  Goals: 1. Learn about factors that shape relationships; 2. Understand the central role of relationships in personal well-being; 3. Learn how to improve all relationships.    Relaxation Training - 1 hour, 3 times per week  Goals: 1. Learn about  and implement various techniques for releasing physical tension from the body.    Spirituality - 1 hour, 1 time per week  Goals: 1. Discuss and reflect on the process of seeking peace and comfort; 2. Identify healthy ways of exploring spirituality.    Stress Management - 1 hour, 4 times per week  Goals: 1. Identify types and levels of stress; 2. Identify and change maladaptive beliefs and behaviors; 3. Identify and practice techniques of stress management.    DBT- 1 hour, 4 times per week  Goals: 1. Discuss emotion regulation and Interpersonal Effectiveness Skills and practice mindfulness; 2. Identify and change maladaptive beliefs and behaviors; Identify and practice techniques of DBT and mindfulness.

## 2024-09-18 NOTE — PROGRESS NOTES
Group Psychotherapy (PhD/LCSW)    Site: [facility]    Clinical status of patient: Intensive Outpatient Program (IOP)    Date: 9/18/2024    Group Focus: Mindfulness    Length of service: 26000 - 45-50 minutes    Number of patients in attendance: 8    Referred by: [facility] Treatment Team    Target symptoms: Depression and Anxiety    Patient's response to treatment: Active Listening and Self-disclosure    Progress toward goals: Progressing adequately    Interval History: Session focus was Mindfulness: Introduction to Mindfulness and States of Mind. Patients were introduced to the practice of mindfulness. Patient discussed the two types of mindfulness practice and the efficacy of mindfulness as a skill to be used in conjunction with other DBT skills. Patients practiced identifying uses of reasonable mind, emotion mind, and synthesizing both to achieve a state of Wise mind.     Diagnosis:     ICD-10-CM ICD-9-CM   1. Anxiety  F41.9 300.00   2. Moderate episode of recurrent major depressive disorder  F33.1 296.32        Plan: Continue treatment on [facility]

## 2024-09-18 NOTE — PATIENT CARE CONFERENCE
"Presenting Problem and History:    Ms Pena said she's here for anxiety. Said she's had anxiety most of her life and it's been worse lately. Said she wants to "get [her] feet back on the ground", to stop crying, to have better confidence, and to feel that a lot of the things that happened aren't her fault. Said she's been through a lot in the past few years and feels that she needs some extra help. Said she got a divorce in 2021 after 26 years. Said she felt that her ex  had been stealing her money the entire time, was using her investment portfolio for their living expenses and they had to sell their house. Said after divorce her daughters chose to be with their dad and she hasn't seen them in 3 years except 1 daughter once, and that did not go well. Said she's been feeling like she's done something wrong and has been facing rejections. Said even as a child she felt like the "ugly duckling", the outsider because she's felt so different from other members of her family. Said that she tried to date after her marriage, had been seeing this man x 2 years who has 12yo twins. On Sunday they gave him an ultimatum to either see them or her and they ended their relationship again. Said she's gone back to him 3x before but feels that the relationship is toxic and thinks this time is for good. Said he and his family do not communicate well and they've had conflicts about parenting styles. 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 Said also that she didn't feel like herself in the past relationship.   Regarding her anxiety, said she's been having anxious about abandonment, rejection, has lupus flare ups (recent dx this year) when anxious, unsure if it's affecting sleep because she's been sleeping well on medications. Said that she's been having panic attacks infrequently and can manage okay with PRN ativan.  Pt did not think she was depressed, " "however does report upset mood, anhedonia, decreased concentration, feeling hopeless, passive SI. Said she's had thoughts like if some accident were to happen and she'd die, then it's okay. Denied having any active thoughts, plans, does not think she'd ever take action. Said she wants to live for her dog, her family and friends, has family for support and is established with psychiatrist (Abhijeet Brooks) an therapist (Caren Mistry). Said she'd call professionals or emergency services if sx worsen. Denied previous SA, SIB, denied having access to firearms.   Said she's been in paxil for years, most recently on Paxil 50 mg, Doxepin 10 mg, Hydroxyzine 20 mg, Ativan 0.5 mg PRN that she takes once every 1-2 weeks. Said she thinks her medications have been working well, denied noticeable side effects. Said she recently changed from paxil to extended release and has been feeling much better.  When asked about her goals here, she said she'd like to be back to her "happy self". Said she'd like to wake up and go to bed without pain. Asked about specifics and said this may look like that she's crying less, has fewer intrusive thoughts, and less negative self talk. Said she's looking to rewrite her narrative in her head.       reviewed - no inconsistencies noted.     Medications:      1.  Continue paxil 50 mg daily    2.  Continue doxepin 10 mg qhs    3.  Continue hydroxyzine 20 mg qhs    4.  Continue PRN ativan 0.5 mg       Diagnoses:    1.  Anxiety, unspecified    2.  Major depressive disorder, moderate    Progress:    Patient was staffed by team.  Pt is new to program, initiated today, 9/18. Pt hasn't been observed in group yet but is motivated to be here and work the program. Team will continue to monitor progress.     Plan of Care:    Patient will continue [facility] program.     Aftercare/Follow ups:  Med Management: TBD  Psychotherapy: 10/16 Fede 8 AM      Staff present:  MD Kevin Aguirre MD  Resident "   Neda Riddle, PhD  Dalton Blackburn, PhD  Salome Keith,   Geovanny Winter, LPN  Suki Rubio, CSW  Sujata Woodruff, RSW
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
## 2024-09-19 ENCOUNTER — HOSPITAL ENCOUNTER (OUTPATIENT)
Dept: PSYCHIATRY | Facility: HOSPITAL | Age: 54
Discharge: HOME | End: 2024-09-19
Attending: STUDENT IN AN ORGANIZED HEALTH CARE EDUCATION/TRAINING PROGRAM
Payer: COMMERCIAL

## 2024-09-19 DIAGNOSIS — F33.1 MODERATE EPISODE OF RECURRENT MAJOR DEPRESSIVE DISORDER: ICD-10-CM

## 2024-09-19 DIAGNOSIS — F41.9 ANXIETY: Primary | ICD-10-CM

## 2024-09-19 PROCEDURE — 90853 GROUP PSYCHOTHERAPY: CPT | Mod: ,,, | Performed by: PSYCHOLOGIST

## 2024-09-19 PROCEDURE — 90853 GROUP PSYCHOTHERAPY: CPT

## 2024-09-19 NOTE — PSYCH
[facility]al Medicine [facility]  Psychosocial Assessment    Date: 2024  Time: 9:08 AM    Name: Becky Cade    Age: 54 y.o.       : 1970         Race: / White     Precipitating Event: adjustment issues, adult ADHD, chronic pain, family conflict, hopelessness/helplessness, obsessive thoughts, stress, and supportive counseling    Symptoms: panic attacks, cry often/depressed, worry alot, loss of interest in eating, anxiety/tension, and loss of energy/fatigue    Marital Status:     Spouse/Significant Other: N/A    Quality of Relationship: N/A    Education: post college graduate work or degree    Occupation: Special      Employer/School: [employer]     Medical/Psychiatric History   Past Psychiatric History:  No    Currently in treatment with [facility]     Medical Conditions/including prior head injury: See past medical history    Suicidal Ideation/Homicidal Ideation:  No    Current Medications:   Current Outpatient Medications:     acyclovir (ZOVIRAX) 400 MG tablet, Take 1 tablet (400 mg total) by mouth 2 (two) times daily. Fever blisters, Disp: 14 tablet, Rfl: 0    benzonatate (TESSALON) 200 MG capsule, Take 1 capsule (200 mg total) by mouth 3 (three) times daily as needed., Disp: 30 capsule, Rfl: 0    calcium-vitamin D3 (OS-KRISTY 500 + D3) 500 mg-5 mcg (200 unit) per tablet, Take 1 tablet by mouth 2 (two) times daily with meals., Disp: , Rfl:     clotrimazole-betamethasone 1-0.05% (LOTRISONE) cream, Apply topically 2 (two) times daily., Disp: 45 g, Rfl: 1    cyanocobalamin (VITAMIN B-12) 1000 MCG tablet, Take 1,000 mcg by mouth once daily., Disp: , Rfl:     doxepin (SINEQUAN) 10 MG capsule, Take 10 mg by mouth every evening., Disp: , Rfl:     erythromycin with ethanoL (EMGEL) 2 % gel, Apply to the affected areas of face bid., Disp: 30 g, Rfl: 1    esomeprazole (NEXIUM) 20 MG capsule, Take 20 mg by mouth as needed. Takes  2-3 a year, Disp: , Rfl:     estradioL (DIVIGEL) 0.25 mg/0.25 gram (0.1 %) GlPk, Place 1 packet onto the skin once daily., Disp: 30 packet, Rfl: 6    estradioL (ESTRACE) 0.01 % (0.1 mg/gram) vaginal cream, Use 0.5 gram of estrogen cream in vagina twice weekly, Disp: 42.5 g, Rfl: 3    hydroxychloroquine (PLAQUENIL) 200 mg tablet, Take 1.5 tablets (300 mg total) by mouth once daily. (Patient not taking: Reported on 2024), Disp: 135 tablet, Rfl: 2    hydrOXYzine HCL (ATARAX) 10 MG Tab, Take 2 tablets (20 mg total) by mouth every evening., Disp: 180 tablet, Rfl: 1    levothyroxine (SYNTHROID) 75 MCG tablet, Take 1 tablet (75 mcg total) by mouth before breakfast., Disp: 90 tablet, Rfl: 1    LORazepam (ATIVAN) 0.5 MG tablet, Take 1 tablet (0.5 mg total) by mouth nightly as needed for Anxiety. Use only infrequently, can be addictive, Disp: 15 tablet, Rfl: 0    magnesium 30 mg Tab, Take by mouth once., Disp: , Rfl:     naproxen sodium (ANAPROX) 550 MG tablet, Take 1 tablet (550 mg total) by mouth as needed (as needed for jaw pain)., Disp: 30 tablet, Rfl: 2    paroxetine (PAXIL-CR) 25 MG 24 hr tablet, Take 50 mg by mouth., Disp: , Rfl:     progesterone (PROMETRIUM) 100 MG capsule, Take 1 capsule (100 mg total) by mouth nightly., Disp: 90 capsule, Rfl: 2    terconazole (TERAZOL 7) 0.4 % Crea, Place 1 applicator vaginally every evening., Disp: 7 g, Rfl: 1    triamcinolone acetonide 0.1% (KENALOG) 0.1 % cream, AAA bid, Disp: 454 g, Rfl: 3    Allergies:   Review of patient's allergies indicates:   Allergen Reactions    Buspar [buspirone] Other (See Comments)     dizziness    Mobic [meloxicam] Rash       Family History  Mother: Rosemary Cade   Present Age: 81  Occupation: retired/ house maker   Medical/Psychiatric History: arthritis    Father:  Bernard Cade   Present Age:  at 69  Occupation: [employer]   Medical/Psychiatric History: Parkinson's disease     Siblings and present ages: 2 brothers: Bernard Ny, 60, Manuel , 58;  Noemy , 541 sister, twin   Medical or Psychiatric Problems: Bernard Ny, anxiety / depression     Relationships with parents and siblings: very close ; healthy and supportive     Developmental History  Place of birth: [place], [place]     Developmental Milestones: Patient reports all met    History of Physical/Sexual Abuse: No    Childhood Behavioral Problems: anxiety    Children  Names/Ages: 2: Linda, 25; Niya, 23    Medical or Psychiatric Problems: anxiety     Quality of Parent/Child Relationship: extremely close until divorce,. No relationship at the moment     Criminal History  Arrests:  No    Miscellaneous:  Financial Issues: No    Leisure Activities: be with my friends, be with my family, walk my dog , be with people, people person    Owns a gun? No Secured? N/A     History:  No    Comments:    Patient was cooperative during psychosocial assessment. Patient's location: in office with .   Discharge Plans:  Will follow-up with outpatient therapist for psychotherapy, outpatient psychiatrist for medication management and after care group with Dr. Blackburn or Dr. Riddle, pending availability.

## 2024-09-19 NOTE — PROGRESS NOTES
Group Psychotherapy (PhD/LCSW)    Site: [facility]    Clinical status of patient: Intensive Outpatient Program (IOP)    Date: 9/19/2024    Group Focus: Emotion Regulation     Length of service: 91757 - 45-50 minutes    Number of patients in attendance: 7    Referred by: [facility] Treatment Team    Target symptoms: Depression and Anxiety    Patient's response to treatment: Active Listening and Self-disclosure    Progress toward goals: Progressing adequately    Interval History: Session focus was Emotion Regulation:  Problem-Solving.  Patients were encouraged to identify problems, check the facts, generate solutions, look at pros/cons, and create action steps to use the solution.    Diagnosis:     ICD-10-CM ICD-9-CM   1. Anxiety  F41.9 300.00   2. Moderate episode of recurrent major depressive disorder  F33.1 296.32        Plan: Continue treatment on [facility]

## 2024-09-19 NOTE — PLAN OF CARE
"   09/19/24 1316   Activity/Group Therapy Checklist   Group Other (Comments)  (Creative Therapy)   Attendance Attended   Follows Direction Followed directions   Group Interactions/Observations Interacted appropriately;Alert;Sharing;Supportive   Affect/Mood Range Normal range   Affect/Mood Display Appropriate   Goal Progression Progressing      facilitated creative group session with patients.  SW discused the concept of building happiness with the "Heart Map" activity.  SW asked pts to identify people places, things and others that contributed to their happines and lived deep inside of their heart and to be descriptive as possible. This exercise allowed pts to reflect on siginificant memories small and big and ways to consider how to sustain happiness.   "

## 2024-09-19 NOTE — PROGRESS NOTES
Group Psychotherapy (PhD/LCSW)     Site: [facility]     Clinical status of patient: Intensive Outpatient Program (IOP)     Date: 9/18/2024      Group Focus: Assertive Communication     Length of service: 63048 - 45-50 minutes     Number of patients in attendance: 8     Referred by: [facility]     Target symptoms: Depression and Anxiety     Patient's response to treatment: Active Listening and Self-Disclosure     Progress toward goals: Progressing adequately     Interval History: Patients reviewed Personal Bill of Rights and discussed rights with which they strongly identified or found difficult to believe. Participants explored distinctions among passive, aggressive, passive aggressive, and assertive communication. Participants discussed characteristics of assertive communication and learned strategies for fostering assertive communication.      Diagnosis:     ICD-10-CM ICD-9-CM   1. Anxiety  F41.9 300.00   2. Moderate episode of recurrent major depressive disorder  F33.1 296.32      Plan: Continue treatment on [facility]   -c/w -c/w Toprol XL and Lipitor

## 2024-09-20 ENCOUNTER — HOSPITAL ENCOUNTER (OUTPATIENT)
Dept: PSYCHIATRY | Facility: HOSPITAL | Age: 54
Discharge: HOME | End: 2024-09-20
Attending: STUDENT IN AN ORGANIZED HEALTH CARE EDUCATION/TRAINING PROGRAM
Payer: COMMERCIAL

## 2024-09-20 VITALS
SYSTOLIC BLOOD PRESSURE: 129 MMHG | TEMPERATURE: 99 F | DIASTOLIC BLOOD PRESSURE: 71 MMHG | HEART RATE: 78 BPM | RESPIRATION RATE: 18 BRPM

## 2024-09-20 DIAGNOSIS — F41.9 ANXIETY: Primary | ICD-10-CM

## 2024-09-20 DIAGNOSIS — F33.1 MODERATE EPISODE OF RECURRENT MAJOR DEPRESSIVE DISORDER: ICD-10-CM

## 2024-09-20 PROCEDURE — 99231 SBSQ HOSP IP/OBS SF/LOW 25: CPT | Mod: ,,, | Performed by: PSYCHIATRY & NEUROLOGY

## 2024-09-20 PROCEDURE — 90853 GROUP PSYCHOTHERAPY: CPT

## 2024-09-20 NOTE — PROGRESS NOTES
Group Psychotherapy (PhD/LCSW)     Site: [facility]     Clinical status of patient: Intensive Outpatient Program (IOP)     Date: 09/18/2024     Group Focus: Psychodynamic Processing      Length of service: 53917 - 45-50 minutes     Number of patients in attendance: 6     Referred by: [facility]     Target symptoms: Anxiety and Depression      Patient's response to treatment: Active Listening and Self-disclosure     Progress toward goals: Adequate     Interval History:   Patients provided updates on progress towards goals and their experience in the program.  This patient introduced herself and briefly discussed presenting concerns. She stated that she has already learned new skills since starting the program this morning. The session was focused on the importance of heathy communication based on skills learned in earlier groups. Patients engaged in role play to better understand how to implement assertive communication with others when outside of the program. This patient remained engaged and actively participated in group discussion.       Diagnosis:     ICD-10-CM ICD-9-CM   1. Anxiety  F41.9 300.00   2. Moderate episode of recurrent major depressive disorder  F33.1 296.32         Plan: Continue treatment in [facility] program

## 2024-09-20 NOTE — PROGRESS NOTES
Group Psychotherapy (PhD/LCSW)     Site: [facility]     Clinical status of patient: Intensive Outpatient Program (IOP)     Date: 09/20/2024      Group Focus: Psychodynamic Processing      Length of service: 29787 - 45-50 minutes     Number of patients in attendance: 3     Referred by: [facility]     Target symptoms: Anxiety and Depression      Patient's response to treatment: Active Listening and Self-disclosure     Progress toward goals: Adequate     Interval History: Today's group session focused on adjustment challenges and coping strategies. Several patients expressed difficulty adapting to solitude, prompting a productive discussion on creating routines and structure to manage this transition. The group collaboratively offered practical suggestions and emphasized the importance of establishing daily rituals and scheduled activities to provide a sense of purpose and stability. Additionally, each member identified and shared one small achievement or positive step they had taken recently. This patient remained engaged and actively participated in group discussion.       Diagnosis:     ICD-10-CM ICD-9-CM   1. Anxiety  F41.9 300.00   2. Moderate episode of recurrent major depressive disorder  F33.1 296.32       Plan: Continue treatment in [facility] program

## 2024-09-20 NOTE — PROGRESS NOTES
Group Psychotherapy (PhD/LCSW)     Site: [facility]     Clinical status of patient: Intensive Outpatient Program (IOP)     Date: 09/19/2024     Group Focus: Psychodynamic Processing      Length of service: 29329 - 45-50 minutes     Number of patients in attendance: 3     Referred by: [facility]     Target symptoms: Anxiety and Depression      Patient's response to treatment: Active Listening and Self-disclosure     Progress toward goals: Adequate     Interval History:   Patients exchanged encouraging words with a group member who completed the program today. Following, this patient shared an example of how she used assertive communication while at work. She received positive feedback from the group. The group was focused on finding or creating spaces to connect with others and engage in activities that positively impact mood.  Additionally, the group discussed the importance of patience and self-compassion throughout the mental health journey. This patient remained engaged and actively participated in group discussion.       Diagnosis:     ICD-10-CM ICD-9-CM   1. Anxiety  F41.9 300.00   2. Moderate episode of recurrent major depressive disorder  F33.1 296.32       Plan: Continue treatment in [facility] program

## 2024-09-20 NOTE — PROGRESS NOTES
[facility]   DEPARTMENT OF PSYCHIATRY     IDENTIFIERS & DEMOGRAPHICS:     ENCOUNTER: subsequent    -- PATIENT IDENTIFIERS: Becky Cade  [id number]  1970  54 y.o.  female  -- LOCATION OF PATIENT: IOP/PHP    -- MODE OF ARRIVAL: self-presented  -- PRESENT WITH PATIENT DURING SESSION: ALONE  -- SOURCES OF INFORMATION: PATIENT  -- ENCOUNTER PROVIDER: Kevin Beach MD        PRESENTATION:     CHIEF COMPLAINT(S): depression, anxiety    OVERVIEW OF THE HPI:    Ms Becky Cade is a 55yo/F with hx of anxiety who presents to [facility] on 9/18 for anxiety. She has been seeing Caren Lowe for therapy since 2021 and seeing Dr Damon Brooks for medication management.     SUBJECTIVE/CURRENT FINDINGS:    Ms Becky cruz she's been doing well in groups. Said it's been hard being in these groups and she has been having a hard time with meditating and mindfulness. Said she also got triggered because she had to do an activity that asked her to fill in people she cared about and she thought about her children and ex partners and that she doesn't have them anymore. Said she's been trying to keep busy but knows she should work on mindfulness and being with herself. Discussed that the IOP can help her get more tools other than distraction to help with her mood and she agreed with this. Said she checked in with Dr Brooks again on Thursday and thinks her medications are doing fine. Said she has been waking up at 3am and has had to take an ativan to get back to sleep. Discussed that this is fine and to try not to make it a habit and she said she's been aware of this also, thinks she's going to try some noise machines also. Pt said she likes structure and is not sure when to get here in mornings. Discussed that we ask for 9-10 am to give them some time to themselves and for us to see her and she acknowledged this. Said maybe she can work on being more flexible. Denied SI or any acute concerns.         REVIEW OF SYSTEMS:    >>  SOURCES: patient     Y   Sleep Disturbance/Disruption  +insomnia    interrupted sleep   N   Appetite/Weight Change   N   Alterations in Energy Level   Y   Depressive Symptomatology  +depressed mood, +tearfulness     Y   Excessive Anxiety/Worry  +generalized anxiety/worry     N   Psychosis    Regarding the current presentation, no other significant issues or complaints are voiced or known at this time.       ADD-ON PSYCHOTHERAPY:     ADD-ON THERAPY     HISTORY:     HISTORY    Allergies:  Buspar [buspirone] and Mobic [meloxicam]     EXAMINATION:     VITALS:  LMP 02/07/2023 (Approximate)     MENTAL STATUS EXAMINATION:  Appearance: appears stated age, normal weight  appropriately dressed, adequately groomed, in no apparent distress, well-appearing    Behavior & Attitude: participative, under good behavioral control, able to redirect, appropriate eye contact  calm, engaged, agreeable, cooperative    Movements & Motor Activity: no psychomotor agitation, no psychomotor retardation, normal gait, normal station, ambulates without assistance, not wheelchair bound (able to ambulate), no weakness, no spasticity, no rigidity, no tics, no tremor    Speech & Language: normal rate, normal volume, normal quantity, normal latency, spontaneous, reciprocal, fluent    Mood: good  Affect: euthymic, reactive, full range  appropriate given the situation/context, mood congruent    tearful at times  Thought Process & Associations: linear, goal-directed, organized, logical, coherent, relevant, abstract  no loosening of associations    Thought Content & Perceptions: no delusions, no paranoid ideation, no ideas of reference, no grandiosity, no hyperreligiosity, no hallucinations, no responding to internal stimuli    Sensorium: awake, alert, clear  no confusion, no delirium    Orientation: grossly intact, oriented to person, oriented to place, oriented to time, oriented to situation    Recent &  Remote Memory: intact (recent), intact (remote)    Attention & Concentration: intact  attentive to conversation, not easily distracted    Fund of Knowledge: intact, vocabulary proficient    Insight: intact, fair  demonstrates sufficient awareness of condition/situation    Judgment: intact, fair  heeds instructions/advice            RISK & REGULATORY:      RISK PARAMETERS (current to the encounter/episode  NOT inclusive of past history):     N   Suicidal Ideation/Threats   N   Suicide Attempts/Gestures   N   Homicidal Ideation/Threats   N   Homicidal Behavior   N   Non-Suicidal Self-Injurious Behavior   N   Perpetrated Violence     FIREARMS & WEAPONS:     N   Ready Access to Firearms     REGULATORY:      INFORMED CONSENT & SHARED DECISION MAKING are the hallmark and bedrock of good clinical care, and as such have been employed and obtained, respectively, to the degree possible.  Discussed, to the extent possible, diagnosis, risks and benefits of proposed treatment (e.g., medication, therapy) vs alternative treatments vs no treatment, potential side effects of these treatments, and the inherent unpredictability of treatment.        WARNINGS & PRECAUTIONS:  >> In cases of emergencies (e.g. SI/HI resulting in danger to self or others, functioning deteriorating to the level of grave disability), call 911 or 988, or present to the emergency department for immediate assistance.    >> Individuals should not operate a motor vehicle or heavy machinery if effects of medications or underlying symptoms/condition impair the ability to do so safely.    >> FULLY comply with ANY/ALL medication as prescribed/instructed and report ANY/ALL suspected adverse effects to appropriate health care providers.       ASSESSMENT & PLAN:     DIAGNOSES & PROBLEMS:       1.  Anxiety, unspecified    2.  Major depressive disorder, moderate    PSYCHOTROPIC REGIMEN:   (C)=Continue as prescribed  (A)=Adjust as noted   (I)=Iniitate  (D)=Discontinue      1.  Continue paxil 50 mg daily    2.  Continue doxepin 10 mg qhs    3.  Continue hydroxyzine 20 mg qhs    4.  Continue PRN ativan 0.5 mg    -- ASSESSMENT (synthesis  analysis):     Ms Becky Cade is a 53yo/F with hx of anxiety who presents to [facility] on 9/18 for anxiety. She has been seeing Caren Lowe for therapy since 2021 and seeing Dr Damon Brooks for medication management.     -- PLAN (goals  recommentations):     - encourage engagement in [facility] programming  - recent labs reviewed - WNL      CHART REVIEW: available documentation has been reviewed, and pertinent elements of the chart have been incorporated into this evaluation where appropriate.       DIAGNOSTIC TESTING:      Glu 95  6/6/2024  Li *   *  TSH 1.352  9/15/2023    HgA1c 5.5  2/7/2023  VPA *   *   FT4 0.85  9/15/2023    Na 135 (L)  6/6/2024  CLZ *   *  WBC 7.51  6/6/2024    Cr 0.8  6/6/2024  ANC 4.4; 58.8;   6/6/2024   Hgb 14.4  6/6/2024     BUN 22 (H)  6/6/2024  Trop I *   *  HCT 44.2  6/6/2024     GFR >60.0  6/6/2024   CPK 59  6/6/2024    6/6/2024     Alb 4.1  6/6/2024   PRL *   *  B12 1995 (H)  2/7/2023     T Bili 0.5  6/6/2024  Chol 236 (H)  2/7/2023  B9 *   *      6/6/2024  TGs 150  2/7/2023  B1 *   *    AST 23  6/6/2024  HDL 89 (H)  2/7/2023  Vit D 34  6/6/2024     ALT 19  6/6/2024  .0  2/7/2023  HIV *   *     INR *   *  Flora *   *   Hep C *   *    GGT *   *  Lip *   *  RPR *   *    MCV 97  6/6/2024   NH4 *   *  UPT Negative  3/25/2022      PETH *   *  THC *   *    ETOH *   *  SMITH *   *    EtG *   *  AMP *   *    ALC *   *  OPI *   *    BZO *   *  MTD *   *     BAR *   *  BUP *   *    PCP *   *  FEN *   *     Results for orders placed or performed during the hospital encounter of 06/21/24   SCHEDULED EKG 12-LEAD (to Muse)    Collection Time: 06/21/24  8:49 AM   Result Value Ref Range    QRS Duration 80 ms    OHS QTC Calculation 431  ms    Narrative    Test Reason : Z51.81,Z79.899,    Vent. Rate : 079 BPM     Atrial Rate : 079 BPM     P-R Int : 132 ms          QRS Dur : 080 ms      QT Int : 376 ms       P-R-T Axes : 061 072 052 degrees     QTc Int : 431 ms    Normal sinus rhythm  Normal ECG  When compared with ECG of 18-MAR-2022 07:38,  No significant change was found  Confirmed by SHAWNEE SHEA MD ([id number]) on 6/21/2024 10:15:08 AM    Referred By: RICHARD DAILEY           Confirmed By:SHAWNEE SHEA MD        HOWARD & LINKS:        Y  = yes/endorses     N  = no/denies     U  = unknown/unable to assess    ADHD   AIMS   AUDIT   AUDIT-C   C-SSRS (Screen)   C-SSRS (Short)   C-SSRS (Full)   DAST   DAST-10   ANAM-7   MoCA   PCL-5   PHQ-9   CATHERINE   YMRS     Consults  [facility] BEHAVIORAL MEDICINE U*

## 2024-09-23 ENCOUNTER — HOSPITAL ENCOUNTER (OUTPATIENT)
Dept: PSYCHIATRY | Facility: HOSPITAL | Age: 54
Discharge: HOME | End: 2024-09-23
Attending: STUDENT IN AN ORGANIZED HEALTH CARE EDUCATION/TRAINING PROGRAM
Payer: COMMERCIAL

## 2024-09-23 DIAGNOSIS — F33.1 MODERATE EPISODE OF RECURRENT MAJOR DEPRESSIVE DISORDER: ICD-10-CM

## 2024-09-23 DIAGNOSIS — F41.9 ANXIETY: Primary | ICD-10-CM

## 2024-09-23 PROCEDURE — 90853 GROUP PSYCHOTHERAPY: CPT

## 2024-09-23 PROCEDURE — 99232 SBSQ HOSP IP/OBS MODERATE 35: CPT | Mod: GT,,, | Performed by: STUDENT IN AN ORGANIZED HEALTH CARE EDUCATION/TRAINING PROGRAM

## 2024-09-23 PROCEDURE — 90833 PSYTX W PT W E/M 30 MIN: CPT | Mod: ,,, | Performed by: STUDENT IN AN ORGANIZED HEALTH CARE EDUCATION/TRAINING PROGRAM

## 2024-09-23 NOTE — PROGRESS NOTES
Group Psychotherapy (PhD/LCSW)    Site: [facility]    Clinical status of patient: Intensive Outpatient Program (IOP)    Date: 9/23/2024    Group Focus: Distress Tolerance     Length of service: 60821 - 45-50 minutes    Number of patients in attendance: 6    Referred by: [facility] Treatment Team    Target symptoms: Depression and Anxiety    Patient's response to treatment: Active Listening and Self-disclosure    Progress toward goals: Progressing adequately    Interval History: Session focus was Distress Tolerance:  Pros & Cons.  Patients were encouraged to use crisis survival skills to reduce intensity of distress.  They were taught to use Pros & Cons to reinforce desired behaviors.    Diagnosis:     ICD-10-CM ICD-9-CM   1. Anxiety  F41.9 300.00   2. Moderate episode of recurrent major depressive disorder  F33.1 296.32        Plan: Continue treatment on [facility]

## 2024-09-23 NOTE — PROGRESS NOTES
Group Psychotherapy (PhD/LCSW)    Site: [facility]    Clinical status of patient: Intensive Outpatient Program (IOP)    Date: 9/23/2024    Group Focus: Sleep 101    Length of service: 64315 - 45-50 minutes    Number of patients in attendance: 6    Referred by: [facility]    Target symptoms: Depression and Anxiety    Patient's response to treatment: Active Listening and Self-disclosure    Progress toward goals: Progressing adequately    Interval History: STIMULUS CONTROL and SLEEP COMPRESSION   Session focus was on stimulus control and sleep compression. Clinician and patient discussed associating beds with wakefulness and how to disrupt the connection. Clinician also discussed the use of sleep compression to improve sleep quality and stimulate sleep drive. Patients reviewed factors contributing to sleep hygiene.  Patients reviewed sleep diaries and strategies for implementing stimulus control and sleep compression.     Diagnosis:     ICD-10-CM ICD-9-CM   1. Anxiety  F41.9 300.00   2. Moderate episode of recurrent major depressive disorder  F33.1 296.32        Plan: Continue treatment on [facility]

## 2024-09-23 NOTE — PROGRESS NOTES
"  [facility]   DEPARTMENT OF PSYCHIATRY     IDENTIFIERS & DEMOGRAPHICS:     ENCOUNTER: subsequent    -- PATIENT IDENTIFIERS: Becky Cade  [id number]  1970  54 y.o.  female  -- LOCATION OF PATIENT: [facility]    -- MODE OF ARRIVAL: self-presented  -- PRESENT WITH PATIENT DURING SESSION: ALONE  -- SOURCES OF INFORMATION: PATIENT  -- ENCOUNTER PROVIDER: Kevin Beach MD        PRESENTATION:     CHIEF COMPLAINT(S): depression, anxiety    OVERVIEW OF THE HPI:    Ms Becky Cade is a 55yo/F with hx of anxiety who presents to [facility] on 9/18 for anxiety. She has been seeing Caren Lowe for therapy since 2021 and seeing Dr Damon Brooks for medication management.     SUBJECTIVE/CURRENT FINDINGS:    Ms Pena said she's good today, said the weekend was rough and she's had some ups and downs. Said she listened to a podcast and thinks she has "emotional impermanence". Said she doesn't feel loved when people are away from her and that love is conditional. Discussed attachment styles and pt said she thinks a lot of her mental health concerns are due to how she was born, because her mother didn't know she was having twins and she came 47 min after her sister. Discussed different attachment styles, how they are related to childhood attachments. Encouraged her to look into anxious attachment and DBT skills for distress tolerance, which could be helpful for many people, not just for people with borderline personality disorder. Said she hasn't made contact with her ex even though she misses him. Said she recognized this relationship wasn't healthy. Said she's going to focus on herself. Said she did take ativan every night over the weekend. Said she had fear of not falling asleep. Discussed that it's okay to use ativan for now but we will work on sleep hygiene and possibly other therapies for sleep also.    REVIEW OF SYSTEMS:    >> SOURCES: patient     Y   Sleep Disturbance/Disruption  +insomnia    has been " using ativan   N   Appetite/Weight Change   N   Alterations in Energy Level   Y   Depressive Symptomatology  +depressed mood, +tearfulness     Y   Excessive Anxiety/Worry  +generalized anxiety/worry, +somatically preoccupied    nauseous   N   Psychosis    Regarding the current presentation, no other significant issues or complaints are voiced or known at this time.       ADD-ON PSYCHOTHERAPY:     ADD-ON THERAPY     HISTORY:     HISTORY    Allergies:  Buspar [buspirone] and Mobic [meloxicam]     EXAMINATION:     VITALS:  LMP 02/07/2023 (Approximate)     MENTAL STATUS EXAMINATION:  Appearance: appears stated age, normal weight  appropriately dressed, adequately groomed, in no apparent distress, well-appearing    Behavior & Attitude: participative, under good behavioral control, able to redirect, appropriate eye contact  calm, engaged, agreeable, cooperative    Movements & Motor Activity: no psychomotor agitation, no psychomotor retardation, normal gait, normal station, ambulates without assistance, not wheelchair bound (able to ambulate), no weakness, no spasticity, no rigidity, no tics, no tremor    Speech & Language: normal rate, normal volume, normal quantity, normal latency, spontaneous, reciprocal, fluent    Mood: good  Affect: euthymic, reactive, full range  appropriate given the situation/context, mood congruent    tearful at times  Thought Process & Associations: linear, goal-directed, organized, logical, coherent, relevant, abstract  no loosening of associations    Thought Content & Perceptions: no delusions, no paranoid ideation, no ideas of reference, no grandiosity, no hyperreligiosity, no hallucinations, no responding to internal stimuli    Sensorium: awake, alert, clear  no confusion, no delirium    Orientation: grossly intact, oriented to person, oriented to place, oriented to time, oriented to situation    Recent & Remote Memory: intact (recent), intact (remote)     Attention & Concentration: intact  attentive to conversation, not easily distracted    Fund of Knowledge: intact, vocabulary proficient    Insight: intact, fair  demonstrates sufficient awareness of condition/situation    Judgment: intact, fair  heeds instructions/advice            RISK & REGULATORY:      RISK PARAMETERS (current to the encounter/episode  NOT inclusive of past history):     N   Suicidal Ideation/Threats   N   Suicide Attempts/Gestures   N   Homicidal Ideation/Threats   N   Homicidal Behavior   N   Non-Suicidal Self-Injurious Behavior   N   Perpetrated Violence     FIREARMS & WEAPONS:     N   Ready Access to Firearms     REGULATORY:      INFORMED CONSENT & SHARED DECISION MAKING are the hallmark and bedrock of good clinical care, and as such have been employed and obtained, respectively, to the degree possible.  Discussed, to the extent possible, diagnosis, risks and benefits of proposed treatment (e.g., medication, therapy) vs alternative treatments vs no treatment, potential side effects of these treatments, and the inherent unpredictability of treatment.        WARNINGS & PRECAUTIONS:  >> In cases of emergencies (e.g. SI/HI resulting in danger to self or others, functioning deteriorating to the level of grave disability), call 911 or 988, or present to the emergency department for immediate assistance.    >> Individuals should not operate a motor vehicle or heavy machinery if effects of medications or underlying symptoms/condition impair the ability to do so safely.    >> FULLY comply with ANY/ALL medication as prescribed/instructed and report ANY/ALL suspected adverse effects to appropriate health care providers.       ASSESSMENT & PLAN:     DIAGNOSES & PROBLEMS:       1.  Anxiety, unspecified    2.  Major depressive disorder, moderate    PSYCHOTROPIC REGIMEN:   (C)=Continue as prescribed  (A)=Adjust as noted  (I)=Iniitate  (D)=Discontinue      1.  Continue  paxil 50 mg daily    2.  Continue doxepin 10 mg qhs    3.  Continue hydroxyzine 20 mg qhs    4.  Continue PRN ativan 0.5 mg    -- ASSESSMENT (synthesis  analysis):     Ms Becky Cade is a 53yo/F with hx of anxiety who presents to [facility] on 9/18 for anxiety. She has been seeing aCren Lowe for therapy since 2021 and seeing Dr Damon Brooks for medication management.     -- PLAN (goals  recommentations):     - encourage engagement in [facility] programming  - recent labs reviewed - WNL      CHART REVIEW: available documentation has been reviewed, and pertinent elements of the chart have been incorporated into this evaluation where appropriate.       DIAGNOSTIC TESTING:      Glu 95  6/6/2024  Li *   *  TSH 1.352  9/15/2023    HgA1c 5.5  2/7/2023  VPA *   *   FT4 0.85  9/15/2023    Na 135 (L)  6/6/2024  CLZ *   *  WBC 7.51  6/6/2024    Cr 0.8  6/6/2024  ANC 4.4; 58.8;   6/6/2024   Hgb 14.4  6/6/2024     BUN 22 (H)  6/6/2024  Trop I *   *  HCT 44.2  6/6/2024     GFR >60.0  6/6/2024   CPK 59  6/6/2024    6/6/2024     Alb 4.1  6/6/2024   PRL *   *  B12 1995 (H)  2/7/2023     T Bili 0.5  6/6/2024  Chol 236 (H)  2/7/2023  B9 *   *      6/6/2024  TGs 150  2/7/2023  B1 *   *    AST 23  6/6/2024  HDL 89 (H)  2/7/2023  Vit D 34  6/6/2024     ALT 19  6/6/2024  .0  2/7/2023  HIV *   *     INR *   *  Flora *   *   Hep C *   *    GGT *   *  Lip *   *  RPR *   *    MCV 97  6/6/2024   NH4 *   *  UPT Negative  3/25/2022      PETH *   *  THC *   *    ETOH *   *  SMITH *   *    EtG *   *  AMP *   *    ALC *   *  OPI *   *    BZO *   *  MTD *   *     BAR *   *  BUP *   *    PCP *   *  FEN *   *     Results for orders placed or performed during the hospital encounter of 06/21/24   SCHEDULED EKG 12-LEAD (to Muse)    Collection Time: 06/21/24  8:49 AM   Result Value Ref Range    QRS Duration 80 ms    OHS QTC Calculation 431 ms    Narrative    Test Reason :  Z51.81,Z79.899,    Vent. Rate : 079 BPM     Atrial Rate : 079 BPM     P-R Int : 132 ms          QRS Dur : 080 ms      QT Int : 376 ms       P-R-T Axes : 061 072 052 degrees     QTc Int : 431 ms    Normal sinus rhythm  Normal ECG  When compared with ECG of 18-MAR-2022 07:38,  No significant change was found  Confirmed by SHAWNEE SHEA MD ([id number]) on 6/21/2024 10:15:08 AM    Referred By: RICHARD DAILEY           Confirmed By:SHAWNEE SHEA MD        HOWARD & LINKS:        Y  = yes/endorses     N  = no/denies     U  = unknown/unable to assess    ADHD   AIMS   AUDIT   AUDIT-C   C-SSRS (Screen)   C-SSRS (Short)   C-SSRS (Full)   DAST   DAST-10   ANAM-7   MoCA   PCL-5   PHQ-9   CATHERINE   YMRS     Consults  [facility] BEHAVIORAL MEDICINE U*

## 2024-09-23 NOTE — PLAN OF CARE
09/23/24 1412   Activity/Group Therapy Checklist   Group Other (Comments)  (Processing)   Attendance Attended   Follows Direction Followed directions   Group Interactions/Observations Interacted appropriately;Sharing;Supportive;Alert   Affect/Mood Range Normal range   Affect/Mood Display Appropriate   Goal Progression Progressing

## 2024-09-23 NOTE — PLAN OF CARE
09/23/24 1402   Activity/Group Therapy Checklist   Group Meditation/Relaxation   Attendance Attended   Follows Direction Followed directions   Group Interactions/Observations Interacted appropriately;Sharing;Supportive;Alert   Affect/Mood Range Normal range   Affect/Mood Display Appropriate   Goal Progression Progressing

## 2024-09-24 ENCOUNTER — HOSPITAL ENCOUNTER (OUTPATIENT)
Dept: PSYCHIATRY | Facility: HOSPITAL | Age: 54
Discharge: HOME | End: 2024-09-24
Attending: STUDENT IN AN ORGANIZED HEALTH CARE EDUCATION/TRAINING PROGRAM
Payer: COMMERCIAL

## 2024-09-24 DIAGNOSIS — F33.1 MODERATE EPISODE OF RECURRENT MAJOR DEPRESSIVE DISORDER: ICD-10-CM

## 2024-09-24 DIAGNOSIS — F41.9 ANXIETY: Primary | ICD-10-CM

## 2024-09-24 NOTE — PLAN OF CARE
"   09/24/24 1548   Activity/Group Therapy Checklist   Group Other (Comments)  (Strengths Exercise)   Attendance Attended   Follows Direction Followed directions   Group Interactions/Observations Interacted appropriately;Alert;Sharing;Supportive   Affect/Mood Range Normal range   Affect/Mood Display Appropriate   Goal Progression Progressing        facilitated strengths group activity. SW discussed with pts activity, " Three Good People". Sw discussed identifying strengths and how those strengths have been used to overcome obstacles in someone that inspires them, a fictional character and self. SW discussed that by identifying personal strengths can help to improve self esteem and assist in completing goals.    "

## 2024-09-24 NOTE — PROGRESS NOTES
Group Psychotherapy (PhD/LCSW)    Site: [facility]    Clinical status of patient: Intensive Outpatient Program (IOP)    Date: 9/24/2024    Group Focus: Interpersonal Effectiveness    Length of service: 92065 - 45-50 minutes    Number of patients in attendance: 7    Referred by: RUTHY    Target symptoms: Depression and Anxiety    Patient's response to treatment: Active Listening and Self-disclosure    Progress toward goals: Progressing well    Interval History: Session focus was Interpersonal Effectiveness: GIVE and FAST. Patients were introduced to skills to promote active listening, self-respect, and attendance to values. Patients were provided with opportunities to collaborate with others and role-play in session.    Diagnosis:     ICD-10-CM ICD-9-CM   1. Anxiety  F41.9 300.00   2. Moderate episode of recurrent major depressive disorder  F33.1 296.32      Plan: Continue treatment on [facility]

## 2024-09-24 NOTE — PLAN OF CARE
09/24/24 1340   Activity/Group Therapy Checklist   Group Other (Comments)  (Looking Back/Looking Forward)   Attendance Attended   Follows Direction Followed directions   Group Interactions/Observations Interacted appropriately;Sharing;Supportive;Alert   Affect/Mood Range Normal range   Affect/Mood Display Appropriate   Goal Progression Progressing

## 2024-09-25 ENCOUNTER — HOSPITAL ENCOUNTER (OUTPATIENT)
Dept: PSYCHIATRY | Facility: HOSPITAL | Age: 54
Discharge: HOME | End: 2024-09-25
Attending: STUDENT IN AN ORGANIZED HEALTH CARE EDUCATION/TRAINING PROGRAM
Payer: COMMERCIAL

## 2024-09-25 VITALS
DIASTOLIC BLOOD PRESSURE: 66 MMHG | TEMPERATURE: 98 F | HEART RATE: 85 BPM | RESPIRATION RATE: 18 BRPM | SYSTOLIC BLOOD PRESSURE: 113 MMHG

## 2024-09-25 DIAGNOSIS — F41.9 ANXIETY: Primary | ICD-10-CM

## 2024-09-25 DIAGNOSIS — F33.1 MODERATE EPISODE OF RECURRENT MAJOR DEPRESSIVE DISORDER: ICD-10-CM

## 2024-09-25 NOTE — PROGRESS NOTES
Group Psychotherapy (PhD/LCSW)    Site: [facility]    Clinical status of patient: Intensive Outpatient Program (IOP)    Date: 9/25/2024    Group Focus: Mindfulness    Length of service: 73608 - 45-50 minutes    Number of patients in attendance: 8    Referred by: RUTHY    Target symptoms: Depression and Anxiety    Patient's response to treatment: Active Listening and Self-disclosure    Progress toward goals: Progressing well    Interval History: 'WHAT' SKILLS  Session focus was Mindfulness: Mindfulness 'What' Skills. Patient's were introduced to mindfulness what skills of observing, describing, participating. Patient's identified the value of each skill, how to use each skill, and practiced each skill in session.     Diagnosis:     ICD-10-CM ICD-9-CM   1. Anxiety  F41.9 300.00   2. Moderate episode of recurrent major depressive disorder  F33.1 296.32      Plan: Continue treatment on [facility]

## 2024-09-25 NOTE — PATIENT CARE CONFERENCE
Diagnoses:     Anxiety, unspecified   Major depressive disorder, moderate       Progress:    Patient was staffed by Team. Pt has been cooperative and appropriate during program. Pt has been supportive and offered feedback to other groups member as needed. Pt is doing well and mood is improving. Team will continue to monitor progress.     Plan of Care:    Patient will continue OMW program.    Aftercare/Follow ups:  Med Management: Damon Brooks MD   [facility]      Staff present:  MD Kevin Aguirre , Resident   Faye Shaw, MS3  MD Dalton Vicente, PhD  Salome Keith ,   Geovanny Winter, LACEY Rubio, W   Sujata Woodruff, W

## 2024-09-25 NOTE — PROGRESS NOTES
Group Psychotherapy (PhD/LCSW)     Site: [facility]     Clinical status of patient: Intensive Outpatient Program (IOP)     Date: 9/25/2024     Group Focus: Addressing Shame     Length of service: 55659 - 45-50 minutes     Number of patients in attendance: 8     Referred by: RUTHY     Target symptoms: Depression and Anxiety     Patient's response to treatment: Active Listening      Progress toward goals: Progressing well     Interval History: Participants reviewed a Shame Questionnaire, identifying experiences of shame with which they identified. Participants discussed cycle of shame and explored distinctions between healthy guilt and unhealthy shame. Participants reviewed factors that contribute to shame, including ways in which shame can be learned from others or may results from abuse. Participants discussed impact of shame on self-esteem and relational functioning. Participants learned strategies for coping with and addressing unhealthy shame.      Diagnosis:     ICD-10-CM ICD-9-CM   1. Anxiety  F41.9 300.00   2. Moderate episode of recurrent major depressive disorder  F33.1 296.32      Plan: Continue treatment on [facility]

## 2024-09-25 NOTE — PLAN OF CARE
09/25/24 1429   Activity/Group Therapy Checklist   Group Other (Comments)  (Processing)   Attendance Attended   Follows Direction Followed directions   Group Interactions/Observations Interacted appropriately;Sharing;Supportive;Alert   Affect/Mood Range Normal range   Affect/Mood Display Appropriate   Goal Progression Progressing

## 2024-09-26 ENCOUNTER — HOSPITAL ENCOUNTER (OUTPATIENT)
Dept: PSYCHIATRY | Facility: HOSPITAL | Age: 54
Discharge: HOME | End: 2024-09-26
Attending: STUDENT IN AN ORGANIZED HEALTH CARE EDUCATION/TRAINING PROGRAM
Payer: COMMERCIAL

## 2024-09-26 DIAGNOSIS — F33.1 MODERATE EPISODE OF RECURRENT MAJOR DEPRESSIVE DISORDER: ICD-10-CM

## 2024-09-26 DIAGNOSIS — F41.9 ANXIETY: Primary | ICD-10-CM

## 2024-09-26 NOTE — PROGRESS NOTES
Group Psychotherapy (PhD/LCSW)    Site: [facility]    Clinical status of patient: Intensive Outpatient Program (IOP)    Date: 09/26/2024    Group Focus: Emotion Regulation     Length of service: 21398 - 45-50 minutes    Number of patients in attendance: 6    Referred by: RUTHY    Target symptoms: Depression and Anxiety    Patient's response to treatment: Active Listening and Self-disclosure    Progress toward goals: Progressing well    Interval History: Session focus was Emotion Regulation:  ABC PLEASE.  Patients were encouraged to reduce vulnerability to distress by accumulating positive emotions, building mastery, coping ahead, and by attending to physical well-being.  Each patient identified a way to accumulate positive emotions and build mastery.     Diagnosis:     ICD-10-CM ICD-9-CM   1. Anxiety  F41.9 300.00   2. Moderate episode of recurrent major depressive disorder  F33.1 296.32      Plan: Continue treatment on [facility]

## 2024-09-26 NOTE — PLAN OF CARE
"   09/26/24 1500   Activity/Group Therapy Checklist   Group Other (Comments)  (Creative therapy)   Attendance Attended   Follows Direction Followed directions   Group Interactions/Observations Interacted appropriately;Alert;Sharing;Supportive   Affect/Mood Range Normal range   Affect/Mood Display Appropriate   Goal Progression Progressing      facilitated creative session and dicussed with patients how creative therapy can be therapeutic to resolving anxiety and other stress-related issues. SW discussed activity: " Mask On, Mask Off". Patient's were able to draw and/ or color two blank masks and depict two versions of self. 1) Of how others perceive them and 2) of how they perceive themselves. SW discussed with pts how this was a form of self expression and an opportunity to use this creative as a way to have difficult conversations.   "

## 2024-09-26 NOTE — PROGRESS NOTES
[facility]   DEPARTMENT OF PSYCHIATRY     IDENTIFIERS & DEMOGRAPHICS:     ENCOUNTER: subsequent    -- PATIENT IDENTIFIERS: Becky Cade  [id number]  1970  54 y.o.  female  -- LOCATION OF PATIENT: [facility]    -- MODE OF ARRIVAL: self-presented  -- PRESENT WITH PATIENT DURING SESSION: ALONE  -- SOURCES OF INFORMATION: PATIENT  -- ENCOUNTER PROVIDER: Emilee Moore MD        PRESENTATION:     CHIEF COMPLAINT(S): depression, anxiety    OVERVIEW OF THE HPI:    Ms Becky Cade is a 55yo/F with hx of anxiety who presents to [facility] on 9/18 for anxiety. She has been seeing Caren Lowe for therapy since 2021 and seeing Dr Damon Brooks for medication management.     SUBJECTIVE/CURRENT FINDINGS:    Becky does not endorse recent SI or self harm. She feels her medications are working well, and has not needed to take Ativan for sleep since the first couple days of the program. She has had 3 episodes of shakiness with shortness of breath in the past 5 days which she attributes to constant reflection but did not take Ativan for. She believes her mood is improving. She feels 'glimpses of her past self before marriage' which she worries will not last. She spoke with her ex-boyfriend to get a key to her house back, and she endorses a sense of resolution and pride. She continues to express improvement with the course. She endorses improved sleep and good appetite.       REVIEW OF SYSTEMS:    >> SOURCES: patient     Y   Sleep Disturbance/Disruption  +insomnia    has been using ativan   N   Appetite/Weight Change   N   Alterations in Energy Level   Y   Depressive Symptomatology  +depressed mood, +tearfulness     Y   Excessive Anxiety/Worry  +generalized anxiety/worry, +somatically preoccupied    nauseous   N   Psychosis    Regarding the current presentation, no other significant issues or complaints are voiced or known at this time.       ADD-ON PSYCHOTHERAPY:     ADD-ON  THERAPY     HISTORY:     HISTORY    Allergies:  Buspar [buspirone] and Mobic [meloxicam]     EXAMINATION:     VITALS:  LMP 02/07/2023 (Approximate)     MENTAL STATUS EXAMINATION:  Appearance: appears stated age, normal weight  appropriately dressed, adequately groomed, in no apparent distress, well-appearing    Behavior & Attitude: participative, under good behavioral control, able to redirect, appropriate eye contact  calm, engaged, agreeable, cooperative    Movements & Motor Activity: no psychomotor agitation, no psychomotor retardation, normal gait, normal station, ambulates without assistance, not wheelchair bound (able to ambulate), no weakness, no spasticity, no rigidity, no tics, no tremor    Speech & Language: normal rate, normal volume, normal quantity, normal latency, spontaneous, reciprocal, fluent    Mood: good  Affect: euthymic, reactive, full range  appropriate given the situation/context, mood congruent    tearful at times  Thought Process & Associations: linear, goal-directed, organized, logical, coherent, relevant, abstract  no loosening of associations    Thought Content & Perceptions: no delusions, no paranoid ideation, no ideas of reference, no grandiosity, no hyperreligiosity, no hallucinations, no responding to internal stimuli    Sensorium: awake, alert, clear  no confusion, no delirium    Orientation: grossly intact, oriented to person, oriented to place, oriented to time, oriented to situation    Recent & Remote Memory: intact (recent), intact (remote)    Attention & Concentration: intact  attentive to conversation, not easily distracted    Fund of Knowledge: intact, vocabulary proficient    Insight: intact, fair  demonstrates sufficient awareness of condition/situation    Judgment: intact, fair  heeds instructions/advice            RISK & REGULATORY:      RISK PARAMETERS (current to the encounter/episode  NOT inclusive of past history):     N   Suicidal  Ideation/Threats   N   Suicide Attempts/Gestures   N   Homicidal Ideation/Threats   N   Homicidal Behavior   N   Non-Suicidal Self-Injurious Behavior   N   Perpetrated Violence     FIREARMS & WEAPONS:     N   Ready Access to Firearms     REGULATORY:      INFORMED CONSENT & SHARED DECISION MAKING are the hallmark and bedrock of good clinical care, and as such have been employed and obtained, respectively, to the degree possible.  Discussed, to the extent possible, diagnosis, risks and benefits of proposed treatment (e.g., medication, therapy) vs alternative treatments vs no treatment, potential side effects of these treatments, and the inherent unpredictability of treatment.        WARNINGS & PRECAUTIONS:  >> In cases of emergencies (e.g. SI/HI resulting in danger to self or others, functioning deteriorating to the level of grave disability), call 911 or [phone], or present to the emergency department for immediate assistance.    >> Individuals should not operate a motor vehicle or heavy machinery if effects of medications or underlying symptoms/condition impair the ability to do so safely.    >> FULLY comply with ANY/ALL medication as prescribed/instructed and report ANY/ALL suspected adverse effects to appropriate health care providers.       ASSESSMENT & PLAN:     DIAGNOSES & PROBLEMS:       1.  Anxiety, unspecified    2.  Major depressive disorder, moderate    PSYCHOTROPIC REGIMEN:   (C)=Continue as prescribed  (A)=Adjust as noted  (I)=Iniitate  (D)=Discontinue      1.  Continue paxil 50 mg daily    2.  Continue doxepin 10 mg qhs    3.  Continue hydroxyzine 20 mg qhs    4.  Continue PRN ativan 0.5 mg    -- ASSESSMENT (synthesis  analysis):     Ms Becky Cade is a 53yo/F with hx of anxiety who presents to [facility] on 9/18 for anxiety. She has been seeing Caren Lowe for therapy since 2021 and seeing Dr Damon Brooks for medication management.     -- PLAN (goals  recommentations):     -  encourage engagement in O[facility] programming  - recent labs reviewed - WNL      CHART REVIEW: available documentation has been reviewed, and pertinent elements of the chart have been incorporated into this evaluation where appropriate.       DIAGNOSTIC TESTING:      Glu 95  6/6/2024  Li *   *  TSH 1.352  9/15/2023    HgA1c 5.5  2/7/2023  VPA *   *   FT4 0.85  9/15/2023    Na 135 (L)  6/6/2024  CLZ *   *  WBC 7.51  6/6/2024    Cr 0.8  6/6/2024  ANC 4.4; 58.8;   6/6/2024   Hgb 14.4  6/6/2024     BUN 22 (H)  6/6/2024  Trop I *   *  HCT 44.2  6/6/2024     GFR >60.0  6/6/2024   CPK 59  6/6/2024    6/6/2024     Alb 4.1  6/6/2024   PRL *   *  B12 1995 (H)  2/7/2023     T Bili 0.5  6/6/2024  Chol 236 (H)  2/7/2023  B9 *   *      6/6/2024  TGs 150  2/7/2023  B1 *   *    AST 23  6/6/2024  HDL 89 (H)  2/7/2023  Vit D 34  6/6/2024     ALT 19  6/6/2024  .0  2/7/2023  HIV *   *     INR *   *  Flora *   *   Hep C *   *    GGT *   *  Lip *   *  RPR *   *    MCV 97  6/6/2024   NH4 *   *  UPT Negative  3/25/2022      PETH *   *  THC *   *    ETOH *   *  SMITH *   *    EtG *   *  AMP *   *    ALC *   *  OPI *   *    BZO *   *  MTD *   *     BAR *   *  BUP *   *    PCP *   *  FEN *   *     Results for orders placed or performed during the hospital encounter of 06/21/24   SCHEDULED EKG 12-LEAD (to Muse)    Collection Time: 06/21/24  8:49 AM   Result Value Ref Range    QRS Duration 80 ms    [facility] QTC Calculation 431 ms    Narrative    Test Reason : Z51.81,Z79.899,    Vent. Rate : 079 BPM     Atrial Rate : 079 BPM     P-R Int : 132 ms          QRS Dur : 080 ms      QT Int : 376 ms       P-R-T Axes : 061 072 052 degrees     QTc Int : 431 ms    Normal sinus rhythm  Normal ECG  When compared with ECG of 18-MAR-2022 07:38,  No significant change was found  Confirmed by SHAWNEE SHEA MD ([id number]) on 6/21/2024 10:15:08 AM    Referred By: RICHARD DAILEY           Confirmed By:SHAWNEE  SHABBIR DAMON        HOWARD & LINKS:        Y  = yes/endorses     N  = no/denies     U  = unknown/unable to assess    ADHD   AIMS   AUDIT   AUDIT-C   C-SSRS (Screen)   C-SSRS (Short)   C-SSRS (Full)   DAST   DAST-10   ANAM-7   MoCA   PCL-5   PHQ-9   CATHERINE   YMRS     Consults  [facility]*

## 2024-09-27 ENCOUNTER — HOSPITAL ENCOUNTER (OUTPATIENT)
Dept: PSYCHIATRY | Facility: HOSPITAL | Age: 54
Discharge: HOME | End: 2024-09-27
Attending: STUDENT IN AN ORGANIZED HEALTH CARE EDUCATION/TRAINING PROGRAM
Payer: COMMERCIAL

## 2024-09-27 VITALS
DIASTOLIC BLOOD PRESSURE: 61 MMHG | TEMPERATURE: 99 F | HEART RATE: 84 BPM | SYSTOLIC BLOOD PRESSURE: 104 MMHG | RESPIRATION RATE: 18 BRPM

## 2024-09-27 DIAGNOSIS — F33.1 MODERATE EPISODE OF RECURRENT MAJOR DEPRESSIVE DISORDER: ICD-10-CM

## 2024-09-27 DIAGNOSIS — F41.9 ANXIETY: Primary | ICD-10-CM

## 2024-09-27 NOTE — PLAN OF CARE
09/27/24 1042   Activity/Group Therapy Checklist   Group Other (Comments)  (Processing)   Attendance Attended   Follows Direction Followed directions   Group Interactions/Observations Interacted appropriately;Sharing;Supportive;Alert   Affect/Mood Range Normal range   Affect/Mood Display Appropriate   Goal Progression Progressing

## 2024-09-30 ENCOUNTER — HOSPITAL ENCOUNTER (OUTPATIENT)
Dept: PSYCHIATRY | Facility: HOSPITAL | Age: 54
Discharge: HOME | End: 2024-09-30
Attending: STUDENT IN AN ORGANIZED HEALTH CARE EDUCATION/TRAINING PROGRAM
Payer: COMMERCIAL

## 2024-09-30 DIAGNOSIS — F41.9 ANXIETY: Primary | ICD-10-CM

## 2024-09-30 DIAGNOSIS — F33.1 MODERATE EPISODE OF RECURRENT MAJOR DEPRESSIVE DISORDER: ICD-10-CM

## 2024-09-30 PROCEDURE — 99232 SBSQ HOSP IP/OBS MODERATE 35: CPT | Mod: GT,,, | Performed by: STUDENT IN AN ORGANIZED HEALTH CARE EDUCATION/TRAINING PROGRAM

## 2024-09-30 PROCEDURE — 90833 PSYTX W PT W E/M 30 MIN: CPT | Mod: ,,, | Performed by: STUDENT IN AN ORGANIZED HEALTH CARE EDUCATION/TRAINING PROGRAM

## 2024-09-30 NOTE — PROGRESS NOTES
Group Psychotherapy (PhD/LCSW)    Site: [facility]    Clinical status of patient: Intensive Outpatient Program (IOP)    Date: 9/30/2024    Group Focus: Distress Tolerance    Length of service: 07718 - 45-50 minutes    Number of patients in attendance: 11    Referred by: RUTHY    Target symptoms: Depression and Anxiety    Patient's response to treatment: Active Listening and Self-disclosure    Progress toward goals: Progressing well    Interval History: Session focus was Distress Tolerance: TIP skill. Patients were encouraged to use temperature, intense exercise, paced breathing, or paired muscle relaxation to reduce intensity of distress.    Diagnosis:     ICD-10-CM ICD-9-CM   1. Anxiety  F41.9 300.00   2. Moderate episode of recurrent major depressive disorder  F33.1 296.32      Plan: Continue treatment on W

## 2024-09-30 NOTE — PROGRESS NOTES
Group Psychotherapy (PhD/LCSW)    Site: [facility]    Clinical status of patient: Intensive Outpatient Program (IOP)    Date: 9/30/2024    Group Focus: Sleep 101    Length of service: 21968 - 45-50 minutes    Number of patients in attendance: 11    Referred by: RUTHY    Target symptoms: Depression and Anxiety    Patient's response to treatment: Active Listening and Self-disclosure    Progress toward goals: Progressing well    Interval History: RELAXATION: Session focus was on the use and implementation of relaxation techniques to help improve sleep quality and counteract negative sleep thoughts. Patients explored the connection between arousal and sleep. Patients engaged in relaxation techniques to promote relaxation including diaphragmatic breathing, paced breathing, visualization, body scan mediations and progressive muscle relaxation.    Diagnosis:     ICD-10-CM ICD-9-CM   1. Anxiety  F41.9 300.00   2. Moderate episode of recurrent major depressive disorder  F33.1 296.32      Plan: Continue treatment on [facility]

## 2024-09-30 NOTE — PLAN OF CARE
09/30/24 1405   Activity/Group Therapy Checklist   Group Other (Comments)  (Processing)   Attendance Attended   Follows Direction Followed directions   Group Interactions/Observations Interacted appropriately;Sharing;Supportive;Alert   Affect/Mood Range Normal range   Affect/Mood Display Appropriate   Goal Progression Progressing

## 2024-09-30 NOTE — PROGRESS NOTES
[facility]   DEPARTMENT OF PSYCHIATRY     IDENTIFIERS & DEMOGRAPHICS:     ENCOUNTER: subsequent    -- PATIENT IDENTIFIERS: Becky Cade  [id number]  1970  54 y.o.  female  -- LOCATION OF PATIENT: [facility]    -- MODE OF ARRIVAL: self-presented  -- PRESENT WITH PATIENT DURING SESSION: ALONE  -- SOURCES OF INFORMATION: PATIENT  -- ENCOUNTER PROVIDER: Lisa Celsetin MD        PRESENTATION:     CHIEF COMPLAINT(S): depression, anxiety    OVERVIEW OF THE HPI:    Ms Becky Cade is a 53yo/F with hx of anxiety who presents to [facility] on 9/18 for anxiety. She has been seeing Caren Lowe for therapy since 2021 and seeing Dr Damon Brooks for medication management.     SUBJECTIVE/CURRENT FINDINGS:    Becky presents for follow up. She feels like she has gained a lot from the program. She feels like on the weekends she has some struggles, and feels isolated and depressed. She is struggling with the changes in her social position- IE people stopping to ask her about her daughters, which is such a sensitive subject for her. She still misses her daughters very deeply. She denies SI, HI, AVH.     PSYCHOTHERAPY ADD-ON +57532 30 minutes (range 16-37 minutes)  Therapeutic intervention type: supportive psychotherapy  Why chosen therapy is appropriate versus another modality: relevant to diagnosis  Target symptoms addressed: depression, anxiety   Topics and themes discussed: relationships difficulties, parenting issues  Primary focus: depression, loneliness, anxiety  Psychotherapeutic techniques employed: active listening and empathic responses  Outcome monitoring methods: self-report  The patient's response to the intervention is: accepting.   The patient's progress toward treatment goals is: fair.  Duration of intervention: 20 minutes        REVIEW OF SYSTEMS:    >> SOURCES: patient     Y   Sleep Disturbance/Disruption  +insomnia    has been using ativan   N   Appetite/Weight Change   N    Alterations in Energy Level   Y   Depressive Symptomatology  +depressed mood, +tearfulness     Y   Excessive Anxiety/Worry  +generalized anxiety/worry, +somatically preoccupied    nauseous   N   Psychosis    Regarding the current presentation, no other significant issues or complaints are voiced or known at this time.       ADD-ON PSYCHOTHERAPY:     ADD-ON THERAPY     HISTORY:     HISTORY    Allergies:  Buspar [buspirone] and Mobic [meloxicam]     EXAMINATION:     VITALS:  LMP 02/07/2023 (Approximate)     MENTAL STATUS EXAMINATION:  Appearance: appears stated age, normal weight  appropriately dressed, adequately groomed, in no apparent distress, well-appearing    Behavior & Attitude: participative, under good behavioral control, able to redirect, appropriate eye contact  calm, engaged, agreeable, cooperative    Movements & Motor Activity: no psychomotor agitation, no psychomotor retardation, normal gait, normal station, ambulates without assistance, not wheelchair bound (able to ambulate), no weakness, no spasticity, no rigidity, no tics, no tremor    Speech & Language: normal rate, normal volume, normal quantity, normal latency, spontaneous, reciprocal, fluent    Mood: ok  Affect: euthymic, reactive, full range  appropriate given the situation/context, mood congruent    tearful at times  Thought Process & Associations: linear, goal-directed, organized, logical, coherent, relevant, abstract  no loosening of associations    Thought Content & Perceptions: no delusions, no paranoid ideation, no ideas of reference, no grandiosity, no hyperreligiosity, no hallucinations, no responding to internal stimuli    Sensorium: awake, alert, clear  no confusion, no delirium    Orientation: grossly intact, oriented to person, oriented to place, oriented to time, oriented to situation    Recent & Remote Memory: intact (recent), intact (remote)    Attention & Concentration: intact  attentive to  conversation, not easily distracted    Fund of Knowledge: intact, vocabulary proficient    Insight: intact, fair  demonstrates sufficient awareness of condition/situation    Judgment: intact, fair  heeds instructions/advice            RISK & REGULATORY:      RISK PARAMETERS (current to the encounter/episode  NOT inclusive of past history):     N   Suicidal Ideation/Threats   N   Suicide Attempts/Gestures   N   Homicidal Ideation/Threats   N   Homicidal Behavior   N   Non-Suicidal Self-Injurious Behavior   N   Perpetrated Violence     FIREARMS & WEAPONS:     N   Ready Access to Firearms     REGULATORY:      INFORMED CONSENT & SHARED DECISION MAKING are the hallmark and bedrock of good clinical care, and as such have been employed and obtained, respectively, to the degree possible.  Discussed, to the extent possible, diagnosis, risks and benefits of proposed treatment (e.g., medication, therapy) vs alternative treatments vs no treatment, potential side effects of these treatments, and the inherent unpredictability of treatment.        WARNINGS & PRECAUTIONS:  >> In cases of emergencies (e.g. SI/HI resulting in danger to self or others, functioning deteriorating to the level of grave disability), call 911 or [phone], or present to the emergency department for immediate assistance.    >> Individuals should not operate a motor vehicle or heavy machinery if effects of medications or underlying symptoms/condition impair the ability to do so safely.    >> FULLY comply with ANY/ALL medication as prescribed/instructed and report ANY/ALL suspected adverse effects to appropriate health care providers.       ASSESSMENT & PLAN:     DIAGNOSES & PROBLEMS:       1.  Anxiety, unspecified    2.  Major depressive disorder, moderate    PSYCHOTROPIC REGIMEN:   (C)=Continue as prescribed  (A)=Adjust as noted  (I)=Iniitate  (D)=Discontinue      1.  Continue paxil 50 mg daily    2.  Continue doxepin 10 mg  qhs    3.  Continue hydroxyzine 20 mg qhs    4.  Continue PRN ativan 0.5 mg    -- ASSESSMENT (synthesis  analysis):     Ms Becky Cade is a 55yo/F with hx of anxiety who presents to [facility] on 9/18 for anxiety. She has been seeing Caren Lowe for therapy since 2021 and seeing Dr Damon Boroks for medication management.     -- PLAN (goals  recommentations):     - encourage engagement in [facility] programming  - recent labs reviewed - WNL      CHART REVIEW: available documentation has been reviewed, and pertinent elements of the chart have been incorporated into this evaluation where appropriate.       DIAGNOSTIC TESTING:      Glu 95  6/6/2024  Li *   *  TSH 1.352  9/15/2023    HgA1c 5.5  2/7/2023  VPA *   *   FT4 0.85  9/15/2023    Na 135 (L)  6/6/2024  CLZ *   *  WBC 7.51  6/6/2024    Cr 0.8  6/6/2024  ANC 4.4; 58.8;   6/6/2024   Hgb 14.4  6/6/2024     BUN 22 (H)  6/6/2024  Trop I *   *  HCT 44.2  6/6/2024     GFR >60.0  6/6/2024   CPK 59  6/6/2024    6/6/2024     Alb 4.1  6/6/2024   PRL *   *  B12 1995 (H)  2/7/2023     T Bili 0.5  6/6/2024  Chol 236 (H)  2/7/2023  B9 *   *      6/6/2024  TGs 150  2/7/2023  B1 *   *    AST 23  6/6/2024  HDL 89 (H)  2/7/2023  Vit D 34  6/6/2024     ALT 19  6/6/2024  .0  2/7/2023  HIV *   *     INR *   *  Flora *   *   Hep C *   *    GGT *   *  Lip *   *  RPR *   *    MCV 97  6/6/2024   NH4 *   *  UPT Negative  3/25/2022      PETH *   *  THC *   *    ETOH *   *  SMITH *   *    EtG *   *  AMP *   *    ALC *   *  OPI *   *    BZO *   *  MTD *   *     BAR *   *  BUP *   *    PCP *   *  FEN *   *     Results for orders placed or performed during the hospital encounter of 06/21/24   SCHEDULED EKG 12-LEAD (to Muse)    Collection Time: 06/21/24  8:49 AM   Result Value Ref Range    QRS Duration 80 ms    [facility] QTC Calculation 431 ms    Narrative    Test Reason : Z51.81,Z79.899,    Vent. Rate : 079 BPM     Atrial Rate : 079  BPM     P-R Int : 132 ms          QRS Dur : 080 ms      QT Int : 376 ms       P-R-T Axes : 061 072 052 degrees     QTc Int : 431 ms    Normal sinus rhythm  Normal ECG  When compared with ECG of 18-MAR-2022 07:38,  No significant change was found  Confirmed by SHAWNEE SHEA MD ([id number]) on 6/21/2024 10:15:08 AM    Referred By: RICHARD DAILEY           Confirmed By:SHAWNEE SHEA MD        HOWARD & LINKS:        Y  = yes/endorses     N  = no/denies     U  = unknown/unable to assess    ADHD   AIMS   AUDIT   AUDIT-C   C-SSRS (Screen)   C-SSRS (Short)   C-SSRS (Full)   DAST   DAST-10   ANAM-7   MoCA   PCL-5   PHQ-9   CATHERINE   YMRS     Consults  [facility] U*

## 2024-10-01 ENCOUNTER — HOSPITAL ENCOUNTER (OUTPATIENT)
Dept: PSYCHIATRY | Facility: HOSPITAL | Age: 54
Discharge: HOME | End: 2024-10-01
Attending: STUDENT IN AN ORGANIZED HEALTH CARE EDUCATION/TRAINING PROGRAM
Payer: COMMERCIAL

## 2024-10-01 DIAGNOSIS — F33.1 MODERATE EPISODE OF RECURRENT MAJOR DEPRESSIVE DISORDER: ICD-10-CM

## 2024-10-01 DIAGNOSIS — F41.9 ANXIETY: Primary | ICD-10-CM

## 2024-10-01 PROCEDURE — 90853 GROUP PSYCHOTHERAPY: CPT

## 2024-10-01 PROCEDURE — 90853 GROUP PSYCHOTHERAPY: CPT | Mod: ,,, | Performed by: PSYCHOLOGIST

## 2024-10-01 NOTE — DISCHARGE SUMMARY
[facility] DISCHARGE SUMMARY    Discharge Date: 10/1/2024 9:53 AM   Pt Name: Becky Cade   : 1970   MRN: [id number]     Admit Date:       SUBJECTIVE:  Patient is a 54 y.o. old, female, with past psychiatric history of anxiety, admitted with MDD and anxiety. Has been seeing Caren Mistry for therapy since  and follows with Dr. Brooks for medication management.    Program course-   54y F with history of anxiety and depression presenting for worsening depression and anxiety. Continues to have some struggles on weekend with feeling isolated and depression. However, feels that she has had significant improvement with her mood and anxiety overall and has been working on attachment issues.  No medication changes during program.    Doing really well, feels good. Feels like she has developed a lot of tools to graduate program. Day 7 of the program was when she started feeling better. Realized it was a lot of an attachment to a relationship rather than love. Checked in with Dr. Brooks today. Going to see him next week. Will continue to follow with Caren. Doing well with medications, no adverse effects from them. Working on losing some weight when she goes back to her normal routine. Reports sleeping better, mostly just needing ativan on Friday or .     PATIENT HEALTH QUESTIONNAIRE-9 ( P H Q - 9 )- DAY OF DISCHARGE    Over the last 2 weeks, how often have you been bothered by any of the following problems?  0-Not at all  1- Several days  2- More than half the days  3- Nearly every day    Little interest or pleasure in doing things 1  Feeling down, depressed, or hopeless 0  Trouble falling or staying asleep, or sleeping too much 1  Feeling tired or having little energy 0  Poor appetite or overeating 1  Feeling bad about yourself -- or that you are a failure or have let yourself or your family down 0  Trouble concentrating on things, such as reading the newspaper or watching  television 1  Moving or speaking so slowly that other people could have noticed? Or the opposite -- being so fidgety or restless that you have been moving around a lot more than usual 1  Thoughts that you would be better off dead or of hurting yourself in some way 0    Total Score: __5____    If you checked off any problems, how difficult have these problems made it for you to do your  work, take care of things at home, or get along with other people?  Not difficult at all    Developed by Sari Flynn, Poornima Saleh, Arpan Cote and colleagues, with an educational casper from  [employer] No permission required to reproduce, translate, display or distribute.    PHQ-9 Patient Depression Questionnaire Explanation  Interpretation of Total Score  Total Score Depression Severity  1-4 Minimal depression  5-9 Mild depression  10-14 Moderate depression  15-19 Moderately severe depression  20-27 Severe depression    PHQ9 Copyright © [employer] All rights reserved. Reproduced with permission. PRIME-MD ® is a  trademark of [employer].  Q8742L 10-       GENERALIZED ANXIETY DISORDER SCALE (ANAM -7)    Over the last two weeks, how often have you been bothered by the following problems?  0-Not at all  1- Several days  2- More than half the days  3- Nearly every day    1. Feeling nervous, anxious, or on edge- 1  2. Not being able to stop or control worrying-0  3. Worrying too much about different things- 1  4. Trouble relaxing- 1  5. Being so restless that it is hard to sit still- 0  6. Becoming easily annoyed or irritable- 0  7. Feeling afraid, as if something awful  might happen- 0    If you checked any problems, how difficult have they made it for you to do your work, take care of things at home, or get along with other people?  Not difficult at all    Scoring ANAM-7 Anxiety Severity =  3/21  This is calculated by assigning scores of 0, 1, 2, and 3 to the response categories, respectively, of not at all,  "several days, more than half the days, and nearly every day. ANAM-7 total score for the seven items ranges from 0 to 21.  0-4: minimal anxiety  5-9: mild anxiety  10-14: moderate anxiety  15-21: severe anxiety    Source: Primary Care Evaluation of Mental Disorders Patient Health Questionnaire (PRIME-MD-PHQ). The PHQ was developed by Sari Flynn, Poornima Saleh, Arpan Cote, and colleagues. For research information, contact Dr. Flynn at [email or web address]. PRIME-MD® is a trademark of [employer]. Copyright© 1999 [employer] All rights reserved. Reproduced with permission     Risk Parameters:  Patient reports no suicidal ideation  Patient reports no homicidal ideation  Patient reports no self-injurious behavior  Patient reports no violent behavior    Allergies:   Review of patient's allergies indicates:   Allergen Reactions    Buspar [buspirone] Other (See Comments)     dizziness    Mobic [meloxicam] Rash     Current Medications:   Paxil 50mg daily  Hydroxyzine 20mg nightly  Doxepin 10mg nightly  Ativan 0.5mg PRN    OBJECTIVE:   Vitals (Most Recent)  There were no vitals filed for this visit.; defer to nursing note      Labs/Imaging/Studies:   No results found for this or any previous visit (from the past 48 hours).   No results found for: "PHENYTOIN", "PHENOBARB", "VALPROATE", "CBMZ"    Mental Status Exam:  General Appearance: appears stated age, well developed and nourished, adequately groomed and appropriately dressed, in no acute distress  Behavior: normal; cooperative; reasonably friendly, pleasant, and polite; appropriate eye-contact; under good behavioral control  Involuntary Movements and Motor Activity: no abnormal involuntary movements noted; no tics, no tremors, no akathisia, no dystonia, no evidence of tardive dyskinesia; no psychomotor agitation or retardation  Gait and Station: intact, normal gait and station, ambulates without assistance  Speech and Language: intact; normal rate, " "rhythm, volume, tone, and pitch; conversational, spontaneous, and coherent; speaks and understands English proficiently and fluently; repeats words and phrases, no word finding difficulties are noted  Mood: "good"  Affect: normal, euthymic, reactive, full-range, mood-congruent, appropriate to situation and context  Thought Process and Associations: intact; linear, goal-directed, organized, and logical; no loosening of associations noted  Thought Content and Perceptions:: no suicidal or homicidal ideation, no auditory or visual hallucinations, no paranoid ideation, no ideas of reference, no evidence of delusions or psychosis  Sensorium and Orientation: intact; alert with clear sensorium; oriented fully to person, place, time and situation  Recent and Remote Memory: grossly intact, able to recall relevant and salient information from the recent and remote past  Attention and Concentration: grossly intact, attentive to the conversation and not readily distractible  Fund of Knowledge: grossly intact, used appropriate vocabulary and demonstrated an awareness of current events, consistent with educational level achieved  Insight: intact, demonstrates awareness of illness and situation  Judgment: intact, behavior is adequate/appropriate to the circumstances, compliant with health provider's recommendations and instructions    ASSESSMENT/PLAN:   General Assessment:  Patient is a 54 y.o., female, admitted to the [facility] partial hospitalization program  for stabilization of anxiety.        Diagnosis:  Generalized Anxiety Disorder, mild  Major depressive disorder, mild     Plan:  1. Completed BMU treatment with much improvement in presenting symptoms and was encouraged to attend after care groups for better transition to out pt care.     2. Psych meds  Continue current med regimen, tolerating well with out any side effects. Pt provided with enough refills until follow up appointment.  For Depression-- paxil 50mg daily  For " Anxiety-- hydroxyzine 20mg nightly, PRN ativan 0.5mg  For Insomnia -- doxepin 10mg nightly  Discussed with patient informed consent, risks vs. benefits, alternative treatments, side effect profile and the inherent unpredictability of individual responses to these treatments. Answered any questions patient may have had. The patient expresses understanding of the above and displays the capacity to  agree with this.     3. Patient Discharge Instructions and informed to:-  Follow up regularly with Outpatient [facility] appointments (Dr. Brooks - pending next appointment 10/8/24) for further psychiatric care and management. Please see SW note for further after care appointments  Refrain from using excessive alcohol, avoid any illicit substances and or abuse of prescription medications, as this may worsen mood and may be detrimental to health.  Call the crisis line at: [phone] for help in a crisis and emergent situations and present to ED for any worsening of psychiatric symptoms or any SI/HI/AVH    Jacinto Huddleston MD  [facility] PGY-IV  10/1/2024 9:53 AM

## 2024-10-01 NOTE — PROGRESS NOTES
Group Psychotherapy (PhD/LCSW)    Site: [facility]    Clinical status of patient: Intensive Outpatient Program (IOP)    Date: 10/1/2024    Group Focus: Interpersonal Effectiveness    Length of service: 19701 - 45-50 minutes    Number of patients in attendance: 10    Referred by: RUTHY    Target symptoms: Depression and Anxiety    Patient's response to treatment: Active Listening and Self-disclosure    Progress toward goals: Progressing well    Interval History: Validation: Session focus was Interpersonal Effectiveness: Validation. Patients were encouraged to focus on validation of others by enhancing their ability to hear, understand, and respect others. Patients practiced validation of others through role-play and examples.      Diagnosis:     ICD-10-CM ICD-9-CM   1. Anxiety  F41.9 300.00   2. Moderate episode of recurrent major depressive disorder  F33.1 296.32      Plan: Continue treatment on [facility]

## 2024-10-01 NOTE — PLAN OF CARE
10/01/24 1453   Activity/Group Therapy Checklist   Group Other (Comments)  (Healthy Boundaries)   Attendance Attended   Follows Direction Followed directions   Group Interactions/Observations Interacted appropriately;Sharing;Supportive;Alert   Affect/Mood Range Normal range   Affect/Mood Display Appropriate   Goal Progression Progressing      facilitated group therapy session and discussed personal boundaries. Defined boundaries and discussed the importance of establishing healthy boundaries. Discussed common traits of rigid, porous and healthy boundaries and asked pts to identify where they fell on the spectrum. Patients completed a boundary exploration activity. Pts were to identify someone or a group of people whom they struggle to set a boundary with and identify what type of boundaries were in place currently with each category based on the person/group selected. Pts reflected on questions: What specific actions could be taken to improve current boundaries? How do they think the other person/ group would respond to the changes? And how do they think life would be different once the healthier boundaries were established.

## 2024-10-02 NOTE — PROGRESS NOTES
Group Psychotherapy (PhD/LCSW)     Site: [facility]     Clinical status of patient: Intensive Outpatient Program (IOP)     Date: 9/27/2024     Group Focus: Psychodynamic Processing      Length of service: 82661 - 45-50 minutes     Number of patients in attendance: 3     Referred by: [facility]     Target symptoms: Anxiety and Depression      Patient's response to treatment: Active Listening and Self-disclosure     Progress toward goals: Adequate     Interval History:  Patients introduced new member to the group. Group members shared experiences with relationship difficulties and self-acceptance challenges, discussing the importance of accepting others' limitations and their own roles, reflecting on struggles with solitude and self-appreciation, and exploring strategies for positive self-image and stress management techniques. This patient remained engaged throughout the group.       Diagnosis:     ICD-10-CM ICD-9-CM   1. Anxiety  F41.9 300.00   2. Moderate episode of recurrent major depressive disorder  F33.1 296.32          Plan: Complete treatment in [facility] program

## 2024-10-02 NOTE — PROGRESS NOTES
"Group Psychotherapy (PhD/LCSW)     Site: [facility]     Clinical status of patient: Intensive Outpatient Program (IOP)     Date: 10/1/2024     Group Focus: Psychodynamic Processing      Length of service: 16322 - 45-50 minutes     Number of patients in attendance: 6     Referred by: [facility]     Target symptoms: Anxiety and Depression      Patient's response to treatment: Active Listening and Self-disclosure     Progress toward goals: Adequate     Interval History:  Patients introduced new member to the group and exchanged encouraging words with this patient as she completed the program. This patient reported that has "added multiple new tools to her belt" and looks forward to continuing to learn on her mental health journey. The group was focused on addressing mental health stigma among family/friends, learning to apply distress tolerance skills learned during morning groups, and overall experience in the program. This patient remained engaged throughout the group.       Diagnosis:     ICD-10-CM ICD-9-CM   1. Anxiety  F41.9 300.00   2. Moderate episode of recurrent major depressive disorder  F33.1 296.32       Plan: Complete treatment in [facility] program   "

## 2024-10-07 DIAGNOSIS — B00.1 HERPES LABIALIS: ICD-10-CM

## 2024-10-07 RX ORDER — ACYCLOVIR 400 MG/1
400 TABLET ORAL 2 TIMES DAILY
Qty: 14 TABLET | Refills: 8 | Status: SHIPPED | OUTPATIENT
Start: 2024-10-07

## 2024-10-07 NOTE — TELEPHONE ENCOUNTER
Care Due:                  Date            Visit Type   Department     Provider  --------------------------------------------------------------------------------                                EP -                              PRIMARY      [facility]  Last Visit: 09-      CARE ([facility])   CARE           Desiree Stephens                              EP -                              PRIMARY      [facility] Primary  Next Visit: 12-      CARE ([facility])   Care           Desiree Stephens                                                            Last  Test          Frequency    Reason                     Performed    Due Date  --------------------------------------------------------------------------------    TSH.........  12 months..  levothyroxine............  09-   09-    [facility] Embedded Care Due Messages. Reference number: [id number].   10/07/2024 9:34:09 AM CDT

## 2024-10-07 NOTE — TELEPHONE ENCOUNTER
Refill Routing Note   Medication(s) are not appropriate for processing by [facility] for the following reason(s):        No active prescription written by provider    [facility] action(s):  Defer     Requires labs : Yes             Appointments  past 12m or future 3m with PCP    Date Provider   Last Visit   Visit date not found Osito Ledezma MD   Next Visit   Visit date not found Osito Ledezma MD   ED visits in past 90 days: 0        Note composed:5:40 PM 10/07/2024

## 2024-10-07 NOTE — TELEPHONE ENCOUNTER
Provider Staff:  Action required for this patient    Requires labs - TSH outdated     Please see care gap opportunities below in Care Due Message.    Thanks!  [facility]     Appointments      Date Provider   Last Visit   Visit date not found Osito Ledezma MD   Next Visit   Visit date not found Osito Ledezma MD

## 2024-10-09 ENCOUNTER — PATIENT MESSAGE (OUTPATIENT)
Dept: PSYCHIATRY | Facility: CLINIC | Age: 54
End: 2024-10-09
Payer: COMMERCIAL

## 2024-10-16 ENCOUNTER — OFFICE VISIT (OUTPATIENT)
Dept: PSYCHIATRY | Facility: CLINIC | Age: 54
End: 2024-10-16
Payer: COMMERCIAL

## 2024-10-16 DIAGNOSIS — F32.A DEPRESSION, UNSPECIFIED DEPRESSION TYPE: ICD-10-CM

## 2024-10-16 DIAGNOSIS — F41.9 ANXIETY: Primary | ICD-10-CM

## 2024-10-16 PROCEDURE — 90834 PSYTX W PT 45 MINUTES: CPT | Mod: S$GLB,,, | Performed by: SOCIAL WORKER

## 2024-10-16 PROCEDURE — 99999 PR PBB SHADOW E&M-EST. PATIENT-LVL I: CPT | Mod: PBBFAC,,, | Performed by: SOCIAL WORKER

## 2024-10-16 NOTE — PROGRESS NOTES
"    Individual Psychotherapy (PhD/LCSW)    7/5/2023    Site: [facility]    Therapeutic Intervention: Met with patient alone  Outpatient - Insight oriented psychotherapy 45 min - CPT code 27566    Chief complaint/reason for encounter: anxiety, depression     Interval history and content of current session: Pt found IOP beneficial, also has met with Dr. Brooks for support and is taking Ativan regularly per his Rx.  Pt aware of "dropping the mask" and grappling with her core belief that she is "not enough" to hold the love of another person. Work with "smiling eyes" to have pt see and love her child self. Pt sees hurt and pain and has had passive SI but no intent and is clear she would not act on the thoughts. Suggest pt write a "letter"to young self to address positives she sees in herself.     Treatment plan:  Target symptoms: anxiety   Why chosen therapy is appropriate versus another modality: relevant to diagnosis  Outcome monitoring methods: self-report  Therapeutic intervention type: insight oriented psychotherapy    Risk parameters:  Patient reports no suicidal ideation  Patient reports no homicidal ideation  Patient reports no self-injurious behavior  Patient reports no violent behavior    Verbal deficits: None    Patient's response to intervention:  The patient's response to intervention is motivated    Progress toward goals and other mental status changes:  The patient's progress toward goals is fair    Diagnosis:   Anxiety, depression    Plan:  individual psychotherapy    Return to clinic: f/u as scheduled    Length of Service (minutes): 45            "

## 2024-10-23 ENCOUNTER — OFFICE VISIT (OUTPATIENT)
Dept: PSYCHIATRY | Facility: CLINIC | Age: 54
End: 2024-10-23
Payer: COMMERCIAL

## 2024-10-23 DIAGNOSIS — F32.A DEPRESSION, UNSPECIFIED DEPRESSION TYPE: ICD-10-CM

## 2024-10-23 DIAGNOSIS — F41.9 ANXIETY: Primary | ICD-10-CM

## 2024-10-23 PROCEDURE — 90834 PSYTX W PT 45 MINUTES: CPT | Mod: S$GLB,,, | Performed by: SOCIAL WORKER

## 2024-10-23 PROCEDURE — 99999 PR PBB SHADOW E&M-EST. PATIENT-LVL I: CPT | Mod: PBBFAC,,, | Performed by: SOCIAL WORKER

## 2024-10-23 NOTE — PROGRESS NOTES
Individual Psychotherapy (PhD/LCSW)    7/5/2023    Site: [facility]    Therapeutic Intervention: Met with patient alone  Outpatient - Insight oriented psychotherapy 45 min - CPT code 40751    Chief complaint/reason for encounter: anxiety, depression     Interval history and content of current session: Pt continues working on co-dependency and self-esteem issues. She and mother met with Dr. Brooks at mom's request and that went well. Discuss experience of being alone when twin left for boarding school in 8th grade and pt's reliance on boyfriends after that to fill the space.     Treatment plan:  Target symptoms: anxiety , depression  Why chosen therapy is appropriate versus another modality: relevant to diagnosis  Outcome monitoring methods: self-report  Therapeutic intervention type: insight oriented psychotherapy    Risk parameters:  Patient reports no suicidal ideation  Patient reports no homicidal ideation  Patient reports no self-injurious behavior  Patient reports no violent behavior    Verbal deficits: None    Patient's response to intervention:  The patient's response to intervention is motivated    Progress toward goals and other mental status changes:  The patient's progress toward goals is fair    Diagnosis:   Anxiety, depression    Plan:  individual psychotherapy    Return to clinic: f/u as scheduled    Length of Service (minutes): 45

## 2024-10-26 ENCOUNTER — PATIENT MESSAGE (OUTPATIENT)
Dept: DERMATOLOGY | Facility: CLINIC | Age: 54
End: 2024-10-26
Payer: COMMERCIAL

## 2024-10-28 ENCOUNTER — PATIENT MESSAGE (OUTPATIENT)
Dept: PSYCHIATRY | Facility: CLINIC | Age: 54
End: 2024-10-28
Payer: COMMERCIAL

## 2024-10-30 ENCOUNTER — PATIENT MESSAGE (OUTPATIENT)
Dept: DERMATOLOGY | Facility: CLINIC | Age: 54
End: 2024-10-30
Payer: COMMERCIAL

## 2024-10-30 ENCOUNTER — OFFICE VISIT (OUTPATIENT)
Dept: PSYCHIATRY | Facility: CLINIC | Age: 54
End: 2024-10-30
Payer: COMMERCIAL

## 2024-10-30 DIAGNOSIS — F32.A DEPRESSION, UNSPECIFIED DEPRESSION TYPE: ICD-10-CM

## 2024-10-30 DIAGNOSIS — F41.9 ANXIETY: Primary | ICD-10-CM

## 2024-10-30 PROCEDURE — 90834 PSYTX W PT 45 MINUTES: CPT | Mod: S$GLB,,, | Performed by: SOCIAL WORKER

## 2024-10-30 PROCEDURE — 99999 PR PBB SHADOW E&M-EST. PATIENT-LVL I: CPT | Mod: PBBFAC,,, | Performed by: SOCIAL WORKER

## 2024-10-31 ENCOUNTER — TELEPHONE (OUTPATIENT)
Dept: DERMATOLOGY | Facility: CLINIC | Age: 54
End: 2024-10-31
Payer: COMMERCIAL

## 2024-10-31 RX ORDER — ALCLOMETASONE DIPROPIONATE 0.5 MG/G
CREAM TOPICAL
Qty: 45 G | Refills: 1 | Status: SHIPPED | OUTPATIENT
Start: 2024-10-31

## 2024-10-31 RX ORDER — KETOCONAZOLE 20 MG/G
CREAM TOPICAL
Qty: 60 G | Refills: 2 | Status: SHIPPED | OUTPATIENT
Start: 2024-10-31

## 2024-11-05 ENCOUNTER — CLINICAL SUPPORT (OUTPATIENT)
Dept: PSYCHIATRY | Facility: CLINIC | Age: 54
End: 2024-11-05
Payer: COMMERCIAL

## 2024-11-05 DIAGNOSIS — F41.9 ANXIETY: Primary | ICD-10-CM

## 2024-11-05 DIAGNOSIS — F32.A DEPRESSION, UNSPECIFIED DEPRESSION TYPE: ICD-10-CM

## 2024-11-05 PROCEDURE — 90853 GROUP PSYCHOTHERAPY: CPT | Mod: S$GLB,,, | Performed by: PSYCHOLOGIST

## 2024-11-05 NOTE — PROGRESS NOTES
[facility] Aftercare Group Therapy      Clinical status of patient: Outpatient     Date: 11/05/2024     Length of service: 64027-79 mins     Referred by: Behavioral Medicine Unit/[facility]     Number of patients in attendance: 3     Target symptoms: anxiety, depression      Patient's response to intervention:  The patient's response to intervention is active listening, self-disclosure, feedback to other patients.     Progress toward goals and other mental status changes:  Adequate        Interval history:  This patient arrived on time and introduced herself to the group.  Group members. provided updates on progress made and challenges experienced since completing IOP. The group discussed the following topics: cycle of grief/loss and change, giving yourself stalin, SMART goals, challenging unhelpful thinking styles, and patterns of avoidance that increase anxiety. Therapist briefly reviewed DBT TIPP and self-soothing skills. Patients remained engaged throughout the session.     Diagnosis:     ICD-10-CM ICD-9-CM   1. Anxiety  F41.9 300.00   2. Depression, unspecified depression type  F32.A 311       Plan: group psychotherapy     Return to clinic: 1 week

## 2024-11-06 ENCOUNTER — OFFICE VISIT (OUTPATIENT)
Dept: PSYCHIATRY | Facility: CLINIC | Age: 54
End: 2024-11-06
Payer: COMMERCIAL

## 2024-11-06 DIAGNOSIS — F41.9 ANXIETY: Primary | ICD-10-CM

## 2024-11-06 DIAGNOSIS — F32.A DEPRESSION, UNSPECIFIED DEPRESSION TYPE: ICD-10-CM

## 2024-11-06 PROCEDURE — 90834 PSYTX W PT 45 MINUTES: CPT | Mod: S$GLB,,, | Performed by: SOCIAL WORKER

## 2024-11-06 PROCEDURE — 99999 PR PBB SHADOW E&M-EST. PATIENT-LVL I: CPT | Mod: PBBFAC,,, | Performed by: SOCIAL WORKER

## 2024-11-06 NOTE — PROGRESS NOTES
"    Individual Psychotherapy (PhD/LCSW)    7/5/2023    Site: [facility]    Therapeutic Intervention: Met with patient alone  Outpatient - Insight oriented psychotherapy 45 min - CPT code 40927    Chief complaint/reason for encounter: anxiety, depression     Interval history and content of current session: Pt brings in letter she wrote to young self. She attended aftercare group, and has appointment with selene of [facility] to introduce herself and ask how to get involved in the [employer]. Pt meets with friend she met in [facility], finds this relationshiip mutually rewarding. Pt saw W for first time, felt she handled it well but did well up with emotion afterward. She is working on being able to feel safe with herself and feel like "I am enough" vs co-dependent reliance on a partner. She has plans with a friend to go to [employer] this weekend. She has slept with less Ativan this week.     Treatment plan:  Target symptoms: anxiety , depression  Why chosen therapy is appropriate versus another modality: relevant to diagnosis  Outcome monitoring methods: self-report  Therapeutic intervention type: insight oriented psychotherapy    Risk parameters:  Patient reports no suicidal ideation  Patient reports no homicidal ideation  Patient reports no self-injurious behavior  Patient reports no violent behavior    Verbal deficits: None    Patient's response to intervention:  The patient's response to intervention is motivated    Progress toward goals and other mental status changes:  The patient's progress toward goals is good    Diagnosis:   Anxiety, depression    Plan:  individual psychotherapy    Return to clinic: f/u as scheduled    Length of Service (minutes): 45                  "

## 2024-11-13 ENCOUNTER — OFFICE VISIT (OUTPATIENT)
Dept: PSYCHIATRY | Facility: CLINIC | Age: 54
End: 2024-11-13
Payer: COMMERCIAL

## 2024-11-13 DIAGNOSIS — F41.9 ANXIETY: Primary | ICD-10-CM

## 2024-11-13 DIAGNOSIS — F32.A DEPRESSION, UNSPECIFIED DEPRESSION TYPE: ICD-10-CM

## 2024-11-13 PROCEDURE — 99999 PR PBB SHADOW E&M-EST. PATIENT-LVL I: CPT | Mod: PBBFAC,,, | Performed by: SOCIAL WORKER

## 2024-11-13 PROCEDURE — 90834 PSYTX W PT 45 MINUTES: CPT | Mod: S$GLB,,, | Performed by: SOCIAL WORKER

## 2024-11-13 NOTE — PROGRESS NOTES
Individual Psychotherapy (PhD/LCSW)    7/5/2023    Site: [facility]    Therapeutic Intervention: Met with patient alone  Outpatient - Insight oriented psychotherapy 45 min - CPT code 50452    Chief complaint/reason for encounter: anxiety, depression     Interval history and content of current session: Pt's emotional regulation is significantly improved. She met with clergy at [facility] and is going to 6pm Sunday service, find this helps with her empty evenings. She is no longer taking Ativan to sleep, more grounded in the evenings, making social plans and enjoying herself. She does think about what she would want in a new relationship. Help pt to notice how different this is from past approach in relationship when she focused on meeting others' needs. Pt finds she is more assertive. Main distress is around lack of contact with her children.     Treatment plan:  Target symptoms: anxiety , depression  Why chosen therapy is appropriate versus another modality: relevant to diagnosis  Outcome monitoring methods: self-report  Therapeutic intervention type: insight oriented psychotherapy    Risk parameters:  Patient reports no suicidal ideation  Patient reports no homicidal ideation  Patient reports no self-injurious behavior  Patient reports no violent behavior    Verbal deficits: None    Patient's response to intervention:  The patient's response to intervention is motivated    Progress toward goals and other mental status changes:  The patient's progress toward goals is good    Diagnosis:   Anxiety, depression    Plan:  individual psychotherapy    Return to clinic: f/u as scheduled    Length of Service (minutes): 45

## 2024-11-19 ENCOUNTER — PATIENT MESSAGE (OUTPATIENT)
Dept: PSYCHIATRY | Facility: CLINIC | Age: 54
End: 2024-11-19
Payer: COMMERCIAL

## 2024-11-19 ENCOUNTER — CLINICAL SUPPORT (OUTPATIENT)
Dept: PSYCHIATRY | Facility: CLINIC | Age: 54
End: 2024-11-19
Payer: COMMERCIAL

## 2024-11-19 DIAGNOSIS — F32.A DEPRESSION, UNSPECIFIED DEPRESSION TYPE: ICD-10-CM

## 2024-11-19 DIAGNOSIS — F41.9 ANXIETY: Primary | ICD-10-CM

## 2024-11-19 PROCEDURE — 90853 GROUP PSYCHOTHERAPY: CPT | Mod: S$GLB,,, | Performed by: PSYCHOLOGIST

## 2024-11-20 ENCOUNTER — OFFICE VISIT (OUTPATIENT)
Dept: PSYCHIATRY | Facility: CLINIC | Age: 54
End: 2024-11-20
Payer: COMMERCIAL

## 2024-11-20 DIAGNOSIS — F32.A DEPRESSION, UNSPECIFIED DEPRESSION TYPE: ICD-10-CM

## 2024-11-20 DIAGNOSIS — F41.9 ANXIETY: Primary | ICD-10-CM

## 2024-11-20 PROCEDURE — 99999 PR PBB SHADOW E&M-EST. PATIENT-LVL I: CPT | Mod: PBBFAC,,, | Performed by: SOCIAL WORKER

## 2024-11-20 PROCEDURE — 90834 PSYTX W PT 45 MINUTES: CPT | Mod: S$GLB,,, | Performed by: SOCIAL WORKER

## 2024-11-20 NOTE — PROGRESS NOTES
"    Individual Psychotherapy (PhD/LCSW)    7/5/2023    Site: [facility]    Therapeutic Intervention: Met with patient alone  Outpatient - Insight oriented psychotherapy 45 min - CPT code 46251    Chief complaint/reason for encounter: anxiety, depression     Interval history and content of current session: Pt struggling with grief about not having a partner and no contact with her children, changed her will this week, which has exacerbated her awareness of her loss. Pt has made holiday plans with family to stay busy but nothing in her life gives her a real sense of purpose at this time. Pt has passive SI, "if something happened to me I would be fine with that" but is clear she has no active thoughts or plan. Discuss strategies for managing evening loneliness. Pt is tearful during session but is functioning well, getting to work, socializing, "just sad." Pt felt uncomfortable in group, more new people,  is leaving, and several group members were actively suicidal, which felt overwhelming to her. She took Ativan last night for the first time in about 2 weeks, generally doesn't feel the need.     Treatment plan:  Target symptoms: anxiety , depression  Why chosen therapy is appropriate versus another modality: relevant to diagnosis  Outcome monitoring methods: self-report  Therapeutic intervention type: insight oriented psychotherapy    Risk parameters:  Patient reports no suicidal ideation  Patient reports no homicidal ideation  Patient reports no self-injurious behavior  Patient reports no violent behavior    Verbal deficits: None    Patient's response to intervention:  The patient's response to intervention is motivated    Progress toward goals and other mental status changes:  The patient's progress toward goals is fair  Diagnosis:   Anxiety, depression    Plan:  individual psychotherapy    Return to clinic: f/u as scheduled    Length of Service (minutes): 45    "

## 2024-11-25 ENCOUNTER — PATIENT MESSAGE (OUTPATIENT)
Dept: PSYCHIATRY | Facility: CLINIC | Age: 54
End: 2024-11-25
Payer: COMMERCIAL

## 2024-11-26 NOTE — PROGRESS NOTES
[facility] Aftercare Group Therapy      Clinical status of patient: Outpatient     Date: 11/19/2024     Length of service: 66174-69 mins     Referred by: Behavioral Medicine Unit/[facility]     Number of patients in attendance: 5     Target symptoms: anxiety, depression      Patient's response to intervention:  The patient's response to intervention is active listening, self-disclosure, feedback to other patients.     Progress toward goals and other mental status changes:  Adequate        Interval history:  This patient arrived on time. Group members provided updates since the last meeting.  New group members were introduced and group rules were briefly reviewed. The session was focused on progress towards goals, effective communication, self-advocacy, and learning to work toward progress by taking one step at a time.  This Patient remained engaged throughout the session.       Diagnosis:     ICD-10-CM ICD-9-CM   1. Anxiety  F41.9 300.00   2. Depression, unspecified depression type  F32.A 311        Plan: group psychotherapy     Return to clinic: 2 weeks due to Thanksgiving holiday

## 2024-12-02 ENCOUNTER — PATIENT MESSAGE (OUTPATIENT)
Dept: PSYCHIATRY | Facility: CLINIC | Age: 54
End: 2024-12-02
Payer: COMMERCIAL

## 2024-12-04 ENCOUNTER — OFFICE VISIT (OUTPATIENT)
Dept: PSYCHIATRY | Facility: CLINIC | Age: 54
End: 2024-12-04
Payer: COMMERCIAL

## 2024-12-04 DIAGNOSIS — F32.A DEPRESSION, UNSPECIFIED DEPRESSION TYPE: ICD-10-CM

## 2024-12-04 DIAGNOSIS — F41.9 ANXIETY: Primary | ICD-10-CM

## 2024-12-04 PROCEDURE — 99999 PR PBB SHADOW E&M-EST. PATIENT-LVL I: CPT | Mod: PBBFAC,,, | Performed by: SOCIAL WORKER

## 2024-12-04 PROCEDURE — 90834 PSYTX W PT 45 MINUTES: CPT | Mod: S$GLB,,, | Performed by: SOCIAL WORKER

## 2024-12-04 NOTE — PROGRESS NOTES
Individual Psychotherapy (PhD/LCSW)    7/5/2023    Site: [facility]    Therapeutic Intervention: Met with patient alone  Outpatient - Insight oriented psychotherapy 45 min - CPT code 54826    Chief complaint/reason for encounter: anxiety, depression     Interval history and content of current session: Pt struggling to manage her feelings of loneliness, still drives past ex boyfriend's home and texted him once, quickly apologized and cut off the communication. Pt sees herself as flawed, unlovable. Reframe for pt that she tries to be honest and direct in her relationships while most of the people in her life mask feelings, leaving her confused and frustrated. Pt shares video from daughters before Thanksgiving in which they cheerfully express love for her while refusing to see or talk with her x three years. Validate pt's reactions and focus on how to ground herself. Pt states she has periods of time where she feels happy and stable but is still easily triggered into a panic state or drop in mood.     Treatment plan:  Target symptoms: anxiety , depression  Why chosen therapy is appropriate versus another modality: relevant to diagnosis  Outcome monitoring methods: self-report  Therapeutic intervention type: insight oriented psychotherapy    Risk parameters:  Patient reports no suicidal ideation  Patient reports no homicidal ideation  Patient reports no self-injurious behavior  Patient reports no violent behavior    Verbal deficits: None    Patient's response to intervention:  The patient's response to intervention is motivated    Progress toward goals and other mental status changes:  The patient's progress toward goals is fair  Diagnosis:   Anxiety, depression    Plan:  individual psychotherapy    Return to clinic: f/u as scheduled    Length of Service (minutes): 45

## 2024-12-05 ENCOUNTER — PATIENT MESSAGE (OUTPATIENT)
Dept: PRIMARY CARE CLINIC | Facility: CLINIC | Age: 54
End: 2024-12-05
Payer: COMMERCIAL

## 2024-12-10 ENCOUNTER — PATIENT MESSAGE (OUTPATIENT)
Dept: RHEUMATOLOGY | Facility: CLINIC | Age: 54
End: 2024-12-10
Payer: COMMERCIAL

## 2024-12-11 ENCOUNTER — OFFICE VISIT (OUTPATIENT)
Dept: PSYCHIATRY | Facility: CLINIC | Age: 54
End: 2024-12-11
Payer: COMMERCIAL

## 2024-12-11 DIAGNOSIS — F32.A DEPRESSION, UNSPECIFIED DEPRESSION TYPE: ICD-10-CM

## 2024-12-11 DIAGNOSIS — F41.9 ANXIETY: Primary | ICD-10-CM

## 2024-12-11 PROCEDURE — 90834 PSYTX W PT 45 MINUTES: CPT | Mod: S$GLB,,, | Performed by: SOCIAL WORKER

## 2024-12-11 PROCEDURE — 99999 PR PBB SHADOW E&M-EST. PATIENT-LVL I: CPT | Mod: PBBFAC,,, | Performed by: SOCIAL WORKER

## 2024-12-11 NOTE — PROGRESS NOTES
"    Individual Psychotherapy (PhD/LCSW)    7/5/2023    Site: [facility]    Therapeutic Intervention: Met with patient alone  Outpatient - Insight oriented psychotherapy 45 min - CPT code 89947    Chief complaint/reason for encounter: anxiety, depression     Interval history and content of current session: Pt reports keeping herself frantically busy and then having several days of feeling deeply depressed, followed by vomiting and diarrhea, feeling alone, recalling as a child lying in her bed shaking and feeling alone. Talk to pt about positive images she can bring up to help her connect with her actual resources in the present, but pt has difficulty accessing these. She does get relief from "goofy comedies". Discuss accepting that her need for companionship need not be a romantic partner, as pt continues to have urge to reach out to Harry. Pt notes she does feel more confident in herself, more comfortable speaking her mind and being her authentic self.     Treatment plan:  Target symptoms: anxiety , depression  Why chosen therapy is appropriate versus another modality: relevant to diagnosis  Outcome monitoring methods: self-report  Therapeutic intervention type: insight oriented psychotherapy    Risk parameters:  Patient reports no suicidal ideation  Patient reports no homicidal ideation  Patient reports no self-injurious behavior  Patient reports no violent behavior    Verbal deficits: None    Patient's response to intervention:  The patient's response to intervention is motivated    Progress toward goals and other mental status changes:  The patient's progress toward goals is fair  Diagnosis:   Anxiety, depression    Plan:  individual psychotherapy    Return to clinic: f/u as scheduled    Length of Service (minutes): 45        "

## 2024-12-12 ENCOUNTER — OFFICE VISIT (OUTPATIENT)
Dept: PRIMARY CARE CLINIC | Facility: CLINIC | Age: 54
End: 2024-12-12
Payer: COMMERCIAL

## 2024-12-12 ENCOUNTER — LAB VISIT (OUTPATIENT)
Dept: LAB | Facility: HOSPITAL | Age: 54
End: 2024-12-12
Attending: INTERNAL MEDICINE
Payer: COMMERCIAL

## 2024-12-12 ENCOUNTER — OFFICE VISIT (OUTPATIENT)
Dept: RHEUMATOLOGY | Facility: CLINIC | Age: 54
End: 2024-12-12
Payer: COMMERCIAL

## 2024-12-12 VITALS
BODY MASS INDEX: 24.1 KG/M2 | DIASTOLIC BLOOD PRESSURE: 68 MMHG | HEIGHT: 64 IN | SYSTOLIC BLOOD PRESSURE: 100 MMHG | HEART RATE: 81 BPM | WEIGHT: 141.13 LBS

## 2024-12-12 VITALS
HEIGHT: 64 IN | DIASTOLIC BLOOD PRESSURE: 76 MMHG | BODY MASS INDEX: 23.9 KG/M2 | WEIGHT: 140 LBS | OXYGEN SATURATION: 97 % | SYSTOLIC BLOOD PRESSURE: 118 MMHG | HEART RATE: 95 BPM

## 2024-12-12 DIAGNOSIS — M77.12 LATERAL EPICONDYLITIS OF LEFT ELBOW: ICD-10-CM

## 2024-12-12 DIAGNOSIS — Z13.6 ENCOUNTER FOR LIPID SCREENING FOR CARDIOVASCULAR DISEASE: ICD-10-CM

## 2024-12-12 DIAGNOSIS — N64.89 PENDULOUS BREAST: ICD-10-CM

## 2024-12-12 DIAGNOSIS — M32.9 SYSTEMIC LUPUS ERYTHEMATOSUS, UNSPECIFIED SLE TYPE, UNSPECIFIED ORGAN INVOLVEMENT STATUS: ICD-10-CM

## 2024-12-12 DIAGNOSIS — M25.529 ELBOW PAIN, UNSPECIFIED LATERALITY: ICD-10-CM

## 2024-12-12 DIAGNOSIS — Z78.0 MENOPAUSE: ICD-10-CM

## 2024-12-12 DIAGNOSIS — M32.9 SYSTEMIC LUPUS ERYTHEMATOSUS, UNSPECIFIED SLE TYPE, UNSPECIFIED ORGAN INVOLVEMENT STATUS: Primary | ICD-10-CM

## 2024-12-12 DIAGNOSIS — Z00.00 ANNUAL PHYSICAL EXAM: Primary | ICD-10-CM

## 2024-12-12 DIAGNOSIS — E03.9 HYPOTHYROIDISM, UNSPECIFIED TYPE: ICD-10-CM

## 2024-12-12 DIAGNOSIS — M77.01 MEDIAL EPICONDYLITIS OF ELBOW, RIGHT: ICD-10-CM

## 2024-12-12 DIAGNOSIS — M54.12 CERVICAL RADICULOPATHY: ICD-10-CM

## 2024-12-12 DIAGNOSIS — H92.02 LEFT EAR PAIN: ICD-10-CM

## 2024-12-12 DIAGNOSIS — Z12.31 SCREENING MAMMOGRAM FOR BREAST CANCER: ICD-10-CM

## 2024-12-12 DIAGNOSIS — Z13.220 ENCOUNTER FOR LIPID SCREENING FOR CARDIOVASCULAR DISEASE: ICD-10-CM

## 2024-12-12 LAB
ALBUMIN SERPL BCP-MCNC: 3.9 G/DL (ref 3.5–5.2)
ALP SERPL-CCNC: 71 U/L (ref 40–150)
ALT SERPL W/O P-5'-P-CCNC: 12 U/L (ref 10–44)
ANION GAP SERPL CALC-SCNC: 8 MMOL/L (ref 8–16)
AST SERPL-CCNC: 22 U/L (ref 10–40)
BACTERIA #/AREA URNS AUTO: ABNORMAL /HPF
BASOPHILS # BLD AUTO: 0.05 K/UL (ref 0–0.2)
BASOPHILS NFR BLD: 1 % (ref 0–1.9)
BILIRUB SERPL-MCNC: 0.3 MG/DL (ref 0.1–1)
BILIRUB UR QL STRIP: NEGATIVE
BUN SERPL-MCNC: 8 MG/DL (ref 6–20)
C3 SERPL-MCNC: 146 MG/DL (ref 50–180)
C4 SERPL-MCNC: 22 MG/DL (ref 11–44)
CALCIUM SERPL-MCNC: 9.4 MG/DL (ref 8.7–10.5)
CHLORIDE SERPL-SCNC: 103 MMOL/L (ref 95–110)
CLARITY UR REFRACT.AUTO: ABNORMAL
CO2 SERPL-SCNC: 26 MMOL/L (ref 23–29)
COLOR UR AUTO: YELLOW
CREAT SERPL-MCNC: 0.8 MG/DL (ref 0.5–1.4)
CREAT UR-MCNC: 130 MG/DL (ref 15–325)
CRP SERPL-MCNC: 6.4 MG/L (ref 0–8.2)
DIFFERENTIAL METHOD BLD: ABNORMAL
EOSINOPHIL # BLD AUTO: 0.1 K/UL (ref 0–0.5)
EOSINOPHIL NFR BLD: 2.6 % (ref 0–8)
ERYTHROCYTE [DISTWIDTH] IN BLOOD BY AUTOMATED COUNT: 11.6 % (ref 11.5–14.5)
ERYTHROCYTE [SEDIMENTATION RATE] IN BLOOD BY PHOTOMETRIC METHOD: 5 MM/HR (ref 0–36)
EST. GFR  (NO RACE VARIABLE): >60 ML/MIN/1.73 M^2
GLUCOSE SERPL-MCNC: 100 MG/DL (ref 70–110)
GLUCOSE UR QL STRIP: NEGATIVE
HCT VFR BLD AUTO: 43.5 % (ref 37–48.5)
HGB BLD-MCNC: 14.9 G/DL (ref 12–16)
HGB UR QL STRIP: NEGATIVE
HYALINE CASTS UR QL AUTO: 0 /LPF
IMM GRANULOCYTES # BLD AUTO: 0.01 K/UL (ref 0–0.04)
IMM GRANULOCYTES NFR BLD AUTO: 0.2 % (ref 0–0.5)
KETONES UR QL STRIP: NEGATIVE
LEUKOCYTE ESTERASE UR QL STRIP: ABNORMAL
LYMPHOCYTES # BLD AUTO: 1.3 K/UL (ref 1–4.8)
LYMPHOCYTES NFR BLD: 26.7 % (ref 18–48)
MCH RBC QN AUTO: 33.2 PG (ref 27–31)
MCHC RBC AUTO-ENTMCNC: 34.3 G/DL (ref 32–36)
MCV RBC AUTO: 97 FL (ref 82–98)
MICROSCOPIC COMMENT: ABNORMAL
MONOCYTES # BLD AUTO: 0.6 K/UL (ref 0.3–1)
MONOCYTES NFR BLD: 11.6 % (ref 4–15)
NEUTROPHILS # BLD AUTO: 2.9 K/UL (ref 1.8–7.7)
NEUTROPHILS NFR BLD: 57.9 % (ref 38–73)
NITRITE UR QL STRIP: NEGATIVE
NON-SQ EPI CELLS #/AREA URNS AUTO: 2 /HPF
NRBC BLD-RTO: 0 /100 WBC
PH UR STRIP: 6 [PH] (ref 5–8)
PLATELET # BLD AUTO: 263 K/UL (ref 150–450)
PMV BLD AUTO: 10.2 FL (ref 9.2–12.9)
POTASSIUM SERPL-SCNC: 3.9 MMOL/L (ref 3.5–5.1)
PROT SERPL-MCNC: 7.5 G/DL (ref 6–8.4)
PROT UR QL STRIP: ABNORMAL
PROT UR-MCNC: 9 MG/DL (ref 0–15)
PROT/CREAT UR: 0.07 MG/G{CREAT} (ref 0–0.2)
RBC # BLD AUTO: 4.49 M/UL (ref 4–5.4)
RBC #/AREA URNS AUTO: 4 /HPF (ref 0–4)
SODIUM SERPL-SCNC: 137 MMOL/L (ref 136–145)
SP GR UR STRIP: 1.01 (ref 1–1.03)
SQUAMOUS #/AREA URNS AUTO: 10 /HPF
URN SPEC COLLECT METH UR: ABNORMAL
WBC # BLD AUTO: 4.99 K/UL (ref 3.9–12.7)
WBC #/AREA URNS AUTO: >100 /HPF (ref 0–5)

## 2024-12-12 PROCEDURE — 86225 DNA ANTIBODY NATIVE: CPT | Performed by: INTERNAL MEDICINE

## 2024-12-12 PROCEDURE — 86160 COMPLEMENT ANTIGEN: CPT | Performed by: INTERNAL MEDICINE

## 2024-12-12 PROCEDURE — 99999 PR PBB SHADOW E&M-EST. PATIENT-LVL V: CPT | Mod: PBBFAC,,, | Performed by: INTERNAL MEDICINE

## 2024-12-12 PROCEDURE — 3008F BODY MASS INDEX DOCD: CPT | Mod: CPTII,S$GLB,, | Performed by: INTERNAL MEDICINE

## 2024-12-12 PROCEDURE — 99396 PREV VISIT EST AGE 40-64: CPT | Mod: S$GLB,,, | Performed by: FAMILY MEDICINE

## 2024-12-12 PROCEDURE — 85652 RBC SED RATE AUTOMATED: CPT | Performed by: INTERNAL MEDICINE

## 2024-12-12 PROCEDURE — 3074F SYST BP LT 130 MM HG: CPT | Mod: CPTII,S$GLB,, | Performed by: INTERNAL MEDICINE

## 2024-12-12 PROCEDURE — 3074F SYST BP LT 130 MM HG: CPT | Mod: CPTII,S$GLB,, | Performed by: FAMILY MEDICINE

## 2024-12-12 PROCEDURE — 1159F MED LIST DOCD IN RCRD: CPT | Mod: CPTII,S$GLB,, | Performed by: INTERNAL MEDICINE

## 2024-12-12 PROCEDURE — 86160 COMPLEMENT ANTIGEN: CPT | Mod: 59 | Performed by: INTERNAL MEDICINE

## 2024-12-12 PROCEDURE — 36415 COLL VENOUS BLD VENIPUNCTURE: CPT | Mod: PN | Performed by: INTERNAL MEDICINE

## 2024-12-12 PROCEDURE — 99999 PR PBB SHADOW E&M-EST. PATIENT-LVL V: CPT | Mod: PBBFAC,,, | Performed by: FAMILY MEDICINE

## 2024-12-12 PROCEDURE — 85025 COMPLETE CBC W/AUTO DIFF WBC: CPT | Performed by: INTERNAL MEDICINE

## 2024-12-12 PROCEDURE — 99214 OFFICE O/P EST MOD 30 MIN: CPT | Mod: S$GLB,,, | Performed by: INTERNAL MEDICINE

## 2024-12-12 PROCEDURE — 3008F BODY MASS INDEX DOCD: CPT | Mod: CPTII,S$GLB,, | Performed by: FAMILY MEDICINE

## 2024-12-12 PROCEDURE — 86140 C-REACTIVE PROTEIN: CPT | Performed by: INTERNAL MEDICINE

## 2024-12-12 PROCEDURE — 1160F RVW MEDS BY RX/DR IN RCRD: CPT | Mod: CPTII,S$GLB,, | Performed by: FAMILY MEDICINE

## 2024-12-12 PROCEDURE — 84156 ASSAY OF PROTEIN URINE: CPT | Performed by: INTERNAL MEDICINE

## 2024-12-12 PROCEDURE — 80053 COMPREHEN METABOLIC PANEL: CPT | Performed by: INTERNAL MEDICINE

## 2024-12-12 PROCEDURE — 3078F DIAST BP <80 MM HG: CPT | Mod: CPTII,S$GLB,, | Performed by: FAMILY MEDICINE

## 2024-12-12 PROCEDURE — 1159F MED LIST DOCD IN RCRD: CPT | Mod: CPTII,S$GLB,, | Performed by: FAMILY MEDICINE

## 2024-12-12 PROCEDURE — 3078F DIAST BP <80 MM HG: CPT | Mod: CPTII,S$GLB,, | Performed by: INTERNAL MEDICINE

## 2024-12-12 PROCEDURE — 81001 URINALYSIS AUTO W/SCOPE: CPT | Performed by: INTERNAL MEDICINE

## 2024-12-12 RX ORDER — HYDROXYCHLOROQUINE SULFATE 200 MG/1
300 TABLET, FILM COATED ORAL DAILY
Qty: 135 TABLET | Refills: 1 | Status: SHIPPED | OUTPATIENT
Start: 2024-12-12

## 2024-12-12 ASSESSMENT — SYSTEMIC LUPUS ERYTHEMATOSUS DISEASE ACTIVITY INDEX (SLEDAI): TOTAL_SCORE: 0

## 2024-12-12 NOTE — PATIENT INSTRUCTIONS
Naproxen 550mg twice daily wM x 1-2 wks  Ref to OT if needed  Continue stretching exercise per previous OT  X-ray elbows  CBC, ESR< CRP C4 C3 dsDNA  UA  UPCR  AVISE-CTD  done 7/23/24 report not accessible call lab  Continue hydroxychloroquine 300mg daily.  Had Baseline Optometry exam with Dr. Pascual  *flu vaccine  *new Covid vaccine > 3 months post Covid which was 9/12/24  *Prevnar 20   Regular 30 min aerobic exercise, Pilates, yoga, [employer]  5/7 days  Mediterranean diet   RTC 3 months with standing lupus labs.

## 2024-12-12 NOTE — PROGRESS NOTES
Subjective:       Patient ID: Becky Cade is a 54 y.o. female.    Chief Complaint: Annual Exam (Pt here for annual exam)    HPI  53 y/o female with hypothyroidism, cervical djd, ANAM, herpes labialis, mixed incontinence, SLE/positive LIBRA, family hx of RA, history of COVID here for annual exam.    She is dealing with chronic cervical DJD with radicular sx and has been through PT and to pain clinic in the past, she is interested in looking into possible getting a breast reduction as it could be contributing to her chronic neck pain. She is currently doing Pilates weekly and gets massages a few times a month which has helped. She is staying active at work.     She is feeling good overall, she notes last Sunday she had an episode of n/v/d, she is not sure the trigger, she is eating light mainly smoothies since then, she is staying hydrated, her stools were back to normal today, she is still nauseated but has not vomited since Monday. She took a dose of Zofran which helped. She denies cp/sob/urinary sx. She is sleeping ok.     She did outpatient mental health program for 10 days through [facility] and it was helpful        L ear pain off and on for years, no hearing change noted    Hx of covid 1/2022, 9/2024  ANAM:  following with Dr. Abhijeet Brooks, doing counseling weekly, paxil 50 mg daily, off hydroxyzine 20 mg at night, doxepin prn,  Ativan 0.5 mg nightly prn  Hypothyroidism:never had procedure or biopsy, levothyroxine 75 mcg  Herpes labialis: acyclovir prn, 2-3 times a year  Positive LIBRA/SLE/family RA: following with rheumatology, hydroxychloroquine 300 mg daily  Mixed incontinence/cystocele: following with Urogyn  GYN: following with Dr. Parson, no longer having cycles, pelvic utd, Divigel, mmg 2/2024, dexa ordered  Colonoscopy 9/2020 repeat 10 years  Eye exam due followed by Dr. Pascual  Dental utd  OTC: b12, vit D3, magnesium     Review of Systems  see HPI  Objective:      /76 (BP Location: Right arm,  "Patient Position: Sitting)   Pulse 95   Ht 5' 4" (1.626 m)   Wt 63.5 kg (139 lb 15.9 oz)   LMP 02/07/2023 (Approximate)   SpO2 97%   BMI 24.03 kg/m²   Physical Exam  Vitals and nursing note reviewed.   Constitutional:       Appearance: She is well-developed.   HENT:      Head: Normocephalic and atraumatic.      Right Ear: Tympanic membrane normal.      Ears:      Comments: Unable to visualize L TM ear canal seems swollen     Mouth/Throat:      Pharynx: No oropharyngeal exudate or posterior oropharyngeal erythema.   Neck:      Thyroid: No thyromegaly.   Cardiovascular:      Rate and Rhythm: Normal rate and regular rhythm.      Heart sounds: Normal heart sounds.   Pulmonary:      Effort: Pulmonary effort is normal. No respiratory distress.      Breath sounds: Normal breath sounds.   Abdominal:      General: Bowel sounds are normal. There is no distension.      Palpations: Abdomen is soft. There is no mass.      Tenderness: There is no abdominal tenderness.   Musculoskeletal:      Cervical back: Normal range of motion and neck supple.      Right lower leg: No edema.      Left lower leg: No edema.   Lymphadenopathy:      Cervical: No cervical adenopathy.   Skin:     General: Skin is warm and dry.   Neurological:      Mental Status: She is alert.         Assessment:       1. Annual physical exam    2. Hypothyroidism, unspecified type    3. Encounter for lipid screening for cardiovascular disease    4. Cervical radiculopathy    5. Pendulous breast    6. Screening mammogram for breast cancer    7. Left ear pain        Plan:   Becky was seen today for annual exam.    Diagnoses and all orders for this visit:    Annual physical exam  -     Hemoglobin A1C; Future  -     Lipid Panel; Future  -     TSH; Future    Hypothyroidism, unspecified type  -     TSH; Future    Encounter for lipid screening for cardiovascular disease  -     Lipid Panel; Future    Cervical radiculopathy  -     Ambulatory referral/consult to " Plastic Surgery; Future    Pendulous breast  -     Ambulatory referral/consult to Plastic Surgery; Future    Screening mammogram for breast cancer  -     Mammo Digital Screening Bilat w/ [facility]rick; Future    Left ear pain  -     Ambulatory referral/consult to ENT; Future

## 2024-12-12 NOTE — PROGRESS NOTES
Your urinalysis shows lots of white blood cells but nitrite negative.  Your urine protein/creatinine ratio was normal. . Are you having any urinary symptoms at present? Any discharge?  Will copy Dr. Stephens whom you saw today Your cbc was normal. Your complement levels remain normal. Your crp and sed rate inflammation test were normal.  Your liver and kidney tests were normal. ALYSHA

## 2024-12-12 NOTE — PROGRESS NOTES
12/11/2024     7:32 PM   Rapid3 Question Responses and Scores   MDHAQ Score 0.4   Psychologic Score 4.4   Pain Score 4.5   When you awakened in the morning OVER THE LAST WEEK, did you feel stiff? Yes   If Yes, please indicate the number of hours until you are as limber as you will be for the day 1   Fatigue Score 4   Global Health Score 4.5   RAPID3 Score 3.44    Answers submitted by the patient for this visit:  Rheumatology Questionnaire (Submitted on 12/11/2024)  fever: No  eye redness: No  mouth sores: No  headaches: No  shortness of breath: No  chest pain: No  trouble swallowing: No  diarrhea: No  constipation: No  unexpected weight change: No  genital sore: No  dysuria: No  During the last 3 days, have you had a skin rash?: No  Bruises or bleeds easily: No  cough: No

## 2024-12-12 NOTE — PROGRESS NOTES
Subjective:      Patient ID: Becky Cade is a 54 y.o. female.    Chief Complaint:  bilateral elbow pain    HPI pt has severe gastroenteritis earlier this week now resolved, and now with bilateral elbow pain which she had many years ago and for which she saw Orthopedics. The pain on the right is at the medial epicondyle and on the left the lateral epicondyle, without redness, warmth or swelling. No unusual precipitating physical activities. Other joints are fine. No fever, no hair loss, no oral ulcers, no rash, no serositis. + dry eyes using OTC AT twice daily.   Review of Systems   Constitutional:  Negative for appetite change, fatigue, fever and unexpected weight change.   HENT:  Negative for mouth sores and trouble swallowing.    Eyes:  Negative for redness and visual disturbance.   Respiratory:  Negative for cough, shortness of breath and wheezing.    Cardiovascular:  Negative for chest pain and palpitations.   Gastrointestinal:  Negative for abdominal pain, anal bleeding, blood in stool, constipation, diarrhea, nausea and vomiting.   Genitourinary:  Negative for dysuria, frequency, genital sores and urgency.   Musculoskeletal:  Positive for arthralgias. Negative for back pain, gait problem, joint swelling, myalgias, neck pain and neck stiffness.   Skin:  Negative for rash.   Neurological:  Negative for weakness, numbness and headaches.   Hematological:  Negative for adenopathy. Does not bruise/bleed easily.   Psychiatric/Behavioral:  Negative for sleep disturbance. The patient is not nervous/anxious.         Objective:   LMP 02/07/2023 (Approximate)   Physical Exam   Constitutional: She is oriented to person, place, and time.   HENT:   Head: Normocephalic and atraumatic.   Mouth/Throat: Oropharynx is clear and moist.   Eyes: Conjunctivae are normal.   Neck: No thyromegaly present.   Cardiovascular: Normal rate, regular rhythm and normal heart sounds. Exam reveals no gallop and no friction rub.   No  murmur heard.  Pulmonary/Chest: Breath sounds normal. She has no wheezes. She has no rales. She exhibits no tenderness.   Abdominal: Soft. She exhibits no distension and no mass. There is no abdominal tenderness.   Musculoskeletal:      Right shoulder: Normal.      Left shoulder: Normal.      Right elbow: Normal.      Left elbow: Normal.      Right wrist: Normal.      Left wrist: Normal.      Cervical back: Normal range of motion and neck supple.      Right knee: Normal.      Left knee: Normal.   Lymphadenopathy:     She has no cervical adenopathy.   Neurological: She is alert and oriented to person, place, and time. She displays normal reflexes. Gait normal.   Nl motor strength UE and LE bilateral       Right Side Rheumatological Exam     Examination finds the shoulder, elbow, wrist, knee, 1st PIP, 1st MCP, 2nd PIP, 2nd MCP, 3rd PIP, 3rd MCP, 4th PIP, 4th MCP, 5th PIP and 5th MCP normal.    Left Side Rheumatological Exam     Examination finds the shoulder, elbow, wrist, knee, 1st PIP, 1st MCP, 2nd PIP, 2nd MCP, 3rd PIP, 3rd MCP, 4th PIP, 4th MCP, 5th PIP and 5th MCP normal.      Right elbow with tenderness localized to medial epicondyle with + wrist flexion pain at that site. No erythema, warmth, swelling  Left elbow with tenderness localized to lateral epicondyle with + wrist extension. No erythema, warmth, swelling     2/7/2023 4/16/2024 6/6/2024   Tender (JOVEL-28) 1 / 28  0 / 28 5 / 28    Swollen (JOVEL-28) 0 / 28 4 / 28  0 / 28    Provider Global 0 / 100 -- --   Patient Global 0 / 100 -- --   ESR 14 mm/hr -- --   CRP 10.5 mg/L -- --   JOVEL-28 (ESR) 2.41 (Remission) -- --   JOVEL-28 (CRP) 2.4 (Remission) -- --   CDAI Score 1  -- --   14-3-3 eta negative  4/17/24     Anti CarP negative     AVISE SLE Prognostic:     Anti-C1q, ribosomal P negative  Phosphatidylserine/prothrombin IgM 37.58(30) positive  IgG negative  Cardiolipin IgG IgM, IgA  negative  B2 GP1 IgG IgM Ig A negative   RF negative     Assessment:    Pt I   "originally saw 7/20/12:  Onset 2nd wk June R middle pip stiff then all the other pips, and MCP's. and swollen(mild) then ankles, knees and shoulders without much swelling but limited ROM. Severe AM stiffness. Saw Dr. Fleming 6/15: labs with BMP with calcium 8.4 but alb. 3. The CBC nl. TSH nl. RF -, LIBRA 1:160 speckled - profile. RX: Aleve- worsened. ED, Vicodin, Anaprox.  Still no better. ED "shot". Dr. Fleming took prednisone taper for one week. Helped after 5 days. All symptoms resolved over 2 weeks. No fever, appetitie fine, no wgt. + fatigue. No hair fall or rash. She freq gets sores in mouth since child. Does have lifelong malar and chin erythema with sun exposure, and with wine. No pleurisy/pericarditis. No raynaud's. No history of seizures.No sicca syndrome. 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    Resolved acute parvovirus B19 due to school exposure to children with fifth disease. Mild + LIBRA with - profile. Lifelong history of recurrent oral ulcers, wine or sun-induced malar erythema, but transient and not fixed more suggestive of vascular hypereremia/rosacea than true butterfly rash.  She is well by symptoms except mild residual fatigue and normal physical exam         Right medial epicondylitis  Left lateral epicondylitis  Post gastroenteritis requiring Zofran    Reviewed cell photo of  photosensitive malar erythema typical of acute cutaneous lupus with sparing Nasolabial folds, 2022 prior visit  Has had history of recurrent oral ulcers,none currently   Migratory arthritis: elbows, knees shoulders sushma  Palatal erythema not currently   SLE meets ACR/EULAR classification criteria (12)  AVISE-SLE prognosis was done, rather than AVISE-CTD hydroxychloroquine 300mg daily started  6/6/24  SLEDAI 0 pending all labs  Right ulnar styloid soft tissue swelling on MRI 5/14/24  H/o acute parvovirus B 19 in  2012  AM stiffness hands 15-30 min  no swollen joints so cannot apply " ACR/EULAR RA criteria   TJ 2 SJ 0 Pt global 45  ESR,  CRP  DAS28   [id number]    CDAI 7.5(LDA)  Mother patient of mine with RA  Minimal OA several DIPs and knees  S/p right cervical radiculopathy      Plan:   Naproxen 550mg twice daily wM x 1-2 wks  Ref to OT if needed  Continue stretching exercise per previous OT  X-ray elbows  CBC, ESR< CRP C4 C3 dsDNA  UA  UPCR  AVISE-CTD  done 7/23/24 report not accessible call lab  Continue hydroxychloroquine 300mg daily.  Had Baseline Optometry exam with Dr. Pascual  *flu vaccine  *new Covid vaccine > 3 months post Covid which was 9/12/24  *Prevnar 20   Regular 30 min aerobic exercise, Pilates, yoga, [employer]  5/7 days  Mediterranean diet   RTC 3 months with standing lupus labs.

## 2024-12-13 LAB — DSDNA AB SER-ACNC: NORMAL [IU]/ML

## 2024-12-16 ENCOUNTER — PATIENT MESSAGE (OUTPATIENT)
Dept: PRIMARY CARE CLINIC | Facility: CLINIC | Age: 54
End: 2024-12-16
Payer: COMMERCIAL

## 2024-12-16 DIAGNOSIS — R30.0 DYSURIA: Primary | ICD-10-CM

## 2024-12-19 ENCOUNTER — TELEPHONE (OUTPATIENT)
Dept: PRIMARY CARE CLINIC | Facility: CLINIC | Age: 54
End: 2024-12-19
Payer: COMMERCIAL

## 2024-12-19 ENCOUNTER — HOSPITAL ENCOUNTER (OUTPATIENT)
Dept: RADIOLOGY | Facility: HOSPITAL | Age: 54
Discharge: HOME | End: 2024-12-19
Attending: INTERNAL MEDICINE
Payer: COMMERCIAL

## 2024-12-19 DIAGNOSIS — M25.529 ELBOW PAIN, UNSPECIFIED LATERALITY: ICD-10-CM

## 2024-12-19 PROCEDURE — 73080 X-RAY EXAM OF ELBOW: CPT | Mod: 26,,, | Performed by: INTERNAL MEDICINE

## 2024-12-19 PROCEDURE — 73080 X-RAY EXAM OF ELBOW: CPT | Mod: TC,50

## 2024-12-19 NOTE — TELEPHONE ENCOUNTER
----- Message from Rina sent at 12/19/2024  9:04 AM CST -----  Contact: [phone]  Type: Orders Request    What orders/ testing are being requested? UA test     Is there a future appointment scheduled for the patient with PCP? No     When? No     Would you prefer a response via [facility]? Call back     Comments: patient is requesting to get a Urine sample drop at the Lab .

## 2024-12-19 NOTE — TELEPHONE ENCOUNTER
Patient is now stating that she is having burning and soreness, pended UA,with Reflex to Culture if okay?

## 2024-12-20 ENCOUNTER — OFFICE VISIT (OUTPATIENT)
Dept: DERMATOLOGY | Facility: CLINIC | Age: 54
End: 2024-12-20
Payer: COMMERCIAL

## 2024-12-20 ENCOUNTER — PATIENT MESSAGE (OUTPATIENT)
Dept: DERMATOLOGY | Facility: CLINIC | Age: 54
End: 2024-12-20

## 2024-12-20 DIAGNOSIS — L71.9 ROSACEA: Primary | ICD-10-CM

## 2024-12-20 DIAGNOSIS — L72.3 SEBACEOUS CYST: ICD-10-CM

## 2024-12-20 DIAGNOSIS — L71.0 PERIORAL DERMATITIS: ICD-10-CM

## 2024-12-20 DIAGNOSIS — L73.9 FOLLICULITIS: ICD-10-CM

## 2024-12-20 PROCEDURE — 99999 PR PBB SHADOW E&M-EST. PATIENT-LVL III: CPT | Mod: PBBFAC,,, | Performed by: DERMATOLOGY

## 2024-12-20 RX ORDER — DAPSONE 75 MG/G
GEL TOPICAL
Qty: 30 G | Refills: 3 | Status: SHIPPED | OUTPATIENT
Start: 2024-12-20

## 2024-12-20 RX ORDER — DOXYCYCLINE HYCLATE 100 MG
TABLET ORAL
Qty: 30 TABLET | Refills: 1 | Status: SHIPPED | OUTPATIENT
Start: 2024-12-20

## 2024-12-20 NOTE — PROGRESS NOTES
Subjective:      Patient ID:  Becky Cade is a 54 y.o. female who presents for   Chief Complaint   Patient presents with    Follow-up     Perioral dermatitis     Follow up perioral dermatitis which has improved since last spring but still with some redness around nose.  Using ketoconizole cream, erygel, and aclovate cream. In additions she states has had rosacea for years in remission now, would like rx.   Also had waxing pubic area and now with irritated hair follicles and a cyst.     Rash - Initial  Affected locations: nose  Signs / symptoms: itching and irritated      Review of Systems   Skin:  Positive for rash.       Objective:   Physical Exam   Constitutional: She appears well-developed and well-nourished.   Neurological: She is alert and oriented to person, place, and time.   Psychiatric: She has a normal mood and affect.   Skin:   Areas Examined (abnormalities noted in diagram):   Head / Face Inspection Performed  Genitals / Buttocks / Groin Inspection Performed                 Diagram Legend     Erythematous scaling macule/papule c/w actinic keratosis       Vascular papule c/w angioma      Pigmented verrucoid papule/plaque c/w seborrheic keratosis      Yellow umbilicated papule c/w sebaceous hyperplasia      Irregularly shaped tan macule c/w lentigo     1-2 mm smooth white papules consistent with Milia      Movable subcutaneous cyst with punctum c/w epidermal inclusion cyst      Subcutaneous movable cyst c/w pilar cyst      Firm pink to brown papule c/w dermatofibroma      Pedunculated fleshy papule(s) c/w skin tag(s)      Evenly pigmented macule c/w junctional nevus     Mildly variegated pigmented, slightly irregular-bordered macule c/w mildly atypical nevus      Flesh colored to evenly pigmented papule c/w intradermal nevus       Pink pearly papule/plaque c/w basal cell carcinoma      Erythematous hyperkeratotic cursted plaque c/w SCC      Surgical scar with no sign of skin cancer recurrence       Open and closed comedones      Inflammatory papules and pustules      Verrucoid papule consistent consistent with wart     Erythematous eczematous patches and plaques     Dystrophic onycholytic nail with subungual debris c/w onychomycosis     Umbilicated papule    Erythematous-base heme-crusted tan verrucoid plaque consistent with inflamed seborrheic keratosis     Erythematous Silvery Scaling Plaque c/w Psoriasis     See annotation      Assessment / Plan:        Rosacea  -     dapsone (ACZONE) 7.5 % GlwP; Use hs on face  Dispense: 30 g; Refill: 3  [facility]: aczone 7.8% and niacincinamide 2% gel       Perioral dermatitis  Brochure provided  Dc all creams with cortisone, list reviewed with her   -     doxycycline (VIBRA-TABS) 100 MG tablet; Take one qd with food  Dispense: 30 tablet; Refill: 1    Sebaceous cyst, oxidized  Call if grows, irritated, desires removal    Folliculitis  Vinegar and water 1/2 each use hs apply and let dry'             Follow up in about 1 year (around 12/20/2025).

## 2024-12-24 ENCOUNTER — OFFICE VISIT (OUTPATIENT)
Dept: PSYCHIATRY | Facility: CLINIC | Age: 54
End: 2024-12-24
Payer: COMMERCIAL

## 2024-12-24 DIAGNOSIS — F41.9 ANXIETY: Primary | ICD-10-CM

## 2024-12-24 DIAGNOSIS — F32.A DEPRESSION, UNSPECIFIED DEPRESSION TYPE: ICD-10-CM

## 2024-12-24 PROCEDURE — 3044F HG A1C LEVEL LT 7.0%: CPT | Mod: CPTII,S$GLB,, | Performed by: SOCIAL WORKER

## 2024-12-24 PROCEDURE — 90834 PSYTX W PT 45 MINUTES: CPT | Mod: S$GLB,,, | Performed by: SOCIAL WORKER

## 2024-12-24 PROCEDURE — 99999 PR PBB SHADOW E&M-EST. PATIENT-LVL I: CPT | Mod: PBBFAC,,, | Performed by: SOCIAL WORKER

## 2024-12-24 NOTE — PROGRESS NOTES
"    Individual Psychotherapy (PhD/LCSW)    7/5/2023    Site: [facility]    Therapeutic Intervention: Met with patient alone  Outpatient - Insight oriented psychotherapy 45 min - CPT code 16355    Chief complaint/reason for encounter: anxiety, depression     Interval history and content of current session: Pt describes feeling like "2 people", one "in a puddle" of loneliness and anxiety, one grounded in reality that she is ok without a partner. Pt felt desperately alone last night, urge to see Harry, but went to mom's house and  mom held her head while she wept. Discuss ways this connection and acceptance from mom is healing for pt. In childhood mom was preoccupied with care of demanding dad with Parkinson's, 4 kids, and social obligations, pt was labeled whiny by parents and siblings, felt defective and unlovable. Pt learning self-love and self-acceptance. Struggles with cut-off by daughters during the holidays.     Treatment plan:  Target symptoms: anxiety , depression  Why chosen therapy is appropriate versus another modality: relevant to diagnosis  Outcome monitoring methods: self-report  Therapeutic intervention type: insight oriented psychotherapy    Risk parameters:  Patient reports no suicidal ideation  Patient reports no homicidal ideation  Patient reports no self-injurious behavior  Patient reports no violent behavior    Verbal deficits: None    Patient's response to intervention:  The patient's response to intervention is motivated    Progress toward goals and other mental status changes:  The patient's progress toward goals is fair  Diagnosis:   Anxiety, depression    Plan:  individual psychotherapy    Return to clinic: f/u as scheduled    Length of Service (minutes): 45          "

## 2024-12-26 ENCOUNTER — HOSPITAL ENCOUNTER (OUTPATIENT)
Dept: RADIOLOGY | Facility: CLINIC | Age: 54
Discharge: HOME | End: 2024-12-26
Attending: INTERNAL MEDICINE
Payer: COMMERCIAL

## 2024-12-26 DIAGNOSIS — Z78.0 MENOPAUSE: ICD-10-CM

## 2024-12-26 PROCEDURE — 77080 DXA BONE DENSITY AXIAL: CPT | Mod: TC

## 2025-01-06 ENCOUNTER — OFFICE VISIT (OUTPATIENT)
Dept: OTOLARYNGOLOGY | Facility: CLINIC | Age: 55
End: 2025-01-06
Payer: COMMERCIAL

## 2025-01-06 DIAGNOSIS — H93.8X3 EAR FULLNESS, BILATERAL: ICD-10-CM

## 2025-01-06 DIAGNOSIS — H61.23 BILATERAL IMPACTED CERUMEN: Primary | ICD-10-CM

## 2025-01-06 PROCEDURE — 99999 PR PBB SHADOW E&M-EST. PATIENT-LVL III: CPT | Mod: PBBFAC,,,

## 2025-01-06 PROCEDURE — 69210 REMOVE IMPACTED EAR WAX UNI: CPT | Mod: 50,S$GLB,,

## 2025-01-06 PROCEDURE — 1159F MED LIST DOCD IN RCRD: CPT | Mod: CPTII,S$GLB,,

## 2025-01-06 PROCEDURE — 99203 OFFICE O/P NEW LOW 30 MIN: CPT | Mod: 25,S$GLB,,

## 2025-01-06 NOTE — PROGRESS NOTES
Subjective:   Becky Cade is a 54 y.o. female with a PMHx of hypothyroidism, SLE/positive LIBRA, ANAM and cervical DJD who was  referred by PCP Dr. Stephens  for ear pain.  She reports aural fullness and occasional popping in bilateral ears (L>R) intermittently over the last few months. Her left ear is sometimes sensitive. Patient states her PCP noted cerumen impaction on the left during annual physical exam recently. She denies using anything in the ears. Notes long standing history of tinnitus bilaterally for years. She reports it fluctuates and is worse with increased anxiety. Overall it is non bothersome to her. She feels she hears well. Denies otalgia, otorrhea or vertigo. There is not a prior history of ear surgery. There is not a prior history of ear infections. There is not a history of ear trauma.    Past Medical History  She has a past medical history of Anxiety, ASCUS of cervix, Autoimmune disorder: just features: mouth sores, butterfly rash, PENNY III (cervical intraepithelial neoplasia III), Fever blister, Hypothyroid, and Parvovirus arthritis of multiple sites.    Past Surgical History  She has a past surgical history that includes Tonsillectomy (1993); Cold knife conization of cervix (2009); Colonoscopy (N/A, 9/18/2020); Insertion of midurethral sling (N/A, 3/25/2022); and Cystoscopy (N/A, 3/25/2022).    Family History  Her family history includes Arthritis in her mother; Malignant hyperthermia in her brother; Parkinsonism in her father; Rheum arthritis in her mother.    Social History  She reports that she has never smoked. She has never used smokeless tobacco. She reports current alcohol use of about 2.0 standard drinks of alcohol per week. She reports that she does not use drugs.    Allergies  She is allergic to buspar [buspirone] and mobic [meloxicam].    Medications  She has a current medication list which includes the following prescription(s): acyclovir, alclometasone, calcium-vitamin d3,  clotrimazole-betamethasone 1-0.05%, cyanocobalamin, dapsone, doxepin, doxycycline, erythromycin with ethanol, esomeprazole, estradiol, estradiol, hydroxychloroquine, ketoconazole, levothyroxine, magnesium, naproxen sodium, paroxetine, progesterone, terconazole, triamcinolone acetonide 0.1%, and lorazepam.    Review of Systems     Constitutional: Negative for appetite change, chills, fatigue, fever and unexpected weight loss.      HENT: Positive for ear pain and ringing in the ears.  Negative for ear discharge, ear infection and hearing loss.      Eyes:  Positive for eye itching.     Respiratory:  Negative for cough, shortness of breath, sleep apnea, snoring and wheezing.      Cardiovascular:  Negative for chest pain, foot swelling, irregular heartbeat and swollen veins.     Gastrointestinal:  Positive for heartburn.     Genitourinary: Negative for difficulty urinating, sexual problems and frequent urination.     Musc: Positive for aching muscles and neck pain.     Skin: Negative for rash.     Allergy: Negative for food allergies and seasonal allergies.     Endocrine: Negative for cold intolerance and heat intolerance.      Neurological: Negative for dizziness, headaches, light-headedness, seizures and tremors.      Hematologic: Negative for bruises/bleeds easily and swollen glands.      Psychiatric: Positive for nervous/anxious.           Objective:       Head:  Normocephalic and atraumatic.     Ears:    Right Ear: No drainage, swelling or tenderness. Tympanic membrane is not injected, not scarred, not perforated and not erythematous. No middle ear effusion.   Left Ear: No drainage, swelling or tenderness. Tympanic membrane is not injected, not scarred, not perforated and not erythematous.  No middle ear effusion.   Hard cerumen impactions bilaterally, obstructing bilateral tympanic membranes. Removed via microscopy.     Pulmonary/Chest:   Effort normal.       Procedure  Cerumen removal performed.  See procedure  note.  Procedure Note:  The patient was brought to the minor procedure room and placed under the operating microscope of the right and left ear canal which was cleaned of ceruminous debris. Using a combination of suction, curettes and cup forceps the patient's cerumen was removed. The patient tolerated the procedure well. There were no complications.     Imaging:   None    Assessment:     1. Bilateral impacted cerumen    2. Ear fullness, bilateral      Plan:   Bilateral impacted cerumen  Ear fullness, bilateral  Wax removed bilaterally with complete improvement in aural fullness. Continue avoidance of anything in the ear. RTC PRN. Audiogram offered to get baseline hearing given history of tinnitus but patient deferred. She will schedule an appointment with audiology if interested.

## 2025-01-08 ENCOUNTER — OFFICE VISIT (OUTPATIENT)
Dept: PSYCHIATRY | Facility: CLINIC | Age: 55
End: 2025-01-08
Payer: COMMERCIAL

## 2025-01-08 DIAGNOSIS — F41.9 ANXIETY: Primary | ICD-10-CM

## 2025-01-08 DIAGNOSIS — F32.A DEPRESSION, UNSPECIFIED DEPRESSION TYPE: ICD-10-CM

## 2025-01-08 PROCEDURE — 99999 PR PBB SHADOW E&M-EST. PATIENT-LVL I: CPT | Mod: PBBFAC,,, | Performed by: SOCIAL WORKER

## 2025-01-08 PROCEDURE — 90834 PSYTX W PT 45 MINUTES: CPT | Mod: S$GLB,,, | Performed by: SOCIAL WORKER
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
## 2025-01-15 ENCOUNTER — OFFICE VISIT (OUTPATIENT)
Dept: PSYCHIATRY | Facility: CLINIC | Age: 55
End: 2025-01-15
Payer: COMMERCIAL

## 2025-01-15 DIAGNOSIS — F32.A DEPRESSION, UNSPECIFIED DEPRESSION TYPE: ICD-10-CM

## 2025-01-15 DIAGNOSIS — F41.9 ANXIETY: Primary | ICD-10-CM

## 2025-01-15 PROCEDURE — 99999 PR PBB SHADOW E&M-EST. PATIENT-LVL I: CPT | Mod: PBBFAC,,, | Performed by: SOCIAL WORKER

## 2025-01-15 PROCEDURE — 90834 PSYTX W PT 45 MINUTES: CPT | Mod: S$GLB,,, | Performed by: SOCIAL WORKER

## 2025-01-15 NOTE — PROGRESS NOTES
Individual Psychotherapy (PhD/LCSW)    7/5/2023    Site: [facility]    Therapeutic Intervention: Met with patient alone  Outpatient - Insight oriented psychotherapy 45 min - CPT code 73490    Chief complaint/reason for encounter: anxiety, depression     Interval history and content of current session: Pt ready to start EMDR. Prefers tapping to EMs.  Identify safe place, at home on her sofa with blanket and Yan.  Pt experiences tearfulness initially, gradually experiences deeper relaxation, notes tightness in throat which then loosens. Discuss key trauma memories and process of EMDR to prepare for targeting next session. Pt doing well.     Treatment plan:  Target symptoms: anxiety , depression  Why chosen therapy is appropriate versus another modality: relevant to diagnosis  Outcome monitoring methods: self-report  Therapeutic intervention type: insight oriented psychotherapy    Risk parameters:  Patient reports no suicidal ideation  Patient reports no homicidal ideation  Patient reports no self-injurious behavior  Patient reports no violent behavior    Verbal deficits: None    Patient's response to intervention:  The patient's response to intervention is motivated    Progress toward goals and other mental status changes:  The patient's progress toward goals is fair    Diagnosis:   Anxiety, depression    Plan:  individual psychotherapy    Return to clinic: f/u as scheduled    Length of Service (minutes): 45

## 2025-01-17 ENCOUNTER — PATIENT MESSAGE (OUTPATIENT)
Dept: PRIMARY CARE CLINIC | Facility: CLINIC | Age: 55
End: 2025-01-17
Payer: COMMERCIAL

## 2025-01-22 ENCOUNTER — OFFICE VISIT (OUTPATIENT)
Dept: PSYCHIATRY | Facility: CLINIC | Age: 55
End: 2025-01-22
Payer: COMMERCIAL

## 2025-01-22 DIAGNOSIS — F41.9 ANXIETY: Primary | ICD-10-CM

## 2025-01-22 DIAGNOSIS — F32.A DEPRESSION, UNSPECIFIED DEPRESSION TYPE: ICD-10-CM

## 2025-01-22 PROCEDURE — 90834 PSYTX W PT 45 MINUTES: CPT | Mod: 95,,, | Performed by: SOCIAL WORKER
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
## 2025-01-29 ENCOUNTER — OFFICE VISIT (OUTPATIENT)
Dept: PSYCHIATRY | Facility: CLINIC | Age: 55
End: 2025-01-29
Payer: COMMERCIAL

## 2025-01-29 DIAGNOSIS — F41.9 ANXIETY: Primary | ICD-10-CM

## 2025-01-29 DIAGNOSIS — F32.A DEPRESSION, UNSPECIFIED DEPRESSION TYPE: ICD-10-CM

## 2025-01-29 PROCEDURE — 90834 PSYTX W PT 45 MINUTES: CPT | Mod: S$GLB,,, | Performed by: SOCIAL WORKER

## 2025-01-29 PROCEDURE — 99999 PR PBB SHADOW E&M-EST. PATIENT-LVL I: CPT | Mod: PBBFAC,,, | Performed by: SOCIAL WORKER
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
## 2025-02-05 ENCOUNTER — PATIENT MESSAGE (OUTPATIENT)
Dept: DERMATOLOGY | Facility: CLINIC | Age: 55
End: 2025-02-05
Payer: COMMERCIAL

## 2025-02-05 ENCOUNTER — OFFICE VISIT (OUTPATIENT)
Dept: PSYCHIATRY | Facility: CLINIC | Age: 55
End: 2025-02-05
Payer: COMMERCIAL

## 2025-02-05 ENCOUNTER — TELEPHONE (OUTPATIENT)
Dept: OPTOMETRY | Facility: CLINIC | Age: 55
End: 2025-02-05
Payer: COMMERCIAL

## 2025-02-05 DIAGNOSIS — F32.A DEPRESSION, UNSPECIFIED DEPRESSION TYPE: ICD-10-CM

## 2025-02-05 DIAGNOSIS — F41.9 ANXIETY: Primary | ICD-10-CM

## 2025-02-05 PROCEDURE — 90834 PSYTX W PT 45 MINUTES: CPT | Mod: S$GLB,,, | Performed by: SOCIAL WORKER

## 2025-02-05 PROCEDURE — 99999 PR PBB SHADOW E&M-EST. PATIENT-LVL I: CPT | Mod: PBBFAC,,, | Performed by: SOCIAL WORKER
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
## 2025-02-06 ENCOUNTER — TELEPHONE (OUTPATIENT)
Dept: DERMATOLOGY | Facility: CLINIC | Age: 55
End: 2025-02-06
Payer: COMMERCIAL

## 2025-02-07 ENCOUNTER — OFFICE VISIT (OUTPATIENT)
Dept: OPTOMETRY | Facility: CLINIC | Age: 55
End: 2025-02-07
Payer: COMMERCIAL

## 2025-02-07 DIAGNOSIS — M32.19 OTHER SYSTEMIC LUPUS ERYTHEMATOSUS WITH OTHER ORGAN INVOLVEMENT: ICD-10-CM

## 2025-02-07 DIAGNOSIS — Z46.0 FITTING AND ADJUSTMENT OF SPECTACLES AND CONTACT LENSES: ICD-10-CM

## 2025-02-07 DIAGNOSIS — Z46.0 FITTING AND ADJUSTMENT OF SPECTACLES AND CONTACT LENSES: Primary | ICD-10-CM

## 2025-02-07 DIAGNOSIS — E03.9 ACQUIRED HYPOTHYROIDISM: ICD-10-CM

## 2025-02-07 DIAGNOSIS — Z79.899 LONG-TERM USE OF PLAQUENIL: ICD-10-CM

## 2025-02-07 DIAGNOSIS — H04.123 DRY EYE SYNDROME, BILATERAL: Primary | ICD-10-CM

## 2025-02-07 DIAGNOSIS — R76.8 POSITIVE ANA (ANTINUCLEAR ANTIBODY): ICD-10-CM

## 2025-02-07 DIAGNOSIS — H52.03 HYPEROPIA, BILATERAL: ICD-10-CM

## 2025-02-07 DIAGNOSIS — H52.4 PRESBYOPIA: ICD-10-CM

## 2025-02-07 PROCEDURE — 99999 PR PBB SHADOW E&M-EST. PATIENT-LVL III: CPT | Mod: PBBFAC,,, | Performed by: OPTOMETRIST

## 2025-02-07 PROCEDURE — 92015 DETERMINE REFRACTIVE STATE: CPT | Mod: S$GLB,,, | Performed by: OPTOMETRIST

## 2025-02-07 PROCEDURE — 92310 CONTACT LENS FITTING OU: CPT | Mod: CSM,,, | Performed by: OPTOMETRIST

## 2025-02-07 PROCEDURE — 99214 OFFICE O/P EST MOD 30 MIN: CPT | Mod: S$GLB,,, | Performed by: OPTOMETRIST

## 2025-02-07 RX ORDER — CYCLOSPORINE OPHTHALMIC SOLUTION 1 MG/ML
1 SOLUTION/ DROPS OPHTHALMIC 2 TIMES DAILY
Qty: 2 ML | Refills: 5 | Status: SHIPPED | OUTPATIENT
Start: 2025-02-07 | End: 2026-02-07

## 2025-02-07 NOTE — PROGRESS NOTES
TIMOTHY    KIMBERLY: 12/15/2023 - Dr. Pascual     CC: Pt is here today for a routine eye exam. Pt states that states that   she noticed a decline with her overall vision. Pt states that year eyes   are sore, itchy, dry, slight swelling, and tearing.     (+)Dryness  (+)Burning  (+)Itchiness  (+)Tearing  (-)Flashes  (-)Floaters   (-)Photophobia  (+)Eye Pain - eyelids, pain level 5 today      Diabetic: no    Contact Lens: yes                           Sleep in CL?: no                            Daily wear time: 6-7hrs                           Days worn before discarded: 1 day                           Solution: none    Eye Meds:   Ivizia QD     PD: 54.5    Last edited by Jasmin Moore MA on 2/7/2025  8:58 AM.            Assessment /Plan     For exam results, see Encounter Report.    Dry eye syndrome, bilateral  Start  cycloSPORINE (VEVYE) 0.1 % Drop; Place 1 drop into both eyes 2 (two) times a day.  Dispense: 2 mL; Refill: 5  IVIZIA art tears PRN    Acquired hypothyroidism  Other systemic lupus erythematosus with other organ involvement  Long-term use of Plaquenil  Positive LIBRA (antinuclear antibody)   RTC 1 year DFE / OCT mac    Positive LIBRA (antinuclear antibody)  Dry eye syndrome, bilateral              Use systane or refresh QID x 1-2 weeks then BID/PRN     Presbyopia  Hyperopia, bilateral              Rx specs                Fitting and adjustment of spectacles and contact lenses   Cl fit today: dispensed trials of oasys MAX MF OU, increase Power OU              Remove nightly, replace daily              Ok to order if happy with vision and comfort OU                 Call/ return if problems with vision     RTC 1 year DFE / OCT mac

## 2025-02-12 ENCOUNTER — OFFICE VISIT (OUTPATIENT)
Dept: PLASTIC SURGERY | Facility: CLINIC | Age: 55
End: 2025-02-12
Payer: COMMERCIAL

## 2025-02-12 ENCOUNTER — OFFICE VISIT (OUTPATIENT)
Dept: PSYCHIATRY | Facility: CLINIC | Age: 55
End: 2025-02-12
Payer: COMMERCIAL

## 2025-02-12 VITALS
SYSTOLIC BLOOD PRESSURE: 116 MMHG | WEIGHT: 141.63 LBS | BODY MASS INDEX: 24.31 KG/M2 | DIASTOLIC BLOOD PRESSURE: 73 MMHG | HEART RATE: 84 BPM

## 2025-02-12 DIAGNOSIS — M25.512 CHRONIC PAIN IN LEFT SHOULDER: ICD-10-CM

## 2025-02-12 DIAGNOSIS — F32.A DEPRESSION, UNSPECIFIED DEPRESSION TYPE: ICD-10-CM

## 2025-02-12 DIAGNOSIS — G89.29 CHRONIC PAIN IN RIGHT SHOULDER: ICD-10-CM

## 2025-02-12 DIAGNOSIS — M54.12 CERVICAL RADICULOPATHY: ICD-10-CM

## 2025-02-12 DIAGNOSIS — N64.89 PENDULOUS BREAST: ICD-10-CM

## 2025-02-12 DIAGNOSIS — M25.511 CHRONIC PAIN IN RIGHT SHOULDER: ICD-10-CM

## 2025-02-12 DIAGNOSIS — G89.29 CHRONIC PAIN IN LEFT SHOULDER: ICD-10-CM

## 2025-02-12 DIAGNOSIS — M54.2 CERVICALGIA: ICD-10-CM

## 2025-02-12 DIAGNOSIS — L30.4 ERYTHEMA INTERTRIGO: ICD-10-CM

## 2025-02-12 DIAGNOSIS — F41.9 ANXIETY: Primary | ICD-10-CM

## 2025-02-12 DIAGNOSIS — N62 HYPERTROPHY OF BREAST: Primary | ICD-10-CM

## 2025-02-12 PROCEDURE — 90834 PSYTX W PT 45 MINUTES: CPT | Mod: S$GLB,,, | Performed by: SOCIAL WORKER

## 2025-02-12 PROCEDURE — 99999 PR PBB SHADOW E&M-EST. PATIENT-LVL I: CPT | Mod: PBBFAC,,, | Performed by: SOCIAL WORKER

## 2025-02-12 NOTE — PROGRESS NOTES
"  Notes:       Individual Psychotherapy (PhD/LCSW)    7/5/2023    Site: [place]    Therapeutic Intervention: Met with patient alone  Outpatient - Insight oriented psychotherapy 45 min - CPT code 94312    Chief complaint/reason for encounter: anxiety, depression     Interval history and content of current session: Pt shares recent events with mother and sister that triggered feelings she had in childhood of being alone, unvalued or unlovable. Pt sat with the feelings and worked to comfort herself. Pt sees how she was "gaslit" by family either denying her distress or dismissing her as crybaby, feeling at fault for her needs and feelings. Mother shifts from denying her own distress to dumping it on pt in a way that makes pt feel responsible for mom. Continue to prepare for EMDR.    Pt notes yesterday was daughter's 26th birthday, feeling intense sadness and loss about lack of contact.     Treatment plan:  Target symptoms: anxiety , depression  Why chosen therapy is appropriate versus another modality: relevant to diagnosis  Outcome monitoring methods: self-report  Therapeutic intervention type: insight oriented psychotherapy    Risk parameters:  Patient reports no suicidal ideation  Patient reports no homicidal ideation  Patient reports no self-injurious behavior  Patient reports no violent behavior    Verbal deficits: None    Patient's response to intervention:  The patient's response to intervention is motivated    Progress toward goals and other mental status changes:  The patient's progress toward goals is good    Diagnosis:   Anxiety, depression    Plan:  individual psychotherapy    Return to clinic: f/u as scheduled    Length of Service (minutes): 45      "

## 2025-02-12 NOTE — PROGRESS NOTES
"History & Physical    SUBJECTIVE:   Chief complaint: large breasts    History of Present Illness:  Becky Cade presents to Plastic Surgery clinic on 2/12/2025 for evaluation for bilateral breast reduction secondary to symptomatic bilateral large pendulous breast. She has a chief complaint of chronic neck and back pain for 10+ years. She has tried ibuprofen, motrin, aleve without alleviation of pain. She has also seen pain management who prescribed meloxicam and obtained an MRI and X-ray for her back and neck pain. Pt has also completed physical therapy without any relief of symptoms. She also complains of deep shoulder grooving from her bra straps as well as macerating rashes below each breast that have not significantly improved with application of medicated ointments and creams. She has seen derm who rx compounds without any relief of rashes. She currently reports a bra size of 36DD. She is employed at [employer]. She denies smoking tobacco or the use of any nicotine containing products.     PMH: lupus, anxiety    Review of Systems:   HENT: Positive for neck pain.    Musculoskeletal: Positive for back pain.   Neurological: Negative for headaches or dizziness    OBJECTIVE:     /73 (BP Location: Left arm)   Pulse 84   Wt 64.3 kg (141 lb 10.3 oz)   LMP 02/07/2023 (Approximate)   BMI 24.31 kg/m²   Physical Exam:  Height: 5'4"  Weight: 130 lbs  WD WN NAD  VSS  Normal resp effort  Chest: Effort normal and breath sounds normal. Bilaterally enlarged breasts, evidence of previous rashes, shoulder grooving, no palpable masses, nipple everted    Last MMG 2/29/2024:  Impression:  Bilateral  There is no mammographic evidence of malignancy.  BI-RADS Category:   Overall: 1 - Negative    ASSESSMENT/PLAN:     1.Symptomatic Bilateral Macromastia  2. Chronic neck pain  3. Chronic back pain  4. Chronic breast rashes    PLAN:Plan    -Pt was seen and evaluated by myself and Dr. Joselo Winn.   -Plan for bilateral " breast reduction and will need approximately 250 gm reduction per side with the intention of symptomatic relief.   -Clearances needed: rheum, psych  -Photos to be obtained  -Risk, benefits, and alternatives explained. She understands that the risks include but are not limited to bleeding, scarring, infection, pain, numbness, asymmetry, deformity, open wound, skin necrosis, wound dehiscence, permanent or temporary loss of sensation to the nipple, partial or total nipple loss requiring removal, nipple malposition, poor cosmetic outcome, hematoma, seroma and pulmonary emobolus. Discussed with patient that we will not remove any axillary breast tissue or any tissue towards the back as we will not flip the patient during surgery. The patient fully understands that she may have axillary fullness and would like to proceed.   - Patient understands that this may not relieve her of all of her symptoms; however, she wishes to proceed.   - Patient would like to proceed with scheduling bilateral breast reduction pending insurance authorization    All questions were answered. The patient was advised to call the clinic with any questions or concerns prior to their next visit.     Nohemi Doan PA-C  Plastic and Reconstructive Surgery   [phone]

## 2025-02-19 ENCOUNTER — OFFICE VISIT (OUTPATIENT)
Dept: PSYCHIATRY | Facility: CLINIC | Age: 55
End: 2025-02-19
Payer: COMMERCIAL

## 2025-02-19 DIAGNOSIS — F32.A DEPRESSION, UNSPECIFIED DEPRESSION TYPE: ICD-10-CM

## 2025-02-19 DIAGNOSIS — F41.9 ANXIETY: Primary | ICD-10-CM

## 2025-02-19 NOTE — PROGRESS NOTES
Notes:       Individual Psychotherapy (PhD/LCSW)    7/5/2023    Site: [place]    Therapeutic Intervention: Met with patient alone  Outpatient - Insight oriented psychotherapy 45 min - CPT code 16105    Chief complaint/reason for encounter: anxiety, depression     Interval history and content of current session: Pt feeling more self-confident, empowered, standing up to people in her life. Talk with pt about win/lose vs win/win, risks of strong confrontation with others about their deficiencies, but pt feels strongly that she needs to speak out. Pt got a text from oldest daughter many days after pt texted for Linda's birthday, saying she was thinking a lot about pt but not ready to talk. Discuss pt's reaction and response.     Treatment plan:  Target symptoms: anxiety , depression  Why chosen therapy is appropriate versus another modality: relevant to diagnosis  Outcome monitoring methods: self-report  Therapeutic intervention type: insight oriented psychotherapy    Risk parameters:  Patient reports no suicidal ideation  Patient reports no homicidal ideation  Patient reports no self-injurious behavior  Patient reports no violent behavior    Verbal deficits: None    Patient's response to intervention:  The patient's response to intervention is motivated    Progress toward goals and other mental status changes:  The patient's progress toward goals is good    Diagnosis:   Anxiety, depression    Plan:  individual psychotherapy    Return to clinic: f/u as scheduled    Length of Service (minutes): 45

## 2025-02-26 ENCOUNTER — OFFICE VISIT (OUTPATIENT)
Dept: PSYCHIATRY | Facility: CLINIC | Age: 55
End: 2025-02-26
Payer: COMMERCIAL

## 2025-02-26 DIAGNOSIS — F41.9 ANXIETY: Primary | ICD-10-CM

## 2025-02-26 DIAGNOSIS — F32.A DEPRESSION, UNSPECIFIED DEPRESSION TYPE: ICD-10-CM

## 2025-02-26 PROCEDURE — 99999 PR PBB SHADOW E&M-EST. PATIENT-LVL I: CPT | Mod: PBBFAC,,, | Performed by: SOCIAL WORKER

## 2025-02-26 PROCEDURE — 90834 PSYTX W PT 45 MINUTES: CPT | Mod: S$GLB,,, | Performed by: SOCIAL WORKER

## 2025-02-26 NOTE — PROGRESS NOTES
"  Notes:       Individual Psychotherapy (PhD/LCSW)    7/5/2023    Site: [place]    Therapeutic Intervention: Met with patient alone  Outpatient - Insight oriented psychotherapy 45 min - CPT code 04184    Chief complaint/reason for encounter: anxiety, depression     Interval history and content of current session: Pt has felt somewhat agitated, "standing up for myself" by telling people off. Discuss assertive vs aggressive behavior. Pt aware she is moving through a stage that will need to be modified, is enjoying finding her voice and being authentic, but also feels keyed up. Discuss strategies for grounding herself, calming and self-soothing.     Treatment plan:  Target symptoms: anxiety , depression  Why chosen therapy is appropriate versus another modality: relevant to diagnosis  Outcome monitoring methods: self-report  Therapeutic intervention type: insight oriented psychotherapy    Risk parameters:  Patient reports no suicidal ideation  Patient reports no homicidal ideation  Patient reports no self-injurious behavior  Patient reports no violent behavior    Verbal deficits: None    Patient's response to intervention:  The patient's response to intervention is motivated    Progress toward goals and other mental status changes:  The patient's progress toward goals is mixed    Diagnosis:   Anxiety, depression    Plan:  individual psychotherapy    Return to clinic: f/u as scheduled    Length of Service (minutes): 45          "

## 2025-03-03 ENCOUNTER — PATIENT MESSAGE (OUTPATIENT)
Dept: PLASTIC SURGERY | Facility: CLINIC | Age: 55
End: 2025-03-03
Payer: COMMERCIAL

## 2025-03-05 ENCOUNTER — OFFICE VISIT (OUTPATIENT)
Dept: PSYCHIATRY | Facility: CLINIC | Age: 55
End: 2025-03-05
Payer: COMMERCIAL

## 2025-03-05 DIAGNOSIS — F32.A DEPRESSION, UNSPECIFIED DEPRESSION TYPE: ICD-10-CM

## 2025-03-05 DIAGNOSIS — F41.9 ANXIETY: Primary | ICD-10-CM

## 2025-03-05 PROCEDURE — 90834 PSYTX W PT 45 MINUTES: CPT | Mod: S$GLB,,, | Performed by: SOCIAL WORKER

## 2025-03-05 PROCEDURE — 99999 PR PBB SHADOW E&M-EST. PATIENT-LVL I: CPT | Mod: PBBFAC,,, | Performed by: SOCIAL WORKER

## 2025-03-05 NOTE — PROGRESS NOTES
"  Notes:       Individual Psychotherapy (PhD/LCSW)    7/5/2023    Site: [place]    Therapeutic Intervention: Met with patient alone  Outpatient - Insight oriented psychotherapy 45 min - CPT code 41649    Chief complaint/reason for encounter: anxiety, depression     Interval history and content of current session: Pt's plans to travel during [place] fell through when friend got sick. Pt met a man at a party and they have begun seeing each other. Pt trying to stay attuned to her needs and feelings despite excitement of new relationship, working hard to address "inner child" feelings. Doing well.     Treatment plan:  Target symptoms: anxiety , depression  Why chosen therapy is appropriate versus another modality: relevant to diagnosis  Outcome monitoring methods: self-report  Therapeutic intervention type: insight oriented psychotherapy    Risk parameters:  Patient reports no suicidal ideation  Patient reports no homicidal ideation  Patient reports no self-injurious behavior  Patient reports no violent behavior    Verbal deficits: None    Patient's response to intervention:  The patient's response to intervention is motivated    Progress toward goals and other mental status changes:  The patient's progress toward goals is good  Diagnosis:   Anxiety, depression    Plan:  individual psychotherapy    Return to clinic: f/u as scheduled    Length of Service (minutes): 45            "

## 2025-03-07 ENCOUNTER — HOSPITAL ENCOUNTER (OUTPATIENT)
Dept: RADIOLOGY | Facility: HOSPITAL | Age: 55
Discharge: HOME | End: 2025-03-07
Attending: FAMILY MEDICINE
Payer: COMMERCIAL

## 2025-03-07 DIAGNOSIS — Z12.31 SCREENING MAMMOGRAM FOR BREAST CANCER: ICD-10-CM

## 2025-03-07 PROCEDURE — 77063 BREAST TOMOSYNTHESIS BI: CPT | Mod: 26,,, | Performed by: RADIOLOGY

## 2025-03-07 PROCEDURE — 77067 SCR MAMMO BI INCL CAD: CPT | Mod: 26,,, | Performed by: RADIOLOGY

## 2025-03-07 PROCEDURE — 77063 BREAST TOMOSYNTHESIS BI: CPT | Mod: TC

## 2025-03-08 ENCOUNTER — RESULTS FOLLOW-UP (OUTPATIENT)
Dept: RHEUMATOLOGY | Facility: CLINIC | Age: 55
End: 2025-03-08

## 2025-03-10 ENCOUNTER — RESULTS FOLLOW-UP (OUTPATIENT)
Dept: PRIMARY CARE CLINIC | Facility: CLINIC | Age: 55
End: 2025-03-10

## 2025-03-10 RX ORDER — NITROFURANTOIN 25; 75 MG/1; MG/1
100 CAPSULE ORAL 2 TIMES DAILY
Qty: 10 CAPSULE | Refills: 0 | Status: SHIPPED | OUTPATIENT
Start: 2025-03-10 | End: 2025-03-15

## 2025-03-12 ENCOUNTER — OFFICE VISIT (OUTPATIENT)
Dept: PSYCHIATRY | Facility: CLINIC | Age: 55
End: 2025-03-12
Payer: COMMERCIAL

## 2025-03-12 DIAGNOSIS — F41.9 ANXIETY: Primary | ICD-10-CM

## 2025-03-12 DIAGNOSIS — F32.A DEPRESSION, UNSPECIFIED DEPRESSION TYPE: ICD-10-CM

## 2025-03-12 PROCEDURE — 99999 PR PBB SHADOW E&M-EST. PATIENT-LVL I: CPT | Mod: PBBFAC,,, | Performed by: SOCIAL WORKER

## 2025-03-12 PROCEDURE — 90834 PSYTX W PT 45 MINUTES: CPT | Mod: S$GLB,,, | Performed by: SOCIAL WORKER

## 2025-03-12 NOTE — PROGRESS NOTES
Notes:       Individual Psychotherapy (PhD/LCSW)    7/5/2023    Site: [place]    Therapeutic Intervention: Met with patient alone  Outpatient - Insight oriented psychotherapy 45 min - CPT code 08864    Chief complaint/reason for encounter: anxiety, depression     Interval history and content of current session: Pt elated, seeing Artem regularly, finds he is communicative and introspective, meeting her need to feel wanted and connected. Pt aware this elation may not last but feels happy, sleeping well, doing well.     Treatment plan:  Target symptoms: anxiety , depression  Why chosen therapy is appropriate versus another modality: relevant to diagnosis  Outcome monitoring methods: self-report  Therapeutic intervention type: insight oriented psychotherapy    Risk parameters:  Patient reports no suicidal ideation  Patient reports no homicidal ideation  Patient reports no self-injurious behavior  Patient reports no violent behavior    Verbal deficits: None    Patient's response to intervention:  The patient's response to intervention is motivated    Progress toward goals and other mental status changes:  The patient's progress toward goals is good  Diagnosis:   Anxiety, depression    Plan:  individual psychotherapy    Return to clinic: f/u as scheduled    Length of Service (minutes): 45

## 2025-03-16 ENCOUNTER — PATIENT MESSAGE (OUTPATIENT)
Dept: OPTOMETRY | Facility: CLINIC | Age: 55
End: 2025-03-16
Payer: COMMERCIAL

## 2025-03-17 ENCOUNTER — OFFICE VISIT (OUTPATIENT)
Dept: RHEUMATOLOGY | Facility: CLINIC | Age: 55
End: 2025-03-17
Payer: COMMERCIAL

## 2025-03-17 ENCOUNTER — TELEPHONE (OUTPATIENT)
Dept: RHEUMATOLOGY | Facility: CLINIC | Age: 55
End: 2025-03-17

## 2025-03-17 ENCOUNTER — RESULTS FOLLOW-UP (OUTPATIENT)
Dept: RHEUMATOLOGY | Facility: CLINIC | Age: 55
End: 2025-03-17

## 2025-03-17 VITALS
HEART RATE: 81 BPM | WEIGHT: 140.19 LBS | SYSTOLIC BLOOD PRESSURE: 108 MMHG | DIASTOLIC BLOOD PRESSURE: 74 MMHG | BODY MASS INDEX: 23.93 KG/M2 | HEIGHT: 64 IN

## 2025-03-17 DIAGNOSIS — M32.9 SYSTEMIC LUPUS ERYTHEMATOSUS, UNSPECIFIED SLE TYPE, UNSPECIFIED ORGAN INVOLVEMENT STATUS: Primary | ICD-10-CM

## 2025-03-17 DIAGNOSIS — M32.9 SYSTEMIC LUPUS ERYTHEMATOSUS, UNSPECIFIED SLE TYPE, UNSPECIFIED ORGAN INVOLVEMENT STATUS: ICD-10-CM

## 2025-03-17 DIAGNOSIS — M77.12 LATERAL EPICONDYLITIS OF LEFT ELBOW: ICD-10-CM

## 2025-03-17 DIAGNOSIS — M77.01 MEDIAL EPICONDYLITIS OF ELBOW, RIGHT: ICD-10-CM

## 2025-03-17 PROCEDURE — 1159F MED LIST DOCD IN RCRD: CPT | Mod: CPTII,S$GLB,, | Performed by: INTERNAL MEDICINE

## 2025-03-17 PROCEDURE — 99999 PR PBB SHADOW E&M-EST. PATIENT-LVL IV: CPT | Mod: PBBFAC,,, | Performed by: INTERNAL MEDICINE

## 2025-03-17 PROCEDURE — 3074F SYST BP LT 130 MM HG: CPT | Mod: CPTII,S$GLB,, | Performed by: INTERNAL MEDICINE

## 2025-03-17 PROCEDURE — 3008F BODY MASS INDEX DOCD: CPT | Mod: CPTII,S$GLB,, | Performed by: INTERNAL MEDICINE

## 2025-03-17 PROCEDURE — 99213 OFFICE O/P EST LOW 20 MIN: CPT | Mod: S$GLB,,, | Performed by: INTERNAL MEDICINE

## 2025-03-17 PROCEDURE — 3078F DIAST BP <80 MM HG: CPT | Mod: CPTII,S$GLB,, | Performed by: INTERNAL MEDICINE

## 2025-03-17 RX ORDER — HYDROXYCHLOROQUINE SULFATE 200 MG/1
200 TABLET, FILM COATED ORAL DAILY
Qty: 90 TABLET | Refills: 1 | Status: SHIPPED | OUTPATIENT
Start: 2025-03-17

## 2025-03-17 ASSESSMENT — SYSTEMIC LUPUS ERYTHEMATOSUS DISEASE ACTIVITY INDEX (SLEDAI): TOTAL_SCORE: 2

## 2025-03-17 NOTE — TELEPHONE ENCOUNTER
Your AVISE-CTD test shows your lupus result to be negative. As you are doing so well, we can reduce the hydroxychloroquine dose to 200mg daily. New Rx sent ALYSHA

## 2025-03-17 NOTE — PROGRESS NOTES
Patient ID:  Becky Cade    YOB: 1970     MRN:  [id number]     Subjective:     Chief Complaint:  Disease Management     History of Present Illness:  Pt is a 54 y.o. female with SLE who presents today for f/u. LCV was 12/12/24. At that time she was having bilateral elbow pain and was recovering from gastroenteritis. Received Macrobid 100mg BID x 5 days for UTI on 3/11/25.    DXA Completed 12/26/24 - normal bone density    Today: She is doing very well today, stress levels have been low over last 3 weeks. Dysuria has improved, last dose of abx this morning. Her elbow pain has improved since last visit, 3/10 today & is the daily average. Pain improving with pilates and daily bicep exercises in AM. Denies need for new OT referral. States she got COVID Dx right after last visit and delayed vaccines until seeing Dr. Schmitt again.     Endorses oral ulcer x 3 days (improving)    Denies hair loss, dry eyes, vision changes, dry mouth, trouble swallowing, new rashes, joint swelling, morning stiffness, or GI disturbances.     Current Meds: Hydroxychloroquine 300mg daily  Exercise: Pilates 1x weekly, walk 2x weekly, active job with walking  Diet: Protein, fruits/veggies - no formal diet.       Review of Systems   Review of Systems   Constitutional:  Negative for fatigue, fever and unexpected weight change.   HENT:  Positive for mouth sores. Negative for trouble swallowing.    Eyes:  Negative for redness.        Dry eyes   Respiratory:  Negative for cough and shortness of breath.    Cardiovascular:  Negative for chest pain.   Gastrointestinal:  Negative for abdominal pain, constipation and diarrhea.   Genitourinary:  Positive for dysuria.   Musculoskeletal:  Negative for joint swelling and myalgias.   Skin:  Negative for rash.   Neurological:  Negative for headaches.   Hematological:  Bruises/bleeds easily.        Current Medications:  Current Medications[1]     Objective:     Vitals:    03/17/25 0813   BP:  108/74   Pulse: 81      Body mass index is 24.07 kg/m².     Physical Exam   Constitutional: She is oriented to person, place, and time. normal appearance.   HENT:   Head: Normocephalic and atraumatic.   Cardiovascular: Normal rate, regular rhythm, normal heart sounds and normal pulses.   Pulmonary/Chest: Effort normal and breath sounds normal.   Abdominal: Soft.   Musculoskeletal:         General: Tenderness present. No swelling. Normal range of motion.      Right shoulder: Normal.      Left shoulder: Normal.      Right elbow: Normal.      Left elbow: Normal.      Right wrist: Normal.      Left wrist: Normal.      Cervical back: Normal range of motion.      Right knee: Normal.      Left knee: Normal.      Comments: Right medial epicondyle pain - exacerbated with wrist flexion  Left lateral epicondyle pain - exacerbated with wrist extension  Left medial epicondyle pain with palpation only.    Neurological: She is alert and oriented to person, place, and time.   Skin: Skin is warm and dry. Capillary refill takes less than 2 seconds. Bruising noted.   Small dime sized bruise just superior to medial joint line, non-pulsatile, no TTP, no mass palpated.    Psychiatric: Her behavior is normal. Mood normal.       Right Side Rheumatological Exam     Examination finds the shoulder, elbow, wrist, knee, 1st PIP, 1st MCP, 2nd PIP, 2nd MCP, 3rd PIP, 3rd MCP, 4th PIP, 4th MCP, 5th PIP and 5th MCP normal.    Muscle Strength (0-5 scale):  Deltoid:  5  Biceps: 5/5   Triceps:  5  : 5/5   Iliopsoas: 5    Left Side Rheumatological Exam     Examination finds the shoulder, elbow, wrist, knee, 1st PIP, 1st MCP, 2nd PIP, 2nd MCP, 3rd PIP, 3rd MCP, 4th PIP, 4th MCP, 5th PIP and 5th MCP normal.    Muscle Strength (0-5 scale):  Deltoid:  5  Biceps: 5/5   Triceps:  5  :  5/5   Iliopsoas: 5             4/16/2024 6/6/2024 12/12/2024 3/17/2025   Tender (JOVEL-28) 0 / 28 5 / 28 2 / 28 2 / 28    Swollen (JOVEL-28) 4 / 28 0 / 28  0 / 28 0 /  "28    Provider Global -- -- 10 / 100 --   Patient Global -- -- 45 / 100 --   ESR -- -- 5 mm/hr 10 mm/hr   CRP -- -- 6.4 mg/L 2 mg/L   JOVEL-28 (ESR) -- -- 2.55 (Remission) --   JOVEL-28 (CRP) -- -- 3.1 (Low disease activity) --   CDAI Score -- -- 7.5  --              Assessment:     1. Systemic lupus erythematosus, unspecified SLE type, unspecified organ involvement status    2. Medial epicondylitis of elbow, right    3. Lateral epicondylitis of left elbow         Pt I  originally saw 7/20/12:  Onset 2nd wk June R middle pip stiff then all the other pips, and MCP's. and swollen(mild) then ankles, knees and shoulders without much swelling but limited ROM. Severe AM stiffness. Saw Dr. Fleming 6/15: labs with BMP with calcium 8.4 but alb. 3. The CBC nl. TSH nl. RF -, LIBRA 1:160 speckled - profile. RX: Aleve- worsened. ED, Vicodin, Anaprox.  Still no better. ED "shot". Dr. Fleming took prednisone taper for one week. Helped after 5 days. All symptoms resolved over 2 weeks. No fever, appetitie fine, no wgt. + fatigue. No hair fall or rash. She freq gets sores in mouth since child. Does have lifelong malar and chin erythema with sun exposure, and with wine. No pleurisy/pericarditis. No raynaud's. No history of seizures.No sicca syndrome. Several children at [facility] [place] where she works in admissions had fifth disease.    Resolved acute parvovirus B19 due to school exposure to children with fifth disease. Mild + LIBRA with - profile. Lifelong history of recurrent oral ulcers, wine or sun-induced malar erythema, but transient and not fixed more suggestive of vascular hypereremia/rosacea than true butterfly rash.  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 with sparing Nasolabial folds, 2022 prior visit  Has had " history of recurrent oral ulcers,none currently   Migratory arthritis: elbows, knees shoulders sushma  Palatal erythema not currently      SLE meets ACR/EULAR classification criteria (12)  AVISE-SLE prognosis was done, rather than AVISE-CTD hydroxychloroquine 300mg daily started  6/6/24  SLEDAI 0 pending all labs  UTI Rx with Macrobid  Right ulnar styloid soft tissue swelling on MRI 5/14/24  H/o acute parvovirus B 19 in  2012  AM stiffness hands 15-30 min  no swollen joints so cannot apply ACR/EULAR RA criteria   TJ 2 SJ 0 Pt global 45  ESR,  CRP  DAS28   [id number]    CDAI 7.5(LDA)  Mother patient of mine with RA  Minimal OA several DIPs and knees  S/p right cervical radiculopathy  DXA  12/26/24 nl          Plan:      Problem List Items Addressed This Visit    None  Visit Diagnoses         Systemic lupus erythematosus, unspecified SLE type, unspecified organ involvement status    -  Primary      Medial epicondylitis of elbow, right          Lateral epicondylitis of left elbow                  Naproxen 550mg twice daily wM x 1-2 wks  Ref to OT if needed  Continue stretching exercise per previous OT  AVISE-CTD  done 7/23/24 report not accessible call lab again, no hyperlink to results  Continue hydroxychloroquine 300mg daily.  Had Baseline Optometry exam with Dr. Pascual 2/7/25  *flu vaccine  *Covid vaccine > 3 months post Covid which was 9/12/24  *Prevnar 20   Regular 30 min aerobic exercise, Pilates, yoga, [employer]  5/7 days  Mediterranean diet   RTC 3 months with standing lupus labs       YELENA Dias,   PGY-1  [employer] [facility]         [1]   Current Outpatient Medications:     acyclovir (ZOVIRAX) 400 MG tablet, Take 1 tablet (400 mg total) by mouth 2 (two) times daily. Fever blisters, Disp: 14 tablet, Rfl: 8    alclometasone (ACLOVATE) 0.05 % cream, AAA face bid prn redness or scaling or itching. Do not apply to the same location more than 2 weeks in a row., Disp: 45 g, Rfl: 1    calcium-vitamin D3 (OS-KRISTY 500 + D3) 500  mg-5 mcg (200 unit) per tablet, Take 1 tablet by mouth 2 (two) times daily with meals., Disp: , Rfl:     clotrimazole-betamethasone 1-0.05% (LOTRISONE) cream, Apply topically 2 (two) times daily., Disp: 45 g, Rfl: 1    cyanocobalamin (VITAMIN B-12) 1000 MCG tablet, Take 1,000 mcg by mouth once daily., Disp: , Rfl:     cycloSPORINE (VEVYE) 0.1 % Drop, Place 1 drop into both eyes 2 (two) times a day., Disp: 2 mL, Rfl: 5    dapsone (ACZONE) 7.5 % GlwP, Use hs on face, Disp: 30 g, Rfl: 3    doxepin (SINEQUAN) 10 MG capsule, Take 10 mg by mouth every evening., Disp: , Rfl:     doxycycline (VIBRA-TABS) 100 MG tablet, Take one qd with food, Disp: 30 tablet, Rfl: 1    erythromycin with ethanoL (EMGEL) 2 % gel, Apply to the affected areas of face bid., Disp: 30 g, Rfl: 1    esomeprazole (NEXIUM) 20 MG capsule, Take 20 mg by mouth as needed. Takes 2-3 a year, Disp: , Rfl:     estradioL (DIVIGEL) 0.25 mg/0.25 gram (0.1 %) GlPk, Place 1 packet onto the skin once daily., Disp: 30 packet, Rfl: 6    estradioL (ESTRACE) 0.01 % (0.1 mg/gram) vaginal cream, Use 0.5 gram of estrogen cream in vagina twice weekly, Disp: 42.5 g, Rfl: 3    hydroxychloroquine (PLAQUENIL) 200 mg tablet, Take 1.5 tablets (300 mg total) by mouth once daily., Disp: 135 tablet, Rfl: 1    ketoconazole (NIZORAL) 2 % cream, Apply to the affected area on face twice a day for maintenance., Disp: 60 g, Rfl: 2    levothyroxine (SYNTHROID) 75 MCG tablet, Take 1 tablet (75 mcg total) by mouth before breakfast., Disp: 90 tablet, Rfl: 1    magnesium 30 mg Tab, Take by mouth once., Disp: , Rfl:     naproxen sodium (ANAPROX) 550 MG tablet, Take 1 tablet (550 mg total) by mouth as needed (as needed for jaw pain)., Disp: 30 tablet, Rfl: 2    paroxetine (PAXIL-CR) 25 MG 24 hr tablet, Take 50 mg by mouth., Disp: , Rfl:     progesterone (PROMETRIUM) 100 MG capsule, Take 1 capsule (100 mg total) by mouth nightly., Disp: 90 capsule, Rfl: 2    terconazole (TERAZOL 7) 0.4 % Crea,  Place 1 applicator vaginally every evening., Disp: 7 g, Rfl: 1    triamcinolone acetonide 0.1% (KENALOG) 0.1 % cream, AAA bid, Disp: 454 g, Rfl: 3    LORazepam (ATIVAN) 0.5 MG tablet, Take 1 tablet (0.5 mg total) by mouth nightly as needed for Anxiety. Use only infrequently, can be addictive, Disp: 15 tablet, Rfl: 0

## 2025-03-17 NOTE — PROGRESS NOTES
I have personally taken the history and examined the patient and agree with the resident,s note as stated above      Dysuria resolved with Macrobid  Feels great today, asymptomatic    SLEDAI 2(single oral ulcer) confirmed on exam     Elbow pain:  right medial epicondylitis; left medial and lateral epicondylitis     Latest Reference Range & Units 03/07/25 11:24 03/07/25 12:10   WBC 3.90 - 12.70 K/uL 6.26    RBC 4.00 - 5.40 M/uL 4.43    Hemoglobin 12.0 - 16.0 g/dL 14.0    Hematocrit 37.0 - 48.5 % 42.4    MCV 82 - 98 fL 96    MCH 27.0 - 31.0 pg 31.6 (H)    MCHC 32.0 - 36.0 g/dL 33.0    RDW 11.5 - 14.5 % 11.9    Platelet Count 150 - 450 K/uL 311    MPV 9.2 - 12.9 fL 9.8    Gran % 38.0 - 73.0 % 61.9    Lymph % 18.0 - 48.0 % 24.4    Mono % 4.0 - 15.0 % 9.9    Eos % 0.0 - 8.0 % 2.1    Basophil % 0.0 - 1.9 % 1.4    Immature Granulocytes 0.0 - 0.5 % 0.3    Gran # (ANC) 1.8 - 7.7 K/uL 3.9    Lymph # 1.0 - 4.8 K/uL 1.5    Mono # 0.3 - 1.0 K/uL 0.6    Eos # 0.0 - 0.5 K/uL 0.1    Baso # 0.00 - 0.20 K/uL 0.09    Immature Grans (Abs) 0.00 - 0.04 K/uL 0.02    nRBC 0 /100 WBC 0    Differential Method  Automated    Sed Rate 0 - 36 mm/Hr 10    Sodium 136 - 145 mmol/L 137    Potassium 3.5 - 5.1 mmol/L 4.8    Chloride 95 - 110 mmol/L 102    CO2 23 - 29 mmol/L 25    Anion Gap 8 - 16 mmol/L 10    BUN 6 - 20 mg/dL 15    Creatinine 0.5 - 1.4 mg/dL 0.8    eGFR >60 mL/min/1.73 m^2 >60.0    Glucose 70 - 110 mg/dL 92    Calcium 8.7 - 10.5 mg/dL 9.7    ALP 40 - 150 U/L 74    PROTEIN TOTAL 6.0 - 8.4 g/dL 7.6    Albumin 3.5 - 5.2 g/dL 4.1    BILIRUBIN TOTAL 0.1 - 1.0 mg/dL 0.7    AST 10 - 40 U/L 32    ALT 10 - 44 U/L 11    CRP 0.0 - 8.2 mg/L 2.0    Complement (C-3) 50 - 180 mg/dL 128    Complement (C-4) 11 - 44 mg/dL 17    Specimen UA   Urine, Unspecified   Color, UA Yellow, Straw, [place]   [place] !   Appearance, UA Clear   Cloudy !   Spec Grav UA 1.005 - 1.030   1.020   pH, UA 5.0 - 8.0   6.0   Protein, UA Negative   Trace !   Glucose, UA Negative  "  Negative   Ketones, UA Negative   Negative   Blood, UA Negative   Negative   NITRITE UA Negative   Negative   Bilirubin (UA) Negative   Negative   Leukocyte Esterase, UA Negative   3+ !   WBC, UA 0 - 5 /hpf  21 (H)   Bacteria, UA None-Occ /hpf  Few !   Squam Epithel, UA /hpf  12   Hyaline Casts, UA 0-1/lpf /lpf  17 !   Amorphous, UA None-Moderate   Many !   Microscopic Comment   SEE COMMENT   (H): Data is abnormally high  !: Data is abnormal      14-3-3 eta negative  4/17/24     Anti CarP negative     AVISE SLE Prognostic:     Anti-C1q, ribosomal P negative  Phosphatidylserine/prothrombin IgM 37.58(30) positive  IgG negative  Cardiolipin IgG IgM, IgA  negative  B2 GP1 IgG IgM Ig A negative   RF negative    Narrative & Impression  EXAMINATION:  XR ELBOW COMPLETE 3 VIEW BILATERAL     CLINICAL HISTORY:  Pain in unspecified elbow     TECHNIQUE:  AP, lateral, and oblique views of bilateral elbow were performed.     COMPARISON:  None     FINDINGS:  The osseous structures are intact without evidence of fracture or osseous destructive process.  No significant degenerative changes are noted.     Impression:     As above        Electronically signed by:Jacquelin Fitzpatrick MD  Date:                                            12/19/2024  Time:                                           11:30        ASSESSMENT:     Pt I  originally saw 7/20/12:  Onset 2nd wk June R middle pip stiff then all the other pips, and MCP's. and swollen(mild) then ankles, knees and shoulders without much swelling but limited ROM. Severe AM stiffness. Saw Dr. Fleming 6/15: labs with BMP with calcium 8.4 but alb. 3. The CBC nl. TSH nl. RF -, LIBRA 1:160 speckled - profile. RX: Aleve- worsened. ED, Vicodin, Anaprox.  Still no better. ED "shot". Dr. Fleming took prednisone taper for one week. Helped after 5 days. All symptoms resolved over 2 weeks. No fever, appetitie fine, no wgt. + fatigue. No hair fall or rash. She freq gets sores in mouth since child. Does " have lifelong malar and chin erythema with sun exposure, and with wine. No pleurisy/pericarditis. No raynaud's. No history of seizures.No sicca syndrome. 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    Resolved acute parvovirus B19 due to school exposure to children with fifth disease. Mild + LIBRA with - profile. Lifelong history of recurrent oral ulcers, wine or sun-induced malar erythema, but transient and not fixed more suggestive of vascular hypereremia/rosacea than true butterfly rash.  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 with sparing Nasolabial folds, 2022 prior visit  Has had history of recurrent oral ulcers,none currently   Migratory arthritis: elbows, knees shoulders sushma  Palatal erythema not currently     SLE meets ACR/EULAR classification criteria (12)  AVISE-SLE prognosis was done, rather than AVISE-CTD hydroxychloroquine 300mg daily started  6/6/24  SLEDAI 2(single oral ulcer today)  UTI Rx with Macrobid resolved  Right medial epicondylitis  Left medial and lateral epicondylitis   Right ulnar styloid soft tissue swelling on MRI 5/14/24  H/o acute parvovirus B 19 in  2012  AM stiffness hands 15-30 min  no swollen joints so cannot apply ACR/EULAR RA criteria   TJ 0 SJ 0 Pt global   ESR,  CRP  DAS28   [id number]    CDAI 7.5(LDA)  Mother patient of mine with RA  Minimal OA several DIPs and knees  S/p right cervical radiculopathy  DXA  12/26/24 nl     PLAN:    Tennis elbow counterforce forearm braces when exercising, carrying etc  Ice massage prn elbows  Diclofenac gel 1% prn elbows, knees  Continue stretching exercise per previous OT  AVISE-CTD  done 7/23/24 report not accessible call lab again, no hyperlink to results  Continue  hydroxychloroquine 300mg daily.  Had Baseline Optometry exam with Dr. Pascual 2/7/25  *flu vaccine  *Covid vaccine > 3 months post Covid which was 9/12/24  *Prevnar 20   Regular 30 min aerobic exercise, Pilates, yoga, [employer]  5/7 days  Mediterranean diet   RTC 3 months with standing lupus labs.

## 2025-03-19 ENCOUNTER — OFFICE VISIT (OUTPATIENT)
Dept: PSYCHIATRY | Facility: CLINIC | Age: 55
End: 2025-03-19
Payer: COMMERCIAL

## 2025-03-19 DIAGNOSIS — F41.9 ANXIETY: Primary | ICD-10-CM

## 2025-03-19 DIAGNOSIS — F32.A DEPRESSION, UNSPECIFIED DEPRESSION TYPE: ICD-10-CM

## 2025-03-19 PROCEDURE — 99999 PR PBB SHADOW E&M-EST. PATIENT-LVL I: CPT | Mod: PBBFAC,,, | Performed by: SOCIAL WORKER

## 2025-03-19 PROCEDURE — 90834 PSYTX W PT 45 MINUTES: CPT | Mod: S$GLB,,, | Performed by: SOCIAL WORKER

## 2025-03-19 NOTE — PROGRESS NOTES
"  Notes:       Individual Psychotherapy (PhD/LCSW)    7/5/2023    Site: [place]    Therapeutic Intervention: Met with patient alone  Outpatient - Insight oriented psychotherapy 45 min - CPT code 51237    Chief complaint/reason for encounter: anxiety, depression     Interval history and content of current session: Pt feeling grounded, sleeping well, finding security, validation and a sense of safety with Artem that she has not experienced in the past. Feels able to use Tx "tools" when triggered by family members, gaining confidence. She is shifting from Paxil to Prozac per Dr. Brooks, and starting to decrease her auto-immune meds.     Treatment plan:  Target symptoms: anxiety , depression  Why chosen therapy is appropriate versus another modality: relevant to diagnosis  Outcome monitoring methods: self-report  Therapeutic intervention type: insight oriented psychotherapy    Risk parameters:  Patient reports no suicidal ideation  Patient reports no homicidal ideation  Patient reports no self-injurious behavior  Patient reports no violent behavior    Verbal deficits: None    Patient's response to intervention:  The patient's response to intervention is motivated    Progress toward goals and other mental status changes:  The patient's progress toward goals is good  Diagnosis:   Anxiety, depression    Plan:  individual psychotherapy    Return to clinic: f/u as scheduled    Length of Service (minutes): 45                "

## 2025-03-26 ENCOUNTER — OFFICE VISIT (OUTPATIENT)
Dept: PSYCHIATRY | Facility: CLINIC | Age: 55
End: 2025-03-26
Payer: COMMERCIAL

## 2025-03-26 DIAGNOSIS — F32.A DEPRESSION, UNSPECIFIED DEPRESSION TYPE: ICD-10-CM

## 2025-03-26 DIAGNOSIS — F41.9 ANXIETY: Primary | ICD-10-CM

## 2025-03-26 PROCEDURE — 99999 PR PBB SHADOW E&M-EST. PATIENT-LVL I: CPT | Mod: PBBFAC,,, | Performed by: SOCIAL WORKER

## 2025-03-26 PROCEDURE — 90834 PSYTX W PT 45 MINUTES: CPT | Mod: S$GLB,,, | Performed by: SOCIAL WORKER

## 2025-03-26 NOTE — PROGRESS NOTES
"  Notes:       Individual Psychotherapy (PhD/LCSW)    7/5/2023    Site: [place]    Therapeutic Intervention: Met with patient alone  Outpatient - Insight oriented psychotherapy 45 min - CPT code 84747    Chief complaint/reason for encounter: anxiety, depression     Interval history and content of current session: Pt notes "frozen shoulder", seeing issues with Artem and addressing them, focusing on grounding herself and doing well over all. Preparing for family wedding this weekend. Working on asking T to explain himself rather than making assumptions about his behavior.     Treatment plan:  Target symptoms: anxiety , depression  Why chosen therapy is appropriate versus another modality: relevant to diagnosis  Outcome monitoring methods: self-report  Therapeutic intervention type: insight oriented psychotherapy    Risk parameters:  Patient reports no suicidal ideation  Patient reports no homicidal ideation  Patient reports no self-injurious behavior  Patient reports no violent behavior    Verbal deficits: None    Patient's response to intervention:  The patient's response to intervention is motivated    Progress toward goals and other mental status changes:  The patient's progress toward goals is good  Diagnosis:   Anxiety, depression    Plan:  individual psychotherapy    Return to clinic: f/u as scheduled    Length of Service (minutes): 45                  "

## 2025-03-30 ENCOUNTER — ON-DEMAND VIRTUAL (OUTPATIENT)
Dept: URGENT CARE | Facility: CLINIC | Age: 55
End: 2025-03-30
Payer: COMMERCIAL

## 2025-03-30 DIAGNOSIS — J01.90 ACUTE SINUSITIS, RECURRENCE NOT SPECIFIED, UNSPECIFIED LOCATION: Primary | ICD-10-CM

## 2025-03-30 DIAGNOSIS — R05.9 COUGH, UNSPECIFIED TYPE: ICD-10-CM

## 2025-03-30 PROCEDURE — 98005 SYNCH AUDIO-VIDEO EST LOW 20: CPT | Mod: 95,,, | Performed by: NURSE PRACTITIONER

## 2025-03-30 RX ORDER — AZITHROMYCIN 250 MG/1
TABLET, FILM COATED ORAL
Qty: 6 TABLET | Refills: 0 | Status: SHIPPED | OUTPATIENT
Start: 2025-03-30 | End: 2025-04-04

## 2025-03-30 RX ORDER — BENZONATATE 100 MG/1
100 CAPSULE ORAL 3 TIMES DAILY PRN
Qty: 30 CAPSULE | Refills: 0 | Status: SHIPPED | OUTPATIENT
Start: 2025-03-30 | End: 2025-04-09

## 2025-03-30 NOTE — PROGRESS NOTES
Subjective:      Patient ID: Becky Cade is a 54 y.o. female.    Vitals:  vitals were not taken for this visit.     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     Past Surgical History:   Procedure Laterality Date    COLD KNIFE CONIZATION OF CERVIX  2009    KJB    COLONOSCOPY N/A 9/18/2020    Procedure: COLONOSCOPY;  Surgeon: Thaddeus Blackburn MD;  Location: [facility] ([address] [place] [address]);  Service: Endoscopy;  Laterality: N/A;  family history malignant hyperthermia  covid test [facility] urgent care 9/15    CYSTOSCOPY N/A 3/25/2022    Procedure: CYSTOSCOPY;  Surgeon: Brian Babcock MD;  Location: [facility] OR [address] [place] [address];  Service: OB/GYN;  Laterality: N/A;    INSERTION OF MIDURETHRAL SLING N/A 3/25/2022    Procedure: SLING, MIDURETHRAL;  Surgeon: Brian Babcock MD;  Location: [facility] OR [address] [place] [address];  Service: OB/GYN;  Laterality: N/A;    TONSILLECTOMY  1993     Review of patient's allergies indicates:   Allergen Reactions    Buspar [buspirone] Other (See Comments)     dizziness    Mobic [meloxicam] Rash     Medications Ordered Prior to Encounter[1]  Family History   Problem Relation Name Age of Onset    Parkinsonism Father      Rheum arthritis Mother      Arthritis Mother      Malignant hyperthermia Brother      Breast cancer Neg Hx      Colon cancer Neg Hx      Ovarian cancer Neg Hx      Melanoma Neg Hx      Cancer Neg Hx      Heart disease Neg Hx      Cervical cancer Neg Hx      Uterine cancer Neg Hx         Medications Ordered                [employer] #[id number] - [facility] - [address]  [address] AT [facility] [address] [place] [id number] [facility] [address]    Telephone: [phone]   Fax: [phone]   Hours: Not open 24 hours                         E-Prescribed (2 of 2)              azithromycin (Z-MIRANDA) 250 MG tablet    Sig: Take 2 tablets by mouth on day 1; Take 1 tablet by mouth on days 2-5       Start: 3/30/25     Quantity: 6 tablet Refills: 0                         benzonatate (TESSALON) 100 MG capsule    Sig: Take 1 capsule (100 mg total) by mouth 3 (three) times daily as needed for Cough.       Start: 3/30/25     Quantity: 30 capsule Refills: 0                           Ohs Peq Odvv Intake    3/30/2025 11:50 AM CDT - Filed by Patient   What is your current physical address in the event of a medical emergency?    Are you able to take your vital signs? No   Please attach any relevant images or files    Is your employer contracted with [facility]? No         Pt with c/o sinus pressure/drainage for a week, now with feeling of voice changing and  congestion with thick green mucous. Denies CP SOB, fever, dizziness, nvd.   Taking mucinex, zyrtec not helping.  Requesting zpack.       Cough  This is a new problem. The current episode started 1 to 4 weeks ago. The problem has been gradually worsening. Associated symptoms include ear congestion, headaches and nasal congestion. Pertinent negatives include no chest pain, chills, ear pain, fever, heartburn, hemoptysis, myalgias, rhinorrhea, sore throat, shortness of breath, sweats or wheezing.       Constitution: Negative for chills and fever.   HENT:  Positive for sinus pain, sinus pressure and voice change. Negative for ear pain and sore throat.    Cardiovascular:  Negative for chest pain.   Respiratory:  Positive for cough and sputum production. Negative for bloody sputum, shortness of breath and wheezing.    Gastrointestinal:  Negative for heartburn.   Musculoskeletal:  Negative for muscle ache.    Neurological:  Positive for headaches. Negative for dizziness.        Objective:   The physical exam was conducted virtually.  Physical Exam   Constitutional: She is oriented to person, place, and time. No distress.   HENT:   Head: Normocephalic.   Ears:   Right Ear: External ear normal.   Left Ear: External ear normal.   Nose: No rhinorrhea or congestion.   Eyes: Conjunctivae are normal.   Neck: Neck supple.   Pulmonary/Chest: Effort normal. No respiratory distress.   Neurological: She is alert and oriented to person, place, and time.   Skin: Skin is no rash.       Assessment:     1. Acute sinusitis, recurrence not specified, unspecified location    2. Cough, unspecified type        Plan:   Increase fluids and rest  Pedialyte, powerade, gatorade  Take meds as directed  Warm salt water gargles, tea with honey, chlorseptic spray, throat lozenges   Limit foods that are salty, spicy food, limit carbonated drinks, limit acidic drinks  Tylenol or Motrin as directed for fever or body aches  Continue antihistamine (zyrtec or Claritin), Flonase and saline nasal spray  Okay to take Robitussin DM, Mucinex DM, Dayquil/Nyquil as directed    If increased Shortness of breath or difficulty breathing go to the Urgent Care or ER  If symptoms worsening or not improved f/u in Urgent Care or with PCP    Acute sinusitis, recurrence not specified, unspecified location  -     azithromycin (Z-MIRANDA) 250 MG tablet; Take 2 tablets by mouth on day 1; Take 1 tablet by mouth on days 2-5  Dispense: 6 tablet; Refill: 0    Cough, unspecified type  -     benzonatate (TESSALON) 100 MG capsule; Take 1 capsule (100 mg total) by mouth 3 (three) times daily as needed for Cough.  Dispense: 30 capsule; Refill: 0      We appreciate you trusting us with your medical care. We hope you feel better soon. We will be happy to take care of you for all of your future medical needs.     You must understand that you've received Virtual treatment only and that you  may be released before all your medical problems are known or treated. You, the patient, will arrange for follow up care as instructed.     Follow up with your PCP or specialty clinic as directed in the next 1-2 weeks if not improved or as needed. You can call [phone] to schedule an appointment with the appropriate provider.     If your condition worsens we recommend that you receive another evaluation in person, with your primary care provider, urgent care or at the emergency room immediately or contact your primary medical clinics after hours call service to discuss your concerns.                   [1]   Current Outpatient Medications on File Prior to Visit   Medication Sig Dispense Refill    acyclovir (ZOVIRAX) 400 MG tablet Take 1 tablet (400 mg total) by mouth 2 (two) times daily. Fever blisters 14 tablet 8    alclometasone (ACLOVATE) 0.05 % cream AAA face bid prn redness or scaling or itching. Do not apply to the same location more than 2 weeks in a row. 45 g 1    calcium-vitamin D3 (OS-KRISTY 500 + D3) 500 mg-5 mcg (200 unit) per tablet Take 1 tablet by mouth 2 (two) times daily with meals.      clotrimazole-betamethasone 1-0.05% (LOTRISONE) cream Apply topically 2 (two) times daily. 45 g 1    cyanocobalamin (VITAMIN B-12) 1000 MCG tablet Take 1,000 mcg by mouth once daily.      cycloSPORINE (VEVYE) 0.1 % Drop Place 1 drop into both eyes 2 (two) times a day. 2 mL 5    dapsone (ACZONE) 7.5 % GlwP Use hs on face 30 g 3    doxepin (SINEQUAN) 10 MG capsule Take 10 mg by mouth every evening.      doxycycline (VIBRA-TABS) 100 MG tablet Take one qd with food 30 tablet 1    erythromycin with ethanoL (EMGEL) 2 % gel Apply to the affected areas of face bid. 30 g 1    esomeprazole (NEXIUM) 20 MG capsule Take 20 mg by mouth as needed. Takes 2-3 a year      estradioL (DIVIGEL) 0.25 mg/0.25 gram (0.1 %) GlPk Place 1 packet onto the skin once daily. 30 packet 6    estradioL (ESTRACE) 0.01 % (0.1 mg/gram) vaginal cream  Use 0.5 gram of estrogen cream in vagina twice weekly 42.5 g 3    hydroxychloroquine (PLAQUENIL) 200 mg tablet Take 1 tablet (200 mg total) by mouth once daily. 90 tablet 1    ketoconazole (NIZORAL) 2 % cream Apply to the affected area on face twice a day for maintenance. 60 g 2    levothyroxine (SYNTHROID) 75 MCG tablet TAKE 1 TABLET(75 MCG) BY MOUTH BEFORE BREAKFAST 90 tablet 2    LORazepam (ATIVAN) 0.5 MG tablet Take 1 tablet (0.5 mg total) by mouth nightly as needed for Anxiety. Use only infrequently, can be addictive 15 tablet 0    magnesium 30 mg Tab Take by mouth once.      naproxen sodium (ANAPROX) 550 MG tablet Take 1 tablet (550 mg total) by mouth as needed (as needed for jaw pain). 30 tablet 2    paroxetine (PAXIL-CR) 25 MG 24 hr tablet Take 50 mg by mouth.      progesterone (PROMETRIUM) 100 MG capsule Take 1 capsule (100 mg total) by mouth nightly. 90 capsule 2    terconazole (TERAZOL 7) 0.4 % Crea Place 1 applicator vaginally every evening. 7 g 1    triamcinolone acetonide 0.1% (KENALOG) 0.1 % cream AAA bid 454 g 3     No current facility-administered medications on file prior to visit.

## 2025-03-30 NOTE — PATIENT INSTRUCTIONS

## 2025-04-02 ENCOUNTER — OFFICE VISIT (OUTPATIENT)
Dept: PSYCHIATRY | Facility: CLINIC | Age: 55
End: 2025-04-02
Payer: COMMERCIAL

## 2025-04-02 DIAGNOSIS — F32.A DEPRESSION, UNSPECIFIED DEPRESSION TYPE: ICD-10-CM

## 2025-04-02 DIAGNOSIS — F41.9 ANXIETY: Primary | ICD-10-CM

## 2025-04-02 PROCEDURE — 99999 PR PBB SHADOW E&M-EST. PATIENT-LVL I: CPT | Mod: PBBFAC,,, | Performed by: SOCIAL WORKER

## 2025-04-02 PROCEDURE — 90834 PSYTX W PT 45 MINUTES: CPT | Mod: S$GLB,,, | Performed by: SOCIAL WORKER

## 2025-04-02 NOTE — PROGRESS NOTES
Notes:       Individual Psychotherapy (PhD/LCSW)    7/5/2023    Site: [place]    Therapeutic Intervention: Met with patient alone  Outpatient - Insight oriented psychotherapy 45 min - CPT code 85662    Chief complaint/reason for encounter: anxiety, depression     Interval history and content of current session: Pt upbeat, talking rapidly, excited about how she handled wedding weekend for sister's child. She did overdrink  the first night, irritated that friends tried to quiet her, but has no regrets about her behavior, very pleased about handling being alone. She is also ok with Artem being gone on frequent small trips, and they are both working on an optimal level of contact. Pt notes she had severe separation anxiety in childhood, would not spend the night out, cried to come home from school many days. Pt is weaning off Paxil and is now on 40mg Prozac per Dr. Brooks.     Treatment plan:  Target symptoms: anxiety , depression  Why chosen therapy is appropriate versus another modality: relevant to diagnosis  Outcome monitoring methods: self-report  Therapeutic intervention type: insight oriented psychotherapy    Risk parameters:  Patient reports no suicidal ideation  Patient reports no homicidal ideation  Patient reports no self-injurious behavior  Patient reports no violent behavior    Verbal deficits: None    Patient's response to intervention:  The patient's response to intervention is motivated    Progress toward goals and other mental status changes:  The patient's progress toward goals is good  Diagnosis:   Anxiety, depression    Plan:  individual psychotherapy    Return to clinic: f/u as scheduled    Length of Service (minutes): 45

## 2025-04-08 ENCOUNTER — PATIENT MESSAGE (OUTPATIENT)
Dept: PRIMARY CARE CLINIC | Facility: CLINIC | Age: 55
End: 2025-04-08
Payer: COMMERCIAL

## 2025-04-08 DIAGNOSIS — B00.1 HERPES LABIALIS: ICD-10-CM

## 2025-04-08 DIAGNOSIS — Z51.81 ENCOUNTER FOR MONITORING OF HYDROXYCHLOROQUINE THERAPY: ICD-10-CM

## 2025-04-08 DIAGNOSIS — Z79.899 ENCOUNTER FOR MONITORING OF HYDROXYCHLOROQUINE THERAPY: ICD-10-CM

## 2025-04-08 NOTE — TELEPHONE ENCOUNTER
LOV with Desiree Stephens MD , 12/12/2024 (annual)  No mention of planned EKG  6/21/24 EKG completed. ordered by rheumatology  Your EKG is fine. No problem with taking hydroxychloroquine with your other meds. RJQ   Written by Van Schmitt MD on 6/24/2024  8:40 AM CDT  Plaquenil currently prescribed by rheumatology. Last visit 3/17/25.    EKG order pended, if appropriate. Please let me know if you would like me to redirect to rheumatology.

## 2025-04-08 NOTE — TELEPHONE ENCOUNTER
Refill Routing Note   Medication(s) are not appropriate for processing by [facility] for the following reason(s):        No active prescription written by provider    [facility] action(s):  Defer               Appointments  past 12m or future 3m with PCP    Date Provider   Last Visit   12/12/2024 Desiree Stephens MD   Next Visit   Visit date not found Desiree Stephens MD   ED visits in past 90 days: 0        Note composed:2:43 PM 04/08/2025

## 2025-04-08 NOTE — TELEPHONE ENCOUNTER
No care due was identified.  [facility] Embedded Care Due Messages. Reference number: [id number].   4/08/2025 12:16:00 PM CDT

## 2025-04-09 ENCOUNTER — OFFICE VISIT (OUTPATIENT)
Dept: PSYCHIATRY | Facility: CLINIC | Age: 55
End: 2025-04-09
Payer: COMMERCIAL

## 2025-04-09 DIAGNOSIS — F41.9 ANXIETY: Primary | ICD-10-CM

## 2025-04-09 DIAGNOSIS — F32.A DEPRESSION, UNSPECIFIED DEPRESSION TYPE: ICD-10-CM

## 2025-04-09 PROCEDURE — 99999 PR PBB SHADOW E&M-EST. PATIENT-LVL I: CPT | Mod: PBBFAC,,, | Performed by: SOCIAL WORKER

## 2025-04-09 PROCEDURE — 90834 PSYTX W PT 45 MINUTES: CPT | Mod: S$GLB,,, | Performed by: SOCIAL WORKER

## 2025-04-09 RX ORDER — ACYCLOVIR 400 MG/1
400 TABLET ORAL 2 TIMES DAILY
Qty: 14 TABLET | Refills: 8 | Status: SHIPPED | OUTPATIENT
Start: 2025-04-09

## 2025-04-09 NOTE — PROGRESS NOTES
"  Notes:       Individual Psychotherapy (PhD/LCSW)    7/5/2023    Site: [place]    Therapeutic Intervention: Met with patient alone  Outpatient - Insight oriented psychotherapy 45 min - CPT code 58036    Chief complaint/reason for encounter: anxiety, depression     Interval history and content of current session: Pt panicky, feeling abandoned and "not normal" as Artem is backing out of relationship. Talk with pt about recognizing that he does not meet her needs, is a "rolling stone" and not interested in a close partnership, compare to a dress that doesn't fit. Pt accepts this intellectually but emotionally is experiencing separation anxiety she has had lifelong. Encourage pt to change her internal dialogue to ground herself.     Treatment plan:  Target symptoms: anxiety , depression  Why chosen therapy is appropriate versus another modality: relevant to diagnosis  Outcome monitoring methods: self-report  Therapeutic intervention type: insight oriented psychotherapy    Risk parameters:  Patient reports no suicidal ideation  Patient reports no homicidal ideation  Patient reports no self-injurious behavior  Patient reports no violent behavior    Verbal deficits: None    Patient's response to intervention:  The patient's response to intervention is motivated    Progress toward goals and other mental status changes:  The patient's progress toward goals is good  Diagnosis:   Anxiety, depression    Plan:  individual psychotherapy    Return to clinic: f/u as scheduled    Length of Service (minutes): 45        "

## 2025-04-10 NOTE — TELEPHONE ENCOUNTER
Please find out if she need pre-op clearance or they just wanted an EKG, if she needs clearance from us please set her up for an in person visit and get a copy of clearance form from doc

## 2025-04-16 ENCOUNTER — OFFICE VISIT (OUTPATIENT)
Dept: PSYCHIATRY | Facility: CLINIC | Age: 55
End: 2025-04-16
Payer: COMMERCIAL

## 2025-04-16 DIAGNOSIS — F41.9 ANXIETY: Primary | ICD-10-CM

## 2025-04-16 DIAGNOSIS — F32.A DEPRESSION, UNSPECIFIED DEPRESSION TYPE: ICD-10-CM

## 2025-04-16 PROCEDURE — 90834 PSYTX W PT 45 MINUTES: CPT | Mod: S$GLB,,, | Performed by: SOCIAL WORKER

## 2025-04-16 PROCEDURE — 99999 PR PBB SHADOW E&M-EST. PATIENT-LVL I: CPT | Mod: PBBFAC,,, | Performed by: SOCIAL WORKER

## 2025-04-16 NOTE — PROGRESS NOTES
"  Notes:       Individual Psychotherapy (PhD/LCSW)    7/5/2023    Site: [place]    Therapeutic Intervention: Met with patient alone  Outpatient - Insight oriented psychotherapy 45 min - CPT code 45722    Chief complaint/reason for encounter: anxiety, depression     Interval history and content of current session: Pt has worked on stability, listening to audio book that helps her focus on being ok in herself. She continues to see Artem but is more realistic in accepting limitations of relationship. Focus on feeling more adult and able to take care of "inner child" vs panic and feeling of helplessness. Pt excited about breast reduction surgery planned for 5/14, "doing this for myself."      Treatment plan:  Target symptoms: anxiety , depression  Why chosen therapy is appropriate versus another modality: relevant to diagnosis  Outcome monitoring methods: self-report  Therapeutic intervention type: insight oriented psychotherapy    Risk parameters:  Patient reports no suicidal ideation  Patient reports no homicidal ideation  Patient reports no self-injurious behavior  Patient reports no violent behavior    Verbal deficits: None    Patient's response to intervention:  The patient's response to intervention is motivated    Progress toward goals and other mental status changes:  The patient's progress toward goals is good  Diagnosis:   Anxiety, depression    Plan:  individual psychotherapy    Return to clinic: f/u as scheduled    Length of Service (minutes): 45          "

## 2025-04-21 ENCOUNTER — PATIENT MESSAGE (OUTPATIENT)
Dept: PRIMARY CARE CLINIC | Facility: CLINIC | Age: 55
End: 2025-04-21
Payer: COMMERCIAL

## 2025-04-21 ENCOUNTER — HOSPITAL ENCOUNTER (OUTPATIENT)
Dept: CARDIOLOGY | Facility: CLINIC | Age: 55
Discharge: HOME | End: 2025-04-21
Payer: COMMERCIAL

## 2025-04-21 DIAGNOSIS — Z79.899 ENCOUNTER FOR MONITORING OF HYDROXYCHLOROQUINE THERAPY: ICD-10-CM

## 2025-04-21 DIAGNOSIS — Z51.81 ENCOUNTER FOR MONITORING OF HYDROXYCHLOROQUINE THERAPY: ICD-10-CM

## 2025-04-21 LAB
OHS QRS DURATION: 88 MS
OHS QTC CALCULATION: 416 MS

## 2025-04-21 PROCEDURE — 93010 ELECTROCARDIOGRAM REPORT: CPT | Mod: S$GLB,,, | Performed by: INTERNAL MEDICINE

## 2025-04-21 PROCEDURE — 93005 ELECTROCARDIOGRAM TRACING: CPT | Mod: S$GLB,,, | Performed by: FAMILY MEDICINE

## 2025-05-04 ENCOUNTER — PATIENT MESSAGE (OUTPATIENT)
Dept: PRIMARY CARE CLINIC | Facility: CLINIC | Age: 55
End: 2025-05-04
Payer: COMMERCIAL

## 2025-05-05 ENCOUNTER — OFFICE VISIT (OUTPATIENT)
Dept: URGENT CARE | Facility: CLINIC | Age: 55
End: 2025-05-05
Payer: COMMERCIAL

## 2025-05-05 VITALS
TEMPERATURE: 98 F | RESPIRATION RATE: 19 BRPM | BODY MASS INDEX: 22.2 KG/M2 | OXYGEN SATURATION: 99 % | SYSTOLIC BLOOD PRESSURE: 122 MMHG | DIASTOLIC BLOOD PRESSURE: 86 MMHG | HEIGHT: 64 IN | WEIGHT: 130 LBS | HEART RATE: 71 BPM

## 2025-05-05 DIAGNOSIS — R12 HEARTBURN: ICD-10-CM

## 2025-05-05 DIAGNOSIS — R11.0 NAUSEA: ICD-10-CM

## 2025-05-05 DIAGNOSIS — R07.89 BURNING CHEST PAIN: Primary | ICD-10-CM

## 2025-05-05 PROCEDURE — 93010 ELECTROCARDIOGRAM REPORT: CPT | Mod: S$GLB,,, | Performed by: INTERNAL MEDICINE

## 2025-05-05 PROCEDURE — 93005 ELECTROCARDIOGRAM TRACING: CPT | Mod: S$GLB,,, | Performed by: NURSE PRACTITIONER

## 2025-05-05 PROCEDURE — 99213 OFFICE O/P EST LOW 20 MIN: CPT | Mod: S$GLB,,, | Performed by: NURSE PRACTITIONER

## 2025-05-05 RX ORDER — ONDANSETRON 4 MG/1
4 TABLET, ORALLY DISINTEGRATING ORAL 2 TIMES DAILY
Qty: 6 TABLET | Refills: 0 | Status: SHIPPED | OUTPATIENT
Start: 2025-05-05 | End: 2025-05-08

## 2025-05-05 RX ORDER — PANTOPRAZOLE SODIUM 40 MG/1
40 TABLET, DELAYED RELEASE ORAL DAILY PRN
Qty: 30 TABLET | Refills: 0 | Status: SHIPPED | OUTPATIENT
Start: 2025-05-05

## 2025-05-05 RX ORDER — FLUOXETINE HYDROCHLORIDE 20 MG/1
CAPSULE ORAL
COMMUNITY
Start: 2025-04-23

## 2025-05-05 RX ORDER — FLUOXETINE HYDROCHLORIDE 40 MG/1
CAPSULE ORAL
COMMUNITY
Start: 2025-04-23

## 2025-05-05 RX ORDER — OXYCODONE AND ACETAMINOPHEN 5; 325 MG/1; MG/1
TABLET ORAL
COMMUNITY
Start: 2025-05-01

## 2025-05-05 RX ORDER — ESZOPICLONE 3 MG/1
3 TABLET, FILM COATED ORAL NIGHTLY PRN
COMMUNITY
Start: 2025-05-01

## 2025-05-05 RX ORDER — CEPHALEXIN 500 MG/1
500 CAPSULE ORAL 4 TIMES DAILY
COMMUNITY
Start: 2025-05-01 | End: 2025-05-05

## 2025-05-05 RX ORDER — METHYLPREDNISOLONE 4 MG/1
TABLET ORAL
COMMUNITY
Start: 2025-05-01

## 2025-05-05 RX ORDER — FLUOXETINE 20 MG/1
60 TABLET ORAL DAILY PRN
COMMUNITY
Start: 2025-04-23

## 2025-05-05 RX ORDER — KETOROLAC TROMETHAMINE 10 MG/1
10 TABLET, FILM COATED ORAL EVERY 6 HOURS PRN
COMMUNITY
Start: 2025-05-01

## 2025-05-05 NOTE — PATIENT INSTRUCTIONS
Please return here or go to the Emergency Department for any concerns or worsening of condition.  Please drink plenty of fluids.  Please get plenty of rest.  Please follow up with your primary care doctor or specialist as needed.

## 2025-05-05 NOTE — PROGRESS NOTES
"Subjective:      Patient ID: Becky Cade is a 54 y.o. female.    Vitals:  height is 5' 4" (1.626 m) and weight is 59 kg (130 lb). Her oral temperature is 98.1 °F (36.7 °C). Her blood pressure is 122/86 and her pulse is 71. Her respiration is 19 and oxygen saturation is 99%.     Chief Complaint: Gastroesophageal Reflux    Pt is here with c/o burning in her chest for the past week and a half.    54-year-old female presents to clinic with complaints of burning chest pain x 1 week treating with Pepcid AC. Reports pending surgery she has wanted her whole life, and she may be having feelings of anxiety scheduled for 5/14/25. EKG 4/10/25 Normal sinus rhythm Normal ECG. History of generalized anxiety treating with Prozac 60 mg, Nexium in the past and Ativan 0.5 mg prn anxiety none recently.     Gastroesophageal Reflux  She complains of chest pain and heartburn. She reports no abdominal pain, no belching or no coughing. This is a new problem. The current episode started 1 to 4 weeks ago (week and a half). The problem occurs constantly. Treatments tried: tums, pepcid.       Cardiovascular:  Positive for chest pain. Negative for chest trauma and palpitations.   Respiratory:  Negative for cough.    Gastrointestinal:  Positive for heartburn. Negative for abdominal pain.   Psychiatric/Behavioral:  Positive for nervous/anxious. The patient is nervous/anxious.       Objective:     Physical Exam   Constitutional: She is oriented to person, place, and time. She appears well-developed.   HENT:   Head: Normocephalic and atraumatic.   Ears:   Right Ear: External ear normal.   Left Ear: External ear normal.   Nose: Nose normal.   Mouth/Throat: Mucous membranes are normal.   Eyes: Lids are normal. Extraocular movement intact   Neck: Trachea normal. Neck supple.   Cardiovascular: Normal rate, regular rhythm and normal heart sounds.   Pulmonary/Chest: Effort normal and breath sounds normal. No respiratory distress.   Abdominal: " Normal appearance and bowel sounds are normal. She exhibits no distension and no mass. Soft. There is no abdominal tenderness.   Musculoskeletal: Normal range of motion.         General: Normal range of motion.   Neurological: She is alert and oriented to person, place, and time. She has normal strength.   Skin: Skin is warm, dry, intact, not diaphoretic and not pale.   Psychiatric: Her speech is normal and behavior is normal. Judgment and thought content normal. Her mood appears anxious. Her affect is tearful.   Nursing note and vitals reviewed.      Assessment:     1. Burning chest pain    2. Heartburn    3. Nausea        Plan:       Burning chest pain  -     IN OFFICE EKG 12-LEAD (to [facility])    Heartburn  -     pantoprazole (PROTONIX) 40 MG tablet; Take 1 tablet (40 mg total) by mouth daily as needed (heartburn).  Dispense: 30 tablet; Refill: 0    Nausea  -     ondansetron (ZOFRAN-ODT) 4 MG TbDL; Take 1 tablet (4 mg total) by mouth 2 (two) times daily. for 3 days  Dispense: 6 tablet; Refill: 0      The EKG shows a normal, regular Sinus Rhythm, at a rate of 63, there are not ST Changes. There is a previous EKG for comparison. This EKG was interpreted by me.    Please return here or go to the Emergency Department for any concerns or worsening of condition.  Please drink plenty of fluids.  Please get plenty of rest.  Please follow up with your primary care doctor or specialist as needed.

## 2025-05-06 LAB
OHS QRS DURATION: 78 MS
OHS QTC CALCULATION: 409 MS

## 2025-05-07 ENCOUNTER — OFFICE VISIT (OUTPATIENT)
Dept: PSYCHIATRY | Facility: CLINIC | Age: 55
End: 2025-05-07
Payer: COMMERCIAL

## 2025-05-07 DIAGNOSIS — F41.9 ANXIETY: Primary | ICD-10-CM

## 2025-05-07 DIAGNOSIS — F32.A DEPRESSION, UNSPECIFIED DEPRESSION TYPE: ICD-10-CM

## 2025-05-07 PROCEDURE — 90834 PSYTX W PT 45 MINUTES: CPT | Mod: S$GLB,,, | Performed by: SOCIAL WORKER

## 2025-05-07 PROCEDURE — 99999 PR PBB SHADOW E&M-EST. PATIENT-LVL I: CPT | Mod: PBBFAC,,, | Performed by: SOCIAL WORKER

## 2025-05-07 NOTE — PROGRESS NOTES
"  Notes:       Individual Psychotherapy (PhD/LCSW)    7/5/2023    Site: [place]    Therapeutic Intervention: Met with patient alone  Outpatient - Insight oriented psychotherapy 45 min - CPT code 02966    Chief complaint/reason for encounter: anxiety, depression     Interval history and content of current session: Pt has been in panic state since Artem broke up with her, went to Urgent Care to r/o MI, struggling to eat, vomiting, did see Dr. Brooks. Talk with pt about her fear of being "alone" and how she pictures this as young frightened child, helpless, vs reality of the peacefulness she has built in her home. Ask pt to write a letter to her adult self validating the strengths she has developed, to help her feel confident that she can take care of herself. Pt tearful most of session.     Treatment plan:  Target symptoms: anxiety , depression  Why chosen therapy is appropriate versus another modality: relevant to diagnosis  Outcome monitoring methods: self-report  Therapeutic intervention type: insight oriented psychotherapy    Risk parameters:  Patient reports no suicidal ideation  Patient reports no homicidal ideation  Patient reports no self-injurious behavior  Patient reports no violent behavior    Verbal deficits: None    Patient's response to intervention:  The patient's response to intervention is motivated    Progress toward goals and other mental status changes:  The patient's progress toward goals is limited  Diagnosis:   Anxiety, depression    Plan:  individual psychotherapy    Return to clinic: f/u as scheduled    Length of Service (minutes): 45            "

## 2025-05-21 ENCOUNTER — OFFICE VISIT (OUTPATIENT)
Dept: PSYCHIATRY | Facility: CLINIC | Age: 55
End: 2025-05-21
Payer: COMMERCIAL

## 2025-05-21 DIAGNOSIS — F41.9 ANXIETY: Primary | ICD-10-CM

## 2025-05-21 DIAGNOSIS — F32.A DEPRESSION, UNSPECIFIED DEPRESSION TYPE: ICD-10-CM

## 2025-05-21 PROCEDURE — 90834 PSYTX W PT 45 MINUTES: CPT | Mod: 95,,, | Performed by: SOCIAL WORKER

## 2025-05-21 NOTE — PROGRESS NOTES
"The patient location is: [place], [place]  The chief complaint leading to consultation is: anxiety, depression    Visit type: audiovisual    Face to Face time with patient: 45 minutes  50 minutes of total time spent on the encounter, which includes face to face time and non-face to face time preparing to see the patient (eg, review of tests), Obtaining and/or reviewing separately obtained history, Documenting clinical information in the electronic or other health record, Independently interpreting results (not separately reported) and communicating results to the patient/family/caregiver, or Care coordination (not separately reported).         Each patient to whom he or she provides medical services by telemedicine is:  (1) informed of the relationship between the physician and patient and the respective role of any other health care provider with respect to management of the patient; and (2) notified that he or she may decline to receive medical services by telemedicine and may withdraw from such care at any time.    Notes:     Individual Psychotherapy (PhD/LCSW)    7/5/2023    Site: [place]    Therapeutic Intervention: Met with patient alone  Outpatient - Insight oriented psychotherapy 45 min - CPT code 26016    Chief complaint/reason for encounter: anxiety, depression     Interval history and content of current session: Pt shares events of past 2 weeks including mom's being honored and staying with sister after breast reduction surgery. Pt saw that sister is depressed,  treats her badly, despite outward appearances of wonderful life. Mom's  acted out "like a big baby" at dinner after [employer] ceremony, pt saw mom stand up to him strongly. Pt feels decision for surgery was very positive for her. Discuss split between her outer well-being and inner panic about being "alone." Pt defines alone as helpless and frightened. Reframe as "on my own" which pt sees as strong.     Treatment plan:  Target " symptoms: anxiety , depression  Why chosen therapy is appropriate versus another modality: relevant to diagnosis  Outcome monitoring methods: self-report  Therapeutic intervention type: insight oriented psychotherapy    Risk parameters:  Patient reports no suicidal ideation  Patient reports no homicidal ideation  Patient reports no self-injurious behavior  Patient reports no violent behavior    Verbal deficits: None    Patient's response to intervention:  The patient's response to intervention is motivated    Progress toward goals and other mental status changes:  The patient's progress toward goals is good  Diagnosis:   Anxiety, depression    Plan:  individual psychotherapy    Return to clinic: f/u as scheduled    Length of Service (minutes): 45

## 2025-05-28 ENCOUNTER — OFFICE VISIT (OUTPATIENT)
Dept: PSYCHIATRY | Facility: CLINIC | Age: 55
End: 2025-05-28
Payer: COMMERCIAL

## 2025-05-28 DIAGNOSIS — F32.A DEPRESSION, UNSPECIFIED DEPRESSION TYPE: ICD-10-CM

## 2025-05-28 DIAGNOSIS — F41.9 ANXIETY: Primary | ICD-10-CM

## 2025-05-28 PROCEDURE — 90834 PSYTX W PT 45 MINUTES: CPT | Mod: S$GLB,,, | Performed by: SOCIAL WORKER

## 2025-05-28 PROCEDURE — 99999 PR PBB SHADOW E&M-EST. PATIENT-LVL I: CPT | Mod: PBBFAC,,, | Performed by: SOCIAL WORKER

## 2025-05-28 NOTE — PROGRESS NOTES
"  Notes:     Individual Psychotherapy (PhD/LCSW)    7/5/2023    Site: [place]    Therapeutic Intervention: Met with patient alone  Outpatient - Insight oriented psychotherapy 45 min - CPT code 95424    Chief complaint/reason for encounter: anxiety, depression     Interval history and content of current session: Pt reports very high level of anxiety and agitation, nausea and some vomiting. Review triggering events in her family in the past week. Pt recovering from her own surgery but was expected to cater to brother recovering from surgery, made to feel guilty that he and her mother have "done so much for me" without regard for her own needs. Pt recognizes this as pattern from childhood, mom kind and nurturing at times but then shifting to guilting and demanding, pt feeling discounted or invisible. Pt aware that family dynamics activate negative self-talk. Pt will work on not "abandoning" herself, cultivating kind supportive inner dialogue. Her ex is again demanding visit with dog the day after scheduled court date in July, which also triggers pt.     Treatment plan:  Target symptoms: anxiety , depression  Why chosen therapy is appropriate versus another modality: relevant to diagnosis  Outcome monitoring methods: self-report  Therapeutic intervention type: insight oriented psychotherapy    Risk parameters:  Patient reports no suicidal ideation  Patient reports no homicidal ideation  Patient reports no self-injurious behavior  Patient reports no violent behavior    Verbal deficits: None    Patient's response to intervention:  The patient's response to intervention is motivated    Progress toward goals and other mental status changes:  The patient's progress toward goals is fair    Diagnosis:   Anxiety, depression    Plan:  individual psychotherapy    Return to clinic: f/u as scheduled    Length of Service (minutes): 45      "

## 2025-05-30 RX ORDER — PROGESTERONE 100 MG/1
CAPSULE ORAL
Qty: 90 CAPSULE | Refills: 0 | Status: SHIPPED | OUTPATIENT
Start: 2025-05-30

## 2025-05-30 NOTE — TELEPHONE ENCOUNTER
Refill Decision Note   Becky Cade  is requesting a refill authorization.  Brief Assessment and Rationale for Refill:  Approve     Medication Therapy Plan:         Comments:     Note composed:11:07 AM 05/30/2025

## 2025-06-02 RX ORDER — PROGESTERONE 100 MG/1
CAPSULE ORAL
Qty: 90 CAPSULE | Refills: 0 | OUTPATIENT
Start: 2025-06-02

## 2025-06-04 ENCOUNTER — OFFICE VISIT (OUTPATIENT)
Dept: PSYCHIATRY | Facility: CLINIC | Age: 55
End: 2025-06-04
Payer: COMMERCIAL

## 2025-06-04 DIAGNOSIS — F41.9 ANXIETY: Primary | ICD-10-CM

## 2025-06-04 DIAGNOSIS — F32.A DEPRESSION, UNSPECIFIED DEPRESSION TYPE: ICD-10-CM

## 2025-06-04 PROCEDURE — 99999 PR PBB SHADOW E&M-EST. PATIENT-LVL I: CPT | Mod: PBBFAC,,, | Performed by: SOCIAL WORKER

## 2025-06-04 PROCEDURE — 90834 PSYTX W PT 45 MINUTES: CPT | Mod: S$GLB,,, | Performed by: SOCIAL WORKER

## 2025-06-18 ENCOUNTER — OFFICE VISIT (OUTPATIENT)
Dept: PSYCHIATRY | Facility: CLINIC | Age: 55
End: 2025-06-18
Payer: COMMERCIAL

## 2025-06-18 ENCOUNTER — TELEPHONE (OUTPATIENT)
Dept: OPTOMETRY | Facility: CLINIC | Age: 55
End: 2025-06-18
Payer: COMMERCIAL

## 2025-06-18 DIAGNOSIS — F32.A DEPRESSION, UNSPECIFIED DEPRESSION TYPE: ICD-10-CM

## 2025-06-18 DIAGNOSIS — F41.9 ANXIETY: Primary | ICD-10-CM

## 2025-06-18 PROCEDURE — 90834 PSYTX W PT 45 MINUTES: CPT | Mod: S$GLB,,, | Performed by: SOCIAL WORKER

## 2025-06-18 PROCEDURE — 99999 PR PBB SHADOW E&M-EST. PATIENT-LVL I: CPT | Mod: PBBFAC,,, | Performed by: SOCIAL WORKER

## 2025-06-18 NOTE — TELEPHONE ENCOUNTER
[facility] contact lens follow up visit scheduled with Dr. Pascual 06/25/2025 at 2:20 pm.

## 2025-06-18 NOTE — PROGRESS NOTES
Notes:     Individual Psychotherapy (PhD/LCSW)    7/5/2023    Site: [place]    Therapeutic Intervention: Met with patient alone  Outpatient - Insight oriented psychotherapy 45 min - CPT code 87641    Chief complaint/reason for encounter: anxiety, depression     Interval history and content of current session: Pt  reports decreased anxiety and decreased use of Ativan, has plan to visit brother Bernard over July 4. Discuss growth in self-nurturing, efforts to be kinder to herself vs lifelong habit of self-shaming. Pt feels deep sadness about loss of relationship with daughters. She is more able to let go of upset around the men who have been in her life, does have some concern about how Elbert will behave as July court date approaches. Focus on self-soothing.     Treatment plan:  Target symptoms: anxiety , depression  Why chosen therapy is appropriate versus another modality: relevant to diagnosis  Outcome monitoring methods: self-report  Therapeutic intervention type: insight oriented psychotherapy    Risk parameters:  Patient reports no suicidal ideation  Patient reports no homicidal ideation  Patient reports no self-injurious behavior  Patient reports no violent behavior    Verbal deficits: None    Patient's response to intervention:  The patient's response to intervention is motivated    Progress toward goals and other mental status changes:  The patient's progress toward goals is good    Diagnosis:   Anxiety, depression    Plan:  individual psychotherapy    Return to clinic: f/u as scheduled    Length of Service (minutes): 45

## 2025-06-18 NOTE — TELEPHONE ENCOUNTER
[facility] contact lens follow up rescheduled to 06/23/2025 at 8:40 am with Dr. Pascual.

## 2025-06-21 DIAGNOSIS — M32.9 SYSTEMIC LUPUS ERYTHEMATOSUS, UNSPECIFIED SLE TYPE, UNSPECIFIED ORGAN INVOLVEMENT STATUS: ICD-10-CM

## 2025-06-23 ENCOUNTER — PATIENT MESSAGE (OUTPATIENT)
Dept: OPTOMETRY | Facility: CLINIC | Age: 55
End: 2025-06-23

## 2025-06-23 ENCOUNTER — OFFICE VISIT (OUTPATIENT)
Dept: OPTOMETRY | Facility: CLINIC | Age: 55
End: 2025-06-23
Payer: COMMERCIAL

## 2025-06-23 DIAGNOSIS — H52.03 HYPEROPIA, BILATERAL: ICD-10-CM

## 2025-06-23 DIAGNOSIS — H04.123 DRY EYE SYNDROME, BILATERAL: Primary | ICD-10-CM

## 2025-06-23 DIAGNOSIS — M32.19 OTHER SYSTEMIC LUPUS ERYTHEMATOSUS WITH OTHER ORGAN INVOLVEMENT: ICD-10-CM

## 2025-06-23 DIAGNOSIS — Z79.899 LONG-TERM USE OF PLAQUENIL: ICD-10-CM

## 2025-06-23 DIAGNOSIS — Z46.0 FITTING AND ADJUSTMENT OF SPECTACLES AND CONTACT LENSES: ICD-10-CM

## 2025-06-23 DIAGNOSIS — R76.8 POSITIVE ANA (ANTINUCLEAR ANTIBODY): ICD-10-CM

## 2025-06-23 DIAGNOSIS — H52.4 PRESBYOPIA: ICD-10-CM

## 2025-06-23 PROCEDURE — 99212 OFFICE O/P EST SF 10 MIN: CPT | Mod: S$GLB,,, | Performed by: OPTOMETRIST

## 2025-06-23 PROCEDURE — 1160F RVW MEDS BY RX/DR IN RCRD: CPT | Mod: CPTII,S$GLB,, | Performed by: OPTOMETRIST

## 2025-06-23 PROCEDURE — 1159F MED LIST DOCD IN RCRD: CPT | Mod: CPTII,S$GLB,, | Performed by: OPTOMETRIST

## 2025-06-23 PROCEDURE — 99999 PR PBB SHADOW E&M-EST. PATIENT-LVL IV: CPT | Mod: PBBFAC,,, | Performed by: OPTOMETRIST

## 2025-06-23 NOTE — PROGRESS NOTES
HPI    Pt. Presents for CLFU. DLE - 02/07/25    Pt. States she was having trouble with last clfu. Had them primarily for   distance and could not use them for driving. Wears readers over them and   that worked fine, but distance/intermediate distance was trouble. Overall,   eyes are dry.   Denies F/F/Pain.   Eye meds: AT's PRN OU, did not like last rx gtts.   Last edited by Wagner Alvarenga MA on 6/23/2025  9:00 AM.            Assessment /Plan     For exam results, see Encounter Report.    Dry eye syndrome, bilateral  Start     lifitegrast (XIIDRA) 5 % Dpet; Place 1 drop into both eyes every 12 (twelve) hours.  Dispense: 180 each; Refill: 3   Consider restart VEVYE in the future    Other systemic lupus erythematosus with other organ involvement  Positive LIBRA (antinuclear antibody)  Long-term use of Plaquenil    Fitting and adjustment of spectacles and contact lenses  Hyperopia, bilateral  Presbyopia   Change power and brand   Ok to use OTC readers over contacts         RTC 2 weeks for dry eye f/u and DFE/ CLf/u

## 2025-06-24 ENCOUNTER — PATIENT MESSAGE (OUTPATIENT)
Dept: RHEUMATOLOGY | Facility: CLINIC | Age: 55
End: 2025-06-24
Payer: COMMERCIAL

## 2025-06-24 ENCOUNTER — PATIENT MESSAGE (OUTPATIENT)
Dept: OPTOMETRY | Facility: CLINIC | Age: 55
End: 2025-06-24
Payer: COMMERCIAL

## 2025-06-25 ENCOUNTER — PATIENT MESSAGE (OUTPATIENT)
Dept: PRIMARY CARE CLINIC | Facility: CLINIC | Age: 55
End: 2025-06-25
Payer: COMMERCIAL

## 2025-06-25 ENCOUNTER — OFFICE VISIT (OUTPATIENT)
Dept: PSYCHIATRY | Facility: CLINIC | Age: 55
End: 2025-06-25
Payer: COMMERCIAL

## 2025-06-25 DIAGNOSIS — F41.9 ANXIETY: Primary | ICD-10-CM

## 2025-06-25 DIAGNOSIS — F32.A DEPRESSION, UNSPECIFIED DEPRESSION TYPE: ICD-10-CM

## 2025-06-25 PROCEDURE — 90834 PSYTX W PT 45 MINUTES: CPT | Mod: S$GLB,,, | Performed by: SOCIAL WORKER

## 2025-06-25 PROCEDURE — 99999 PR PBB SHADOW E&M-EST. PATIENT-LVL I: CPT | Mod: PBBFAC,,, | Performed by: SOCIAL WORKER

## 2025-06-25 RX ORDER — HYDROXYCHLOROQUINE SULFATE 200 MG/1
TABLET, FILM COATED ORAL
Qty: 135 TABLET | Refills: 0 | Status: SHIPPED | OUTPATIENT
Start: 2025-06-25

## 2025-06-25 NOTE — PROGRESS NOTES
Notes:     Individual Psychotherapy (PhD/LCSW)    7/5/2023    Site: [place]    Therapeutic Intervention: Met with patient alone  Outpatient - Insight oriented psychotherapy 45 min - CPT code 71983    Chief complaint/reason for encounter: anxiety, depression     Interval history and content of current session: Pt  states she is working with Dr. Barahona to wean off Prozac, feels it causes agitation and gastric upset, will meet with him to discuss other options. Pt struggles with worrying about how others perceive her, continues to feel rejected by men she has dated and by her children. Discuss challenge of accepting her own words of self-validation, suggest she write a letter of affirmation to herself.     Treatment plan:  Target symptoms: anxiety , depression  Why chosen therapy is appropriate versus another modality: relevant to diagnosis  Outcome monitoring methods: self-report  Therapeutic intervention type: insight oriented psychotherapy    Risk parameters:  Patient reports no suicidal ideation  Patient reports no homicidal ideation  Patient reports no self-injurious behavior  Patient reports no violent behavior    Verbal deficits: None    Patient's response to intervention:  The patient's response to intervention is motivated    Progress toward goals and other mental status changes:  The patient's progress toward goals is mixed    Diagnosis:   Anxiety, depression    Plan:  individual psychotherapy    Return to clinic: f/u as scheduled    Length of Service (minutes): 45

## 2025-06-26 NOTE — TELEPHONE ENCOUNTER
LM for the patient to call the office.      Sent message on patient portal as well.    
Pt requesting to schedule appt for GERD/anxiety. Pt seen at  5/5 for same sx, EKG negative, tx with Protonix. Pt requesting f/u appt asap. Earliest appt August, will send to staff to assist in scheduling.   
none

## 2025-06-27 ENCOUNTER — OFFICE VISIT (OUTPATIENT)
Dept: INTERNAL MEDICINE | Facility: CLINIC | Age: 55
End: 2025-06-27
Payer: COMMERCIAL

## 2025-06-27 VITALS
HEART RATE: 85 BPM | HEIGHT: 64 IN | SYSTOLIC BLOOD PRESSURE: 94 MMHG | DIASTOLIC BLOOD PRESSURE: 70 MMHG | OXYGEN SATURATION: 98 % | TEMPERATURE: 98 F | WEIGHT: 136 LBS | BODY MASS INDEX: 23.22 KG/M2 | RESPIRATION RATE: 18 BRPM

## 2025-06-27 DIAGNOSIS — M32.8 OTHER FORMS OF SYSTEMIC LUPUS ERYTHEMATOSUS, UNSPECIFIED ORGAN INVOLVEMENT STATUS: ICD-10-CM

## 2025-06-27 DIAGNOSIS — R07.89 BURNING CHEST PAIN: Primary | ICD-10-CM

## 2025-06-27 DIAGNOSIS — R13.10 DYSPHAGIA, UNSPECIFIED TYPE: ICD-10-CM

## 2025-06-27 PROCEDURE — 99999 PR PBB SHADOW E&M-EST. PATIENT-LVL V: CPT | Mod: PBBFAC,,,

## 2025-06-27 RX ORDER — SUCRALFATE 1 G/10ML
1 SUSPENSION ORAL 4 TIMES DAILY
Qty: 473 ML | Refills: 1 | Status: SHIPPED | OUTPATIENT
Start: 2025-06-27

## 2025-06-27 RX ORDER — GABAPENTIN 600 MG/1
600 TABLET ORAL DAILY PRN
COMMUNITY
Start: 2025-06-22

## 2025-06-27 NOTE — PROGRESS NOTES
Becky Cade  1970        Subjective     Chief Complaint: Heartburn      History of Present Illness:  Ms. Becky Cade is a 54 y.o. female with hypothyroidism, ANAM, MDD, cervical radiculopathy, SLE who presents to clinic for heartburn causing increased anxiety.  Attempted Protonix without relief.        History of Present Illness    CHIEF COMPLAINT:  Ms. Cade presents today for heartburn symptoms that started in March    GASTROINTESTINAL SYMPTOMS:  She reports persistent burning sensation in esophagus that begins with morning movement, accompanied by clear phlegmy coating in throat and significant nausea. She experiences difficulty with eating, including food getting stuck and post-meal vomiting. She can only tolerate small amounts of food such as crackers with cream cheese and yogurt. She notes taste changes where previously enjoyed foods and beverages, including morning hot tea and diet soda, taste unpleasant. Symptoms worsen with larger meals or spicy foods. She denies difficulty swallowing pills but reports a constant uncomfortable sensation in her throat that moves from upper to lower regions throughout the day.    PREVIOUS GI TREATMENTS:  She has attempted symptom management with OTC Pepcid AC and Pantoprazole from urgent care without improvement. She currently uses chocolate milk to soothe esophageal symptoms.    PSYCHIATRIC HISTORY:  She has a 30-year history of Paxil use. In March, she was transitioned to Prozac by Dr. Barahona and is currently weaning from 60mg to 40mg, with plans to reduce to 20mg. She takes Ativan nightly for sleep and nighttime symptoms. She previously took Ativan during daytime but is now using alternative coping methods. She reports increased anxiety related to multiple life stressors including divorce and family dynamics.    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 She takes  hydroxychloroquine 200mg for joint pain and inflammation management. She is menopausal and manages symptoms with estrogen gel and progesterone.    SURGICAL HISTORY:  She underwent breast reduction and lift on May 13th, which she describes as positive despite associated mental and physical stress.      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 OR [address] [place] [address];  Service: OB/GYN;  Laterality: N/A;    INSERTION OF MIDURETHRAL SLING N/A 3/25/2022    Procedure: SLING, MIDURETHRAL;  Surgeon: Brian Babcock MD;  Location: [facility] OR [address] [place] [address];  Service: OB/GYN;  Laterality: N/A;    TONSILLECTOMY  1993       Family History   Problem Relation Name Age of Onset    Parkinsonism Father      Rheum arthritis Mother      Arthritis Mother      Malignant hyperthermia Brother      Breast cancer Neg Hx      Colon cancer Neg Hx      Ovarian cancer Neg Hx      Melanoma Neg Hx      Cancer Neg Hx      Heart disease Neg Hx      Cervical cancer Neg Hx      Uterine cancer Neg Hx         Social History     Socioeconomic History    Marital status:    Tobacco Use    Smoking status: Never     Smokeless tobacco: Never   Substance and Sexual Activity    Alcohol use: Yes     Alcohol/week: 2.0 standard drinks of alcohol     Types: 2 Glasses of wine per week     Comment: socially on weekends    Drug use: No    Sexual activity: Yes     Partners: Male     Birth control/protection: Other-see comments     Comment: Apri Birth Control Pills     Social Drivers of Health     Financial Resource Strain: Patient Declined (5/27/2024)    Overall Financial Resource Strain (CARDIA)     Difficulty of Paying Living Expenses: Patient declined   Food Insecurity: No Food Insecurity (5/27/2024)    Hunger Vital Sign     Worried About Running Out of Food in the Last Year: Never true     Ran Out of Food in the Last Year: Never true   Physical Activity: Insufficiently Active (5/27/2024)    Exercise Vital Sign     Days of Exercise per Week: 4 days     Minutes of Exercise per Session: 30 min   Stress: Stress Concern Present (5/27/2024)    [employer] - Occupational Stress Questionnaire     Feeling of Stress : To some extent   Housing Stability: Unknown (1/17/2024)    Housing Stability Vital Sign     Unable to Pay for Housing in the Last Year: No     Unstable Housing in the Last Year: No       MEDICATIONS & ALLERGIES:     Current Outpatient Medications on File Prior to Visit   Medication Sig    calcium-vitamin D3 (OS-KRISTY 500 + D3) 500 mg-5 mcg (200 unit) per tablet Take 1 tablet by mouth 2 (two) times daily with meals.    cyanocobalamin (VITAMIN B-12) 1000 MCG tablet Take 1,000 mcg by mouth once daily.    gabapentin (NEURONTIN) 600 MG tablet Take 600 mg by mouth daily as needed.    hydroxychloroquine (PLAQUENIL) 200 mg tablet TAKE 1 AND 1/2 TABLETS(300 MG) BY MOUTH DAILY    levothyroxine (SYNTHROID) 75 MCG tablet TAKE 1 TABLET(75 MCG) BY MOUTH BEFORE BREAKFAST    lifitegrast (XIIDRA) 5 % Dpet Place 1 drop into both eyes every 12 (twelve) hours.    magnesium 30 mg Tab Take by mouth once.    pantoprazole  (PROTONIX) 40 MG tablet Take 1 tablet (40 mg total) by mouth daily as needed (heartburn).    progesterone (PROMETRIUM) 100 MG capsule TAKE 1 CAPSULE(100 MG) BY MOUTH EVERY NIGHT    acyclovir (ZOVIRAX) 400 MG tablet Take 1 tablet (400 mg total) by mouth 2 (two) times daily. Fever blisters    alclometasone (ACLOVATE) 0.05 % cream AAA face bid prn redness or scaling or itching. Do not apply to the same location more than 2 weeks in a row.    clotrimazole-betamethasone 1-0.05% (LOTRISONE) cream Apply topically 2 (two) times daily.    dapsone (ACZONE) 7.5 % GlwP Use hs on face    estradioL (DIVIGEL) 0.25 mg/0.25 gram (0.1 %) GlPk Place 1 packet onto the skin once daily.    estradioL (ESTRACE) 0.01 % (0.1 mg/gram) vaginal cream Use 0.5 gram of estrogen cream in vagina twice weekly    eszopiclone (LUNESTA) 3 mg Tab Take 3 mg by mouth nightly as needed.    FLUoxetine 20 MG capsule Take by mouth.    FLUoxetine 20 MG tablet Take 60 mg by mouth daily as needed.    FLUoxetine 40 MG capsule Take by mouth.    ketoconazole (NIZORAL) 2 % cream Apply to the affected area on face twice a day for maintenance.    ketorolac (TORADOL) 10 mg tablet Take 10 mg by mouth every 6 (six) hours as needed.    LORazepam (ATIVAN) 0.5 MG tablet Take 1 tablet (0.5 mg total) by mouth nightly as needed for Anxiety. Use only infrequently, can be addictive    methylPREDNISolone (MEDROL DOSEPACK) 4 mg tablet SMARTSIG:- Tablet(s) By Mouth -    naproxen sodium (ANAPROX) 550 MG tablet Take 1 tablet (550 mg total) by mouth as needed (as needed for jaw pain).    oxyCODONE-acetaminophen (PERCOCET) 5-325 mg per tablet Take by mouth.    terconazole (TERAZOL 7) 0.4 % Crea Place 1 applicator vaginally every evening.    triamcinolone acetonide 0.1% (KENALOG) 0.1 % cream AAA bid     No current facility-administered medications on file prior to visit.       Review of patient's allergies indicates:   Allergen Reactions    Buspar [buspirone] Other (See Comments)      "dizziness    Mobic [meloxicam] Rash       OBJECTIVE:     Vital Signs:  Vitals:    06/27/25 1011   BP: 94/70   BP Location: Right arm   Patient Position: Sitting   Pulse: 85   Resp: 18   Temp: 97.6 °F (36.4 °C)   TempSrc: Temporal   SpO2: 98%   Weight: 61.7 kg (136 lb 0.4 oz)   Height: 5' 4" (1.626 m)       Body mass index is 23.35 kg/m².     Physical Exam:  Physical Exam  Vitals and nursing note reviewed.   Constitutional:       General: She is not in acute distress.     Appearance: She is not ill-appearing.   HENT:      Head: Normocephalic and atraumatic.      Mouth/Throat:      Mouth: Mucous membranes are moist.      Pharynx: Oropharynx is clear. No oropharyngeal exudate or posterior oropharyngeal erythema.   Eyes:      Extraocular Movements: Extraocular movements intact.      Conjunctiva/sclera: Conjunctivae normal.   Cardiovascular:      Rate and Rhythm: Normal rate and regular rhythm.   Pulmonary:      Effort: Pulmonary effort is normal. No respiratory distress.      Breath sounds: Normal breath sounds. No wheezing or rales.   Chest:      Chest wall: No tenderness.   Abdominal:      Palpations: Abdomen is soft.      Tenderness: There is no abdominal tenderness. There is no guarding.   Musculoskeletal:         General: Normal range of motion.      Cervical back: Normal range of motion and neck supple. No tenderness.      Right lower leg: No edema.      Left lower leg: No edema.   Lymphadenopathy:      Cervical: No cervical adenopathy.   Skin:     General: Skin is warm and dry.   Neurological:      Mental Status: She is alert and oriented to person, place, and time.   Psychiatric:         Mood and Affect: Mood is anxious.            Laboratory  Lab Results   Component Value Date    WBC 6.26 03/07/2025    HGB 14.0 03/07/2025    HCT 42.4 03/07/2025    MCV 96 03/07/2025     03/07/2025     Lab Results   Component Value Date    GLU 92 03/07/2025     03/07/2025    K 4.8 03/07/2025     03/07/2025    " "CO2 25 03/07/2025    BUN 15 03/07/2025    CREATININE 0.8 03/07/2025    CALCIUM 9.7 03/07/2025     No results found for: "INR", "PROTIME"  Lab Results   Component Value Date    HGBA1C 5.6 12/12/2024     No results for input(s): "POCTGLUCOSE" in the last 72 hours.      Health Maintenance         Date Due Completion Date    HIV Screening Never done ---    COVID-19 Vaccine (7 - 2024-25 season) 09/01/2024 11/17/2023    Mammogram 03/07/2026 3/7/2025    Cervical Cancer Screening 01/03/2029 1/3/2024    Lipid Panel 12/12/2029 12/12/2024    TETANUS VACCINE 01/06/2030 1/6/2020    Colorectal Cancer Screening 09/18/2030 9/18/2020    RSV Vaccine (Age 60+ and Pregnant patients) (1 - 1-dose 75+ series) 07/13/2045 ---            ASSESSMENT & PLAN:   54 y.o. female who was seen today in clinic for burning chest pain    Burning chest pain  -     sucralfate (CARAFATE) 100 mg/mL suspension; Take 10 mLs (1 g total) by mouth 4 (four) times daily.  Dispense: 473 mL; Refill: 1  -     Ambulatory referral/consult to Gastroenterology; Future; Expected date: 07/04/2025    Dysphagia, unspecified type  -     sucralfate (CARAFATE) 100 mg/mL suspension; Take 10 mLs (1 g total) by mouth 4 (four) times daily.  Dispense: 473 mL; Refill: 1  -     Ambulatory referral/consult to Gastroenterology; Future; Expected date: 07/04/2025    Other forms of systemic lupus erythematosus, unspecified organ involvement status         1. Burning chest pain    2. Dysphagia, unspecified type    3. Other forms of systemic lupus erythematosus, unspecified organ involvement status        1/2/3.  Worsening heartburn causing increased anxiety.  Evaluated at  with unremarkable EKG.  Attempted Protonix without relief.  Physical exam unremarkable.  Concern for possible esophageal dysmotility vs infection with underlying lupus on Plaquenil.  Sucralfate trial sent to pharmacy.  GI referral for consideration of EGD and worsening symptoms.      RTC in 4-6 months for follow " courtney Gunn MD  [facility]    This note was generated with the assistance of ambient listening technology. Verbal consent was obtained by the patient and accompanying visitor(s) for the recording of patient appointment to facilitate this note. I attest to having reviewed and edited the generated note for accuracy, though some syntax or spelling errors may persist. Please contact the author of this note for any clarification.

## 2025-07-02 ENCOUNTER — OFFICE VISIT (OUTPATIENT)
Dept: PSYCHIATRY | Facility: CLINIC | Age: 55
End: 2025-07-02
Payer: COMMERCIAL

## 2025-07-02 DIAGNOSIS — F32.A DEPRESSION, UNSPECIFIED DEPRESSION TYPE: ICD-10-CM

## 2025-07-02 DIAGNOSIS — F41.9 ANXIETY: Primary | ICD-10-CM

## 2025-07-02 PROCEDURE — 90834 PSYTX W PT 45 MINUTES: CPT | Mod: S$GLB,,, | Performed by: SOCIAL WORKER

## 2025-07-02 PROCEDURE — 99999 PR PBB SHADOW E&M-EST. PATIENT-LVL I: CPT | Mod: PBBFAC,,, | Performed by: SOCIAL WORKER

## 2025-07-02 NOTE — PROGRESS NOTES
"  Notes:     Individual Psychotherapy (PhD/LCSW)    7/5/2023    Site: [place]    Therapeutic Intervention: Met with patient alone  Outpatient - Insight oriented psychotherapy 45 min - CPT code 66336    Chief complaint/reason for encounter: anxiety, depression     Interval history and content of current session: Pt dramatically relates recent family conflict between mother, 2 brothers and herself resulting in cancelling her plans to visit brother Bernard. Point out level of agitation, pt aware of feeling urgent need to be heard, believed and validated by others, fear of being dismissed. Discuss ways to relate events calmly with less urgency, and learning to self-validate instead of looking to others. Pt states she looks for "the [employer] movie" where she gets through and helps others to change.     Treatment plan:  Target symptoms: anxiety , depression  Why chosen therapy is appropriate versus another modality: relevant to diagnosis  Outcome monitoring methods: self-report  Therapeutic intervention type: insight oriented psychotherapy    Risk parameters:  Patient reports no suicidal ideation  Patient reports no homicidal ideation  Patient reports no self-injurious behavior  Patient reports no violent behavior    Verbal deficits: None    Patient's response to intervention:  The patient's response to intervention is motivated    Progress toward goals and other mental status changes:  The patient's progress toward goals is mixed    Diagnosis:   Anxiety, depression    Plan:  individual psychotherapy    Return to clinic: f/u as scheduled    Length of Service (minutes): 45      "

## 2025-07-07 ENCOUNTER — TELEPHONE (OUTPATIENT)
Dept: OPTOMETRY | Facility: CLINIC | Age: 55
End: 2025-07-07
Payer: COMMERCIAL

## 2025-07-07 ENCOUNTER — PATIENT MESSAGE (OUTPATIENT)
Dept: RHEUMATOLOGY | Facility: CLINIC | Age: 55
End: 2025-07-07
Payer: COMMERCIAL

## 2025-07-07 NOTE — TELEPHONE ENCOUNTER
Copied from CRM #[id number]. Topic: Appointments - Appointment Rescheduling  >> Jul 7, 2025  3:23 PM Evy wrote:  RESCHEDULE/APPTS     Current Appt Date:07-     Type of Appt:dry eye f/u and DFE     Physician:Ankur      Reason for Scheduling:Pt would like to reschedule to another date same [place]     Caller:Becky Cade       Contact Preference:[phone] (home)

## 2025-07-09 ENCOUNTER — OFFICE VISIT (OUTPATIENT)
Dept: PSYCHIATRY | Facility: CLINIC | Age: 55
End: 2025-07-09
Payer: COMMERCIAL

## 2025-07-09 ENCOUNTER — OFFICE VISIT (OUTPATIENT)
Dept: GASTROENTEROLOGY | Facility: CLINIC | Age: 55
End: 2025-07-09
Payer: COMMERCIAL

## 2025-07-09 VITALS
DIASTOLIC BLOOD PRESSURE: 73 MMHG | BODY MASS INDEX: 23.58 KG/M2 | HEART RATE: 85 BPM | WEIGHT: 137.38 LBS | SYSTOLIC BLOOD PRESSURE: 104 MMHG

## 2025-07-09 DIAGNOSIS — F32.A DEPRESSION, UNSPECIFIED DEPRESSION TYPE: ICD-10-CM

## 2025-07-09 DIAGNOSIS — R13.10 DYSPHAGIA, UNSPECIFIED TYPE: Primary | ICD-10-CM

## 2025-07-09 DIAGNOSIS — R07.89 BURNING CHEST PAIN: ICD-10-CM

## 2025-07-09 DIAGNOSIS — F41.9 ANXIETY: Primary | ICD-10-CM

## 2025-07-09 DIAGNOSIS — Z84.89 FAMILY HISTORY OF MALIGNANT HYPERTHERMIA: ICD-10-CM

## 2025-07-09 DIAGNOSIS — R09.A2 GLOBUS SENSATION: ICD-10-CM

## 2025-07-09 DIAGNOSIS — K21.9 GASTROESOPHAGEAL REFLUX DISEASE, UNSPECIFIED WHETHER ESOPHAGITIS PRESENT: ICD-10-CM

## 2025-07-09 DIAGNOSIS — R12 HEARTBURN: ICD-10-CM

## 2025-07-09 DIAGNOSIS — K58.0 IRRITABLE BOWEL SYNDROME WITH DIARRHEA: ICD-10-CM

## 2025-07-09 PROCEDURE — 99999 PR PBB SHADOW E&M-EST. PATIENT-LVL I: CPT | Mod: PBBFAC,,, | Performed by: SOCIAL WORKER

## 2025-07-09 PROCEDURE — 99204 OFFICE O/P NEW MOD 45 MIN: CPT | Mod: S$GLB,,,

## 2025-07-09 PROCEDURE — 90834 PSYTX W PT 45 MINUTES: CPT | Mod: S$GLB,,, | Performed by: SOCIAL WORKER

## 2025-07-09 PROCEDURE — 3074F SYST BP LT 130 MM HG: CPT | Mod: CPTII,S$GLB,,

## 2025-07-09 PROCEDURE — 1159F MED LIST DOCD IN RCRD: CPT | Mod: CPTII,S$GLB,,

## 2025-07-09 PROCEDURE — 99999 PR PBB SHADOW E&M-EST. PATIENT-LVL V: CPT | Mod: PBBFAC,,,

## 2025-07-09 PROCEDURE — 3078F DIAST BP <80 MM HG: CPT | Mod: CPTII,S$GLB,,

## 2025-07-09 PROCEDURE — G2211 COMPLEX E/M VISIT ADD ON: HCPCS | Mod: S$GLB,,,

## 2025-07-09 PROCEDURE — 3008F BODY MASS INDEX DOCD: CPT | Mod: CPTII,S$GLB,,

## 2025-07-09 RX ORDER — PANTOPRAZOLE SODIUM 40 MG/1
40 TABLET, DELAYED RELEASE ORAL DAILY
Qty: 90 TABLET | Refills: 1 | Status: SHIPPED | OUTPATIENT
Start: 2025-07-09 | End: 2026-01-05

## 2025-07-09 NOTE — PATIENT INSTRUCTIONS
For GERD/Reflux:     Take your PPI 30-45 minutes before your first protein containing meal (breakfast) every day. Take once daily for 8 weeks. If symptoms improve ok discontinue an use PPI as needed for symptoms.      Take Pepcid 20mg in the evening before bedtime to help with nocturnal symptoms, as needed.     Remain upright for at least 3 hours after eating.      Elevate the head of the bed for nighttime.      Avoid foods that you have noticed make your symptoms worse (possible triggers include: peppermint, alcohol, chocolate, caffeine, spicy foods, greasy/fried foods, acidic foods-citrus).     GERD Treatment: Lifestyle and Dietary Changes    Dietary and lifestyle changes are the first step in treating GERD. Certain foods make the reflux worse. Suggestions to help alleviate symptoms include:  Lose weight if you are overweight -- of all of the lifestyle changes you can make, this one is the most effective.  Avoid foods that increase the level of acid in your stomach, including caffeinated beverages.  Avoid foods that decrease the pressure in the lower esophagus, such as fatty foods, alcohol and peppermint.  Avoid foods that affect peristalsis (the muscle movements in your digestive tract), such as coffee, alcohol and acidic liquids.  Avoid foods that slow gastric emptying, including fatty foods.  Avoid large meals.  Quit smoking.  Do not lie down immediately after a meal.  Elevate the level of your head when you lie down.    Foods That May Cause Heartburn  Foods commonly known to be heartburn triggers cause the esophageal sphincter to relax and delay the digestive process, letting food sit in the stomach longer, says Catalina. The worst culprits? Foods that are high in fat, salt or spice such as:  Fried food  Fast food  Pizza  Potato chips and other processed snacks  Chili powder and pepper (white, black, cayenne)  Fatty meats such as lam and sausage  Cheese  Other foods that can cause the same problem  include:  Tomato-based sauces  Citrus fruits  Chocolate  Peppermint  Carbonated beverages    Moderation is key since many people may not be able to or want to completely eliminate these foods. But try to avoid eating problem foods late in the evening closer to bedtime, so they're not sitting in your stomach and then coming up your esophagus when you lay down at night. It's also a good idea to eat small frequent meals instead of bigger, heavier meals and avoid late-night dinners and bedtime snacks.    Foods That Help Prevent Acid Reflux  Good news: There are plenty of things you can eat to help prevent acid reflux. Stock your kitchen with foods from these three categories:    High-fiber foods  Fibrous foods make you feel full so you're less likely to overeat, which may contribute to heartburn. So, load up on healthy fiber from these foods:  Whole grains such as oatmeal, couscous and brown rice.  Root vegetables such as sweet potatoes, carrots and beets.  Green vegetables such as asparagus, broccoli and green beans.    Alkaline foods  Foods fall somewhere along the pH scale (an indicator of acid levels). Those that have a low pH are acidic and more likely to cause reflux. Those with higher pH are alkaline and can help offset strong stomach acid. Alkaline foods include:  Bananas  Melons  Cauliflower  Fennel  Nuts    Watery foods  Eating foods that contain a lot of water can dilute and weaken stomach acid. Choose foods such as:  Celery  Cucumber  Lettuce  Watermelon  Broth-based soups  Herbal tea

## 2025-07-09 NOTE — PROGRESS NOTES
Patient ID: Becky Cade is a 54 y.o. female.    PCP:   Danny Gunn   [address] [facility] / [place] [id number]    Gastroenterology Clinic Consultation Note    Reason for Visit:  The primary encounter diagnosis was Dysphagia, unspecified type. Diagnoses of Globus sensation, Gastroesophageal reflux disease, unspecified whether esophagitis present, Burning chest pain, Heartburn, Family history of malignant hyperthermia, and Irritable bowel syndrome with diarrhea were also pertinent to this visit.  Chief Complaint: GERD and dysphagia     History of Present Illness    CHIEF COMPLAINT:  Patient presents today for evaluation of severe heartburn and chest pain that started in March.    HISTORY OF PRESENT ILLNESS:  She reports onset of heartburn and chest pain symptoms in March, initially occurring after drinking wine at her nephew's wedding. She describes burning pain radiating from chest to throat, characterized as severe. Symptoms are exacerbated by certain foods, particularly spicy dishes, Gerry sauce, and foods requiring extensive chewing. She currently only tolerates mashed potatoes and chocolate milk without experiencing pain. She experiences new-onset difficulty swallowing with sensation of food getting stuck, frequent vomiting of undigested food, hiccups, water brash, and burping with every meal, and produces clear mucus that she occasionally coughs up. She reports nausea continuously for 24 hours. She denies shortness of breath or other cardiac-related symptoms. She previously was on Protonix but discontinued after approximately two weeks saying it was ineffective in managing her symptoms.    Denies changes in bowel patterns. Normal bowel patterns are typically multiple times a day. Denies diarrhea. Denies blood in stool or UIWL. Denies abdominal pains.     MEDICAL HISTORY:  She has a history of lupus, currently being managed by a new PCP, and reports having seven out of ten typical lupus  symptoms. She also has a lifelong history of irritable bowel syndrome previously diagnosed.    SURGICAL HISTORY:  She underwent breast reduction surgery on March 13th and describes ongoing recovery with chest discomfort.    CURRENT MEDICATIONS:  She is currently taking Prozac 20 mg tablet, Ativan at night, and a recently prescribed carafate liquid suspension taken 4 times daily.     FAMILY HISTORY:  She has a family history of malignant hyperthermia, with her brother being affected by the condition.     SOCIAL HISTORY:  She reports experiencing significant family stress over the past four years, including a recent divorce and estrangement from her adult children. Regarding alcohol use, she reports limited consumption, describing her intake as occasional wine or champagne.    ANXIETY:  She indicates high level of anxiety related to symptoms, expressing fear about her condition and describing daily shaking. She feels that symptoms are interconnected and expresses concern about potential serious underlying conditions.    Denies any nausea. No abdominal pains, changes in bowel pattern, blood/ mucus in stool or unintentional weight loss. Nocturnal symptoms. No melena or maroon stools. No recent changes in diet. No family history of IBD, Celiac disease or GI malignancy. No regular NSAIDs. No tobacco use. No recent antibiotic use, travels or sick contacts. No prior history of C.diff.    Abdominal Surgeries: none    ROS:  General: -fever, -chills, -fatigue, -weight gain, -weight loss, +cold sweats  Eyes: -vision changes, -redness, -discharge  ENT: -ear pain, -nasal congestion, -sore throat  Cardiovascular: -chest pain, -palpitations, -lower extremity edema  Respiratory: -cough, -shortness of breath, +productive cough  Gastrointestinal: -abdominal pain, +nausea, +vomiting, -diarrhea, -constipation, -blood in stool, +heartburn, +difficulty swallowing, +excessive belching  Genitourinary: -dysuria, -hematuria,  -frequency  Musculoskeletal: -joint pain, -muscle pain  Skin: -rash, -lesion  Neurological: -headache, -dizziness, -numbness, -tingling  Psychiatric: +anxiety, -depression, -sleep difficulty    Medical History:  has a past medical history of Anxiety, ASCUS of cervix (2020), Autoimmune disorder: just features: mouth sores, butterfly rash (09/26/2012), PENNY III (cervical intraepithelial neoplasia III) (2009), Fever blister, Hypothyroid, and 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    Surgical History:  has a past surgical history that includes Tonsillectomy (1993); Cold knife conization of cervix (2009); Colonoscopy (N/A, 9/18/2020); Insertion of midurethral sling (N/A, 3/25/2022); and Cystoscopy (N/A, 3/25/2022).    Family History: family history includes Arthritis in her mother; Malignant hyperthermia in her brother; Parkinsonism in her father; Rheum arthritis in her mother..     Vital Signs:  /73   Pulse 85   Wt 62.3 kg (137 lb 5.6 oz)   LMP 02/07/2023 (Approximate)   BMI 23.58 kg/m²   Body mass index is 23.58 kg/m².    Physical Exam    General: No acute distress. Well-developed. Well-nourished.  Eyes: EOMI. Sclerae anicteric.  HENT: Normocephalic. Atraumatic. Nares patent. Moist oral mucosa.  Ears: Bilateral TMs clear. Bilateral EACs clear.  Cardiovascular: Regular rate. Regular rhythm. No murmurs. No rubs. No gallops. Normal S1, S2.  Respiratory: Normal respiratory effort. Clear to auscultation bilaterally. No rales. No rhonchi. No wheezing.  Abdomen: Soft. Non-tender. Non-distended. Normoactive bowel sounds.  Musculoskeletal: No  obvious deformity.  Extremities: No lower extremity edema.  Neurological: Alert & oriented x3. No slurred speech. Normal gait.  Psychiatric: Normal mood. Normal affect. Good insight. Good judgment.  Skin: Warm. Dry. No rash.         Review of patient's allergies indicates:   Allergen Reactions    Buspar [buspirone] Other (See Comments)     dizziness    Mobic  [meloxicam] Rash     Labs:  Lab Results   Component Value Date    WBC 6.26 03/07/2025    HGB 14.0 03/07/2025    HCT 42.4 03/07/2025     03/07/2025    CRP 2.0 03/07/2025    CHOL 168 12/12/2024    TRIG 102 12/12/2024    HDL 59 12/12/2024    ALKPHOS 74 03/07/2025    ALT 11 03/07/2025    AST 32 03/07/2025     03/07/2025    K 4.8 03/07/2025     03/07/2025    CREATININE 0.8 03/07/2025    BUN 15 03/07/2025    CO2 25 03/07/2025    TSH 4.229 (H) 12/12/2024    HGBA1C 5.6 12/12/2024     Imaging reviewed:   No recent/relevant GI imaging performed for review.     Endoscopy reviewed:   Procedure:            Colonoscopy 9/18/2020  Indications:          Screening for colorectal malignant neoplasm   Impression:           - The entire examined colon is normal on direct and                         retroflexion views.                         - No specimens collected.   - Repeat colonoscopy in 10 years for screening                         purposes.     Assessment & Plan    K21.9 Gastroesophageal reflux disease, unspecified whether esophagitis present   M32.10 Systemic lupus erythematosus, organ or system involvement unspecified  K58.0 Irritable bowel syndrome with diarrhea  F41.9 Anxiety disorder, unspecified  Z84.89 Family history of other specified conditions    GASTRO-ESOPHAGEAL REFLUX DISEASE (GERD):  - Suspect GERD based on symptoms of heartburn, trouble swallowing, nausea, and abdominal pain.  - Previous 2-week trial of pantoprazole (Protonix) was ineffective. Advise she continue use for at least 8 weeks. Discussed proper use of PPI for best results.   - Discussed common GERD triggers including spicy foods, fried foods, red sauces, alcohol, caffeine, and carbonated beverages.  - Educated on water brash symptom (sour taste in throat) as related to reflux.  - Explained that PPIs like Protonix typically require 8 weeks of consistent use to see full benefits.  - Patient to avoid known dietary triggers for reflux  symptoms.  - Started Protonix (pantoprazole) 40 mg daily, to be taken 30-45 minutes before eating or drinking anything in the morning.  - Continued Carafate (sucralfate) liquid, can be taken 2-4 times daily as needed.  - Ordered upper endoscopy (EGD) with biopsies to evaluate for esophageal inflammation, gastritis, ulcers, H. pylori and other abnormalities; explained procedure  ** family history of malignant hyperthermia**     Assessment:  1. Dysphagia, unspecified type    2. Globus sensation    3. Gastroesophageal reflux disease, unspecified whether esophagitis present    4. Burning chest pain    5. Heartburn    6. Family history of malignant hyperthermia    7. Irritable bowel syndrome with diarrhea      Follow up in about 3 months (around 10/9/2025).    Thank you for allowing me to participate in this patient's care.     Sincerely,      SHANNON FUENTES  Gastroenterology Department  [facility]     This note was generated with the assistance of ambient listening technology. Verbal consent was obtained by the patient and accompanying visitor(s) for the recording of patient appointment to facilitate this note. I attest to having reviewed and edited the generated note for accuracy, though some syntax or spelling errors may persist. Please contact the author of this note for any clarification.

## 2025-07-09 NOTE — PROGRESS NOTES
Notes:     Individual Psychotherapy (PhD/LCSW)    7/5/2023    Site: [place]    Therapeutic Intervention: Met with patient alone  Outpatient - Insight oriented psychotherapy 45 min - CPT code 70512    Chief complaint/reason for encounter: anxiety, depression     Interval history and content of current session: Pt working on relating events without re-experiencing them, sees that she experiences less somatic stress. Pt seeing GI today about burning in chest. Pt saw Dr. Brooks, on 20mg Prozac and Ativan. Pt continues to work on self-validation vs needing love from others. Birthday is this week, pt states historically birthdays were downplayed in her family, and she felt others gathered around her twin on that day, now avoids being center of attention. Encourage pt to notice and take in attention she gets and allow herself to feel special on her birthday.     Treatment plan:  Target symptoms: anxiety , depression  Why chosen therapy is appropriate versus another modality: relevant to diagnosis  Outcome monitoring methods: self-report  Therapeutic intervention type: insight oriented psychotherapy    Risk parameters:  Patient reports no suicidal ideation  Patient reports no homicidal ideation  Patient reports no self-injurious behavior  Patient reports no violent behavior    Verbal deficits: None    Patient's response to intervention:  The patient's response to intervention is motivated    Progress toward goals and other mental status changes:  The patient's progress toward goals is good    Diagnosis:   Anxiety, depression    Plan:  individual psychotherapy    Return to clinic: f/u as scheduled    Length of Service (minutes): 45

## 2025-07-10 ENCOUNTER — PATIENT MESSAGE (OUTPATIENT)
Dept: GASTROENTEROLOGY | Facility: CLINIC | Age: 55
End: 2025-07-10
Payer: COMMERCIAL

## 2025-07-11 ENCOUNTER — TELEPHONE (OUTPATIENT)
Dept: ENDOSCOPY | Facility: HOSPITAL | Age: 55
End: 2025-07-11
Payer: COMMERCIAL

## 2025-07-11 DIAGNOSIS — K21.9 GASTROESOPHAGEAL REFLUX DISEASE, UNSPECIFIED WHETHER ESOPHAGITIS PRESENT: Primary | ICD-10-CM

## 2025-07-11 NOTE — TELEPHONE ENCOUNTER
"  ----- Message -----  From: Brook Maloney NP  Sent: 7/9/2025  11:06 AM CDT  To: Henna Small RN    Procedure: EGD    Diagnosis: GERD, Dysphagia, and Nausea/Vomiting    Procedure Timing: Within 4 weeks (Urgent)    *If within 4 weeks selected, please jaylyn as high priority*    *If greater than 12 weeks, please select "5-12 weeks" and delay sending until 3 months prior to requested date*    Location: Any Site    Additional Scheduling Information: family history of malignant hyperthermia    Prep Specifications:Standard prep    Is the patient taking a GLP-1 Agonist:no    Have you attached a patient to this message: yes   "

## 2025-07-11 NOTE — TELEPHONE ENCOUNTER
Patient is scheduled for a Upper Endoscopy (EGD) on 9/3/25 with Dr. JACKLYN Nunez  Referral for procedure from [facility]

## 2025-07-15 ENCOUNTER — TELEPHONE (OUTPATIENT)
Dept: ENDOSCOPY | Facility: HOSPITAL | Age: 55
End: 2025-07-15
Payer: COMMERCIAL

## 2025-07-15 DIAGNOSIS — F41.1 GAD (GENERALIZED ANXIETY DISORDER): ICD-10-CM

## 2025-07-15 DIAGNOSIS — Z79.899 LONG-TERM CURRENT USE OF BENZODIAZEPINE: ICD-10-CM

## 2025-07-15 NOTE — TELEPHONE ENCOUNTER
"Patient is scheduled for a Upper Endoscopy (EGD) on 7/22/25 with Dr. YELENA Heller  Referral for procedure from [facility].             ----- Message -----  From: Brook Maloney NP  Sent: 7/9/2025  11:06 AM CDT  To: Henna Small RN    Procedure: EGD    Diagnosis: GERD, Dysphagia, and Nausea/Vomiting    Procedure Timing: Within 4 weeks (Urgent)    *If within 4 weeks selected, please jaylyn as high priority*    *If greater than 12 weeks, please select "5-12 weeks" and delay sending until 3 months prior to requested date*    Location: Any Site    Additional Scheduling Information: family history of malignant hyperthermia    Prep Specifications:Standard prep    Is the patient taking a GLP-1 Agonist:no    Have you attached a patient to this message: yes   "

## 2025-07-16 ENCOUNTER — OFFICE VISIT (OUTPATIENT)
Dept: PSYCHIATRY | Facility: CLINIC | Age: 55
End: 2025-07-16
Payer: COMMERCIAL

## 2025-07-16 DIAGNOSIS — F32.A DEPRESSION, UNSPECIFIED DEPRESSION TYPE: ICD-10-CM

## 2025-07-16 DIAGNOSIS — F41.9 ANXIETY: Primary | ICD-10-CM

## 2025-07-16 PROCEDURE — 99999 PR PBB SHADOW E&M-EST. PATIENT-LVL I: CPT | Mod: PBBFAC,,, | Performed by: SOCIAL WORKER

## 2025-07-16 PROCEDURE — 90834 PSYTX W PT 45 MINUTES: CPT | Mod: S$GLB,,, | Performed by: SOCIAL WORKER

## 2025-07-16 NOTE — PROGRESS NOTES
"  Notes:     Individual Psychotherapy (PhD/LCSW)    7/5/2023    Site: [place]    Therapeutic Intervention: Met with patient alone  Outpatient - Insight oriented psychotherapy 45 min - CPT code 11798    Chief complaint/reason for encounter: anxiety, depression     Interval history and content of current session: Pt doing well, spent time with friend Priya in [place] on her birthday, was able to accept compliments and affection from others, able to see H's unhappiness with  which helped pt feel less urgency about finding a partner. Pt got rote message from daughter on e-mail stating "we love you and miss you" and replied in kind. Discuss goal of not taking on other people's needs and problems, and validating her own observations and reactions.     Treatment plan:  Target symptoms: anxiety , depression  Why chosen therapy is appropriate versus another modality: relevant to diagnosis  Outcome monitoring methods: self-report  Therapeutic intervention type: insight oriented psychotherapy    Risk parameters:  Patient reports no suicidal ideation  Patient reports no homicidal ideation  Patient reports no self-injurious behavior  Patient reports no violent behavior    Verbal deficits: None    Patient's response to intervention:  The patient's response to intervention is motivated    Progress toward goals and other mental status changes:  The patient's progress toward goals is good    Diagnosis:   Anxiety, depression    Plan:  individual psychotherapy    Return to clinic: f/u as scheduled    Length of Service (minutes): 45      "

## 2025-07-16 NOTE — TELEPHONE ENCOUNTER
Looks like she may have changed her PCP, if this is the case please have them provide refills, if this is not the case please change PCP back and let me know

## 2025-07-16 NOTE — TELEPHONE ENCOUNTER
No care due was identified.  [facility] Embedded Care Due Messages. Reference number: [id number].   7/15/2025 9:21:51 PM CDT

## 2025-07-16 NOTE — TELEPHONE ENCOUNTER
Refill Encounter    PCP Visits: Recent Visits  Date Type Provider Dept   12/12/24 Office Visit Desiree Stephens MD [facility]   Showing recent visits within past 360 days and meeting all other requirements  Future Appointments  No visits were found meeting these conditions.  Showing future appointments within next 720 days and meeting all other requirements      Last 3 Blood Pressure:   BP Readings from Last 3 Encounters:   07/09/25 104/73   06/27/25 94/70   05/05/25 122/86     Preferred Pharmacy:   [id number] - [place] [address] AT [facility] & [address]  [place] [address]  Phone: [phone] Fax: [phone]    [facility] [address] [facility]  [address] [facility] [address]  Phone: [phone] Fax: [phone]    [facility] (New Address) - [address] AT Previously: [address]na Simmons[address]  [address] [place] [address]  Phone: [phone] Fax: [phone]    Requested RX:  Requested Prescriptions     Pending Prescriptions Disp Refills    LORazepam (ATIVAN) 0.5 MG tablet 15 tablet 0     Sig: Take 1 tablet (0.5 mg total) by mouth nightly as needed for Anxiety. Use only infrequently, can be addictive      RX Route: Normal

## 2025-07-18 RX ORDER — LORAZEPAM 0.5 MG/1
0.5 TABLET ORAL NIGHTLY PRN
Qty: 15 TABLET | Refills: 0 | Status: SHIPPED | OUTPATIENT
Start: 2025-07-18 | End: 2025-08-17

## 2025-07-22 ENCOUNTER — ANESTHESIA (OUTPATIENT)
Dept: ENDOSCOPY | Facility: HOSPITAL | Age: 55
End: 2025-07-22
Payer: COMMERCIAL

## 2025-07-22 ENCOUNTER — ANESTHESIA EVENT (OUTPATIENT)
Dept: ENDOSCOPY | Facility: HOSPITAL | Age: 55
End: 2025-07-22
Payer: COMMERCIAL

## 2025-07-22 ENCOUNTER — HOSPITAL ENCOUNTER (OUTPATIENT)
Facility: HOSPITAL | Age: 55
Discharge: HOME | End: 2025-07-22
Attending: INTERNAL MEDICINE | Admitting: INTERNAL MEDICINE
Payer: COMMERCIAL

## 2025-07-22 VITALS
DIASTOLIC BLOOD PRESSURE: 73 MMHG | HEART RATE: 71 BPM | RESPIRATION RATE: 22 BRPM | WEIGHT: 130 LBS | OXYGEN SATURATION: 99 % | SYSTOLIC BLOOD PRESSURE: 120 MMHG | TEMPERATURE: 97 F | BODY MASS INDEX: 22.2 KG/M2 | HEIGHT: 64 IN

## 2025-07-22 DIAGNOSIS — K21.9 GASTROESOPHAGEAL REFLUX DISEASE, UNSPECIFIED WHETHER ESOPHAGITIS PRESENT: ICD-10-CM

## 2025-07-22 DIAGNOSIS — K21.9 GERD (GASTROESOPHAGEAL REFLUX DISEASE): ICD-10-CM

## 2025-07-22 DIAGNOSIS — R13.10 DYSPHAGIA, UNSPECIFIED TYPE: ICD-10-CM

## 2025-07-22 PROCEDURE — 25000003 PHARM REV CODE 250: Performed by: INTERNAL MEDICINE

## 2025-07-22 PROCEDURE — 63600175 PHARM REV CODE 636 W HCPCS: Performed by: NURSE ANESTHETIST, CERTIFIED REGISTERED

## 2025-07-22 PROCEDURE — 37000009 HC ANESTHESIA EA ADD 15 MINS: Performed by: INTERNAL MEDICINE

## 2025-07-22 PROCEDURE — 88305 TISSUE EXAM BY PATHOLOGIST: CPT | Mod: 26,,, | Performed by: STUDENT IN AN ORGANIZED HEALTH CARE EDUCATION/TRAINING PROGRAM

## 2025-07-22 PROCEDURE — 88342 IMHCHEM/IMCYTCHM 1ST ANTB: CPT | Mod: 26,,, | Performed by: STUDENT IN AN ORGANIZED HEALTH CARE EDUCATION/TRAINING PROGRAM

## 2025-07-22 PROCEDURE — 88305 TISSUE EXAM BY PATHOLOGIST: CPT | Mod: TC,91 | Performed by: INTERNAL MEDICINE

## 2025-07-22 PROCEDURE — 37000008 HC ANESTHESIA 1ST 15 MINUTES: Performed by: INTERNAL MEDICINE

## 2025-07-22 PROCEDURE — 27201012 HC FORCEPS, HOT/COLD, DISP: Performed by: INTERNAL MEDICINE

## 2025-07-22 PROCEDURE — 43239 EGD BIOPSY SINGLE/MULTIPLE: CPT | Mod: ,,, | Performed by: INTERNAL MEDICINE

## 2025-07-22 PROCEDURE — 43239 EGD BIOPSY SINGLE/MULTIPLE: CPT | Performed by: INTERNAL MEDICINE

## 2025-07-22 RX ORDER — FENTANYL CITRATE 50 UG/ML
INJECTION, SOLUTION INTRAMUSCULAR; INTRAVENOUS
Status: DISCONTINUED | OUTPATIENT
Start: 2025-07-22 | End: 2025-07-22

## 2025-07-22 RX ORDER — PROPOFOL 10 MG/ML
VIAL (ML) INTRAVENOUS
Status: DISCONTINUED | OUTPATIENT
Start: 2025-07-22 | End: 2025-07-22

## 2025-07-22 RX ORDER — OXYCODONE HYDROCHLORIDE 5 MG/1
5 TABLET ORAL
Status: DISCONTINUED | OUTPATIENT
Start: 2025-07-22 | End: 2025-07-22 | Stop reason: HOSPADM

## 2025-07-22 RX ORDER — HALOPERIDOL LACTATE 5 MG/ML
0.5 INJECTION, SOLUTION INTRAMUSCULAR EVERY 10 MIN PRN
Status: DISCONTINUED | OUTPATIENT
Start: 2025-07-22 | End: 2025-07-22 | Stop reason: HOSPADM

## 2025-07-22 RX ORDER — LIDOCAINE HYDROCHLORIDE 20 MG/ML
INJECTION, SOLUTION EPIDURAL; INFILTRATION; INTRACAUDAL; PERINEURAL
Status: DISCONTINUED | OUTPATIENT
Start: 2025-07-22 | End: 2025-07-22

## 2025-07-22 RX ORDER — SODIUM CHLORIDE 9 MG/ML
INJECTION, SOLUTION INTRAVENOUS CONTINUOUS
Status: DISCONTINUED | OUTPATIENT
Start: 2025-07-22 | End: 2025-07-22 | Stop reason: HOSPADM

## 2025-07-22 RX ORDER — ONDANSETRON HYDROCHLORIDE 2 MG/ML
4 INJECTION, SOLUTION INTRAVENOUS DAILY PRN
Status: DISCONTINUED | OUTPATIENT
Start: 2025-07-22 | End: 2025-07-22 | Stop reason: HOSPADM

## 2025-07-22 RX ORDER — FENTANYL CITRATE 50 UG/ML
25 INJECTION, SOLUTION INTRAMUSCULAR; INTRAVENOUS EVERY 5 MIN PRN
Status: DISCONTINUED | OUTPATIENT
Start: 2025-07-22 | End: 2025-07-22 | Stop reason: HOSPADM

## 2025-07-22 RX ORDER — HYDROMORPHONE HYDROCHLORIDE 1 MG/ML
0.2 INJECTION, SOLUTION INTRAMUSCULAR; INTRAVENOUS; SUBCUTANEOUS EVERY 5 MIN PRN
Status: DISCONTINUED | OUTPATIENT
Start: 2025-07-22 | End: 2025-07-22 | Stop reason: HOSPADM

## 2025-07-22 RX ORDER — GLUCAGON 1 MG
1 KIT INJECTION
Status: DISCONTINUED | OUTPATIENT
Start: 2025-07-22 | End: 2025-07-22 | Stop reason: HOSPADM

## 2025-07-22 RX ADMIN — GLYCOPYRROLATE 0.2 MG: 0.2 INJECTION, SOLUTION INTRAMUSCULAR; INTRAVENOUS at 07:07

## 2025-07-22 RX ADMIN — PROPOFOL 10 MG: 10 INJECTION, EMULSION INTRAVENOUS at 07:07

## 2025-07-22 RX ADMIN — SODIUM CHLORIDE: 0.9 INJECTION, SOLUTION INTRAVENOUS at 07:07

## 2025-07-22 RX ADMIN — SODIUM CHLORIDE: 9 INJECTION, SOLUTION INTRAVENOUS at 06:07

## 2025-07-22 RX ADMIN — FENTANYL CITRATE 25 MCG: 50 INJECTION, SOLUTION INTRAMUSCULAR; INTRAVENOUS at 07:07

## 2025-07-22 RX ADMIN — PROPOFOL 60 MG: 10 INJECTION, EMULSION INTRAVENOUS at 07:07

## 2025-07-22 RX ADMIN — LIDOCAINE HYDROCHLORIDE 100 MG: 20 INJECTION, SOLUTION EPIDURAL; INFILTRATION; INTRACAUDAL; PERINEURAL at 07:07

## 2025-07-22 RX ADMIN — PROPOFOL 150 MCG/KG/MIN: 10 INJECTION, EMULSION INTRAVENOUS at 07:07

## 2025-07-22 NOTE — TRANSFER OF CARE
"Anesthesia Transfer of Care Note    Patient: Becky Cade    Procedure(s) Performed: Procedure(s) (LRB):  ESOPHAGOGASTRODUODENOSCOPY (EGD) (N/A)    Patient location: [facility]    Anesthesia Type: general    Transport from OR: Transported from OR on room air with adequate spontaneous ventilation    Post pain: adequate analgesia    Post assessment: no apparent anesthetic complications    Post vital signs: stable    Level of consciousness: awake and alert    Nausea/Vomiting: no nausea/vomiting    Complications: none    Transfer of care protocol was followed      Last vitals: Visit Vitals  /83 (BP Location: Left arm, Patient Position: Lying)   Pulse 75   Temp 36.7 °C (98.1 °F) (Temporal)   Resp 16   Ht 5' 4" (1.626 m)   Wt 59 kg (130 lb)   LMP 02/07/2023 (Approximate)   SpO2 97%   Breastfeeding No   BMI 22.31 kg/m²     "

## 2025-07-22 NOTE — PLAN OF CARE
Patient and patient's mother received discharge instructions and no prescriptions. Patient and patient's mother verbalized understanding of all instructions given and all questions were addressed prior to patient's discharge. Patient's vital signs are stable and within patient's baseline. Patient tolerated clear liquids PO. Patient denies pain. Patient denies nausea and vomiting at this time. Dr. YELENA Heller spoke to patient and patient's mother in post-op. Patient meets all criteria for discharge at this time.

## 2025-07-22 NOTE — ANESTHESIA PREPROCEDURE EVALUATION
07/22/2025  Becky Cade is a 55 y.o., female with GERD and dysphagia for EGD.      Pre-op Assessment    I have reviewed the Patient Summary Reports.          Review of Systems  Anesthesia Hx:    family h/o MH - brother              Social:  Non-Smoker       Hematology/Oncology:  Hematology Normal   Oncology Normal                                   EENT/Dental:   TMJ, good mouth opening          Pulmonary:  Pulmonary Normal                       Renal/:  Renal/ Normal                 Hepatic/GI:     GERD                Musculoskeletal:         Spine Disorders: cervical            Neurological:  Neurology Normal                                      Endocrine:   Hypothyroidism          Dermatological:  Skin Normal    Psych:   anxiety               Physical Exam  General: Alert and Oriented    Airway:  Mallampati: II / II  Mouth Opening: Normal  TM Distance: Normal  Tongue: Normal  Neck ROM: Normal ROM    Dental:  Intact    Chest/Lungs:  Clear to auscultation, Normal Respiratory Rate    Heart:  Rate: Normal  Rhythm: Regular Rhythm  Sounds: Normal    Anesthesia Plan  Type of Anesthesia, risks & benefits discussed:    Anesthesia Type: Gen Natural Airway  Intra-op Monitoring Plan: Standard ASA Monitors  Post Op Pain Control Plan: multimodal analgesia  Airway Plan: Direct  Informed Consent: Informed consent signed with the Patient and all parties understand the risks and agree with anesthesia plan.  All questions answered.   ASA Score: 2    Ready For Surgery From Anesthesia Perspective.   .

## 2025-07-22 NOTE — H&P
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/2012    PENNY III (cervical intraepithelial neoplasia III) 2009    Fever blister     GERD (gastroesophageal reflux disease)     Hypothyroid     Malignant hyperthermia     family history-brother    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    TMJ (temporomandibular joint disorder)        Past Surgical History:   Procedure Laterality Date    COLD KNIFE CONIZATION OF CERVIX  2009    KJB    COLONOSCOPY N/A 09/18/2020    Procedure: COLONOSCOPY;  Surgeon: Thaddeus Blackburn MD;  Location: [facility] ([address] [place] [address]);  Service: Endoscopy;  Laterality: N/A;  family history malignant hyperthermia  covid test [facility] urgent care 9/15    CYSTOSCOPY N/A 03/25/2022    Procedure: CYSTOSCOPY;  Surgeon: Brian Babcock MD;  Location: [facility] OR [address] [place] [address];  Service: OB/GYN;  Laterality: N/A;    INSERTION OF MIDURETHRAL SLING N/A 03/25/2022    Procedure: SLING, MIDURETHRAL;  Surgeon: Brian Babcock MD;  Location: [facility] OR [address] [place] [address];  Service: OB/GYN;  Laterality: N/A;    REDUCTION OF BOTH BREASTS  2025    TONSILLECTOMY  1993       Review of patient's allergies indicates:   Allergen Reactions    Buspar [buspirone] Other (See Comments)     dizziness    Mobic [meloxicam] Rash     Family History       Problem Relation (Age of Onset)    Arthritis Mother    Malignant hyperthermia Brother    Parkinsonism Father    Rheum arthritis Mother          Tobacco Use    Smoking status: Never    Smokeless tobacco: Never   Vaping Use    Vaping status: Never Used   Substance and  Sexual Activity    Alcohol use: Yes     Alcohol/week: 2.0 standard drinks of alcohol     Types: 2 Glasses of wine per week     Comment: socially on weekends    Drug use: No    Sexual activity: Yes     Partners: Male     Birth control/protection: Other-see comments     Comment: Apri Birth Control Pills     Review of Systems   Respiratory: Negative.     Cardiovascular: Negative.      Objective:     Vital Signs (Most Recent):  Temp: 98.1 °F (36.7 °C) (07/22/25 0642)  Pulse: 75 (07/22/25 0642)  Resp: 16 (07/22/25 0642)  BP: 121/83 (07/22/25 0642)  SpO2: 97 % (07/22/25 0642) Vital Signs (24h Range):  Temp:  [98.1 °F (36.7 °C)] 98.1 °F (36.7 °C)  Pulse:  [75] 75  Resp:  [16] 16  SpO2:  [97 %] 97 %  BP: (121)/(83) 121/83     Weight: 59 kg (130 lb) (07/22/25 0642)  Body mass index is 22.31 kg/m².    No intake or output data in the 24 hours ending 07/22/25 0700    Lines/Drains/Airways       Peripheral Intravenous Line  Duration             Peripheral IV Single Lumen 07/22/25 0640 22 G Posterior;Right Wrist <1 day                    Physical Exam  Cardiovascular:      Rate and Rhythm: Normal rate and regular rhythm.   Pulmonary:      Effort: Pulmonary effort is normal.      Breath sounds: Normal breath sounds.         Significant Labs:      Significant Imaging:      Assessment/Plan:     Indication for procedure:    ASA:III  Airway normal  Malampati class:    Personal and family history negative for anesthesia problems    Plan:egd  Anesthesia plan: general      This SmartLink must be used in the context of an imaging procedure      Kelvin Heller MD  Gastroenterology  [facility] - Endoscopy

## 2025-07-22 NOTE — PLAN OF CARE
Patient arrived from ENDO.  Patient stable.  Report received at this time from ENDO RN and CRNA.  Assumed care of patient at this time.

## 2025-07-22 NOTE — PROVATION PATIENT INSTRUCTIONS
Discharge Summary/Instructions after an Endoscopic Procedure  Patient Name: Becky Cade  Patient MRN: [id number]  Patient YOB: 1970 Tuesday, July 22, 2025  Kelvin Heller MD  Dear patient,  As a result of recent federal legislation (The Federal Cures Act), you may   receive lab or pathology results from your procedure in your [facility]   account before your physician is able to contact you. Your physician or   their representative will relay the results to you with their   recommendations at their soonest availability.  Thank you,  RESTRICTIONS:  During your procedure today, you received medications for sedation.  These   medications may affect your judgment, balance and coordination.  Therefore,   for 24 hours, you have the following restrictions:   - DO NOT drive a car, operate machinery, make legal/financial decisions,   sign important papers or drink alcohol.    ACTIVITY:  Today: no heavy lifting, straining or running due to procedural   sedation/anesthesia.  The following day: return to full activity including work.  DIET:  Eat and drink normally unless instructed otherwise.     TREATMENT FOR COMMON SIDE EFFECTS:  - Mild abdominal pain, nausea, belching, bloating or excessive gas:  rest,   eat lightly and use a heating pad.  - Sore Throat: treat with throat lozenges and/or gargle with warm salt   water.  - Because air was used during the procedure, expelling large amounts of air   from your rectum or belching is normal.  - If a bowel prep was taken, you may not have a bowel movement for 1-3 days.    This is normal.  SYMPTOMS TO WATCH FOR AND REPORT TO YOUR PHYSICIAN:  1. Abdominal pain or bloating, other than gas cramps.  2. Chest pain.  3. Back pain.  4. Signs of infection such as: chills or fever occurring within 24 hours   after the procedure.  5. Rectal bleeding, which would show as bright red, maroon, or black stools.   (A tablespoon of blood from the rectum is not serious, especially if    hemorrhoids are present.)  6. Vomiting.  7. Weakness or dizziness.  GO DIRECTLY TO THE NEAREST EMERGENCY ROOM IF YOU HAVE ANY OF THE FOLLOWING:      Difficulty breathing              Chills and/or fever over 101 F   Persistent vomiting and/or vomiting blood   Severe abdominal pain   Severe chest pain   Black, tarry stools   Bleeding- more than one tablespoon   Any other symptom or condition that you feel may need urgent attention  Your doctor recommends these additional instructions:  If any biopsies were taken, your doctors clinic will contact you in 1 to 2   weeks with any results.  - Patient has a contact number available for emergencies.  The signs and   symptoms of potential delayed complications were discussed with the   patient.  Return to normal activities tomorrow.  Written discharge   instructions were provided to the patient.   - Discharge patient to home (ambulatory).   - Resume previous diet.   - Continue present medications.   - Return to GI clinic at appointment to be scheduled.   for now, would increase the pantoprazole to twice daily, but f/u in GI   clinic, consider gastric emptying study and barium swallow to start,   possible esophageal manometry after that  For questions, problems or results please call your physician - Kelvin Heller MD at Work:  [phone].  [facility], EMERGENCY ROOM PHONE NUMBER: [phone]  IF A COMPLICATION OR EMERGENCY SITUATION ARISES AND YOU ARE UNABLE TO REACH   YOUR PHYSICIAN - GO DIRECTLY TO THE EMERGENCY ROOM.  Kelvin Heller MD  7/22/2025 7:26:05 AM  This report has been verified and signed electronically.  Dear patient,  As a result of recent federal legislation (The Federal Cures Act), you may   receive lab or pathology results from your procedure in your [facility]   account before your physician is able to contact you. Your physician or   their representative will relay the results to you with their   recommendations at their soonest  availability.  Thank you,  PROVATION

## 2025-07-23 ENCOUNTER — OFFICE VISIT (OUTPATIENT)
Dept: PSYCHIATRY | Facility: CLINIC | Age: 55
End: 2025-07-23
Payer: COMMERCIAL

## 2025-07-23 DIAGNOSIS — F41.9 ANXIETY: Primary | ICD-10-CM

## 2025-07-23 DIAGNOSIS — F32.A DEPRESSION, UNSPECIFIED DEPRESSION TYPE: ICD-10-CM

## 2025-07-23 PROCEDURE — 99999 PR PBB SHADOW E&M-EST. PATIENT-LVL I: CPT | Mod: PBBFAC,,, | Performed by: SOCIAL WORKER

## 2025-07-23 PROCEDURE — 90834 PSYTX W PT 45 MINUTES: CPT | Mod: S$GLB,,, | Performed by: SOCIAL WORKER

## 2025-07-23 NOTE — ANESTHESIA POSTPROCEDURE EVALUATION
Anesthesia Post Evaluation    Patient: Becky Cade    Procedure(s) Performed: Procedure(s) (LRB):  ESOPHAGOGASTRODUODENOSCOPY (EGD) (N/A)    Final Anesthesia Type: general      Patient location during evaluation: PACU  Patient participation: Yes- Able to Participate  Level of consciousness: awake and alert  Post-procedure vital signs: reviewed and stable  Pain management: adequate  Airway patency: patent    PONV status: None or treated.  Anesthetic complications: no      Cardiovascular status: hemodynamically stable  Respiratory status: spontaneous ventilation  Hydration status: euvolemic  Follow-up not needed.          Vitals Value Taken Time   /73 07/22/25 08:02   Temp 36.3 °C (97.3 °F) 07/22/25 08:00   Pulse 73 07/22/25 08:06   Resp 34 07/22/25 08:05   SpO2 99 % 07/22/25 08:06   Vitals shown include unfiled device data.      No case tracking events are documented in the log.      Pain/Lorenza Score: Lorenza Score: 10 (7/22/2025  7:45 AM)

## 2025-07-23 NOTE — PROGRESS NOTES
Notes:     Individual Psychotherapy (PhD/LCSW)    7/5/2023    Site: [place]    Therapeutic Intervention: Met with patient alone  Outpatient - Insight oriented psychotherapy 45 min - CPT code 79920    Chief complaint/reason for encounter: anxiety, depression     Interval history and content of current session: Pt started a trauma-informed yoga class, discuss value of helping her calm her body. She had a good heart to heart with mom and felt heard and validated, but is aware that mom slips back into her persona and her rules. Pt more able to see the family dysfunction and validate her perceptions. Court got continued again, and pt extremely anxious about M coming to town for scheduled visitation with dog.     Treatment plan:  Target symptoms: anxiety , depression  Why chosen therapy is appropriate versus another modality: relevant to diagnosis  Outcome monitoring methods: self-report  Therapeutic intervention type: insight oriented psychotherapy    Risk parameters:  Patient reports no suicidal ideation  Patient reports no homicidal ideation  Patient reports no self-injurious behavior  Patient reports no violent behavior    Verbal deficits: None    Patient's response to intervention:  The patient's response to intervention is motivated    Progress toward goals and other mental status changes:  The patient's progress toward goals is fair    Diagnosis:   Anxiety, depression    Plan:  individual psychotherapy    Return to clinic: f/u as scheduled    Length of Service (minutes): 45

## 2025-07-24 ENCOUNTER — PATIENT MESSAGE (OUTPATIENT)
Dept: GASTROENTEROLOGY | Facility: HOSPITAL | Age: 55
End: 2025-07-24
Payer: COMMERCIAL

## 2025-07-24 ENCOUNTER — PATIENT MESSAGE (OUTPATIENT)
Dept: OPTOMETRY | Facility: CLINIC | Age: 55
End: 2025-07-24
Payer: COMMERCIAL

## 2025-07-24 LAB
ESTROGEN SERPL-MCNC: NORMAL PG/ML
INSULIN SERPL-ACNC: NORMAL U[IU]/ML
LAB AP CLINICAL INFORMATION: NORMAL
LAB AP GROSS DESCRIPTION: NORMAL
LAB AP PERFORMING LOCATION(S): NORMAL
LAB AP REPORT FOOTNOTES: NORMAL
T3RU NFR SERPL: NORMAL %

## 2025-07-24 RX ORDER — ESTRADIOL 0.25 MG/.25G
GEL TOPICAL
Qty: 30 PACKET | Refills: 11 | Status: SHIPPED | OUTPATIENT
Start: 2025-07-24

## 2025-07-30 ENCOUNTER — OFFICE VISIT (OUTPATIENT)
Dept: PSYCHIATRY | Facility: CLINIC | Age: 55
End: 2025-07-30
Payer: COMMERCIAL

## 2025-07-30 DIAGNOSIS — F32.A DEPRESSION, UNSPECIFIED DEPRESSION TYPE: ICD-10-CM

## 2025-07-30 DIAGNOSIS — F41.9 ANXIETY: Primary | ICD-10-CM

## 2025-07-30 PROCEDURE — 90834 PSYTX W PT 45 MINUTES: CPT | Mod: S$GLB,,, | Performed by: SOCIAL WORKER

## 2025-07-30 PROCEDURE — 99999 PR PBB SHADOW E&M-EST. PATIENT-LVL I: CPT | Mod: PBBFAC,,, | Performed by: SOCIAL WORKER

## 2025-07-30 NOTE — PROGRESS NOTES
Notes:     Individual Psychotherapy (PhD/LCSW)    7/5/2023    Site: [place]    Therapeutic Intervention: Met with patient alone  Outpatient - Insight oriented psychotherapy 45 min - CPT code 41084    Chief complaint/reason for encounter: anxiety, depression     Interval history and content of current session: Pt feeling overwhelmed since attending second trauma-informed yoga class, struggling to understand  is using, triggering old feelings of learning issues in school, and feeling overwhelmed by feelings that are flooding her as she gets in touch with her body without full awareness of how to shut things down and ground herself. Pt shares dreams of drowning. Ask pt to contact the teacher to discuss her experience, offer to speak with the teacher, teach pt specific ways to close off triggered affect and clarify messages from teacher's worksheet.     Treatment plan:  Target symptoms: anxiety , depression  Why chosen therapy is appropriate versus another modality: relevant to diagnosis  Outcome monitoring methods: self-report  Therapeutic intervention type: insight oriented psychotherapy    Risk parameters:  Patient reports no suicidal ideation  Patient reports no homicidal ideation  Patient reports no self-injurious behavior  Patient reports no violent behavior    Verbal deficits: None    Patient's response to intervention:  The patient's response to intervention is motivated    Progress toward goals and other mental status changes:  The patient's progress toward goals is fair    Diagnosis:   Anxiety, depression    Plan:  individual psychotherapy    Return to clinic: f/u as scheduled    Length of Service (minutes): 45

## 2025-08-05 DIAGNOSIS — R13.10 DYSPHAGIA, UNSPECIFIED TYPE: ICD-10-CM

## 2025-08-05 DIAGNOSIS — R07.89 BURNING CHEST PAIN: ICD-10-CM

## 2025-08-05 RX ORDER — SUCRALFATE 1 G/10ML
1 SUSPENSION ORAL 4 TIMES DAILY
Qty: 473 ML | Refills: 1 | Status: SHIPPED | OUTPATIENT
Start: 2025-08-05

## 2025-08-05 NOTE — TELEPHONE ENCOUNTER
Refill Routing Note   Medication(s) are not appropriate for processing by [facility] for the following reason(s):        Outside of protocol    [facility] action(s):  Route             Appointments  past 12m or future 3m with PCP    Date Provider   Last Visit   6/27/2025 Danny Gunn MD   Next Visit   10/6/2025 Danny Gunn MD   ED visits in past 90 days: 0        Note composed:11:37 AM 08/05/2025

## 2025-08-06 ENCOUNTER — OFFICE VISIT (OUTPATIENT)
Dept: PSYCHIATRY | Facility: CLINIC | Age: 55
End: 2025-08-06
Payer: COMMERCIAL

## 2025-08-06 DIAGNOSIS — F32.A DEPRESSION, UNSPECIFIED DEPRESSION TYPE: ICD-10-CM

## 2025-08-06 DIAGNOSIS — F41.9 ANXIETY: Primary | ICD-10-CM

## 2025-08-06 PROCEDURE — 90834 PSYTX W PT 45 MINUTES: CPT | Mod: S$GLB,,, | Performed by: SOCIAL WORKER

## 2025-08-06 PROCEDURE — 99999 PR PBB SHADOW E&M-EST. PATIENT-LVL I: CPT | Mod: PBBFAC,,, | Performed by: SOCIAL WORKER

## 2025-08-06 NOTE — PROGRESS NOTES
"  Notes:     Individual Psychotherapy (PhD/LCSW)    7/5/2023    Site: [place]    Therapeutic Intervention: Met with patient alone  Outpatient - Insight oriented psychotherapy 45 min - CPT code 71393    Chief complaint/reason for encounter: anxiety, depression     Interval history and content of current session: Pt quit yoga class, aware it was not a safe environment for her, continues to work on self-soothing. Discuss feeling guilty when she "leans on God" for support. Suggest she talk with  at her [employer], validate usefulness of feeling that support instead of looking for a man to make her feel safe. Discuss bedtime routine since she is having trouble falling asleep even with Ativan.     Treatment plan:  Target symptoms: anxiety , depression  Why chosen therapy is appropriate versus another modality: relevant to diagnosis  Outcome monitoring methods: self-report  Therapeutic intervention type: insight oriented psychotherapy    Risk parameters:  Patient reports no suicidal ideation  Patient reports no homicidal ideation  Patient reports no self-injurious behavior  Patient reports no violent behavior    Verbal deficits: None    Patient's response to intervention:  The patient's response to intervention is motivated    Progress toward goals and other mental status changes:  The patient's progress toward goals is fair    Diagnosis:   Anxiety, depression    Plan:  individual psychotherapy    Return to clinic: f/u as scheduled    Length of Service (minutes): 45      "

## 2025-08-13 ENCOUNTER — OFFICE VISIT (OUTPATIENT)
Dept: PSYCHIATRY | Facility: CLINIC | Age: 55
End: 2025-08-13
Payer: COMMERCIAL

## 2025-08-13 DIAGNOSIS — F32.A DEPRESSION, UNSPECIFIED DEPRESSION TYPE: ICD-10-CM

## 2025-08-13 DIAGNOSIS — F41.9 ANXIETY: Primary | ICD-10-CM

## 2025-08-13 PROCEDURE — 90834 PSYTX W PT 45 MINUTES: CPT | Mod: S$GLB,,, | Performed by: SOCIAL WORKER

## 2025-08-13 PROCEDURE — 99999 PR PBB SHADOW E&M-EST. PATIENT-LVL I: CPT | Mod: PBBFAC,,, | Performed by: SOCIAL WORKER

## 2025-08-19 ENCOUNTER — PATIENT MESSAGE (OUTPATIENT)
Dept: OBSTETRICS AND GYNECOLOGY | Facility: CLINIC | Age: 55
End: 2025-08-19
Payer: COMMERCIAL

## 2025-08-20 ENCOUNTER — OFFICE VISIT (OUTPATIENT)
Dept: PSYCHIATRY | Facility: CLINIC | Age: 55
End: 2025-08-20
Payer: COMMERCIAL

## 2025-08-20 DIAGNOSIS — F32.A DEPRESSION, UNSPECIFIED DEPRESSION TYPE: ICD-10-CM

## 2025-08-20 DIAGNOSIS — F41.9 ANXIETY: Primary | ICD-10-CM

## 2025-08-20 PROCEDURE — 99999 PR PBB SHADOW E&M-EST. PATIENT-LVL I: CPT | Mod: PBBFAC,,, | Performed by: SOCIAL WORKER

## 2025-08-20 PROCEDURE — 90834 PSYTX W PT 45 MINUTES: CPT | Mod: S$GLB,,, | Performed by: SOCIAL WORKER

## 2025-08-27 ENCOUNTER — OFFICE VISIT (OUTPATIENT)
Dept: PSYCHIATRY | Facility: CLINIC | Age: 55
End: 2025-08-27
Payer: COMMERCIAL

## 2025-08-27 DIAGNOSIS — F41.9 ANXIETY: Primary | ICD-10-CM

## 2025-08-27 DIAGNOSIS — F32.A DEPRESSION, UNSPECIFIED DEPRESSION TYPE: ICD-10-CM

## 2025-08-27 PROCEDURE — 90834 PSYTX W PT 45 MINUTES: CPT | Mod: S$GLB,,, | Performed by: SOCIAL WORKER

## 2025-08-27 PROCEDURE — 99999 PR PBB SHADOW E&M-EST. PATIENT-LVL I: CPT | Mod: PBBFAC,,, | Performed by: SOCIAL WORKER

## 2025-09-01 RX ORDER — PROGESTERONE 100 MG/1
CAPSULE ORAL
Qty: 90 CAPSULE | Refills: 0 | Status: SHIPPED | OUTPATIENT
Start: 2025-09-01

## 2025-09-03 ENCOUNTER — OFFICE VISIT (OUTPATIENT)
Dept: PSYCHIATRY | Facility: CLINIC | Age: 55
End: 2025-09-03
Payer: COMMERCIAL

## 2025-09-03 DIAGNOSIS — F41.9 ANXIETY: Primary | ICD-10-CM

## 2025-09-03 DIAGNOSIS — F32.A DEPRESSION, UNSPECIFIED DEPRESSION TYPE: ICD-10-CM

## 2025-09-03 PROCEDURE — 99999 PR PBB SHADOW E&M-EST. PATIENT-LVL I: CPT | Mod: PBBFAC,,, | Performed by: SOCIAL WORKER

## 2025-09-03 PROCEDURE — 90834 PSYTX W PT 45 MINUTES: CPT | Mod: S$GLB,,, | Performed by: SOCIAL WORKER

## (undated) DEVICE — DRAPE STERI INSTRUMENT 1018

## (undated) DEVICE — GOWN SURGICAL X-LARGE

## (undated) DEVICE — LUBRICANT SURGILUBE 2 OZ

## (undated) DEVICE — SUT MONOCRYL 4-0 PS-2

## (undated) DEVICE — TOWEL OR DISP STRL BLUE 4/PK

## (undated) DEVICE — SUT VICRYL 2-0 SH VCP317H

## (undated) DEVICE — TRAY FOLEY 16FR INFECTION CONT

## (undated) DEVICE — SUT PDS II 2-0 CT-2 VIL

## (undated) DEVICE — SET IRR URLGY 2LINE UNIV SPIKE

## (undated) DEVICE — TRAY MINOR GEN SURG

## (undated) DEVICE — ADHESIVE DERMABOND ADVANCED

## (undated) DEVICE — COVER LIGHT HANDLE 80/CA

## (undated) DEVICE — CLIPPER BLADE MOD 4406 (CAREF)

## (undated) DEVICE — SYR 10CC LUER LOCK

## (undated) DEVICE — SUT 2-0 VICRYL / SH (J417)

## (undated) DEVICE — SUT CTD VICRYL 0 VIL BR/CT

## (undated) DEVICE — ELECTRODE REM PLYHSV RETURN 9

## (undated) DEVICE — SUT MCRYL PLUS 4-0 PS2 27IN

## (undated) DEVICE — TUBING SUC UNIV W/CONN 12FT

## (undated) DEVICE — SPONGE DERMACEA GAUZE 4X4

## (undated) DEVICE — SEE MEDLINE ITEM 157181

## (undated) DEVICE — ADHESIVE MASTISOL VIAL 48/BX

## (undated) DEVICE — PACK LAPSCP/PELVSCPY III TIBRN

## (undated) DEVICE — SLING FIT ADVANTAGE: Type: IMPLANTABLE DEVICE | Site: BLADDER | Status: NON-FUNCTIONAL

## (undated) DEVICE — SOL IRR WATER STRL 3000 ML

## (undated) DEVICE — SEE MEDLINE ITEM 154981